# Patient Record
Sex: MALE | Race: BLACK OR AFRICAN AMERICAN | Employment: OTHER | URBAN - METROPOLITAN AREA
[De-identification: names, ages, dates, MRNs, and addresses within clinical notes are randomized per-mention and may not be internally consistent; named-entity substitution may affect disease eponyms.]

---

## 2018-02-28 ENCOUNTER — OFFICE VISIT (OUTPATIENT)
Dept: CARDIOLOGY CLINIC | Age: 68
End: 2018-02-28

## 2018-02-28 DIAGNOSIS — R94.31 ABNORMAL EKG: Primary | ICD-10-CM

## 2018-02-28 NOTE — PROGRESS NOTES
This is the first office visit here for Mr. Papo Trevizo since 2015. He is a hypertensive and diabetic being followed by Dr. Gagan Elizabeth.     The patient did not keep this appointment

## 2018-03-19 RX ORDER — PEN NEEDLE, DIABETIC 32GX 5/32"
NEEDLE, DISPOSABLE MISCELLANEOUS
Refills: 5 | Status: ON HOLD | COMMUNITY
Start: 2018-01-14 | End: 2019-11-05

## 2018-03-19 RX ORDER — DIPHENOXYLATE HYDROCHLORIDE AND ATROPINE SULFATE 2.5; .025 MG/1; MG/1
TABLET ORAL
Refills: 1 | COMMUNITY
Start: 2018-01-08 | End: 2019-09-05

## 2018-03-19 RX ORDER — INSULIN ASPART 100 [IU]/ML
INJECTION, SUSPENSION SUBCUTANEOUS
Refills: 6 | COMMUNITY
Start: 2018-01-13 | End: 2018-03-19 | Stop reason: ALTCHOICE

## 2018-03-19 RX ORDER — CLONIDINE HYDROCHLORIDE 0.1 MG/1
0.2 TABLET ORAL 3 TIMES DAILY
Refills: 6 | COMMUNITY
Start: 2018-02-10 | End: 2019-09-08 | Stop reason: DRUGHIGH

## 2018-03-19 RX ORDER — AMLODIPINE BESYLATE 5 MG/1
TABLET ORAL
Refills: 6 | COMMUNITY
Start: 2018-02-10 | End: 2018-03-19 | Stop reason: ALTCHOICE

## 2019-01-23 ENCOUNTER — HOSPITAL ENCOUNTER (OUTPATIENT)
Age: 69
Setting detail: OBSERVATION
Discharge: HOME | End: 2019-01-24
Attending: EMERGENCY MEDICINE | Admitting: GENERAL ACUTE CARE HOSPITAL
Payer: MEDICARE

## 2019-01-23 DIAGNOSIS — E16.2 HYPOGLYCEMIA: Primary | ICD-10-CM

## 2019-01-23 DIAGNOSIS — N17.9 AKI (ACUTE KIDNEY INJURY) (HCC): ICD-10-CM

## 2019-01-23 LAB
ALBUMIN SERPL-MCNC: 3 G/DL (ref 3.5–5)
ALBUMIN/GLOB SERPL: 0.6 {RATIO} (ref 1.1–2.2)
ALP SERPL-CCNC: 71 U/L (ref 45–117)
ALT SERPL-CCNC: 49 U/L (ref 12–78)
ANION GAP SERPL CALC-SCNC: 7 MMOL/L (ref 5–15)
AST SERPL-CCNC: 47 U/L (ref 15–37)
BASOPHILS # BLD: 0 K/UL (ref 0–0.1)
BASOPHILS NFR BLD: 0 % (ref 0–1)
BILIRUB SERPL-MCNC: 0.6 MG/DL (ref 0.2–1)
BUN SERPL-MCNC: 33 MG/DL (ref 6–20)
BUN/CREAT SERPL: 14 (ref 12–20)
CALCIUM SERPL-MCNC: 8.9 MG/DL (ref 8.5–10.1)
CHLORIDE SERPL-SCNC: 109 MMOL/L (ref 97–108)
CO2 SERPL-SCNC: 25 MMOL/L (ref 21–32)
CREAT SERPL-MCNC: 2.29 MG/DL (ref 0.7–1.3)
DIFFERENTIAL METHOD BLD: ABNORMAL
EOSINOPHIL # BLD: 0 K/UL (ref 0–0.4)
EOSINOPHIL NFR BLD: 0 % (ref 0–7)
ERYTHROCYTE [DISTWIDTH] IN BLOOD BY AUTOMATED COUNT: 13.5 % (ref 11.5–14.5)
GLOBULIN SER CALC-MCNC: 5 G/DL (ref 2–4)
GLUCOSE BLD STRIP.AUTO-MCNC: 101 MG/DL (ref 65–100)
GLUCOSE BLD STRIP.AUTO-MCNC: 147 MG/DL (ref 65–100)
GLUCOSE BLD STRIP.AUTO-MCNC: 151 MG/DL (ref 65–100)
GLUCOSE BLD STRIP.AUTO-MCNC: 53 MG/DL (ref 65–100)
GLUCOSE BLD STRIP.AUTO-MCNC: 78 MG/DL (ref 65–100)
GLUCOSE BLD STRIP.AUTO-MCNC: 88 MG/DL (ref 65–100)
GLUCOSE SERPL-MCNC: 90 MG/DL (ref 65–100)
HCT VFR BLD AUTO: 32.8 % (ref 36.6–50.3)
HGB BLD-MCNC: 10.5 G/DL (ref 12.1–17)
IMM GRANULOCYTES # BLD AUTO: 0 K/UL (ref 0–0.04)
IMM GRANULOCYTES NFR BLD AUTO: 0 % (ref 0–0.5)
LYMPHOCYTES # BLD: 0.6 K/UL (ref 0.8–3.5)
LYMPHOCYTES NFR BLD: 11 % (ref 12–49)
MCH RBC QN AUTO: 30 PG (ref 26–34)
MCHC RBC AUTO-ENTMCNC: 32 G/DL (ref 30–36.5)
MCV RBC AUTO: 93.7 FL (ref 80–99)
MONOCYTES # BLD: 0.3 K/UL (ref 0–1)
MONOCYTES NFR BLD: 5 % (ref 5–13)
NEUTS SEG # BLD: 4.6 K/UL (ref 1.8–8)
NEUTS SEG NFR BLD: 84 % (ref 32–75)
NRBC # BLD: 0 K/UL (ref 0–0.01)
NRBC BLD-RTO: 0 PER 100 WBC
PLATELET # BLD AUTO: 235 K/UL (ref 150–400)
PMV BLD AUTO: 10.6 FL (ref 8.9–12.9)
POTASSIUM SERPL-SCNC: 3.4 MMOL/L (ref 3.5–5.1)
PROT SERPL-MCNC: 8 G/DL (ref 6.4–8.2)
RBC # BLD AUTO: 3.5 M/UL (ref 4.1–5.7)
RBC MORPH BLD: ABNORMAL
SERVICE CMNT-IMP: ABNORMAL
SERVICE CMNT-IMP: NORMAL
SERVICE CMNT-IMP: NORMAL
SODIUM SERPL-SCNC: 141 MMOL/L (ref 136–145)
TROPONIN I SERPL-MCNC: <0.05 NG/ML
WBC # BLD AUTO: 5.5 K/UL (ref 4.1–11.1)

## 2019-01-23 PROCEDURE — 82962 GLUCOSE BLOOD TEST: CPT

## 2019-01-23 PROCEDURE — 99218 HC RM OBSERVATION: CPT

## 2019-01-23 PROCEDURE — 93005 ELECTROCARDIOGRAM TRACING: CPT

## 2019-01-23 PROCEDURE — 96365 THER/PROPH/DIAG IV INF INIT: CPT

## 2019-01-23 PROCEDURE — 80053 COMPREHEN METABOLIC PANEL: CPT

## 2019-01-23 PROCEDURE — 74011250636 HC RX REV CODE- 250/636: Performed by: NURSE PRACTITIONER

## 2019-01-23 PROCEDURE — 99285 EMERGENCY DEPT VISIT HI MDM: CPT

## 2019-01-23 PROCEDURE — 85025 COMPLETE CBC W/AUTO DIFF WBC: CPT

## 2019-01-23 PROCEDURE — 96366 THER/PROPH/DIAG IV INF ADDON: CPT

## 2019-01-23 PROCEDURE — 96374 THER/PROPH/DIAG INJ IV PUSH: CPT

## 2019-01-23 PROCEDURE — 96361 HYDRATE IV INFUSION ADD-ON: CPT

## 2019-01-23 PROCEDURE — 74011000258 HC RX REV CODE- 258: Performed by: NURSE PRACTITIONER

## 2019-01-23 PROCEDURE — 74011000250 HC RX REV CODE- 250

## 2019-01-23 PROCEDURE — 96375 TX/PRO/DX INJ NEW DRUG ADDON: CPT

## 2019-01-23 PROCEDURE — 36415 COLL VENOUS BLD VENIPUNCTURE: CPT

## 2019-01-23 PROCEDURE — 84484 ASSAY OF TROPONIN QUANT: CPT

## 2019-01-23 RX ORDER — DEXTROSE 50 % IN WATER (D50W) INTRAVENOUS SYRINGE
50
Status: COMPLETED | OUTPATIENT
Start: 2019-01-23 | End: 2019-01-23

## 2019-01-23 RX ORDER — MAGNESIUM SULFATE 100 %
4 CRYSTALS MISCELLANEOUS AS NEEDED
Status: DISCONTINUED | OUTPATIENT
Start: 2019-01-23 | End: 2019-01-24 | Stop reason: HOSPADM

## 2019-01-23 RX ORDER — AMLODIPINE BESYLATE 5 MG/1
10 TABLET ORAL DAILY
Status: DISCONTINUED | OUTPATIENT
Start: 2019-01-24 | End: 2019-01-24 | Stop reason: HOSPADM

## 2019-01-23 RX ORDER — DEXTROSE 50 % IN WATER (D50W) INTRAVENOUS SYRINGE
12.5-25 AS NEEDED
Status: DISCONTINUED | OUTPATIENT
Start: 2019-01-23 | End: 2019-01-24 | Stop reason: HOSPADM

## 2019-01-23 RX ORDER — ASPIRIN 81 MG/1
81 TABLET ORAL DAILY
Status: DISCONTINUED | OUTPATIENT
Start: 2019-01-24 | End: 2019-01-24 | Stop reason: HOSPADM

## 2019-01-23 RX ORDER — SODIUM CHLORIDE 0.9 % (FLUSH) 0.9 %
5-40 SYRINGE (ML) INJECTION AS NEEDED
Status: DISCONTINUED | OUTPATIENT
Start: 2019-01-23 | End: 2019-01-24 | Stop reason: HOSPADM

## 2019-01-23 RX ORDER — INSULIN LISPRO 100 [IU]/ML
INJECTION, SOLUTION INTRAVENOUS; SUBCUTANEOUS
Status: DISCONTINUED | OUTPATIENT
Start: 2019-01-24 | End: 2019-01-24 | Stop reason: HOSPADM

## 2019-01-23 RX ORDER — HYDRALAZINE HYDROCHLORIDE 20 MG/ML
20 INJECTION INTRAMUSCULAR; INTRAVENOUS
Status: DISCONTINUED | OUTPATIENT
Start: 2019-01-23 | End: 2019-01-24 | Stop reason: HOSPADM

## 2019-01-23 RX ORDER — SODIUM CHLORIDE 0.9 % (FLUSH) 0.9 %
5-40 SYRINGE (ML) INJECTION EVERY 8 HOURS
Status: DISCONTINUED | OUTPATIENT
Start: 2019-01-24 | End: 2019-01-24 | Stop reason: HOSPADM

## 2019-01-23 RX ORDER — HEPARIN SODIUM 5000 [USP'U]/ML
5000 INJECTION, SOLUTION INTRAVENOUS; SUBCUTANEOUS EVERY 8 HOURS
Status: DISCONTINUED | OUTPATIENT
Start: 2019-01-24 | End: 2019-01-24 | Stop reason: HOSPADM

## 2019-01-23 RX ORDER — DEXTROSE 50 % IN WATER (D50W) INTRAVENOUS SYRINGE
Status: COMPLETED
Start: 2019-01-23 | End: 2019-01-23

## 2019-01-23 RX ADMIN — DEXTROSE MONOHYDRATE: 10 INJECTION, SOLUTION INTRAVENOUS at 22:25

## 2019-01-23 RX ADMIN — DEXTROSE 50 % IN WATER (D50W) INTRAVENOUS SYRINGE 25 G: at 17:54

## 2019-01-23 RX ADMIN — DEXTROSE MONOHYDRATE 25 G: 500 INJECTION PARENTERAL at 17:54

## 2019-01-24 VITALS
TEMPERATURE: 98.1 F | HEIGHT: 70 IN | SYSTOLIC BLOOD PRESSURE: 168 MMHG | HEART RATE: 80 BPM | DIASTOLIC BLOOD PRESSURE: 96 MMHG | OXYGEN SATURATION: 98 % | RESPIRATION RATE: 18 BRPM | BODY MASS INDEX: 27.2 KG/M2 | WEIGHT: 190 LBS

## 2019-01-24 LAB
APPEARANCE UR: ABNORMAL
ATRIAL RATE: 57 BPM
BACTERIA URNS QL MICRO: NEGATIVE /HPF
BILIRUB UR QL: NEGATIVE
CALCULATED P AXIS, ECG09: 30 DEGREES
CALCULATED R AXIS, ECG10: 27 DEGREES
CALCULATED T AXIS, ECG11: -54 DEGREES
CHOLEST SERPL-MCNC: 144 MG/DL
COLOR UR: ABNORMAL
CREAT UR-MCNC: 94.8 MG/DL
DIAGNOSIS, 93000: NORMAL
EPITH CASTS URNS QL MICRO: ABNORMAL /LPF
EST. AVERAGE GLUCOSE BLD GHB EST-MCNC: 134 MG/DL
GLUCOSE BLD STRIP.AUTO-MCNC: 108 MG/DL (ref 65–100)
GLUCOSE BLD STRIP.AUTO-MCNC: 125 MG/DL (ref 65–100)
GLUCOSE BLD STRIP.AUTO-MCNC: 139 MG/DL (ref 65–100)
GLUCOSE BLD STRIP.AUTO-MCNC: 160 MG/DL (ref 65–100)
GLUCOSE BLD STRIP.AUTO-MCNC: 167 MG/DL (ref 65–100)
GLUCOSE BLD STRIP.AUTO-MCNC: 178 MG/DL (ref 65–100)
GLUCOSE BLD STRIP.AUTO-MCNC: 95 MG/DL (ref 65–100)
GLUCOSE UR STRIP.AUTO-MCNC: 100 MG/DL
HBA1C MFR BLD: 6.3 % (ref 4.2–6.3)
HDLC SERPL-MCNC: 38 MG/DL
HDLC SERPL: 3.8 {RATIO} (ref 0–5)
HGB UR QL STRIP: ABNORMAL
HYALINE CASTS URNS QL MICRO: ABNORMAL /LPF (ref 0–5)
KETONES UR QL STRIP.AUTO: NEGATIVE MG/DL
LDLC SERPL CALC-MCNC: 85.4 MG/DL (ref 0–100)
LEUKOCYTE ESTERASE UR QL STRIP.AUTO: NEGATIVE
LIPID PROFILE,FLP: NORMAL
NITRITE UR QL STRIP.AUTO: NEGATIVE
OSMOLALITY UR: 430 MOSM/KG H2O
P-R INTERVAL, ECG05: 166 MS
PH UR STRIP: 6 [PH] (ref 5–8)
POTASSIUM UR-SCNC: 42 MMOL/L
PROT UR STRIP-MCNC: >300 MG/DL
PROT UR-MCNC: 633 MG/DL (ref 0–11.9)
Q-T INTERVAL, ECG07: 536 MS
QRS DURATION, ECG06: 156 MS
QTC CALCULATION (BEZET), ECG08: 521 MS
RBC #/AREA URNS HPF: ABNORMAL /HPF (ref 0–5)
SERVICE CMNT-IMP: ABNORMAL
SERVICE CMNT-IMP: NORMAL
SODIUM UR-SCNC: 65 MMOL/L
SP GR UR REFRACTOMETRY: 1.02 (ref 1–1.03)
T4 FREE SERPL-MCNC: 1 NG/DL (ref 0.8–1.5)
TRIGL SERPL-MCNC: 103 MG/DL (ref ?–150)
TSH SERPL DL<=0.05 MIU/L-ACNC: 1.09 UIU/ML (ref 0.36–3.74)
UROBILINOGEN UR QL STRIP.AUTO: 1 EU/DL (ref 0.2–1)
VENTRICULAR RATE, ECG03: 57 BPM
VLDLC SERPL CALC-MCNC: 20.6 MG/DL
WBC URNS QL MICRO: ABNORMAL /HPF (ref 0–4)

## 2019-01-24 PROCEDURE — 80061 LIPID PANEL: CPT

## 2019-01-24 PROCEDURE — 84300 ASSAY OF URINE SODIUM: CPT

## 2019-01-24 PROCEDURE — 84133 ASSAY OF URINE POTASSIUM: CPT

## 2019-01-24 PROCEDURE — 84443 ASSAY THYROID STIM HORMONE: CPT

## 2019-01-24 PROCEDURE — 96366 THER/PROPH/DIAG IV INF ADDON: CPT

## 2019-01-24 PROCEDURE — 84439 ASSAY OF FREE THYROXINE: CPT

## 2019-01-24 PROCEDURE — 74011250636 HC RX REV CODE- 250/636: Performed by: GENERAL ACUTE CARE HOSPITAL

## 2019-01-24 PROCEDURE — 82570 ASSAY OF URINE CREATININE: CPT

## 2019-01-24 PROCEDURE — 96372 THER/PROPH/DIAG INJ SC/IM: CPT

## 2019-01-24 PROCEDURE — 94760 N-INVAS EAR/PLS OXIMETRY 1: CPT

## 2019-01-24 PROCEDURE — 83935 ASSAY OF URINE OSMOLALITY: CPT

## 2019-01-24 PROCEDURE — 81001 URINALYSIS AUTO W/SCOPE: CPT

## 2019-01-24 PROCEDURE — 36415 COLL VENOUS BLD VENIPUNCTURE: CPT

## 2019-01-24 PROCEDURE — 99218 HC RM OBSERVATION: CPT

## 2019-01-24 PROCEDURE — 83036 HEMOGLOBIN GLYCOSYLATED A1C: CPT

## 2019-01-24 PROCEDURE — 74011000258 HC RX REV CODE- 258: Performed by: NURSE PRACTITIONER

## 2019-01-24 PROCEDURE — 84156 ASSAY OF PROTEIN URINE: CPT

## 2019-01-24 PROCEDURE — 74011250636 HC RX REV CODE- 250/636: Performed by: NURSE PRACTITIONER

## 2019-01-24 PROCEDURE — 82962 GLUCOSE BLOOD TEST: CPT

## 2019-01-24 PROCEDURE — 96411 CHEMO IV PUSH ADDL DRUG: CPT

## 2019-01-24 PROCEDURE — 74011250637 HC RX REV CODE- 250/637: Performed by: GENERAL ACUTE CARE HOSPITAL

## 2019-01-24 RX ADMIN — ASPIRIN 81 MG: 81 TABLET, COATED ORAL at 08:22

## 2019-01-24 RX ADMIN — HEPARIN SODIUM 5000 UNITS: 5000 INJECTION INTRAVENOUS; SUBCUTANEOUS at 08:22

## 2019-01-24 RX ADMIN — HEPARIN SODIUM 5000 UNITS: 5000 INJECTION INTRAVENOUS; SUBCUTANEOUS at 00:13

## 2019-01-24 RX ADMIN — Medication 10 ML: at 13:35

## 2019-01-24 RX ADMIN — Medication 10 ML: at 06:17

## 2019-01-24 RX ADMIN — HYDRALAZINE HYDROCHLORIDE 20 MG: 20 INJECTION INTRAMUSCULAR; INTRAVENOUS at 00:13

## 2019-01-24 RX ADMIN — Medication 10 ML: at 00:18

## 2019-01-24 RX ADMIN — AMLODIPINE BESYLATE 10 MG: 5 TABLET ORAL at 08:22

## 2019-01-24 NOTE — ED PROVIDER NOTES
EMERGENCY DEPARTMENT HISTORY AND PHYSICAL EXAM 
 
 
Date: 1/23/2019 Patient Name: Mine Mckeon History of Presenting Illness Chief Complaint Patient presents with  Low Blood Sugar Pt reports he took his normal dose of insulin this morning and did not eat breakfast or lunch. EMS reports BS of 43 when they got there. He was given 25g of D50 and BS went to 140s. Pt A&Ox4 at present. History Provided By: Patient HPI: Mine Mckeon, 77 y.o. male with PMHx significant for DM and HTN, presents via EMS to the ED with cc of altered sensorium. He was driving when he suddenly felt as though he would pass out. He pulled over and activated EMS. BG level was 43 upon arrival. He states that the only insulin he took was 35 units of NPH this am. Pt denies fevers, chills, night sweats, chest pain, pressure, SOB, POWELL, PND, orthopnea, abdominal pain, n/v/d, melena, hematuria, dysuria, constipation, HA,and syncope.d No prior history of kidney disease. There are no other complaints, changes, or physical findings at this time. PCP: Katie Boggs MD 
 
No current facility-administered medications on file prior to encounter. Current Outpatient Medications on File Prior to Encounter Medication Sig Dispense Refill  cloNIDine HCl (CATAPRES) 0.1 mg tablet TK 1 T PO  BID  6  
 MASSIMO PEN NEEDLE 32 gauge x 5/32\" ndle USE AS DIRECTED WITH INSULIN  5  
 diphenoxylate-atropine (LOMOTIL) 2.5-0.025 mg per tablet TK 2 TS PO QID PRN  1  
 amLODIPine-benazepril (LOTREL) 10-40 mg per capsule Take 1 Cap by mouth daily.  insulin aspart protamine/insulin aspart (NOVOLOG MIX 70-30) 100 unit/mL (70-30) injection by SubCUTAneous route daily. 35 UNITS  aspirin delayed-release 81 mg tablet Take  by mouth as needed for Pain. Past History Past Medical History: 
Past Medical History:  
Diagnosis Date  Diabetes ([address]  Hypertension Past Surgical History: 
Past Surgical History: Procedure Laterality Date  HX GI    
 colonoscopy 6-7 yrs ago  [place] COLON CA SCRN NOT HI RSK IND  10/21/2015 Family History: No family history on file. Social History: 
Social History Tobacco Use  Smoking status: Never Smoker  Smokeless tobacco: Never Used Substance Use Topics  Alcohol use: No  
 Drug use: No  
 
 
Allergies: 
No Known Allergies Review of Systems Review of Systems Constitutional: Negative for activity change, appetite change, chills, diaphoresis, fatigue, fever and unexpected weight change. HENT: Negative for congestion, ear pain, rhinorrhea, sinus pressure, sore throat, tinnitus, trouble swallowing and voice change. Eyes: Negative for pain, discharge, redness and visual disturbance. Respiratory: Negative for apnea, cough, choking, chest tightness, shortness of breath, wheezing and stridor. Cardiovascular: Negative for chest pain, palpitations and leg swelling. Gastrointestinal: Negative for abdominal pain, constipation, nausea and vomiting. Endocrine: Negative for cold intolerance and heat intolerance. Genitourinary: Negative for difficulty urinating, dysuria, flank pain, hematuria, testicular pain and urgency. Musculoskeletal: Negative for arthralgias, back pain, gait problem, joint swelling, myalgias, neck pain and neck stiffness. Skin: Negative for color change, pallor, rash and wound. Allergic/Immunologic: Negative for immunocompromised state. Neurological: Negative for tremors, syncope, weakness, light-headedness, numbness and headaches. Hematological: Does not bruise/bleed easily. Psychiatric/Behavioral: Negative for agitation, confusion and suicidal ideas. Physical Exam  
Physical Exam  
Constitutional: He is oriented to person, place, and time. He appears well-developed and well-nourished. No distress. HENT:  
Head: Atraumatic. Nose: Nose normal.  
Mouth/Throat: No oropharyngeal exudate. Eyes: Conjunctivae and EOM are normal. Right eye exhibits no discharge. Left eye exhibits no discharge. No scleral icterus. Neck: Normal range of motion. Neck supple. No JVD present. No tracheal deviation present. No thyromegaly present. Cardiovascular: Normal rate and regular rhythm. Exam reveals no gallop and no friction rub. No murmur heard. Pulmonary/Chest: Breath sounds normal. No stridor. No respiratory distress. He has no wheezes. He has no rales. He exhibits no tenderness. Abdominal: Soft. Bowel sounds are normal. He exhibits no distension and no mass. There is no tenderness. There is no rebound and no guarding. Musculoskeletal: Normal range of motion. He exhibits no edema or tenderness. Lymphadenopathy:  
  He has no cervical adenopathy. Neurological: He is alert and oriented to person, place, and time. Coordination normal.  
Skin: Skin is warm and dry. He is not diaphoretic. Psychiatric: He has a normal mood and affect. His behavior is normal.  
Nursing note and vitals reviewed. Diagnostic Study Results Labs - Recent Results (from the past 12 hour(s)) GLUCOSE, POC Collection Time: 01/23/19  5:28 PM  
Result Value Ref Range Glucose (POC) 88 65 - 100 mg/dL Performed by Crystal Brooks GLUCOSE, POC Collection Time: 01/23/19  5:50 PM  
Result Value Ref Range Glucose (POC) 53 (L) 65 - 100 mg/dL Performed by Crystal Brooks GLUCOSE, POC Collection Time: 01/23/19  6:15 PM  
Result Value Ref Range Glucose (POC) 147 (H) 65 - 100 mg/dL Performed by Faisal Coburn EKG, 12 LEAD, INITIAL Collection Time: 01/23/19  6:27 PM  
Result Value Ref Range Ventricular Rate 57 BPM  
 Atrial Rate 57 BPM  
 P-R Interval 166 ms  
 QRS Duration 156 ms  
 Q-T Interval 536 ms  
 QTC Calculation (Bezet) 521 ms Calculated P Axis 30 degrees Calculated R Axis 27 degrees Calculated T Axis -54 degrees Diagnosis Sinus bradycardia with occasional premature ventricular complexes Right bundle branch block T wave abnormality, consider inferolateral ischemia No previous ECGs available METABOLIC PANEL, COMPREHENSIVE Collection Time: 01/23/19  7:06 PM  
Result Value Ref Range Sodium 141 136 - 145 mmol/L Potassium 3.4 (L) 3.5 - 5.1 mmol/L Chloride 109 (H) 97 - 108 mmol/L  
 CO2 25 21 - 32 mmol/L Anion gap 7 5 - 15 mmol/L Glucose 90 65 - 100 mg/dL BUN 33 (H) 6 - 20 MG/DL Creatinine 2.29 (H) 0.70 - 1.30 MG/DL  
 BUN/Creatinine ratio 14 12 - 20 GFR est AA 34 (L) >60 ml/min/1.73m2 GFR est non-AA 28 (L) >60 ml/min/1.73m2 Calcium 8.9 8.5 - 10.1 MG/DL Bilirubin, total 0.6 0.2 - 1.0 MG/DL  
 ALT (SGPT) 49 12 - 78 U/L  
 AST (SGOT) 47 (H) 15 - 37 U/L Alk. phosphatase 71 45 - 117 U/L Protein, total 8.0 6.4 - 8.2 g/dL Albumin 3.0 (L) 3.5 - 5.0 g/dL Globulin 5.0 (H) 2.0 - 4.0 g/dL A-G Ratio 0.6 (L) 1.1 - 2.2    
CBC WITH AUTOMATED DIFF Collection Time: 01/23/19  7:06 PM  
Result Value Ref Range WBC 5.5 4.1 - 11.1 K/uL  
 RBC 3.50 (L) 4.10 - 5.70 M/uL  
 HGB 10.5 (L) 12.1 - 17.0 g/dL HCT 32.8 (L) 36.6 - 50.3 % MCV 93.7 80.0 - 99.0 FL  
 MCH 30.0 26.0 - 34.0 PG  
 MCHC 32.0 30.0 - 36.5 g/dL  
 RDW 13.5 11.5 - 14.5 % PLATELET 914 416 - 331 K/uL MPV 10.6 8.9 - 12.9 FL  
 NRBC 0.0 0  WBC ABSOLUTE NRBC 0.00 0.00 - 0.01 K/uL NEUTROPHILS 84 (H) 32 - 75 % LYMPHOCYTES 11 (L) 12 - 49 % MONOCYTES 5 5 - 13 % EOSINOPHILS 0 0 - 7 % BASOPHILS 0 0 - 1 % IMMATURE GRANULOCYTES 0 0.0 - 0.5 % ABS. NEUTROPHILS 4.6 1.8 - 8.0 K/UL  
 ABS. LYMPHOCYTES 0.6 (L) 0.8 - 3.5 K/UL  
 ABS. MONOCYTES 0.3 0.0 - 1.0 K/UL  
 ABS. EOSINOPHILS 0.0 0.0 - 0.4 K/UL  
 ABS. BASOPHILS 0.0 0.0 - 0.1 K/UL  
 ABS. IMM. GRANS. 0.0 0.00 - 0.04 K/UL  
 DF AUTOMATED    
 RBC COMMENTS NORMOCYTIC, NORMOCHROMIC    
TROPONIN I  Collection Time: 01/23/19  7:06 PM  
 Result Value Ref Range Troponin-I, Qt. <0.05 <0.05 ng/mL GLUCOSE, POC Collection Time: 01/23/19  7:47 PM  
Result Value Ref Range Glucose (POC) 78 65 - 100 mg/dL Performed by Martina Kd GLUCOSE, POC Collection Time: 01/23/19  8:55 PM  
Result Value Ref Range Glucose (POC) 101 (H) 65 - 100 mg/dL Performed by [employer] Kd Radiologic Studies - No orders to display CT Results  (Last 48 hours) None CXR Results  (Last 48 hours) None Medical Decision Making I am the first provider for this patient. I reviewed the vital signs, available nursing notes, past medical history, past surgical history, family history and social history. Vital Signs-Reviewed the patient's vital signs. Patient Vitals for the past 12 hrs: 
 Temp Pulse Resp BP SpO2  
01/23/19 2101  68 17 (!) 165/96 99 % 01/23/19 1728  (!) 58 16 (!) 167/96 100 % Pulse Oximetry Analysis - 99% on RA Cardiac Monitor:  
Rate: 68 bpm 
Rhythm: Normal Sinus Rhythm Records Reviewed: Nursing Notes, Old Medical Records, Previous electrocardiograms, Previous Radiology Studies and Previous Laboratory Studies Provider Notes (Medical Decision Making):  
Hypoglycemia ASHVIN Routine laboratory data and UA Urine studies D10 Diet ordered Consult to Hospitalist 
 
CONSULT NOTE:  
9:32 PM 
Martita Ashton NP spoke with Dr. Beverli Bamberger, MD, Specialty: Hospitalist 
Discussed pt's hx, disposition, and available diagnostic and imaging results. Reviewed care plans. Consultant agrees with plans as outlined. Admit to inpt. Martita Ashton NP 
 
 
ED Course:  
Initial assessment performed. The patients presenting problems have been discussed, and they are in agreement with the care plan formulated and outlined with them. I have encouraged them to ask questions as they arise throughout their visit. Stable, ambulatory pt in NAD Critical Care Time:  
61 Disposition: Admit to inpt 
 
9:32 PM 
Patient is being admitted to the hospital.  The results of their tests and reasons for their admission have been discussed with them and/or available family. They convey agreement and understanding for the need to be admitted and for their admission diagnosis. Consultation has been made with the inpatient physician specialist for hospitalization. LABORATORY TESTS: 
Recent Results (from the past 12 hour(s)) GLUCOSE, POC Collection Time: 01/23/19  5:28 PM  
Result Value Ref Range Glucose (POC) 88 65 - 100 mg/dL Performed by [employer] GLUCOSE, POC Collection Time: 01/23/19  5:50 PM  
Result Value Ref Range Glucose (POC) 53 (L) 65 - 100 mg/dL Performed by [employer] GLUCOSE, POC Collection Time: 01/23/19  6:15 PM  
Result Value Ref Range Glucose (POC) 147 (H) 65 - 100 mg/dL Performed by Marcella Leung EKG, 12 LEAD, INITIAL Collection Time: 01/23/19  6:27 PM  
Result Value Ref Range Ventricular Rate 57 BPM  
 Atrial Rate 57 BPM  
 P-R Interval 166 ms  
 QRS Duration 156 ms  
 Q-T Interval 536 ms  
 QTC Calculation (Bezet) 521 ms Calculated P Axis 30 degrees Calculated R Axis 27 degrees Calculated T Axis -54 degrees Diagnosis Sinus bradycardia with occasional premature ventricular complexes Right bundle branch block T wave abnormality, consider inferolateral ischemia No previous ECGs available METABOLIC PANEL, COMPREHENSIVE Collection Time: 01/23/19  7:06 PM  
Result Value Ref Range Sodium 141 136 - 145 mmol/L Potassium 3.4 (L) 3.5 - 5.1 mmol/L Chloride 109 (H) 97 - 108 mmol/L  
 CO2 25 21 - 32 mmol/L Anion gap 7 5 - 15 mmol/L Glucose 90 65 - 100 mg/dL BUN 33 (H) 6 - 20 MG/DL Creatinine 2.29 (H) 0.70 - 1.30 MG/DL  
 BUN/Creatinine ratio 14 12 - 20 GFR est AA 34 (L) >60 ml/min/1.73m2 GFR est non-AA 28 (L) >60 ml/min/1.73m2 Calcium 8.9 8.5 - 10.1 MG/DL  Bilirubin, total 0.6 0.2 - 1.0 MG/DL  
 ALT (SGPT) 49 12 - 78 U/L  
 AST (SGOT) 47 (H) 15 - 37 U/L Alk. phosphatase 71 45 - 117 U/L Protein, total 8.0 6.4 - 8.2 g/dL Albumin 3.0 (L) 3.5 - 5.0 g/dL Globulin 5.0 (H) 2.0 - 4.0 g/dL A-G Ratio 0.6 (L) 1.1 - 2.2    
CBC WITH AUTOMATED DIFF Collection Time: 01/23/19  7:06 PM  
Result Value Ref Range WBC 5.5 4.1 - 11.1 K/uL  
 RBC 3.50 (L) 4.10 - 5.70 M/uL  
 HGB 10.5 (L) 12.1 - 17.0 g/dL HCT 32.8 (L) 36.6 - 50.3 % MCV 93.7 80.0 - 99.0 FL  
 MCH 30.0 26.0 - 34.0 PG  
 MCHC 32.0 30.0 - 36.5 g/dL  
 RDW 13.5 11.5 - 14.5 % PLATELET 414 618 - 144 K/uL MPV 10.6 8.9 - 12.9 FL  
 NRBC 0.0 0  WBC ABSOLUTE NRBC 0.00 0.00 - 0.01 K/uL NEUTROPHILS 84 (H) 32 - 75 % LYMPHOCYTES 11 (L) 12 - 49 % MONOCYTES 5 5 - 13 % EOSINOPHILS 0 0 - 7 % BASOPHILS 0 0 - 1 % IMMATURE GRANULOCYTES 0 0.0 - 0.5 % ABS. NEUTROPHILS 4.6 1.8 - 8.0 K/UL  
 ABS. LYMPHOCYTES 0.6 (L) 0.8 - 3.5 K/UL  
 ABS. MONOCYTES 0.3 0.0 - 1.0 K/UL  
 ABS. EOSINOPHILS 0.0 0.0 - 0.4 K/UL  
 ABS. BASOPHILS 0.0 0.0 - 0.1 K/UL  
 ABS. IMM. GRANS. 0.0 0.00 - 0.04 K/UL  
 DF AUTOMATED    
 RBC COMMENTS NORMOCYTIC, NORMOCHROMIC    
TROPONIN I Collection Time: 01/23/19  7:06 PM  
Result Value Ref Range Troponin-I, Qt. <0.05 <0.05 ng/mL GLUCOSE, POC Collection Time: 01/23/19  7:47 PM  
Result Value Ref Range Glucose (POC) 78 65 - 100 mg/dL Performed by [employer] Puls GLUCOSE, POC Collection Time: 01/23/19  8:55 PM  
Result Value Ref Range Glucose (POC) 101 (H) 65 - 100 mg/dL Performed by Rosy Puls IMAGING RESULTS: 
No orders to display No results found. MEDICATIONS GIVEN: 
Medications  
sodium chloride 0.9 % in dextrose 10% 1,040 mL infusion (not administered) dextrose (D50W) injection syrg 25 g (25 g IntraVENous Given 1/23/19 4455) IMPRESSION: 
1. Hypoglycemia 2. ASHVIN (acute kidney injury) ([facility] [address] PLAN: 
1.  Admit to Hospitalist  
 
 
 
 
 
 
 Diagnosis Clinical Impression: 1. Hypoglycemia 2. [facility]) [facility] Attestations: 
 
Yohana Jacobo NP 
9:34 PM

## 2019-01-24 NOTE — PROGRESS NOTES
Patient resting comfortably, sitting on the side of the bed, call bell w/in reach, no further needs expressed at this time, aware of POC.

## 2019-01-24 NOTE — PROGRESS NOTES
Oncology End of Shift Note Bedside shift change report given to Grady Flores RN (incoming nurse) by Magnus Street (outgoing nurse) on Hellen Webster. Report included the following information SBAR, Kardex and MAR. Shift Summary: pt arrived from ED,  Received PRN dose of hydralazine. BS obtained every two hours. Labs drawn Issues for Physician to Address:   
 
Patient on Cardiac Monitoring? [] Yes 
[x] No 
 
Rhythm:   
 
 
 
Shift Events Magn[address] Street

## 2019-01-24 NOTE — PROGRESS NOTES
Patient discharged and given discharge instructions. Patient had an opportunity to ask questions. Patient verbalized understanding of discharge instructions. Patient d/c from ED ambulatory, discharge instructions and prescriptions in hand. Patient accompanied by family. Pt refused a wheelchair. Walked with pt to his daughter car. 
 
1708 Discussed pt's BP of 168/96 and pt's refusal of insulin for BG of 160 with Addie Raphael MD. Pt stated he did not want to wait for a meal tray so he did not want insulin. No orders received.

## 2019-01-24 NOTE — PROGRESS NOTES
Problem: Diabetes Self-Management Goal: *Monitoring blood glucose, interpreting and using results Identify recommended blood glucose targets  and personal targets. AC & HS, PRN Goal: *Insulin pump training Outcome: Resolved/Met Date Met: 01/24/19 
n/a

## 2019-01-24 NOTE — DISCHARGE INSTRUCTIONS
HOSPITALIST DISCHARGE INSTRUCTIONS    NAME: Nilsa Irizarry   :  1950   MRN:  [id number]     Date/Time:  2019 3:17 PM    ADMIT DATE: 2019   DISCHARGE DATE: 2019         · It is important that you take the medication exactly as they are prescribed. · Keep your medication in the bottles provided by the pharmacist and keep a list of the medication names, dosages, and times to be taken in your wallet. · Do not take other medications without consulting your doctor. What to do at Home    Recommended diet:  Diabetic Diet    Recommended activity: Activity as tolerated      If you have questions regarding the hospital related prescriptions or hospital related issues please call [facility] at [phone]. You can always direct your questions to your primary care doctor if you are unable to reach your hospital physician; your PCP works as an extension of your hospital doctor just like your hospital doctor is an extension of your PCP for your time at the hospital [facility], [facility])    If you experience any of the following symptoms then please call your primary care physician or return to the emergency room if you cannot get hold of your doctor:    Fever, chills, nausea, vomiting, or persistent diarrhea  Worsening weakness or new problems with your speech or balance  Dark stools or visible blood in your stools  New Leg swelling or shortness of breath as these could be signs of a clot    Additional Instructions:      Bring these papers with you to your follow up appointments. The papers will help your doctors be sure to continue the care plan from the hospital.              Information obtained by :  I understand that if any problems occur once I am at home I am to contact my physician. I understand and acknowledge receipt of the instructions indicated above. Physician's or R.N.'s Signature                                                                  Date/Time                                                                                                                                              Patient or Representative Signature

## 2019-01-24 NOTE — PROGRESS NOTES
Reason for Admission:   Pt was admitted under OBS on 1/25/19 d/t dx of Hypoglycemia in setting of NPH.  AMS. ASHVIN vs CKD and HTN 
               
RRAT Score:     12 Do you (patient/family) have any concerns for transition/discharge? No concerns. Pt stated that he has received DM teaching in the past and knows what he should do. Plan for utilizing home health:    No ordered at this time. Likelihood of readmission? MOD Transition of Care Plan:      Pt lives alone in two story home with no steps to enter. 15 steps up to second floor. Pt has no DME. Pt has [employer] RN that calls to check on him. Pt has used HH in the past but can't remember the name of the agency. Pt has no SNF experience. Pt was I W ADLs and drives. Pharmacy is [facility]. DTR is going to transport Pt home in car. Pt is ready to DC home today. Appt is on AVS. No further CM needs. Care Management Interventions PCP Verified by CM: Yes 
Palliative Care Criteria Met (RRAT>21 & CHF Dx)?: No 
Mode of Transport at Discharge: Other (see comment) Transition of Care Consult (CM Consult): Discharge Planning MyChart Signup: No 
Discharge Durable Medical Equipment: No 
Physical Therapy Consult: No 
Occupational Therapy Consult: No 
Speech Therapy Consult: No 
Current Support Network: Lives with Spouse, Own Home Confirm Follow Up Transport: Self Plan discussed with Pt/Family/Caregiver: Yes Freedom of Choice Offered: Yes Discharge Location Discharge Placement: Home with family assistance Chris Archer CM Ext [phone]

## 2019-01-24 NOTE — ED NOTES
TRANSFER - OUT REPORT: 
 
Verbal report given to [facility], RN(name) on Rafaela Bustillo  being transferred to 1135(unit) for routine progression of care Report consisted of patients Situation, Background, Assessment and  
Recommendations(SBAR). Information from the following report(s) ED Summary was reviewed with the receiving nurse. Opportunity for questions and clarification was provided. Patient transported with: 
 [employer]

## 2019-01-24 NOTE — DISCHARGE SUMMARY
Hospitalist Discharge Summary     Patient ID:  Anupam Hatch  [id number]  77 y.o.  1950    PCP on record: Steff Vasquez MD    Admit date: 1/23/2019  Discharge date and time: 1/24/2019      DISCHARGE DIAGNOSIS:    Acute metabolic encephalopathy due to hypoglycemia  CKD? HTN    CONSULTATIONS:  None    Excerpted HPI from H&P of Feliberto Ibarra MD:  CHIEF COMPLAINT: confusion, AMS     HISTORY OF PRESENT ILLNESS:     Anupam Hatch is a 77 y.o.  male who presents with CC listed above. Pt states that he was feeling well this morning and doing his normal routine which involves taking 35 units of NPH in the morning. He states that he did not eat or drink anything after taking his insulin. He was driving when he suddenly felt as though he would pass out. He pulled over and activated EMS. BG level was 43 upon arrival.   In the ER pt has received multiple rounds of juice and crackers in an attempt to increase his BS. Eventually placed on D10 drip.    ______________________________________________________________________  DISCHARGE SUMMARY/HOSPITAL COURSE:  for full details see H&P, daily progress notes, labs, consult notes. Hypogylcemia, in the setting of NPH use   AMS, resolved  -Pt reports giving himself his usual 70/30 dose of insulin but ate only a salad, resulting in him passing out from hypoglycemia. -weaned off D10 drip and his BG has remained stable. He can restart his usual insulin regimen tomorrow and he knows to hold his insulin if he plans on skipping or delaying his breakfast.    ASHVIN vs CKD? HTN  -Cr 2.  He does not know his baseline but recalls being told in the past that his renal function was a bit off. He request to go home today and I think this is reasonable. I gave him a copy of his BMP to show his PCP next week. He plans to call his PCP tomorrow. If this is not his baseline, then his PCP can hold his ARB or lower the dose. _______________________________________________________________________  Patient seen and examined by me on discharge day. Pertinent Findings:  Gen:    Not in distress  Chest: Clear lungs  CVS:   Regular rhythm. No edema  Abd:  Soft, not distended, not tender  Neuro:  Alert, Oriented x 4, grossly non focal exam  _______________________________________________________________________  DISCHARGE MEDICATIONS:   Current Discharge Medication List      CONTINUE these medications which have NOT CHANGED    Details   cloNIDine HCl (CATAPRES) 0.1 mg tablet TK 1 T PO  BID  Refills: 6      MASSIMO PEN NEEDLE 32 gauge x 5/32\" ndle USE AS DIRECTED WITH INSULIN  Refills: 5      diphenoxylate-atropine (LOMOTIL) 2.5-0.025 mg per tablet TK 2 TS PO QID PRN  Refills: 1      amLODIPine-benazepril (LOTREL) 10-40 mg per capsule Take 1 Cap by mouth daily. insulin aspart protamine/insulin aspart (NOVOLOG MIX 70-30) 100 unit/mL (70-30) injection by SubCUTAneous route daily. 35 UNITS      aspirin delayed-release 81 mg tablet Take  by mouth as needed for Pain. My Recommended Diet, Activity, Wound Care, and follow-up labs are listed in the patient's Discharge Insturctions which I have personally completed and reviewed.   Risk of deterioration: Low    Condition at Discharge:  Stable  _____________________________________________________________________    Disposition  Home with family, no needs  ____________________________________________________________________    Care Plan discussed with:   Patient, Family, RN, Care Manager    ____________________________________________________________________    Code Status: Full Code  ____________________________________________________________________      Condition at Discharge:  Stable  _____________________________________________________________________  Follow up with:   PCP : Cynthia Huff MD  Follow-up Information     Follow up With Specialties Details Why Contact Info Vane Alfredo MD Family Practice In 3 days  [address]  [phone]                Total time in minutes spent coordinating this discharge (includes going over instructions, follow-up, prescriptions, and preparing report for sign off to her PCP) :  35 minutes    Signed:  Bernice Rivera MD
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
## 2019-01-28 LAB
ALBUMIN MFR UR ELPH: 61.4 %
ALPHA1 GLOB MFR UR ELPH: 4.7 %
ALPHA2 GLOB 24H MFR UR ELPH: 5 %
B-GLOBULIN 24H MFR UR ELPH: 9.9 %
GAMMA GLOB 24H MFR UR ELPH: 19 %
M PROTEIN MFR UR ELPH: NORMAL %
PLEASE NOTE:, 133800: NORMAL
PROT UR-MCNC: 761 MG/DL

## 2019-03-07 ENCOUNTER — HOSPITAL ENCOUNTER (OUTPATIENT)
Dept: ULTRASOUND IMAGING | Age: 69
Discharge: HOME | End: 2019-03-07
Attending: INTERNAL MEDICINE
Payer: MEDICARE

## 2019-03-07 DIAGNOSIS — N18.30 CHRONIC KIDNEY DISEASE, STAGE III (MODERATE) (HCC): ICD-10-CM

## 2019-03-07 PROCEDURE — 76770 US EXAM ABDO BACK WALL COMP: CPT

## 2019-04-10 NOTE — PROGRESS NOTES
Bedside shift change report given to Guillaume Ortiz rn (oncoming nurse) by Kathy James (offgoing nurse). Report included the following information SBAR, Kardex, Procedure Summary, Intake/Output, MAR and Recent Results. English

## 2019-04-17 ENCOUNTER — HOSPITAL ENCOUNTER (OUTPATIENT)
Dept: VASCULAR SURGERY | Age: 69
Discharge: HOME | End: 2019-04-17
Attending: PODIATRIST
Payer: MEDICARE

## 2019-04-17 DIAGNOSIS — R60.9 EDEMA: ICD-10-CM

## 2019-04-17 PROCEDURE — 93970 EXTREMITY STUDY: CPT

## 2019-06-03 ENCOUNTER — HOSPITAL ENCOUNTER (OUTPATIENT)
Dept: PHYSICAL THERAPY | Age: 69
Discharge: HOME | End: 2019-06-03
Payer: MEDICARE

## 2019-06-03 PROCEDURE — 97161 PT EVAL LOW COMPLEX 20 MIN: CPT

## 2019-06-03 PROCEDURE — 97110 THERAPEUTIC EXERCISES: CPT
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
## 2019-06-05 ENCOUNTER — HOSPITAL ENCOUNTER (OUTPATIENT)
Dept: PHYSICAL THERAPY | Age: 69
Discharge: HOME | End: 2019-06-05
Payer: MEDICARE

## 2019-06-05 PROCEDURE — 97110 THERAPEUTIC EXERCISES: CPT

## 2019-06-05 NOTE — PROGRESS NOTES
[facility]  [address], [address] [facility]    OUTPATIENT physical Therapy DAILY Treatment NOTe  Visit: 2    NAME: Donna Blood AGE: 77 y.o. GENDER: male  DATE: 6/5/2019  REFERRING PHYSICIAN: Silverio Burris MD        GOALS  Short term goals  Time frame: 4  1. Patient will be compliant and independent with the initial HEP as evidenced by being able to perform without cuing. 2. Patient will report a 40% improvement in symptoms. 3. Patient report a 40% improvement in sleeping. 4. Patient will have an increased tolerance for standing to allow >15 minutes of the activity before symptoms start. 5. Patient will tolerate 30 minutes of clinic activities before onset of symptoms.      Long term goals  Time frame: 8  1. Patient will report pain level decrease to 3/10 with activity to allow increased ease of movement. 2. Patient will have an improved score on the [facility] Angel outcome measure by 5 points to demonstrate an increase in functional activity tolerance. 3. Patient will be independent in final individualized HEP. 4. Patient will have an increase in hip flexion, adduction, abduction strength to 4+/5 to allow performance of dynamic gait tasks. 5. Patient will demonstrate improved postural awareness and positioning strategies to allow for >30 minutes sitting without being limited by symptoms. 6. Patient will sleep 6-8 hours without being interrupted by pain. SUBJECTIVE:   \"It feels tight, like I need to stretch it out. \"  \"Sometimes I have this horrible pain in my tail bone. \" (indicating his coccyx)   Pain In: 6/10    OBJECTIVE DATA SUMMARY:       EXAMINATION/PRESENTATION/DECISION MAKING:   Pain:  Location: Lumbar L4-5  Quality: aching and pain  Now: 7/10  Best: 0/10  Worst: 8/10  Factors that improve pain: rest, heat     Posture:   Rounded shoulders, generally forward head     Strength:   Hip add 3+/5     Range of Motion:   Lumbar Spine (AROM)  (*Measured 3rd finger from the floor)  Flexion*                Extension           ~50% available  R side bend*      58  L side bend*      55  R rotation           ~75% available  L rotation           ~75% available        Spinal Assessment:   Diminished lumbar lordosis        Joint Mobility:   Generally hypomobile     Palpation:   TTP and increased turgor right periscapula  TTP lumbar paraspinals     Neurologic Assessment:               Tone: normal               Sensation: intact               Reflexes: NT     Special Tests:   - SLR, increased tightness/buttock pain L > R     Edema: 3+ pitting edema bilateral ankles        Mobility:               Transitional Movements:                 Gait: SPC use with noted path deviations as he left the clinic     Balance:       Functional Measure:   Joseph Octave: 16/28        TREATMENT/INTERVENTION:  Modalities (Rationale): MHP to cervical and lumbar spine x10 min in hooklying post session to decrease guarding and pain        Therapeutic Exercises:  Initial HEP:  Scapular retraction  Shoulder rolls  Cervical retraction  Lower cervical/upper thoracic stretch  SKTC  LTR    Activities in bold completed this date: Additional clinical activities:    Seated: Forward flex over blue physio x10  Thoracic flexion/extesion over towel  Hamstring stretch on stool 3x20 sec  Rows with red TB x10  UT stretch  Lower cervical/upper thoracic stretch       Manual Therapy:  STM UT and right rhomboids  Hamstring, piriformis stretches 2x30 each     Activity tolerance and post treatment pain report:   Pain Out: 4/10    Education:  Education was provided to the patient on the following topics: Reinforced stretching while avoiding pain. [x]    No changes were made to the home exercise program.  []    The following changes were made to the home exercise program:   Patient verbalized understanding of the topics presented.       ASSESSMENT:   Patient returns for first visit following IE with reported improvement in pain and good compliance with home exercises. Challenged with form on rows and UT stretches but improved with demonstration and manual cues. Patients progression toward goals is as follows:  [x]     Improving appropriately and progressing toward goals  []     Improving slowly and progressing toward goals  []     Not making progress toward goals and plan of care will be adjusted    PLAN OF CARE:   Patient continues to benefit from skilled intervention to address the above impairments. [x]    Continue treatment per established plan of care.   []     Recommend the following changes to the plan of care:     Recommendations/Intent for next treatment: advance clinical activity, add UBE and standing rows/pulldowns    Kelli Guaman, PT, DPT   Time Calculation: 47 mins  Patient Time in clinic:   Start Time: 1430   Stop Time: [address]

## 2019-06-11 ENCOUNTER — HOSPITAL ENCOUNTER (OUTPATIENT)
Dept: PHYSICAL THERAPY | Age: 69
Discharge: HOME | End: 2019-06-11
Payer: MEDICARE

## 2019-06-11 PROCEDURE — 97014 ELECTRIC STIMULATION THERAPY: CPT

## 2019-06-11 PROCEDURE — 97110 THERAPEUTIC EXERCISES: CPT

## 2019-06-11 NOTE — PROGRESS NOTES
[facility]  [address], [address] [facility]    OUTPATIENT physical Therapy DAILY Treatment NOTe  Visit: 3    NAME: Kraig Cuba AGE: 77 y.o. GENDER: male  DATE: 6/11/2019  REFERRING PHYSICIAN: July Hammer MD        GOALS  Short term goals  Time frame: 4  1. Patient will be compliant and independent with the initial HEP as evidenced by being able to perform without cuing. 2. Patient will report a 40% improvement in symptoms. 3. Patient report a 40% improvement in sleeping. 4. Patient will have an increased tolerance for standing to allow >15 minutes of the activity before symptoms start. 5. Patient will tolerate 30 minutes of clinic activities before onset of symptoms.      Long term goals  Time frame: 8  1. Patient will report pain level decrease to 3/10 with activity to allow increased ease of movement. 2. Patient will have an improved score on the TXU Ernie outcome measure by 5 points to demonstrate an increase in functional activity tolerance. 3. Patient will be independent in final individualized HEP. 4. Patient will have an increase in hip flexion, adduction, abduction strength to 4+/5 to allow performance of dynamic gait tasks. 5. Patient will demonstrate improved postural awareness and positioning strategies to allow for >30 minutes sitting without being limited by symptoms. 6. Patient will sleep 6-8 hours without being interrupted by pain.            SUBJECTIVE:   Pain in low back and shoulders (points to UT)    Pain In: 7/10    OBJECTIVE DATA SUMMARY:       EXAMINATION/PRESENTATION/DECISION MAKING:   Pain:  Location: Lumbar L4-5  Quality: aching and pain  Now: 7/10  Best: 0/10  Worst: 8/10  Factors that improve pain: rest, heat     Posture:   Rounded shoulders, generally forward head     Strength:   Hip add 3+/5     Range of Motion:   Lumbar Spine (AROM)  (*Measured 3rd finger from the floor)  Flexion*                Extension           ~50% available  R side bend*      58  L side bend*      55  R rotation           ~75% available  L rotation           ~75% available        Spinal Assessment:   Diminished lumbar lordosis        Joint Mobility:   Generally hypomobile     Palpation:   TTP and increased turgor right periscapula  TTP lumbar paraspinals     Neurologic Assessment:               Tone: normal               Sensation: intact               Reflexes: NT     Special Tests:   - SLR, increased tightness/buttock pain L > R     Edema: 3+ pitting edema bilateral ankles        Mobility:               Transitional Movements:                 Gait: SPC use with noted path deviations as he left the clinic     Balance:       Functional Measure:   Erle Miracle: 16/28        TREATMENT/INTERVENTION:  Modalities (Rationale): MHP and ES to cervical and lumbar spine x15 min post session to decrease guarding and pain        Therapeutic Exercises:  Initial HEP:  Scapular retraction  Shoulder rolls  Cervical retraction  Lower cervical/upper thoracic stretch  SKTC  LTR    Activities in bold completed this date: Additional clinical activities:    Supine:  SKTC x 10 reps  LTR x 10 reps  Figure 4 piriformis stretch 2 reps B 30 hold  Single leg lumbar rotation stretch    Standing:  Rows with green TB x 10 reps  Pull downs with green TB x 10 reps  Hip flex and abduction B 10 reps, no resistance    Seated: Forward flex over blue physio x10  Thoracic flexion/extesion over towel  Trunk SB x 10 reps  Hamstring stretch on stool 3x20 sec  UT stretch   2 reps with 30 sec hold  Lower cervical/upper thoracic stretch    UBE/LBE x 5 min no resistance       Manual Therapy:  IASTM to B UT, levator and rhomboids. Procedure explained to pt, he expressed understanding and visualized the response. Left UT increased turgor.       Activity tolerance and post treatment pain report:   Pain Out: 4/10    Education:  Education was provided to the patient on the following topics: Reinforced stretching while avoiding pain. [x]    No changes were made to the home exercise program.  []    The following changes were made to the home exercise program:   Patient verbalized understanding of the topics presented. ASSESSMENT:   Pt reports decreased pain in low back and UT, continues to require verbal cues for form. Tolerated  Advanced clinic activities well. Assess response to ES and IASTM on next visit. Patients progression toward goals is as follows:  [x]     Improving appropriately and progressing toward goals  []     Improving slowly and progressing toward goals  []     Not making progress toward goals and plan of care will be adjusted    PLAN OF CARE:   Patient continues to benefit from skilled intervention to address the above impairments. [x]    Continue treatment per established plan of care.   []     Recommend the following changes to the plan of care:     Recommendations/Intent for next treatment: advance clinical activity, assess response to ES and IASTM     Elliot Andrea, PT   Time Calculation: 45 mins  Patient Time in clinic:   Start Time: 1330   Stop Time: [id number] [phone]

## 2019-06-13 ENCOUNTER — HOSPITAL ENCOUNTER (OUTPATIENT)
Dept: PHYSICAL THERAPY | Age: 69
Discharge: HOME | End: 2019-06-13
Payer: MEDICARE

## 2019-06-13 PROCEDURE — 97014 ELECTRIC STIMULATION THERAPY: CPT | Performed by: PHYSICAL THERAPIST

## 2019-06-13 PROCEDURE — 97110 THERAPEUTIC EXERCISES: CPT | Performed by: PHYSICAL THERAPIST

## 2019-06-13 NOTE — PROGRESS NOTES
[facility]  [address], [address] [facility]    OUTPATIENT physical Therapy DAILY Treatment NOTe  Visit: 4    NAME: Rachel Castellanos AGE: 77 y.o. GENDER: male  DATE: 6/13/2019  REFERRING PHYSICIAN: Cordelia Cummins MD       GOALS  Short term goals  Time frame: 4  1. Patient will be compliant and independent with the initial HEP as evidenced by being able to perform without cuing. 2. Patient will report a 40% improvement in symptoms. 3. Patient report a 40% improvement in sleeping. 4. Patient will have an increased tolerance for standing to allow >15 minutes of the activity before symptoms start. 5. Patient will tolerate 30 minutes of clinic activities before onset of symptoms.      Long term goals  Time frame: 8  1. Patient will report pain level decrease to 3/10 with activity to allow increased ease of movement. 2. Patient will have an improved score on the TXU Ernie outcome measure by 5 points to demonstrate an increase in functional activity tolerance. 3. Patient will be independent in final individualized HEP. 4. Patient will have an increase in hip flexion, adduction, abduction strength to 4+/5 to allow performance of dynamic gait tasks. 5. Patient will demonstrate improved postural awareness and positioning strategies to allow for >30 minutes sitting without being limited by symptoms. 6. Patient will sleep 6-8 hours without being interrupted by pain. SUBJECTIVE:   \"It's sore. \"    Pain In: 7/10 low back    OBJECTIVE DATA SUMMARY:   Objective data from initial evaluation  EXAMINATION/PRESENTATION/DECISION MAKING:   Pain:  Location: Lumbar L4-5  Quality: aching and pain  Now: 7/10  Best: 0/10  Worst: 8/10  Factors that improve pain: rest, heat     Posture:   Rounded shoulders, generally forward head     Strength:   Hip add 3+/5     Range of Motion:   Lumbar Spine (AROM)  (*Measured 3rd finger from the floor)  Flexion              Extension ~50% available  R side bend     58  L side bend      55  R rotation           ~75% available  L rotation           ~75% available        Spinal Assessment:   Diminished lumbar lordosis     Joint Mobility:   Generally hypomobile     Palpation:   TTP and increased turgor right periscapula  TTP lumbar paraspinals     Neurologic Assessment:               Tone: normal               Sensation: intact               Reflexes: NT     Special Tests:   - SLR, increased tightness/buttock pain L > R     Edema: 3+ pitting edema bilateral ankles        Mobility:               Transitional Movements: Increased time to perform               Gait: SPC use with noted path deviations as he left the clinic     Balance: Impaired; TU seconds     Functional Measure:   TXU Ernie:        TREATMENT/INTERVENTION:  Modalities (Rationale): MHP to cervical and lumbar spine x15 min post session to decrease guarding and pain; no skin irritation noted  E stim to bilateral UT and lumbar paraspinals with MHP        Therapeutic Exercises:  Initial HEP: Scapular retraction, Shoulder rolls, Cervical retraction, Lower cervical/upper thoracic stretch, SKTC, LTR    Additional clinical activities in BOLD performed this date:    UBE/LBE x 5 minutes, level 2    Supine:  SKTC x 5 reps B  LTR x 10 reps  Figure 4 piriformis stretch: 3 reps with 30 sec hold B  Single leg lumbar rotation stretch    Standing:  Rows with green TB x 10 reps  Pull downs with green TB x 10 reps  Hip extension and abduction B x 10 reps, no resistance    Seated: Forward flex over blue physio x10 reps; 5 reps side to side  Thoracic flexion/extension over towel: 10 reps   Trunk SB stretch x 10 reps  Trunk rotation stretch x 5 reps  Hamstring stretch on stool 3x20 sec  UT stretch   2 reps with 30 sec hold  Lower cervical/upper thoracic stretch     Manual Therapy:  IASTM to B UT, levator and rhomboids.   Procedure explained to pt, he expressed understanding and visualized the response. Left UT increased turgor. Activity tolerance and post treatment pain report:   Good; Pain Out: 4/10    Education:  Education was provided to the patient on the following topics: Reinforced stretching while avoiding pain. [x]    No changes were made to the home exercise program.  []    The following changes were made to the home exercise program:   Patient verbalized understanding of the topics presented. ASSESSMENT:   Patient presents with 7/10 pain in bilateral low back. He also reports pain in bilateral UT. Patient experiences the most pain when trying to fall asleep at night. He reported pain relief from IASTM and e stim trial last session. Patient tolerated clinic exercises well with cues for form as needed. Patients progression toward goals is as follows:  [x]     Improving appropriately and progressing toward goals  []     Improving slowly and progressing toward goals  []     Not making progress toward goals and plan of care will be adjusted    PLAN OF CARE:   Patient continues to benefit from skilled intervention to address the above impairments. [x]    Continue treatment per established plan of care.   []     Recommend the following changes to the plan of care:     Recommendations/Intent for next treatment: advance clinical activity, assess response to ES and IASTM     Monica Bates, PT   Time Calculation: 48 mins  Patient Time in clinic:   Start Time: 3484   Stop Time: [phone]

## 2019-06-18 ENCOUNTER — HOSPITAL ENCOUNTER (OUTPATIENT)
Dept: PHYSICAL THERAPY | Age: 69
Discharge: HOME | End: 2019-06-18
Payer: MEDICARE

## 2019-06-18 PROCEDURE — 97110 THERAPEUTIC EXERCISES: CPT

## 2019-06-18 PROCEDURE — 97014 ELECTRIC STIMULATION THERAPY: CPT

## 2019-06-18 NOTE — PROGRESS NOTES
[facility]  [address], [address] [facility]    OUTPATIENT physical Therapy DAILY Treatment NOTe  Visit: 5    NAME: Bernerd Koyanagi AGE: 77 y.o. GENDER: male  DATE: 6/18/2019  REFERRING PHYSICIAN: Deandre Leslie MD       GOALS  Short term goals  Time frame: 4  1. Patient will be compliant and independent with the initial HEP as evidenced by being able to perform without cuing. 2. Patient will report a 40% improvement in symptoms. 3. Patient report a 40% improvement in sleeping. 4. Patient will have an increased tolerance for standing to allow >15 minutes of the activity before symptoms start. 5. Patient will tolerate 30 minutes of clinic activities before onset of symptoms.      Long term goals  Time frame: 8  1. Patient will report pain level decrease to 3/10 with activity to allow increased ease of movement. 2. Patient will have an improved score on the TXU Ernie outcome measure by 5 points to demonstrate an increase in functional activity tolerance. 3. Patient will be independent in final individualized HEP. 4. Patient will have an increase in hip flexion, adduction, abduction strength to 4+/5 to allow performance of dynamic gait tasks. 5. Patient will demonstrate improved postural awareness and positioning strategies to allow for >30 minutes sitting without being limited by symptoms. 6. Patient will sleep 6-8 hours without being interrupted by pain. SUBJECTIVE:   \"It  still hurts. \"    Pain In: 7/10 low back, 6/10 for neck    OBJECTIVE DATA SUMMARY:   Objective data from initial evaluation  EXAMINATION/PRESENTATION/DECISION MAKING:   Pain:  Location: Lumbar L4-5  Quality: aching and pain  Now: 7/10  Best: 0/10  Worst: 8/10  Factors that improve pain: rest, heat     Posture:   Rounded shoulders, generally forward head     Strength:   Hip add 3+/5     Range of Motion:   Lumbar Spine (AROM)  (*Measured 3rd finger from the floor)  Flexion Extension           ~50% available  R side bend     58  L side bend      55  R rotation           ~75% available  L rotation           ~75% available        Spinal Assessment:   Diminished lumbar lordosis     Joint Mobility:   Generally hypomobile     Palpation:   TTP and increased turgor right periscapula  TTP lumbar paraspinals     Neurologic Assessment:               Tone: normal               Sensation: intact               Reflexes: NT     Special Tests:   - SLR, increased tightness/buttock pain L > R     Edema: 3+ pitting edema bilateral ankles        Mobility:               Transitional Movements: Increased time to perform               Gait: SPC use with noted path deviations as he left the clinic     Balance: Impaired; TU seconds     Functional Measure:   [facility]:        TREATMENT/INTERVENTION:  Modalities (Rationale): MHP to cervical and lumbar spine x15 min post session to decrease guarding and pain; no skin irritation noted  E stim to bilateral UT and lumbar paraspinals with MHP        Therapeutic Exercises:  Initial HEP: Scapular retraction, Shoulder rolls, Cervical retraction, Lower cervical/upper thoracic stretch, SKTC, LTR    Additional clinical activities in BOLD performed this date:    UBE/LBE x 5 minutes, level 3    Supine:  SKTC x 5 reps B  LTR x 10 reps  Figure 4 piriformis stretch: 3 reps with 30 sec hold B  Bridges x 10 reps    Standing:  Rows at Quantum 25# x 10 reps  Pull downs at Quantum 25# x 10 reps  Hip extension and abduction B x 10 reps, red TB for ext, no resistance for abduction  Mini squats x 10 reps    Seated: Forward flex over blue physio x10 reps; 5 reps side to side  Thoracic flexion/extension over towel: 10 reps   Trunk SB stretch x 10 reps  Trunk rotation stretch x 5 reps  Hamstring stretch  3x20 sec  UT stretch   2 reps with 30 sec hold  Lower cervical/upper thoracic stretch      Manual Therapy:  IASTM to B UT, levator and rhomboids.   Procedure explained to pt, he expressed understanding and visualized the response. Left UT increased turgor. Activity tolerance and post treatment pain report:   Good; Pain Out: 4/10    Education:  Education was provided to the patient on the following topics: Reinforced stretching while avoiding pain. [x]    No changes were made to the home exercise program.  []    The following changes were made to the home exercise program:   Patient verbalized understanding of the topics presented. ASSESSMENT:   Patient presents with 7/10 pain in bilateral low back, 6/10 in his neck and UT area. Patient experiences the most pain when trying to fall asleep at night. Patient tolerated clinic exercises well with cues for form as needed. , advanced exercises as above. Pt reports good response to IASTM and ES. Patients progression toward goals is as follows:  [x]     Improving appropriately and progressing toward goals  []     Improving slowly and progressing toward goals  []     Not making progress toward goals and plan of care will be adjusted    PLAN OF CARE:   Patient continues to benefit from skilled intervention to address the above impairments. [x]    Continue treatment per established plan of care.   []     Recommend the following changes to the plan of care:     Recommendations/Intent for next treatment: advance clinical activity, assess response to ES and IASTM     Elliot Andrea, PT   Time Calculation: 50 mins  Patient Time in clinic:   Start Time: 1400   Stop Time: 9034

## 2019-06-20 ENCOUNTER — HOSPITAL ENCOUNTER (OUTPATIENT)
Dept: PHYSICAL THERAPY | Age: 69
Discharge: HOME | End: 2019-06-20
Payer: MEDICARE

## 2019-06-20 PROCEDURE — 97110 THERAPEUTIC EXERCISES: CPT | Performed by: PHYSICAL THERAPIST

## 2019-06-20 PROCEDURE — 97014 ELECTRIC STIMULATION THERAPY: CPT | Performed by: PHYSICAL THERAPIST

## 2019-06-20 NOTE — PROGRESS NOTES
[facility]  [address], [address] [facility]    OUTPATIENT physical Therapy DAILY Treatment NOTe  Visit: 6    NAME: Edna Trinh AGE: 77 y.o. GENDER: male  DATE: 6/20/2019  REFERRING PHYSICIAN: Anaya Malhotra MD       GOALS  Short term goals  Time frame: 4  1. Patient will be compliant and independent with the initial HEP as evidenced by being able to perform without cuing. 2. Patient will report a 40% improvement in symptoms. 3. Patient report a 40% improvement in sleeping. 4. Patient will have an increased tolerance for standing to allow >15 minutes of the activity before symptoms start. 5. Patient will tolerate 30 minutes of clinic activities before onset of symptoms.      Long term goals  Time frame: 8  1. Patient will report pain level decrease to 3/10 with activity to allow increased ease of movement. 2. Patient will have an improved score on the TXU Ernie outcome measure by 5 points to demonstrate an increase in functional activity tolerance. 3. Patient will be independent in final individualized HEP. 4. Patient will have an increase in hip flexion, adduction, abduction strength to 4+/5 to allow performance of dynamic gait tasks. 5. Patient will demonstrate improved postural awareness and positioning strategies to allow for >30 minutes sitting without being limited by symptoms. 6. Patient will sleep 6-8 hours without being interrupted by pain. SUBJECTIVE:   \"It was sore last night. \"    Pain In: 7/10 low back and 6/10 neck    OBJECTIVE DATA SUMMARY:   Objective data from initial evaluation  EXAMINATION/PRESENTATION/DECISION MAKING:   Pain:  Location: Lumbar L4-5  Quality: aching and pain  Now: 7/10  Best: 0/10  Worst: 8/10  Factors that improve pain: rest, heat     Posture:   Rounded shoulders, generally forward head     Strength:   Hip add 3+/5     Range of Motion:   Lumbar Spine (AROM)  (*Measured 3rd finger from the floor)  Flexion Extension           ~50% available  R side bend     58  L side bend      55  R rotation           ~75% available  L rotation           ~75% available        Spinal Assessment:   Diminished lumbar lordosis     Joint Mobility:   Generally hypomobile     Palpation:   TTP and increased turgor right periscapula  TTP lumbar paraspinals     Neurologic Assessment:               Tone: normal               Sensation: intact               Reflexes: NT     Special Tests:   - SLR, increased tightness/buttock pain L > R     Edema: 3+ pitting edema bilateral ankles        Mobility:               Transitional Movements: Increased time to perform               Gait: SPC use with noted path deviations as he left the clinic     Balance: Impaired; TU seconds     Functional Measure:   [facility]:        TREATMENT/INTERVENTION:  Modalities (Rationale): MHP to cervical and lumbar spine x15 min post session to decrease guarding and pain; no skin irritation noted  E stim to bilateral UT and lumbar paraspinals with MHP       Therapeutic Exercises:  Initial HEP: Scapular retraction, Shoulder rolls, Cervical retraction, Lower cervical/upper thoracic stretch, SKTC, LTR    Additional clinical activities in BOLD performed this date:    UBE/LBE x 5 minutes, level 3    Supine:  SKTC x 5 reps B  LTR x 10 reps  Figure 4 piriformis stretch: 3 reps with 30 sec hold B  Bridges x 10 reps    Standing:  Rows at Quantum 25#: x 10 reps  Pull downs at Quantum 35#: x 10 reps  Hip extension and abduction with red TB: x 10 reps  Mini squats x 10 reps    Seated: Forward flex over blue physio x10 reps; 5 reps side to side  Thoracic flexion/extension over towel: 10 reps   Trunk SB stretch x 5 reps B  Trunk rotation stretch x 5 reps B  Hamstring stretch: 2 reps with 30 sec holds B  UT stretch   2 reps with 30 sec hold  Lower cervical/upper thoracic stretch: 3 reps with 10 second holds      Manual Therapy:  IASTM to B UT, levator and rhomboids. Procedure explained to pt, he expressed understanding and visualized the response. Left UT increased turgor. Activity tolerance and post treatment pain report:   Good; Pain Out: 5/10    Education:  Education was provided to the patient on the following topics: Reinforced stretching while avoiding pain. [x]    No changes were made to the home exercise program.  []    The following changes were made to the home exercise program:   Patient verbalized understanding of the topics presented. ASSESSMENT:   Patient presents with continued 7/10 pain in bilateral low back and 6/10 in his neck around UT. Patient experiences the most pain when trying to fall asleep at night. Patient tolerated clinic exercises well with cues for form as needed. He was able to tolerate increase in resistance for exercises. Patient with good response to IASTM, and reports continued pain relief with electrical stimulation. Patients progression toward goals is as follows:  [x]     Improving appropriately and progressing toward goals  []     Improving slowly and progressing toward goals  []     Not making progress toward goals and plan of care will be adjusted    PLAN OF CARE:   Patient continues to benefit from skilled intervention to address the above impairments. [x]    Continue treatment per established plan of care.   []     Recommend the following changes to the plan of care:     Recommendations/Intent for next treatment: advance clinical activity,  ES and IASTM     Essie Alpers, PT   Time Calculation: 45 mins  Patient Time in clinic:   Start Time: 1400   Stop Time: [phone]

## 2019-06-25 ENCOUNTER — HOSPITAL ENCOUNTER (OUTPATIENT)
Dept: PHYSICAL THERAPY | Age: 69
Discharge: HOME | End: 2019-06-25
Payer: MEDICARE

## 2019-06-25 PROCEDURE — 97014 ELECTRIC STIMULATION THERAPY: CPT

## 2019-06-25 PROCEDURE — 97110 THERAPEUTIC EXERCISES: CPT

## 2019-06-25 NOTE — PROGRESS NOTES
[facility]  [address], [address] [facility]    OUTPATIENT physical Therapy DAILY Treatment NOTe  Visit: 7    NAME: Husam Espinoza AGE: 77 y.o. GENDER: male  DATE: 6/25/2019  REFERRING PHYSICIAN: Barron Brown MD       GOALS  Short term goals  Time frame: 4  1. Patient will be compliant and independent with the initial HEP as evidenced by being able to perform without cuing. 2. Patient will report a 40% improvement in symptoms. 3. Patient report a 40% improvement in sleeping. 4. Patient will have an increased tolerance for standing to allow >15 minutes of the activity before symptoms start. 5. Patient will tolerate 30 minutes of clinic activities before onset of symptoms.      Long term goals  Time frame: 8  1. Patient will report pain level decrease to 3/10 with activity to allow increased ease of movement. 2. Patient will have an improved score on the TXU Ernie outcome measure by 5 points to demonstrate an increase in functional activity tolerance. 3. Patient will be independent in final individualized HEP. 4. Patient will have an increase in hip flexion, adduction, abduction strength to 4+/5 to allow performance of dynamic gait tasks. 5. Patient will demonstrate improved postural awareness and positioning strategies to allow for >30 minutes sitting without being limited by symptoms. 6. Patient will sleep 6-8 hours without being interrupted by pain. SUBJECTIVE:   \"It was sore last night. \"    Pain In: 6/10 low back and 5/10 neck    OBJECTIVE DATA SUMMARY:   Objective data from initial evaluation  EXAMINATION/PRESENTATION/DECISION MAKING:   Pain:  Location: Lumbar L4-5  Quality: aching and pain  Now: 7/10  Best: 0/10  Worst: 8/10  Factors that improve pain: rest, heat     Posture:   Rounded shoulders, generally forward head     Strength:   Hip add 3+/5     Range of Motion:   Lumbar Spine (AROM)  (*Measured 3rd finger from the floor)  Flexion Extension           ~50% available  R side bend     58  L side bend      55  R rotation           ~75% available  L rotation           ~75% available        Spinal Assessment:   Diminished lumbar lordosis     Joint Mobility:   Generally hypomobile     Palpation:   TTP and increased turgor right periscapula  TTP lumbar paraspinals     Neurologic Assessment:               Tone: normal               Sensation: intact               Reflexes: NT     Special Tests:   - SLR, increased tightness/buttock pain L > R     Edema: 3+ pitting edema bilateral ankles        Mobility:               Transitional Movements: Increased time to perform               Gait: SPC use with noted path deviations as he left the clinic     Balance: Impaired; TU seconds     Functional Measure:   [facility]:        TREATMENT/INTERVENTION:  Modalities (Rationale): MHP to cervical and lumbar spine x15 min post session to decrease guarding and pain; no skin irritation noted  E stim to bilateral UT and lumbar paraspinals with MHP       Therapeutic Exercises:  Initial HEP: Scapular retraction, Shoulder rolls, Cervical retraction, Lower cervical/upper thoracic stretch, SKTC, LTR    Additional clinical activities in BOLD performed this date:    UBE/LBE x 5 minutes, level 3    Supine:  SKTC x 5 reps B  LTR x 10 reps  Figure 4 piriformis stretch: 3 reps with 30 sec hold B  Bridges x 10 reps    Standing:  Rows at Quantum 30#: x 15 reps  Pull downs at Quantum 35#: x 15 reps  Hip extension and abduction with green TB: x 10 reps  Mini squats x 10 reps    Seated: Forward flex over blue physio x10 reps; 5 reps side to side  Thoracic flexion/extension over towel: 10 reps   Trunk SB stretch x 5 reps B  Trunk rotation stretch x 5 reps B  Hamstring stretch: 2 reps with 30 sec holds B  UT stretch   2 reps with 30 sec hold  Lower cervical/upper thoracic stretch: 3 reps with 10 second holds      Manual Therapy:  IASTM to B UT, levator and rhomboids. Procedure explained to pt, he expressed understanding and visualized the response. Left UT increased turgor. Activity tolerance and post treatment pain report:   Good; Pain Out: 5/10    Education:  Education was provided to the patient on the following topics: Reinforced stretching while avoiding pain. [x]    No changes were made to the home exercise program.  []    The following changes were made to the home exercise program:   Patient verbalized understanding of the topics presented. ASSESSMENT:   Patient presents with continued 6/10 pain in bilateral low back and 5/10 in his neck around UT\"I think the exercises are helping\"   Patient experiences the most pain when trying to fall asleep at night. Patient tolerated clinic exercises well with cues for form as needed. Increased repetitions and resistance this date. Patient with good response to IASTM, and reports continued pain relief with electrical stimulation. Patients progression toward goals is as follows:  [x]     Improving appropriately and progressing toward goals  []     Improving slowly and progressing toward goals  []     Not making progress toward goals and plan of care will be adjusted    PLAN OF CARE:   Patient continues to benefit from skilled intervention to address the above impairments. [x]    Continue treatment per established plan of care.   []     Recommend the following changes to the plan of care:     Recommendations/Intent for next treatment: advance clinical activity,  ES and IASTM, UBE/LBE     Zafar Andrew, PT   Time Calculation: 40 mins  Patient Time in clinic:   Start Time: 1410   Stop Time: 1450

## 2019-06-27 ENCOUNTER — HOSPITAL ENCOUNTER (OUTPATIENT)
Dept: PHYSICAL THERAPY | Age: 69
Discharge: HOME | End: 2019-06-27
Payer: MEDICARE

## 2019-06-27 PROCEDURE — 97110 THERAPEUTIC EXERCISES: CPT | Performed by: PHYSICAL THERAPIST

## 2019-06-27 PROCEDURE — 97014 ELECTRIC STIMULATION THERAPY: CPT | Performed by: PHYSICAL THERAPIST

## 2019-06-27 NOTE — PROGRESS NOTES
[facility]  [address], [address] [facility]    OUTPATIENT physical Therapy DAILY Treatment NOTe  Visit: 8    NAME: Mendel Thompson AGE: 77 y.o. GENDER: male  DATE: 6/27/2019  REFERRING PHYSICIAN: Murphy Hicks MD       GOALS  Short term goals  Time frame: 4  1. Patient will be compliant and independent with the initial HEP as evidenced by being able to perform without cuing. 2. Patient will report a 40% improvement in symptoms. 3. Patient report a 40% improvement in sleeping. 4. Patient will have an increased tolerance for standing to allow >15 minutes of the activity before symptoms start. 5. Patient will tolerate 30 minutes of clinic activities before onset of symptoms.      Long term goals  Time frame: 8  1. Patient will report pain level decrease to 3/10 with activity to allow increased ease of movement. 2. Patient will have an improved score on the TXU Ernie outcome measure by 5 points to demonstrate an increase in functional activity tolerance. 3. Patient will be independent in final individualized HEP. 4. Patient will have an increase in hip flexion, adduction, abduction strength to 4+/5 to allow performance of dynamic gait tasks. 5. Patient will demonstrate improved postural awareness and positioning strategies to allow for >30 minutes sitting without being limited by symptoms. 6. Patient will sleep 6-8 hours without being interrupted by pain. SUBJECTIVE:   \"It's sore. \"    Pain In: 6/10 low back and 5-6/10 neck    OBJECTIVE DATA SUMMARY:   Objective data from initial evaluation  EXAMINATION/PRESENTATION/DECISION MAKING:   Pain:  Location: Lumbar L4-5  Quality: aching and pain  Now: 7/10  Best: 0/10  Worst: 8/10  Factors that improve pain: rest, heat     Posture:   Rounded shoulders, generally forward head     Strength:   Hip add 3+/5     Range of Motion:   Lumbar Spine (AROM)  (*Measured 3rd finger from the floor)  Flexion Extension           ~50% available  R side bend     58  L side bend      55  R rotation           ~75% available  L rotation           ~75% available        Spinal Assessment:   Diminished lumbar lordosis     Joint Mobility:   Generally hypomobile     Palpation:   TTP and increased turgor right periscapula  TTP lumbar paraspinals     Neurologic Assessment:               Tone: normal               Sensation: intact               Reflexes: NT     Special Tests:   - SLR, increased tightness/buttock pain L > R     Edema: 3+ pitting edema bilateral ankles        Mobility:               Transitional Movements: Increased time to perform               Gait: SPC use with noted path deviations as he left the clinic     Balance: Impaired; TU seconds     Functional Measure:   [facility]:        TREATMENT/INTERVENTION:  Modalities (Rationale): MHP to cervical and lumbar spine x15 min post session to decrease guarding and pain; no skin irritation noted  E stim to bilateral UT and lumbar paraspinals with MHP       Therapeutic Exercises:  Initial HEP: Scapular retraction, Shoulder rolls, Cervical retraction, Lower cervical/upper thoracic stretch, SKTC, LTR    Additional clinical activities in BOLD performed this date:    UBE/LBE x 6 minutes, level 3    Supine:  SKTC x 5 reps B  LTR x 10 reps  Figure 4 piriformis stretch: 3 reps with 30 sec hold B  Bridges x 10 reps    Standing:  Rows at Quantum 30#: 15 reps  Pull downs at Quantum 35#: 15 reps  Hip extension and abduction with green TB: 10 reps B  Mini squats x 10 reps    Seated: Forward flex over blue physio x10 reps; 5 reps side to side  Thoracic flexion/extension over towel: 10 reps   Trunk SB stretch x 5 reps B  Trunk rotation stretch x 5 reps B  Hamstring stretch: 2 reps with 30 sec holds B  UT stretch: 2 reps with 30 sec hold  Lower cervical/upper thoracic stretch: 3 reps with 10 second holds      Manual Therapy:  IASTM to B UT, levator and rhomboids. Procedure explained to pt, he expressed understanding and visualized the response. Left UT increased turgor. Activity tolerance and post treatment pain report:   Good; Pain Out: 5/10    Education:  Education was provided to the patient on the following topics: Reinforced stretching while avoiding pain. [x]    No changes were made to the home exercise program.  []    The following changes were made to the home exercise program:   Patient verbalized understanding of the topics presented. ASSESSMENT:   Patient presents with continued pain in bilateral low back and neck around UT. Slow progress noted but patient reports that the pain is not as constant as it was. Patient experiences the most pain when trying to fall asleep at night. Patient tolerated clinic exercises well. He continues to require cues for form. Patient with continued pain relief with electrical stimulation. Patients progression toward goals is as follows:  [x]     Improving appropriately and progressing toward goals  []     Improving slowly and progressing toward goals  []     Not making progress toward goals and plan of care will be adjusted    PLAN OF CARE:   Patient continues to benefit from skilled intervention to address the above impairments. [x]    Continue treatment per established plan of care.   []     Recommend the following changes to the plan of care:     Recommendations/Intent for next treatment: advance clinical activity,  ES and IASVALENCIA Patel PT   Time Calculation: 40 mins  Patient Time in clinic:   Start Time: 1340   Stop Time: [id number]

## 2019-07-02 ENCOUNTER — HOSPITAL ENCOUNTER (OUTPATIENT)
Dept: PHYSICAL THERAPY | Age: 69
Discharge: HOME | End: 2019-07-02
Payer: MEDICARE

## 2019-07-02 PROCEDURE — 97140 MANUAL THERAPY 1/> REGIONS: CPT

## 2019-07-02 PROCEDURE — 97110 THERAPEUTIC EXERCISES: CPT

## 2019-07-02 NOTE — PROGRESS NOTES
[facility]  [address], [address] [facility]    OUTPATIENT physical Therapy DAILY Treatment NOTe  Visit: 9    NAME: Tj Corcoran AGE: 77 y.o. GENDER: male  DATE: 7/2/2019  REFERRING PHYSICIAN: Rbob Mckenzie MD       GOALS  Short term goals  Time frame: 4  1. Patient will be compliant and independent with the initial HEP as evidenced by being able to perform without cuing. 2. Patient will report a 40% improvement in symptoms. 3. Patient report a 40% improvement in sleeping. 4. Patient will have an increased tolerance for standing to allow >15 minutes of the activity before symptoms start. 5. Patient will tolerate 30 minutes of clinic activities before onset of symptoms.      Long term goals  Time frame: 8  1. Patient will report pain level decrease to 3/10 with activity to allow increased ease of movement. 2. Patient will have an improved score on the TXU Ernie outcome measure by 5 points to demonstrate an increase in functional activity tolerance. 3. Patient will be independent in final individualized HEP. 4. Patient will have an increase in hip flexion, adduction, abduction strength to 4+/5 to allow performance of dynamic gait tasks. 5. Patient will demonstrate improved postural awareness and positioning strategies to allow for >30 minutes sitting without being limited by symptoms. 6. Patient will sleep 6-8 hours without being interrupted by pain. SUBJECTIVE:   \"It's sore. \"    Pain In: 7/10 low back neck 5/10  It feels stiff    OBJECTIVE DATA SUMMARY:   Objective data from initial evaluation  EXAMINATION/PRESENTATION/DECISION MAKING:   Pain:  Location: Lumbar L4-5  Quality: aching and pain  Now: 7/10  Best: 0/10  Worst: 8/10  Factors that improve pain: rest, heat     Posture:   Rounded shoulders, generally forward head     Strength:   Hip add 3+/5     Range of Motion:   Lumbar Spine (AROM)  (*Measured 3rd finger from the floor)  Flexion Extension           ~50% available  R side bend     58  L side bend      55  R rotation           ~75% available  L rotation           ~75% available        Spinal Assessment:   Diminished lumbar lordosis     Joint Mobility:   Generally hypomobile     Palpation:   TTP and increased turgor right periscapula  TTP lumbar paraspinals     Neurologic Assessment:               Tone: normal               Sensation: intact               Reflexes: NT     Special Tests:   - SLR, increased tightness/buttock pain L > R     Edema: 3+ pitting edema bilateral ankles        Mobility:               Transitional Movements: Increased time to perform               Gait: SPC use with noted path deviations as he left the clinic     Balance: Impaired; TU seconds     Functional Measure:   [facility]:        TREATMENT/INTERVENTION:  Modalities (Rationale): MHP to cervical and lumbar spine x15 min post session to decrease guarding and pain; no skin irritation noted      Therapeutic Exercises:  Initial HEP: Scapular retraction, Shoulder rolls, Cervical retraction, Lower cervical/upper thoracic stretch, SKTC, LTR    Additional clinical activities in BOLD performed this date:    UBE/LBE x 6 minutes, level 3.5    Supine:  SKTC x 10 reps B  LTR x 10 reps B, therapist cues for form. Figure 4 piriformis stretch: 3 reps with 30 sec hold B  Bridges x 10 reps 2 sets  Single leg lumbar stretch with 20 sec hold 2 reps B      Standing:  Rows at Quantum 30#: 15 reps  Pull downs at Quantum 35#: 15 reps  Hip extension and abduction with green TB: 10 reps B  Mini squats with yellow ballx 10 reps  Band walks with red  TB 20 x 2 reps    Seated:   Forward flex over blue physio x10 reps; 5 reps side to side  Thoracic flexion/extension over towel: 10 reps   Trunk SB stretch x 5 reps B  Trunk rotation stretch x 5 reps B  Hamstring stretch: 2 reps with 30 sec holds B  UT stretch: 2 reps with 30 sec hold  Lower cervical/upper thoracic stretch: 3 reps with 10 second holds      Manual Therapy:  IASTM to B lumbar paraspinals  Procedure explained to pt, he expressed understanding and visualized the response. Activity tolerance and post treatment pain report:   Good; Pain Out: 5/10    Education:  Education was provided to the patient on the following topics: Reinforced stretching while avoiding pain. [x]    No changes were made to the home exercise program.  []    The following changes were made to the home exercise program:   Patient verbalized understanding of the topics presented. ASSESSMENT:   Patient presents with continued pain in bilateral low back and neck around UT. Slow progress noted but patient reports that the pain is not as constant as it was. Patient experiences the most pain when trying to fall asleep at night. Advanced clinic exercises this date with good tolerance. Keri Santos He continues to require cues for form. Assess response to IASTM lumbar paraspinals. Patients progression toward goals is as follows:  [x]     Improving appropriately and progressing toward goals  []     Improving slowly and progressing toward goals  []     Not making progress toward goals and plan of care will be adjusted    PLAN OF CARE:   Patient continues to benefit from skilled intervention to address the above impairments. [x]    Continue treatment per established plan of care.   []     Recommend the following changes to the plan of care:     Recommendations/Intent for next treatment: advance clinical activity,Assess response to IASTM lumbar paraspinals    Jamia Turner PT   Time Calculation: 60 mins  Patient Time in clinic:   Start Time: 1330   Stop Time: [id number]

## 2019-07-05 ENCOUNTER — HOSPITAL ENCOUNTER (OUTPATIENT)
Dept: PHYSICAL THERAPY | Age: 69
Discharge: HOME | End: 2019-07-05
Payer: MEDICARE

## 2019-07-05 PROCEDURE — 97110 THERAPEUTIC EXERCISES: CPT | Performed by: PHYSICAL THERAPIST

## 2019-07-05 NOTE — PROGRESS NOTES
[facility]  [address], [address] [facility]    OUTPATIENT physical Therapy DAILY Treatment NOTe  Visit: 10    NAME: Lindsey Carlisle AGE: 77 y.o. GENDER: male  DATE: 7/5/2019  REFERRING PHYSICIAN: Mariah Leo MD       GOALS  Short term goals  Time frame: 4  1. Patient will be compliant and independent with the initial HEP as evidenced by being able to perform without cuing. MET  2. Patient will report a 40% improvement in symptoms. MET  3. Patient report a 40% improvement in sleeping. MET  4. Patient will have an increased tolerance for standing to allow >15 minutes of the activity before symptoms start. MET  5. Patient will tolerate 30 minutes of clinic activities before onset of symptoms. MET     Long term goals  Time frame: 8  1. Patient will report pain level decrease to 3/10 with activity to allow increased ease of movement. 2. Patient will have an improved score on the TXU Ernie outcome measure by 5 points to demonstrate an increase in functional activity tolerance. 3. Patient will be independent in final individualized HEP. 4. Patient will have an increase in hip flexion, adduction, abduction strength to 4+/5 to allow performance of dynamic gait tasks. 5. Patient will demonstrate improved postural awareness and positioning strategies to allow for >30 minutes sitting without being limited by symptoms. 6. Patient will sleep 6-8 hours without being interrupted by pain. SUBJECTIVE:   \"I might have done a little too much so it's hurting. \"  \"It's off and on pain. The therapy is helping. \"    Patient 10 minutes late to session    Pain In: 7/10 low back and neck 6-7/10     OBJECTIVE DATA SUMMARY:   Objective data from initial evaluation  EXAMINATION/PRESENTATION/DECISION MAKING:   Pain:  Location: Lumbar L4-5  Quality: aching and pain  Now: 7/10  Best: 0/10  Worst: 8/10  Factors that improve pain: rest, heat     Posture:   Rounded shoulders, generally forward head     Strength:   Hip add 3+/5     Range of Motion:   Lumbar Spine (AROM)  (*Measured 3rd finger from the floor)  Flexion              Extension           ~50% available  R side bend     58  L side bend      55  R rotation           ~75% available  L rotation           ~75% available        Spinal Assessment:   Diminished lumbar lordosis     Joint Mobility:   Generally hypomobile     Palpation:   TTP and increased turgor right periscapular area  TTP lumbar paraspinals     Neurologic Assessment:               Tone: normal               Sensation: intact               Reflexes: NT     Special Tests:   - SLR, increased tightness/buttock pain L > R     Edema: 3+ pitting edema bilateral ankles        Mobility:               Transitional Movements: Increased time to perform               Gait: SPC use with noted path deviations as he left the clinic     Balance: Impaired; TU seconds     Functional Measure:   [facility]:  on evaluation  [facility]:  on 10th visit     TREATMENT/INTERVENTION:  Modalities (Rationale): MHP to cervical and lumbar spine x10 min post session to decrease guarding and pain; no skin irritation noted      Therapeutic Exercises:  Initial HEP: Scapular retraction, Shoulder rolls, Cervical retraction, Lower cervical/upper thoracic stretch, SKTC, LTR    Additional clinical activities in BOLD performed this date:    UBE/LBE x 6 minutes, level 3.5    Supine:  SKTC x 10 reps B  LTR x 5  reps B  Figure 4 piriformis stretch: 3 reps with 30 sec hold B  Bridges x 10 reps 2 sets  Single leg lumbar stretch with 20 sec hold 2 reps B    Standing:  Rows at Quantum 30#: 15 reps  Pull downs at Quantum 35#: 15 reps  Hip extension and abduction with green TB: 10 reps B  Mini squats with yellow ballx 10 reps  Band walks with red  TB 20 x 2 reps    Seated:   Forward flex over blue physio x 10 reps; 5 reps side to side  Thoracic flexion/extension over towel: 10 reps   Trunk SB stretch x 5 reps B  Trunk rotation stretch x 5 reps B  Hamstring stretch: 2 reps with 30 sec holds B  UT stretch: 2 reps with 30 sec holds B; maximal cues for form  Levator scapulae stretch: 2 reps with 30 second holds B; maximal cues for form  Lower cervical/upper thoracic stretch: 3 reps with 10 second holds      Manual Therapy:  IASTM to B lumbar paraspinals  Procedure explained to pt, he expressed understanding and visualized the response. Activity tolerance and post treatment pain report:   Good; Pain Out: 5/10    Education:  Education was provided to the patient on the following topics: Reinforced stretching while avoiding pain. [x]    No changes were made to the home exercise program.  []    The following changes were made to the home exercise program:   Patient verbalized understanding of the topics presented. ASSESSMENT:   Patient presents with continued pain in bilateral low back and neck. Pain is worse on right than left. Slow progress noted but patient reports that the pain is not as constant as it was. He reports that therapy is helping. Patient reports that he continues to rely on straight cane for balance. He continues to require moderate to maximal cues for form with majority of exercises despite this being his 10th visit. He does report that he is only performing LTR, SKTC, and trunk flexion stretch at home. Patient instructed to perform additional stretches for his neck to decrease pain levels. Patients progression toward goals is as follows:  []     Improving appropriately and progressing toward goals  [x]     Improving slowly and progressing toward goals  []     Not making progress toward goals and plan of care will be adjusted    PLAN OF CARE:   Patient continues to benefit from skilled intervention to address the above impairments. [x]    Continue treatment per established plan of care.   []     Recommend the following changes to the plan of care:     Recommendations/Intent for next treatment: advance clinical activity    [facility], PT   Time Calculation: 35 mins  Patient Time in clinic:   Start Time: 5824   Stop Time: 517

## 2019-07-08 ENCOUNTER — HOSPITAL ENCOUNTER (OUTPATIENT)
Dept: PHYSICAL THERAPY | Age: 69
Discharge: HOME | End: 2019-07-08
Payer: MEDICARE

## 2019-07-08 PROCEDURE — 97110 THERAPEUTIC EXERCISES: CPT

## 2019-07-08 NOTE — PROGRESS NOTES
[facility]  [address], [address] [facility]    OUTPATIENT physical Therapy DAILY Treatment NOTe  Visit: 11    NAME: Radha Urbano AGE: 77 y.o. GENDER: male  DATE: 7/8/2019  REFERRING PHYSICIAN: Teofilo Marshall MD       GOALS  Short term goals  Time frame: 4  1. Patient will be compliant and independent with the initial HEP as evidenced by being able to perform without cuing. MET  2. Patient will report a 40% improvement in symptoms. MET  3. Patient report a 40% improvement in sleeping. MET  4. Patient will have an increased tolerance for standing to allow >15 minutes of the activity before symptoms start. MET  5. Patient will tolerate 30 minutes of clinic activities before onset of symptoms. MET     Long term goals  Time frame: 8  1. Patient will report pain level decrease to 3/10 with activity to allow increased ease of movement. 2. Patient will have an improved score on the TXU Ernie outcome measure by 5 points to demonstrate an increase in functional activity tolerance. 3. Patient will be independent in final individualized HEP. 4. Patient will have an increase in hip flexion, adduction, abduction strength to 4+/5 to allow performance of dynamic gait tasks. 5. Patient will demonstrate improved postural awareness and positioning strategies to allow for >30 minutes sitting without being limited by symptoms. 6. Patient will sleep 6-8 hours without being interrupted by pain.          SUBJECTIVE:   It's not too bad today\"  Patient 15 minutes late to session    Pain In: 7/10 low back and neck 6/10     OBJECTIVE DATA SUMMARY:   Objective data from initial evaluation  EXAMINATION/PRESENTATION/DECISION MAKING:   Pain:  Location: Lumbar L4-5  Quality: aching and pain  Now: 7/10  Best: 0/10  Worst: 8/10  Factors that improve pain: rest, heat     Posture:   Rounded shoulders, generally forward head     Strength:   Hip add 3+/5     Range of Motion:   Lumbar Spine (AROM)  (*Measured 3rd finger from the floor)  Flexion              Extension           ~50% available  R side bend     58  L side bend      55  R rotation           ~75% available  L rotation           ~75% available        Spinal Assessment:   Diminished lumbar lordosis     Joint Mobility:   Generally hypomobile     Palpation:   TTP and increased turgor right periscapular area  TTP lumbar paraspinals     Neurologic Assessment:               Tone: normal               Sensation: intact               Reflexes: NT     Special Tests:   - SLR, increased tightness/buttock pain L > R     Edema: 3+ pitting edema bilateral ankles        Mobility:               Transitional Movements: Increased time to perform               Gait: SPC use with noted path deviations as he left the clinic     Balance: Impaired; TU seconds     Functional Measure:   Perico Mann:  on evaluation  Perico Mann:  on 10th visit     TREATMENT/INTERVENTION:  Modalities (Rationale): MHP to cervical and lumbar spine x10 min post session to decrease guarding and pain; no skin irritation noted      Therapeutic Exercises:  Initial HEP: Scapular retraction, Shoulder rolls, Cervical retraction, Lower cervical/upper thoracic stretch, SKTC, LTR    Additional clinical activities in BOLD performed this date:    UBE/LBE x 6 minutes, level 3.5    Supine:  SKTC x 10 reps B  LTR x 5  reps B  Figure 4 piriformis stretch: 3 reps with 30 sec hold B  Bridges x 10 reps 2 sets  Single leg lumbar stretch with 20 sec hold 2 reps B    Standing:  Rows at Quantum 30#: 15 reps  Pull downs at Quantum 35#: 15 reps  Hip extension and abduction with green TB: 10 reps B  Mini squats with yellow ballx 10 reps  Band walks with red  TB 20 x 2 reps    Seated:   Forward flex over blue physio x 10 reps; 5 reps side to side  Thoracic flexion/extension over towel: 10 reps   Trunk SB stretch x 5 reps B  Trunk rotation stretch x 5 reps B  Hamstring stretch: 2 reps with 30 sec holds B  UT stretch: 2 reps with 30 sec holds B; maximal cues for form  Levator scapulae stretch: 2 reps with 30 second holds B; maximal cues for form  Lower cervical/upper thoracic stretch: 3 reps with 10 second holds      Manual Therapy:  IASTM to B lumbar paraspinals  Procedure explained to pt, he expressed understanding and visualized the response. Activity tolerance and post treatment pain report:   Good; Pain Out: 5/10    Education:  Education was provided to the patient on the following topics: Reinforced stretching while avoiding pain. [x]    No changes were made to the home exercise program.  []    The following changes were made to the home exercise program:   Patient verbalized understanding of the topics presented. ASSESSMENT:   Patient presents with continued pain in bilateral low back, neck pain is better. Pt arrives in clinic with st cane,. He continues to require moderate to maximal cues for form with majority of exercises . Pt encouraged to continue complete HEP each day. .     Patients progression toward goals is as follows:  []     Improving appropriately and progressing toward goals  [x]     Improving slowly and progressing toward goals  []     Not making progress toward goals and plan of care will be adjusted    PLAN OF CARE:   Patient continues to benefit from skilled intervention to address the above impairments. [x]    Continue treatment per established plan of care.   []     Recommend the following changes to the plan of care:     Recommendations/Intent for next treatment: advance clinical activity, review HEP and emphasize daily exercises    Lee Amador PT   Time Calculation: 35 mins  Patient Time in clinic:   Start Time: 1315   Stop Time: 1350

## 2019-07-10 ENCOUNTER — HOSPITAL ENCOUNTER (OUTPATIENT)
Dept: PHYSICAL THERAPY | Age: 69
Discharge: HOME | End: 2019-07-10
Payer: MEDICARE

## 2019-07-10 PROCEDURE — 97110 THERAPEUTIC EXERCISES: CPT

## 2019-07-10 NOTE — PROGRESS NOTES
[facility]  [address], [address] [facility]    OUTPATIENT physical Therapy DAILY Treatment NOTe  Visit: 12     NAME: Chika Swanson AGE: 77 y.o. GENDER: male  DATE: 7/10/2019  REFERRING PHYSICIAN: Mohan Valladares MD       GOALS  Short term goals  Time frame: 4  1. Patient will be compliant and independent with the initial HEP as evidenced by being able to perform without cuing. MET  2. Patient will report a 40% improvement in symptoms. MET  3. Patient report a 40% improvement in sleeping. MET  4. Patient will have an increased tolerance for standing to allow >15 minutes of the activity before symptoms start. MET  5. Patient will tolerate 30 minutes of clinic activities before onset of symptoms. MET     Long term goals  Time frame: 8  1. Patient will report pain level decrease to 3/10 with activity to allow increased ease of movement. 2. Patient will have an improved score on the TXU Ernie outcome measure by 5 points to demonstrate an increase in functional activity tolerance. 3. Patient will be independent in final individualized HEP. 4. Patient will have an increase in hip flexion, adduction, abduction strength to 4+/5 to allow performance of dynamic gait tasks. 5. Patient will demonstrate improved postural awareness and positioning strategies to allow for >30 minutes sitting without being limited by symptoms. 6. Patient will sleep 6-8 hours without being interrupted by pain.          SUBJECTIVE:   It's not too bad today\"    Pain In: 7/10 low back and neck 6/10     OBJECTIVE DATA SUMMARY:   Objective data from initial evaluation  EXAMINATION/PRESENTATION/DECISION MAKING:   Pain:  Location: Lumbar L4-5  Quality: aching and pain  Now: 7/10  Best: 0/10  Worst: 8/10  Factors that improve pain: rest, heat     Posture:   Rounded shoulders, generally forward head     Strength:   Hip add 3+/5     Range of Motion:   Lumbar Spine (AROM)  (*Measured 3rd finger from the floor)  Flexion              Extension           ~50% available  R side bend     58  L side bend      55  R rotation           ~75% available  L rotation           ~75% available        Spinal Assessment:   Diminished lumbar lordosis     Joint Mobility:   Generally hypomobile     Palpation:   TTP and increased turgor right periscapular area  TTP lumbar paraspinals     Neurologic Assessment:               Tone: normal               Sensation: intact               Reflexes: NT     Special Tests:   - SLR, increased tightness/buttock pain L > R     Edema: 3+ pitting edema bilateral ankles        Mobility:               Transitional Movements: Increased time to perform               Gait: SPC use with noted path deviations as he left the clinic     Balance: Impaired; TU seconds     Functional Measure:   Perico Mann:  on evaluation  Perico Mann:  on 10th visit     TREATMENT/INTERVENTION:  Modalities (Rationale): MHP to cervical and lumbar spine x10 min post session to decrease guarding and pain; no skin irritation noted      Therapeutic Exercises:  Initial HEP: Scapular retraction, Shoulder rolls, Cervical retraction, Lower cervical/upper thoracic stretch, SKTC, LTR    Additional clinical activities in BOLD performed this date:    UBE/LBE x 6 minutes, level 3.5    Supine:  SKTC x 10 reps B  LTR x 10 reps B  Figure 4 piriformis stretch: 3 reps with 30 sec hold B  Bridges x 10 reps 2 sets  Single leg lumbar stretch with 20 sec hold 2 reps B      Standing:  Rows at Quantum 30#: 15 reps  Pull downs at Quantum 35#: 15 reps  Hip extension and abduction with green TB: 10 reps B  Mini squats with   Green TBx 10 reps  Band walks with green  TB 20 x 2 reps  HS stretch x 2 reps B 30 sec hold    Seated:   Forward flex over blue physio x 10 reps; 5 reps side to side  Thoracic flexion/extension over towel: 10 reps   Trunk SB stretch x 5 reps B  Trunk rotation stretch x 5 reps B  UT stretch: 2 reps with 30 sec holds B; maximal cues for form  Levator scapulae stretch: 2 reps with 30 second holds B; maximal cues for form  Lower cervical/upper thoracic stretch: 3 reps with 10 second holds      Manual Therapy:  IASTM to B lumbar paraspinals  Procedure explained to pt, he expressed understanding and visualized the response. Activity tolerance and post treatment pain report:   Good; Pain Out: 5/10    Education:  Education was provided to the patient on the following topics: Reinforced stretching while avoiding pain. [x]    No changes were made to the home exercise program.  []    The following changes were made to the home exercise program:   Patient verbalized understanding of the topics presented. ASSESSMENT:   Patient presents with continued pain in bilateral low back, neck pain is better. Pt arrives in clinic with st cane,. He continues to require moderate to maximal cues for form with majority of exercises Discussed with pt that today is his 12th Therapy visit and he does not report a significant decrease in pain. His pain level has been 7/10 since evaluation. Pt admits to not performing exercises everyday. Pt reports he will try to do the exercises x 2 per day with walking program over the next 2 weeks  and will return to see Dr Percy Aguilar at that time to assess progress. .     Patients progression toward goals is as follows:  []     Improving appropriately and progressing toward goals  [x]     Improving slowly and progressing toward goals  []     Not making progress toward goals and plan of care will be adjusted    PLAN OF CARE:   Patient continues to benefit from skilled intervention to address the above impairments. [x]    Continue treatment per established plan of care.   []     Recommend the following changes to the plan of care:     Recommendations/Intent for next treatment: advance clinical activity, review HEP and emphasize daily exercises    Vin Wharton PT   Time Calculation: 55 mins  Patient Time in clinic:   Start Time: 1035   Stop Time: 1133

## 2019-07-15 ENCOUNTER — HOSPITAL ENCOUNTER (OUTPATIENT)
Dept: PHYSICAL THERAPY | Age: 69
Discharge: HOME | End: 2019-07-15
Payer: MEDICARE

## 2019-07-15 PROCEDURE — 97110 THERAPEUTIC EXERCISES: CPT

## 2019-07-15 NOTE — PROGRESS NOTES
[facility]  [address], [address] [facility]    OUTPATIENT physical Therapy DAILY Treatment NOTe  Visit: 13  NAME: Tj Corcoran AGE: 77 y.o. GENDER: male  DATE: 7/15/2019  REFERRING PHYSICIAN: Robb Mckenzie MD       GOALS  Short term goals  Time frame: 4  1. Patient will be compliant and independent with the initial HEP as evidenced by being able to perform without cuing. MET  2. Patient will report a 40% improvement in symptoms. MET  3. Patient report a 40% improvement in sleeping. MET  4. Patient will have an increased tolerance for standing to allow >15 minutes of the activity before symptoms start. MET  5. Patient will tolerate 30 minutes of clinic activities before onset of symptoms. MET     Long term goals  Time frame: 8  1. Patient will report pain level decrease to 3/10 with activity to allow increased ease of movement. 2. Patient will have an improved score on the TXU Ernie outcome measure by 5 points to demonstrate an increase in functional activity tolerance. 3. Patient will be independent in final individualized HEP. 4. Patient will have an increase in hip flexion, adduction, abduction strength to 4+/5 to allow performance of dynamic gait tasks. 5. Patient will demonstrate improved postural awareness and positioning strategies to allow for >30 minutes sitting without being limited by symptoms. 6. Patient will sleep 6-8 hours without being interrupted by pain.          SUBJECTIVE:   It's not too bad today\"    Pain In: 5/10 low back and neck 5/10     OBJECTIVE DATA SUMMARY:   Objective data from initial evaluation  EXAMINATION/PRESENTATION/DECISION MAKING:   Pain:  Location: Lumbar L4-5  Quality: aching and pain  Now: 7/10  Best: 0/10  Worst: 8/10  Factors that improve pain: rest, heat     Posture:   Rounded shoulders, generally forward head     Strength:   Hip add 3+/5     Range of Motion:   Lumbar Spine (AROM)  (*Measured 3rd finger from the floor)  Flexion              Extension           ~50% available  R side bend     58  L side bend      55  R rotation           ~75% available  L rotation           ~75% available        Spinal Assessment:   Diminished lumbar lordosis     Joint Mobility:   Generally hypomobile     Palpation:   TTP and increased turgor right periscapular area  TTP lumbar paraspinals     Neurologic Assessment:               Tone: normal               Sensation: intact               Reflexes: NT     Special Tests:   - SLR, increased tightness/buttock pain L > R     Edema: 3+ pitting edema bilateral ankles        Mobility:               Transitional Movements: Increased time to perform               Gait: SPC use with noted path deviations as he left the clinic     Balance: Impaired; TU seconds     Functional Measure:   Perico Mann:  on evaluation  Perico Mann:  on 10th visit     TREATMENT/INTERVENTION:  Modalities (Rationale): MHP to cervical and lumbar spine x10 min post session to decrease guarding and pain; no skin irritation noted      Therapeutic Exercises:  Initial HEP: Scapular retraction, Shoulder rolls, Cervical retraction, Lower cervical/upper thoracic stretch, SKTC, LTR    Additional clinical activities in BOLD performed this date:    UBE/LBE x 6 minutes, level 4.0    Supine:  SKTC x 10 reps B  LTR x 10 reps B  Figure 4 piriformis stretch: 3 reps with 30 sec hold B  Bridges with 5# wt at hips x 10 reps 2 sets  Single leg lumbar stretch with 20 sec hold 2 reps B  SLR x 10 reps B      Standing:  Rows at Quantum 30#: 15 reps  Pull downs at Quantum 35#: 15 reps  Hip extension and abduction with green TB: 10 reps B  Mini squats with   Green TBx 10 reps  Band walks with green  TB 20 x 2 reps  HS stretch x 2 reps B 30 sec hold      Seated:   Forward flex over blue physio x 10 reps; 5 reps side to side  Thoracic flexion/extension over towel: 10 reps   Trunk SB stretch x 5 reps B  Trunk rotation stretch x 5 reps B  UT stretch: 2 reps with 30 sec holds B; maximal cues for form  Levator scapulae stretch: 2 reps with 30 second holds B; maximal cues for form  Lower cervical/upper thoracic stretch: 3 reps with 10 second holds      Manual Therapy:  IASTM to B right UT, levator and rhomboids, procedure explained to pt and he expressed understanding, visualized response. Pt instructed in use of tennis ball for self STM, he demonstrated understanding, encouraged him to use this at home. Activity tolerance and post treatment pain report:   Good; Pain Out: 5/10    Education:  Education was provided to the patient on the following topics: Reinforced stretching while avoiding pain. [x]    No changes were made to the home exercise program.  []    The following changes were made to the home exercise program:   Patient verbalized understanding of the topics presented. ASSESSMENT:   Patient presents with continued pain in bilateral low back, neck pain is better, 5/10 pain this date. Pt reports improved compliance with HEP, exercises daily  and  walking program.  Pt to see Dr Ramonita Ruano in the next couple of weeks  to assess progress. .     Patients progression toward goals is as follows:  []     Improving appropriately and progressing toward goals  [x]     Improving slowly and progressing toward goals  []     Not making progress toward goals and plan of care will be adjusted    PLAN OF CARE:   Patient continues to benefit from skilled intervention to address the above impairments. [x]    Continue treatment per established plan of care.   []     Recommend the following changes to the plan of care:     Recommendations/Intent for next treatment: advance clinical activity, review HEP and emphasize daily exercises    Sandra Mason PT   Time Calculation: 55 mins  Patient Time in clinic:   Start Time: 1212   Stop Time: 1130

## 2019-07-18 ENCOUNTER — HOSPITAL ENCOUNTER (OUTPATIENT)
Dept: PHYSICAL THERAPY | Age: 69
Discharge: HOME | End: 2019-07-18
Payer: MEDICARE

## 2019-07-18 PROCEDURE — 97110 THERAPEUTIC EXERCISES: CPT | Performed by: PHYSICAL THERAPIST

## 2019-07-18 NOTE — PROGRESS NOTES
[facility]  [address], [address] [facility]    OUTPATIENT physical Therapy DAILY Treatment NOTe  Visit: 14  NAME: Leonie Butler AGE: 77 y.o. GENDER: male  DATE: 7/18/2019  REFERRING PHYSICIAN: Jearldine Osler, MD       GOALS  Short term goals  Time frame: 4  1. Patient will be compliant and independent with the initial HEP as evidenced by being able to perform without cuing. MET  2. Patient will report a 40% improvement in symptoms. MET  3. Patient report a 40% improvement in sleeping. MET  4. Patient will have an increased tolerance for standing to allow >15 minutes of the activity before symptoms start. MET  5. Patient will tolerate 30 minutes of clinic activities before onset of symptoms. MET     Long term goals  Time frame: 8  1. Patient will report pain level decrease to 3/10 with activity to allow increased ease of movement. 2. Patient will have an improved score on the TXU Ernie outcome measure by 5 points to demonstrate an increase in functional activity tolerance. 3. Patient will be independent in final individualized HEP. 4. Patient will have an increase in hip flexion, adduction, abduction strength to 4+/5 to allow performance of dynamic gait tasks. 5. Patient will demonstrate improved postural awareness and positioning strategies to allow for >30 minutes sitting without being limited by symptoms. 6. Patient will sleep 6-8 hours without being interrupted by pain.          SUBJECTIVE:   \"The low back is feeling better\"    Pain In: 3-4/10 low back and neck 5/10     OBJECTIVE DATA SUMMARY:   Objective data from initial evaluation  EXAMINATION/PRESENTATION/DECISION MAKING:   Pain:  Location: Lumbar L4-5  Quality: aching and pain  Now: 7/10  Best: 0/10  Worst: 8/10  Factors that improve pain: rest, heat     Posture:   Rounded shoulders, generally forward head     Strength:   Hip add 3+/5     Range of Motion:   Lumbar Spine (AROM)  (*Measured 3rd finger from the floor)  Flexion              Extension           ~50% available  R side bend     58  L side bend      55  R rotation           ~75% available  L rotation           ~75% available        Spinal Assessment:   Diminished lumbar lordosis     Joint Mobility:   Generally hypomobile     Palpation:   TTP and increased turgor right periscapular area  TTP lumbar paraspinals     Neurologic Assessment:               Tone: normal               Sensation: intact               Reflexes: NT     Special Tests:   - SLR, increased tightness/buttock pain L > R     Edema: 3+ pitting edema bilateral ankles        Mobility:               Transitional Movements: Increased time to perform               Gait: SPC use with noted path deviations as he left the clinic     Balance: Impaired; TU seconds     Functional Measure:   Perico Mann:  on evaluation  Perico Mann:  on 10th visit     TREATMENT/INTERVENTION:  Modalities (Rationale): MHP to cervical and lumbar spine x10 min post session to decrease guarding and pain; no skin irritation noted      Therapeutic Exercises:  Initial HEP: Scapular retraction, Shoulder rolls, Cervical retraction, Lower cervical/upper thoracic stretch, SKTC, LTR    Additional clinical activities in BOLD performed this date:    UBE/LBE x 6 minutes, level 4.0    Supine:  SKTC x 10 reps B  LTR x 10 reps B  Figure 4 piriformis stretch: 3 reps with 30 sec hold B  Bridges with 5# wt at hips x 10 reps 2 sets  Single leg lumbar stretch with 20 sec hold 2 reps B  SLR x 10 reps B    Standing:  Rows at Quantum 30#: 15 reps  Pull downs at Quantum 35#: 15 reps  Hip extension and abduction with green TB: 10 reps B  Mini squats with   Green TBx 10 reps  Band walks with green  TB 20 x 2 reps  HS stretch x 2 reps B 30 sec hold      Seated:   Forward flex over blue physio x 10 reps; 5 reps side to side  Thoracic flexion/extension over towel: 10 reps   Trunk SB stretch x 5 reps B  Trunk rotation stretch x 5 reps B  UT stretch: 2 reps with 30 sec holds B; maximal cues for form  Levator scapulae stretch: 2 reps with 30 second holds B; maximal cues for form  Lower cervical/upper thoracic stretch: 3 reps with 10 second holds      Manual Therapy:  IASTM to B right UT, levator and rhomboids, procedure explained to pt and he expressed understanding, visualized response. Pt instructed in use of tennis ball for self STM, he demonstrated understanding, encouraged him to use this at home. Activity tolerance and post treatment pain report:   Good; Pain Out: 3/10    Education:  Education was provided to the patient on the following topics: Reinforced stretching while avoiding pain. [x]    No changes were made to the home exercise program.  []    The following changes were made to the home exercise program:   Patient verbalized understanding of the topics presented. ASSESSMENT:   Patient presents with decreased pain in low back and neck pain. Pain worse on left than right. Patient reports improved compliance with HEP; exercises daily  and walking program. Patient tolerated exercises well this date; continues to require verbal cues for proper form but not as regularly as he did in previous sessions. Patient believes he sees his doctor on Tuesday. Patients progression toward goals is as follows:  []     Improving appropriately and progressing toward goals  [x]     Improving slowly and progressing toward goals  []     Not making progress toward goals and plan of care will be adjusted    PLAN OF CARE:   Patient continues to benefit from skilled intervention to address the above impairments. [x]    Continue treatment per established plan of care.   []     Recommend the following changes to the plan of care:     Recommendations/Intent for next treatment: advance clinical activity, review HEP and emphasize daily exercises    Terri Danielle PT   Time Calculation: 33 mins  Patient Time in clinic:   Start Time: 6784   Stop Time: [address]

## 2019-07-22 ENCOUNTER — HOSPITAL ENCOUNTER (OUTPATIENT)
Dept: PHYSICAL THERAPY | Age: 69
Discharge: HOME | End: 2019-07-22
Payer: MEDICARE

## 2019-07-22 PROCEDURE — 97110 THERAPEUTIC EXERCISES: CPT

## 2019-07-22 NOTE — PROGRESS NOTES
[facility]  [address], [address] [facility]    OUTPATIENT physical Therapy DAILY Treatment NOTe with Discharge  Visit: 15  NAME: Sabrina Patrick AGE: 77 y.o. GENDER: male  DATE: 7/22/2019  REFERRING PHYSICIAN: Elizabeth Granados MD       GOALS  Short term goals  Time frame: 4  1. Patient will be compliant and independent with the initial HEP as evidenced by being able to perform without cuing. MET  2. Patient will report a 40% improvement in symptoms. MET  3. Patient report a 40% improvement in sleeping. MET  4. Patient will have an increased tolerance for standing to allow >15 minutes of the activity before symptoms start. MET  5. Patient will tolerate 30 minutes of clinic activities before onset of symptoms. MET     Long term goals  Time frame: 8  1. Patient will report pain level decrease to 3/10 with activity to allow increased ease of movement. 2. Patient will have an improved score on the TXU Ernie outcome measure by 5 points to demonstrate an increase in functional activity tolerance. 3. Patient will be independent in final individualized HEP. 4. Patient will have an increase in hip flexion, adduction, abduction strength to 4+/5 to allow performance of dynamic gait tasks. 5. Patient will demonstrate improved postural awareness and positioning strategies to allow for >30 minutes sitting without being limited by symptoms. 6. Patient will sleep 6-8 hours without being interrupted by pain.          SUBJECTIVE:   It feels ok    Pain In: 4/10 low back and neck 4/10     OBJECTIVE DATA SUMMARY:   Objective data from initial evaluation  EXAMINATION/PRESENTATION/DECISION MAKING:   Pain:  Location: Lumbar L4-5  Quality: aching and pain  Now: 7/10  Best: 0/10  Worst: 8/10  Factors that improve pain: rest, heat     Posture:   Rounded shoulders, generally forward head     Strength:   Hip add 3+/5     Range of Motion:   Lumbar Spine (AROM)  (*Measured 3rd finger from the floor)  Flexion              Extension           ~50% available  R side bend     58  L side bend      55  R rotation           ~75% available  L rotation           ~75% available        Spinal Assessment:   Diminished lumbar lordosis     Joint Mobility:   Generally hypomobile     Palpation:   TTP and increased turgor right periscapular area  TTP lumbar paraspinals     Neurologic Assessment:               Tone: normal               Sensation: intact               Reflexes: NT     Special Tests:   - SLR, increased tightness/buttock pain L > R     Edema: 3+ pitting edema bilateral ankles        Mobility:               Transitional Movements: Increased time to perform               Gait: SPC use with noted path deviations as he left the clinic     Balance: Impaired; TU seconds     Functional Measure:   Perico Mann:  on evaluation  Perico Mann:  on 10th visit     TREATMENT/INTERVENTION:  Modalities (Rationale): MHP to cervical and lumbar spine x10 min post session to decrease guarding and pain; no skin irritation noted      Therapeutic Exercises:  Initial HEP: Scapular retraction, Shoulder rolls, Cervical retraction, Lower cervical/upper thoracic stretch, SKTC, LTR    Additional clinical activities in BOLD performed this date:    UBE/LBE x 6 minutes, level 4.0    Supine:  SKTC x 10 reps B  LTR x 10 reps B  Figure 4 piriformis stretch: 3 reps with 30 sec hold B  Bridges with 5# wt at hips x 10 reps 2 sets  Single leg lumbar stretch with 20 sec hold 2 reps B  SLR x 10 reps B    Standing:  Rows at Quantum 30#: 15 reps  Pull downs at Quantum 35#: 15 reps  Hip extension and abduction with green TB: 10 reps B  Mini squats with   Green TBx 10 reps  Band walks with green  TB 20 x 2 reps  HS stretch x 2 reps B 30 sec hold      Seated:   Forward flex over blue physio x 10 reps; 5 reps side to side  Thoracic flexion/extension over towel: 10 reps   Trunk SB stretch x 5 reps B  Trunk rotation stretch x 5 reps B  UT stretch: 2 reps with 30 sec holds B; maximal cues for form  Levator scapulae stretch: 2 reps with 30 second holds B; maximal cues for form  Lower cervical/upper thoracic stretch: 3 reps with 10 second holds      Manual Therapy:  IASTM to B right UT, levator and rhomboids, procedure explained to pt and he expressed understanding, visualized response. Pt instructed in use of tennis ball for self STM, he demonstrated understanding, encouraged him to use this at home. Activity tolerance and post treatment pain report:   Good; Pain Out: 3/10    Education:  Education was provided to the patient on the following topics: Reinforced stretching while avoiding pain. [x]    No changes were made to the home exercise program.  []    The following changes were made to the home exercise program:   Patient verbalized understanding of the topics presented. ASSESSMENT:   Patient presents with decreased pain in low back and neck pain. . Patient reports improved compliance with HEP; exercises daily  and walking program. Patient tolerated exercises well this date; improved form with all exercises. Reviewed use of tennis ball for self trigger point release. He is ready for discharge from our service. PLAN OF CARE:      []    Continue treatment per established plan of care.   [x]     Recommend the following changes to the plan of care: Discharge      Vin Wharton PT   Time Calculation: 45 mins  Patient Time in clinic:   Start Time: 5467   Stop Time: 97 70 84

## 2019-07-22 NOTE — PROGRESS NOTES
Dr Sarthak Hung,    Mr Elza Ramachandran has been to Physical Therapy for 15 visits. Overall he reports decreased pain. In addition to his home exercise program, he has been doing strengthening  and stretching exercises. He is able to ride the stationary bike and perform some upper extremity wt lifting exercises. I have encouraged him to continue his exercises a long with walking or some form of cardiovascular exercise. He is ready for discharge from our Service.     Thank you for this referral,    Shelton Runner [facility]

## 2019-09-02 ENCOUNTER — APPOINTMENT (OUTPATIENT)
Dept: GENERAL RADIOLOGY | Age: 69
End: 2019-09-02
Attending: EMERGENCY MEDICINE
Payer: MEDICARE

## 2019-09-02 ENCOUNTER — HOSPITAL ENCOUNTER (INPATIENT)
Age: 69
LOS: 3 days | Discharge: HOME HEALTH | DRG: 683 | End: 2019-09-05
Attending: INTERNAL MEDICINE | Admitting: INTERNAL MEDICINE
Payer: MEDICARE

## 2019-09-02 ENCOUNTER — APPOINTMENT (OUTPATIENT)
Dept: CT IMAGING | Age: 69
End: 2019-09-02
Attending: EMERGENCY MEDICINE
Payer: MEDICARE

## 2019-09-02 ENCOUNTER — APPOINTMENT (OUTPATIENT)
Dept: ULTRASOUND IMAGING | Age: 69
DRG: 683 | End: 2019-09-02
Attending: INTERNAL MEDICINE
Payer: MEDICARE

## 2019-09-02 ENCOUNTER — HOSPITAL ENCOUNTER (EMERGENCY)
Age: 69
Discharge: ANOTHER HOSPITAL | End: 2019-09-02
Attending: EMERGENCY MEDICINE
Payer: MEDICARE

## 2019-09-02 VITALS
BODY MASS INDEX: 27.06 KG/M2 | HEIGHT: 70 IN | TEMPERATURE: 97.7 F | OXYGEN SATURATION: 100 % | RESPIRATION RATE: 16 BRPM | SYSTOLIC BLOOD PRESSURE: 191 MMHG | DIASTOLIC BLOOD PRESSURE: 115 MMHG | HEART RATE: 73 BPM | WEIGHT: 189 LBS

## 2019-09-02 DIAGNOSIS — I10 HYPERTENSION, UNSPECIFIED TYPE: ICD-10-CM

## 2019-09-02 DIAGNOSIS — S42.201A CLOSED FRACTURE OF PROXIMAL END OF RIGHT HUMERUS, UNSPECIFIED FRACTURE MORPHOLOGY, INITIAL ENCOUNTER: Primary | ICD-10-CM

## 2019-09-02 DIAGNOSIS — N17.9 ACUTE RENAL FAILURE, UNSPECIFIED ACUTE RENAL FAILURE TYPE (HCC): Primary | ICD-10-CM

## 2019-09-02 DIAGNOSIS — S42.214A CLOSED NONDISPLACED FRACTURE OF SURGICAL NECK OF RIGHT HUMERUS, UNSPECIFIED FRACTURE MORPHOLOGY, INITIAL ENCOUNTER: ICD-10-CM

## 2019-09-02 PROBLEM — E11.9 DM (DIABETES MELLITUS) (HCC): Status: ACTIVE | Noted: 2019-09-02

## 2019-09-02 PROBLEM — I16.1 HYPERTENSIVE EMERGENCY: Status: ACTIVE | Noted: 2019-09-02

## 2019-09-02 PROBLEM — S42.309A HUMERUS FRACTURE: Status: ACTIVE | Noted: 2019-09-02

## 2019-09-02 LAB
ALBUMIN SERPL-MCNC: 2.7 G/DL (ref 3.5–5)
ALBUMIN/GLOB SERPL: 0.6 {RATIO} (ref 1.1–2.2)
ALP SERPL-CCNC: 70 U/L (ref 45–117)
ALT SERPL-CCNC: 36 U/L (ref 12–78)
ANION GAP SERPL CALC-SCNC: 11 MMOL/L (ref 5–15)
AST SERPL-CCNC: 38 U/L (ref 15–37)
BASOPHILS # BLD: 0 K/UL (ref 0–0.1)
BASOPHILS NFR BLD: 0 % (ref 0–1)
BILIRUB SERPL-MCNC: 0.5 MG/DL (ref 0.2–1)
BUN SERPL-MCNC: 41 MG/DL (ref 6–20)
BUN/CREAT SERPL: 8 (ref 12–20)
CALCIUM SERPL-MCNC: 8.5 MG/DL (ref 8.5–10.1)
CHLORIDE SERPL-SCNC: 106 MMOL/L (ref 97–108)
CK SERPL-CCNC: 332 U/L (ref 39–308)
CO2 SERPL-SCNC: 27 MMOL/L (ref 21–32)
CREAT SERPL-MCNC: 5.26 MG/DL (ref 0.7–1.3)
DIFFERENTIAL METHOD BLD: ABNORMAL
EOSINOPHIL # BLD: 0.1 K/UL (ref 0–0.4)
EOSINOPHIL NFR BLD: 2 % (ref 0–7)
ERYTHROCYTE [DISTWIDTH] IN BLOOD BY AUTOMATED COUNT: 13.2 % (ref 11.5–14.5)
GLOBULIN SER CALC-MCNC: 4.5 G/DL (ref 2–4)
GLUCOSE BLD STRIP.AUTO-MCNC: 79 MG/DL (ref 65–100)
GLUCOSE SERPL-MCNC: 142 MG/DL (ref 65–100)
HCT VFR BLD AUTO: 29.7 % (ref 36.6–50.3)
HGB BLD-MCNC: 9.7 G/DL (ref 12.1–17)
IMM GRANULOCYTES # BLD AUTO: 0 K/UL (ref 0–0.04)
IMM GRANULOCYTES NFR BLD AUTO: 0 % (ref 0–0.5)
LYMPHOCYTES # BLD: 0.7 K/UL (ref 0.8–3.5)
LYMPHOCYTES NFR BLD: 16 % (ref 12–49)
MCH RBC QN AUTO: 30.6 PG (ref 26–34)
MCHC RBC AUTO-ENTMCNC: 32.7 G/DL (ref 30–36.5)
MCV RBC AUTO: 93.7 FL (ref 80–99)
MONOCYTES # BLD: 0.4 K/UL (ref 0–1)
MONOCYTES NFR BLD: 9 % (ref 5–13)
NEUTS SEG # BLD: 3.2 K/UL (ref 1.8–8)
NEUTS SEG NFR BLD: 73 % (ref 32–75)
NRBC # BLD: 0 K/UL (ref 0–0.01)
NRBC BLD-RTO: 0 PER 100 WBC
PLATELET # BLD AUTO: 272 K/UL (ref 150–400)
PMV BLD AUTO: 10.2 FL (ref 8.9–12.9)
POTASSIUM SERPL-SCNC: 3.4 MMOL/L (ref 3.5–5.1)
PROT SERPL-MCNC: 7.2 G/DL (ref 6.4–8.2)
RBC # BLD AUTO: 3.17 M/UL (ref 4.1–5.7)
RBC MORPH BLD: ABNORMAL
SERVICE CMNT-IMP: NORMAL
SODIUM SERPL-SCNC: 144 MMOL/L (ref 136–145)
TROPONIN I SERPL-MCNC: <0.05 NG/ML
WBC # BLD AUTO: 4.4 K/UL (ref 4.1–11.1)

## 2019-09-02 PROCEDURE — 99285 EMERGENCY DEPT VISIT HI MDM: CPT

## 2019-09-02 PROCEDURE — 36415 COLL VENOUS BLD VENIPUNCTURE: CPT

## 2019-09-02 PROCEDURE — 74011250637 HC RX REV CODE- 250/637: Performed by: EMERGENCY MEDICINE

## 2019-09-02 PROCEDURE — 76770 US EXAM ABDO BACK WALL COMP: CPT

## 2019-09-02 PROCEDURE — 93005 ELECTROCARDIOGRAM TRACING: CPT

## 2019-09-02 PROCEDURE — 74011250636 HC RX REV CODE- 250/636: Performed by: EMERGENCY MEDICINE

## 2019-09-02 PROCEDURE — 65660000000 HC RM CCU STEPDOWN

## 2019-09-02 PROCEDURE — 70450 CT HEAD/BRAIN W/O DYE: CPT

## 2019-09-02 PROCEDURE — 85025 COMPLETE CBC W/AUTO DIFF WBC: CPT

## 2019-09-02 PROCEDURE — 84484 ASSAY OF TROPONIN QUANT: CPT

## 2019-09-02 PROCEDURE — 74011250636 HC RX REV CODE- 250/636: Performed by: INTERNAL MEDICINE

## 2019-09-02 PROCEDURE — A4565 SLINGS: HCPCS

## 2019-09-02 PROCEDURE — 80053 COMPREHEN METABOLIC PANEL: CPT

## 2019-09-02 PROCEDURE — 96375 TX/PRO/DX INJ NEW DRUG ADDON: CPT

## 2019-09-02 PROCEDURE — 74011250637 HC RX REV CODE- 250/637: Performed by: INTERNAL MEDICINE

## 2019-09-02 PROCEDURE — 74011636637 HC RX REV CODE- 636/637: Performed by: INTERNAL MEDICINE

## 2019-09-02 PROCEDURE — 74176 CT ABD & PELVIS W/O CONTRAST: CPT

## 2019-09-02 PROCEDURE — 82962 GLUCOSE BLOOD TEST: CPT

## 2019-09-02 PROCEDURE — 74011000250 HC RX REV CODE- 250: Performed by: INTERNAL MEDICINE

## 2019-09-02 PROCEDURE — 82550 ASSAY OF CK (CPK): CPT

## 2019-09-02 PROCEDURE — 73060 X-RAY EXAM OF HUMERUS: CPT

## 2019-09-02 PROCEDURE — 71101 X-RAY EXAM UNILAT RIBS/CHEST: CPT

## 2019-09-02 PROCEDURE — 96374 THER/PROPH/DIAG INJ IV PUSH: CPT

## 2019-09-02 PROCEDURE — 74011000258 HC RX REV CODE- 258: Performed by: INTERNAL MEDICINE

## 2019-09-02 PROCEDURE — 74011000250 HC RX REV CODE- 250: Performed by: EMERGENCY MEDICINE

## 2019-09-02 RX ORDER — ONDANSETRON 2 MG/ML
4 INJECTION INTRAMUSCULAR; INTRAVENOUS
Status: DISCONTINUED | OUTPATIENT
Start: 2019-09-02 | End: 2019-09-05 | Stop reason: HOSPADM

## 2019-09-02 RX ORDER — LANOLIN ALCOHOL/MO/W.PET/CERES
325 CREAM (GRAM) TOPICAL 2 TIMES DAILY
Status: DISCONTINUED | OUTPATIENT
Start: 2019-09-02 | End: 2019-09-05 | Stop reason: HOSPADM

## 2019-09-02 RX ORDER — MAGNESIUM SULFATE 100 %
4 CRYSTALS MISCELLANEOUS AS NEEDED
Status: DISCONTINUED | OUTPATIENT
Start: 2019-09-02 | End: 2019-09-05 | Stop reason: HOSPADM

## 2019-09-02 RX ORDER — METHOCARBAMOL 500 MG/1
500 TABLET, FILM COATED ORAL 2 TIMES DAILY
Status: ON HOLD | COMMUNITY
End: 2019-11-05 | Stop reason: SDDI

## 2019-09-02 RX ORDER — FENTANYL CITRATE 50 UG/ML
50 INJECTION, SOLUTION INTRAMUSCULAR; INTRAVENOUS
Status: COMPLETED | OUTPATIENT
Start: 2019-09-02 | End: 2019-09-02

## 2019-09-02 RX ORDER — LABETALOL 100 MG/1
100 TABLET, FILM COATED ORAL ONCE
Status: COMPLETED | OUTPATIENT
Start: 2019-09-02 | End: 2019-09-02

## 2019-09-02 RX ORDER — LABETALOL 100 MG/1
100 TABLET, FILM COATED ORAL 2 TIMES DAILY
Status: DISCONTINUED | OUTPATIENT
Start: 2019-09-02 | End: 2019-09-02

## 2019-09-02 RX ORDER — LABETALOL 100 MG/1
100 TABLET, FILM COATED ORAL 2 TIMES DAILY
Status: ON HOLD | COMMUNITY
End: 2019-09-05 | Stop reason: SDUPTHER

## 2019-09-02 RX ORDER — DEXTROSE MONOHYDRATE 100 MG/ML
125-250 INJECTION, SOLUTION INTRAVENOUS AS NEEDED
Status: DISCONTINUED | OUTPATIENT
Start: 2019-09-02 | End: 2019-09-05 | Stop reason: HOSPADM

## 2019-09-02 RX ORDER — INSULIN LISPRO 100 [IU]/ML
INJECTION, SOLUTION INTRAVENOUS; SUBCUTANEOUS
Status: DISCONTINUED | OUTPATIENT
Start: 2019-09-02 | End: 2019-09-05 | Stop reason: HOSPADM

## 2019-09-02 RX ORDER — SPIRONOLACTONE 25 MG/1
25 TABLET ORAL DAILY
COMMUNITY
End: 2020-02-13

## 2019-09-02 RX ORDER — CLONIDINE HYDROCHLORIDE 0.1 MG/1
0.1 TABLET ORAL
Status: COMPLETED | OUTPATIENT
Start: 2019-09-02 | End: 2019-09-02

## 2019-09-02 RX ORDER — LABETALOL HYDROCHLORIDE 5 MG/ML
10 INJECTION, SOLUTION INTRAVENOUS
Status: COMPLETED | OUTPATIENT
Start: 2019-09-02 | End: 2019-09-02

## 2019-09-02 RX ORDER — ACETAMINOPHEN 325 MG/1
650 TABLET ORAL
Status: DISCONTINUED | OUTPATIENT
Start: 2019-09-02 | End: 2019-09-05 | Stop reason: HOSPADM

## 2019-09-02 RX ORDER — HEPARIN SODIUM 5000 [USP'U]/ML
5000 INJECTION, SOLUTION INTRAVENOUS; SUBCUTANEOUS EVERY 8 HOURS
Status: DISCONTINUED | OUTPATIENT
Start: 2019-09-02 | End: 2019-09-05 | Stop reason: HOSPADM

## 2019-09-02 RX ORDER — ASPIRIN 81 MG/1
81 TABLET ORAL AS NEEDED
Status: DISCONTINUED | OUTPATIENT
Start: 2019-09-02 | End: 2019-09-02

## 2019-09-02 RX ORDER — ASPIRIN 81 MG/1
81 TABLET ORAL DAILY
Status: DISCONTINUED | OUTPATIENT
Start: 2019-09-03 | End: 2019-09-05 | Stop reason: HOSPADM

## 2019-09-02 RX ORDER — FERROUS FUMARATE 324(106)MG
TABLET ORAL 2 TIMES DAILY
COMMUNITY
End: 2019-09-08 | Stop reason: DRUGHIGH

## 2019-09-02 RX ORDER — FUROSEMIDE 20 MG/1
20 TABLET ORAL 2 TIMES DAILY
COMMUNITY
End: 2019-09-05

## 2019-09-02 RX ORDER — POTASSIUM CHLORIDE 750 MG/1
10 TABLET, FILM COATED, EXTENDED RELEASE ORAL
Status: COMPLETED | OUTPATIENT
Start: 2019-09-02 | End: 2019-09-02

## 2019-09-02 RX ORDER — CLONIDINE HYDROCHLORIDE 0.1 MG/1
0.2 TABLET ORAL 3 TIMES DAILY
Status: DISCONTINUED | OUTPATIENT
Start: 2019-09-02 | End: 2019-09-05 | Stop reason: HOSPADM

## 2019-09-02 RX ORDER — SODIUM CHLORIDE 450 MG/100ML
75 INJECTION, SOLUTION INTRAVENOUS CONTINUOUS
Status: DISCONTINUED | OUTPATIENT
Start: 2019-09-02 | End: 2019-09-03

## 2019-09-02 RX ORDER — AMLODIPINE BESYLATE 5 MG/1
10 TABLET ORAL DAILY
Status: DISCONTINUED | OUTPATIENT
Start: 2019-09-03 | End: 2019-09-05 | Stop reason: HOSPADM

## 2019-09-02 RX ORDER — OXYCODONE HYDROCHLORIDE 5 MG/1
5 TABLET ORAL
Status: DISCONTINUED | OUTPATIENT
Start: 2019-09-02 | End: 2019-09-05 | Stop reason: HOSPADM

## 2019-09-02 RX ORDER — LABETALOL 200 MG/1
200 TABLET, FILM COATED ORAL 2 TIMES DAILY
Status: DISCONTINUED | OUTPATIENT
Start: 2019-09-03 | End: 2019-09-03

## 2019-09-02 RX ORDER — MORPHINE SULFATE 2 MG/ML
2 INJECTION, SOLUTION INTRAMUSCULAR; INTRAVENOUS
Status: DISCONTINUED | OUTPATIENT
Start: 2019-09-02 | End: 2019-09-05 | Stop reason: HOSPADM

## 2019-09-02 RX ADMIN — LABETALOL HYDROCHLORIDE 100 MG: 100 TABLET, FILM COATED ORAL at 21:16

## 2019-09-02 RX ADMIN — LABETALOL HYDROCHLORIDE 100 MG: 100 TABLET, FILM COATED ORAL at 15:30

## 2019-09-02 RX ADMIN — CLONIDINE HYDROCHLORIDE 0.2 MG: 0.1 TABLET ORAL at 21:16

## 2019-09-02 RX ADMIN — FENTANYL CITRATE 50 MCG: 50 INJECTION, SOLUTION INTRAMUSCULAR; INTRAVENOUS at 16:53

## 2019-09-02 RX ADMIN — LABETALOL HYDROCHLORIDE 10 MG: 5 INJECTION INTRAVENOUS at 16:51

## 2019-09-02 RX ADMIN — HEPARIN SODIUM 5000 UNITS: 5000 INJECTION INTRAVENOUS; SUBCUTANEOUS at 21:25

## 2019-09-02 RX ADMIN — CLONIDINE HYDROCHLORIDE 0.1 MG: 0.1 TABLET ORAL at 15:30

## 2019-09-02 RX ADMIN — FERROUS SULFATE TAB 325 MG (65 MG ELEMENTAL FE) 325 MG: 325 (65 FE) TAB at 21:16

## 2019-09-02 RX ADMIN — SODIUM CHLORIDE 1000 ML: 900 INJECTION, SOLUTION INTRAVENOUS at 15:20

## 2019-09-02 RX ADMIN — POTASSIUM CHLORIDE 10 MEQ: 750 TABLET, FILM COATED, EXTENDED RELEASE ORAL at 21:17

## 2019-09-02 RX ADMIN — SODIUM CHLORIDE 75 ML/HR: 450 INJECTION, SOLUTION INTRAVENOUS at 20:58

## 2019-09-02 RX ADMIN — SODIUM CHLORIDE 5 MG/HR: 0.9 INJECTION, SOLUTION INTRAVENOUS at 21:05

## 2019-09-02 NOTE — ED NOTES
Pt didn't understand that MD wants him to be admitted r/t renal failure-dr edgar md, at bedside again, discussing plan of care. No si/s of acute distress. Pt reported 10/10 RUE pain, tolerable at present per pt. No si/s of acute distress. Call bell within reach.

## 2019-09-02 NOTE — ED NOTES
Report given to EMS staff and care passed on of pt. No si/s of acute distress. Pt left via EMS stretcher with pt's belongings (keys, luggage, cane, home medications, and other personal belongings) to [facility] PCU, 2nd floor.

## 2019-09-02 NOTE — ED NOTES

## 2019-09-02 NOTE — ED NOTES
.. TRANSFER - OUT REPORT:    Verbal report given to buck sauer rn(name) on Chika Swanson  being transferred to [facility] inpt 2nd floor, PCU(unit) for routine progression of care       Report consisted of patients Situation, Background, Assessment and   Recommendations(SBAR). Information from the following report(s) SBAR, Kardex, ED Summary, Intake/Output, MAR, Accordion, Recent Results, Med Rec Status, Cardiac Rhythm sinus rhythm HR 80s, Alarm Parameters  and Quality Measures was reviewed with the receiving nurse. Lines:   Peripheral IV 09/02/19 Right Arm (Active)   Site Assessment Clean, dry, & intact 9/2/2019  2:26 PM   Phlebitis Assessment 0 9/2/2019  2:26 PM   Infiltration Assessment 0 9/2/2019  2:26 PM   Dressing Status Clean, dry, & intact 9/2/2019  2:26 PM   Dressing Type Transparent 9/2/2019  2:26 PM   Hub Color/Line Status Pink;Patent; Flushed 9/2/2019  2:26 PM   Action Taken Blood drawn 9/2/2019  2:26 PM   Alcohol Cap Used Yes 9/2/2019  2:26 PM        Opportunity for questions and clarification was provided.       Patient transported with:   Monitor, EMS staff, pt's belongings

## 2019-09-02 NOTE — H&P
Hospitalist Admission Note    NAME: Rehan Stacy   :  1950   MRN:  [id number]     Date/Time:  2019 6:13 PM    Patient PCP: Thea Parikh MD  ______________________________________________________________________  Given the patient's current clinical presentation, I have a high level of concern for decompensation if discharged from the emergency department. Complex decision making was performed, which includes reviewing the patient's available past medical records, laboratory results, and x-ray films. My assessment of this patient's clinical condition and my plan of care is as follows. Assessment / Plan:  Hypertensive Emergency: will start Cardene drip, c/w Norvasc, labetalol and clonidine, monitor in stepdown unit. Hold lasix and spironolactone  ASHVIN on CKD: will start IVF and monitor, hold ACE and diuretics for now. Get Nephrology input, check urine protein/creatinine, PTH. Chronic Diastolic Heart Failure: not in acute failure at this time, hold lasix, monitor I/o and weight. Hypokalemia: replace and monitor  Right humeral Fracture: c/w pain meds, get Ortho evaluation. DM type 2 with hyperglycemia: c/w NPH, place SSI, check HbA1C. Code Status: Full Code  Surrogate Decision Maker: DTR Avelina Pichardo [phone]  DVT Prophylaxis: heparin  GI Prophylaxis: not indicated  Baseline: fairly independent full aDL    Patient is critically ill, high risk for de-compensation      Subjective:   CHIEF COMPLAINT: \"I feel weak, my right shoulder hurts\"    HISTORY OF PRESENT ILLNESS:     Rehan Stacy is a 77 y.o.  male  with PMHx significant for diabetes, hypertension, who presents complaining of right upper extremity and right rib pain following a fall 4 days ago. Patient states that he was getting up to go to the bathroom and woke up on the ground.   Has had pain in the right upper extremity as well as some right rib pain since that time, however was in the midst of moving so did not present to the ER initially. He denies any chest pain, palpitations, or shortness of breath preceding his syncopal episode. Did not feel lightheaded prior to the event  At this time patient is lying in be dc/o right shoulder pain, s/p fall, denies chest pain, no SOB, no fever, no N/V no diarrhea, has low extremity edema, no cough, no urinary symptoms, no other associated symptoms. ,  We were asked to admit for work up and evaluation of the above problems. Past Medical History:   Diagnosis Date    Diabetes ([address]     Hypertension         Past Surgical History:   Procedure Laterality Date    HX GI      colonoscopy 6-7 yrs ago    FL COLON CA SCRN NOT HI RSK IND  10/21/2015            Social History     Tobacco Use    Smoking status: Never Smoker    Smokeless tobacco: Never Used   Substance Use Topics    Alcohol use: No        Family History   Problem Relation Age of Onset    Hypertension Mother     Hypertension Father      No Known Allergies     Prior to Admission medications    Medication Sig Start Date End Date Taking? Authorizing Provider   methocarbamol (ROBAXIN) 500 mg tablet Take 500 mg by mouth two (2) times a day. Bita Hudson MD   spironolactone (ALDACTONE) 25 mg tablet Take 25 mg by mouth daily. Bita Hudson MD   ferrous fumarate 324 mg (106 mg iron) tab Take  by mouth two (2) times a day. Bita Hudson MD   furosemide (LASIX) 20 mg tablet Take 20 mg by mouth two (2) times a day. Bita Hudson MD   labetalol (NORMODYNE) 100 mg tablet Take 100 mg by mouth two (2) times a day. Bita Hudson MD   cloNIDine HCl (CATAPRES) 0.1 mg tablet 0.2 mg three (3) times daily.  2/10/18   Provider, Historical   MASSIMO PEN NEEDLE 32 gauge x 5/32\" ndle USE AS DIRECTED WITH INSULIN 1/14/18   Provider, Historical   diphenoxylate-atropine (LOMOTIL) 2.5-0.025 mg per tablet TK 2 TS PO QID PRN 1/8/18   Provider, Historical   amLODIPine-benazepril (LOTREL) 10-40 mg per capsule Take 1 Cap by mouth daily.    Provider, Historical   insulin aspart protamine/insulin aspart (NOVOLOG MIX 70-30) 100 unit/mL (70-30) injection by SubCUTAneous route daily. 35 UNITS    Provider, Historical   aspirin delayed-release 81 mg tablet Take  by mouth as needed for Pain. Provider, Historical       REVIEW OF SYSTEMS:     I am not able to complete the review of systems because: The patient is intubated and sedated    The patient has altered mental status due to his acute medical problems    The patient has baseline aphasia from prior stroke(s)    The patient has baseline dementia and is not reliable historian    The patient is in acute medical distress and unable to provide information           Total of 12 systems reviewed as follows:       POSITIVE= underlined text  Negative = text not underlined  General:  fever, chills, sweats, generalized weakness, weight loss/gain,      loss of appetite   Eyes:    blurred vision, eye pain, loss of vision, double vision  ENT:    rhinorrhea, pharyngitis   Respiratory:   cough, sputum production, SOB, POWELL, wheezing, pleuritic pain   Cardiology:   chest pain, palpitations, orthopnea, PND, edema, syncope   Gastrointestinal:  abdominal pain , N/V, diarrhea, dysphagia, constipation, bleeding   Genitourinary:  frequency, urgency, dysuria, hematuria, incontinence   Muskuloskeletal :  arthralgia, myalgia, back pain  Hematology:  easy bruising, nose or gum bleeding, lymphadenopathy   Dermatological: rash, ulceration, pruritis, color change / jaundice  Endocrine:   hot flashes or polydipsia   Neurological:  headache, dizziness, confusion, focal weakness, paresthesia,     Speech difficulties, memory loss, gait difficulty  Psychological: Feelings of anxiety, depression, agitation    Objective:   VITALS:    Visit Vitals  BP (!) 213/123   Pulse 76   Temp 97.4 °F (36.3 °C)   Resp 18       PHYSICAL EXAM:    General:    Alert, cooperative, no distress, appears stated age.      HEENT: Atraumatic, anicteric sclerae, pink conjunctivae     No oral ulcers, mucosa moist, throat clear, dentition poor   Neck:  Supple, symmetrical,  thyroid: non tender  Lungs:   Coarse BS  Chest wall:  No tenderness  No Accessory muscle use. Heart:   Regular  rhythm,  No  murmur   trace edema  Abdomen:   Soft, non-tender. Not distended. Bowel sounds normal  Extremities: No cyanosis. No clubbing,      Skin turgor normal, Capillary refill normal, Radial pulse 2+  Skin:     Not pale. Not Jaundiced  No rashes   Psych:  Good insight. Not depressed. Not anxious or agitated. Neurologic: EOMs intact. No facial asymmetry. No aphasia or slurred speech. Symmetrical strength, Sensation grossly intact. Alert and oriented X 4.     _______________________________________________________________________  Care Plan discussed with:    Comments   Patient y    Family  y    RN y    Care Manager                    Consultant:      _______________________________________________________________________  Expected  Disposition:   Home with Family    HH/PT/OT/RN y   SNF/LTC    HALINA    ________________________________________________________________________  TOTAL TIME:   Minutes    Critical Care Provided 61    Minutes non procedure based      Comments    y Reviewed previous records   >50% of visit spent in counseling and coordination of care y Discussion with patient and/or family and questions answered       ________________________________________________________________________  Signed: Nahed Faith MD    Procedures: see electronic medical records for all procedures/Xrays and details which were not copied into this note but were reviewed prior to creation of Plan.     LAB DATA REVIEWED:    Recent Results (from the past 24 hour(s))   EKG, 12 LEAD, INITIAL    Collection Time: 09/02/19  2:13 PM   Result Value Ref Range    Ventricular Rate 91 BPM    Atrial Rate 91 BPM    P-R Interval 170 ms    QRS Duration 142 ms    Q-T Interval 382 ms    QTC Calculation (Bezet) 469 ms    Calculated P Axis 43 degrees    Calculated R Axis 62 degrees    Calculated T Axis -24 degrees    Diagnosis       Normal sinus rhythm  Right bundle branch block  Inferior infarct , age undetermined  T wave abnormality, consider lateral ischemia  Abnormal ECG  When compared with ECG of 23-JAN-2019 18:27,  Significant changes have occurred     CBC WITH AUTOMATED DIFF    Collection Time: 09/02/19  2:23 PM   Result Value Ref Range    WBC 4.4 4.1 - 11.1 K/uL    RBC 3.17 (L) 4.10 - 5.70 M/uL    HGB 9.7 (L) 12.1 - 17.0 g/dL    HCT 29.7 (L) 36.6 - 50.3 %    MCV 93.7 80.0 - 99.0 FL    MCH 30.6 26.0 - 34.0 PG    MCHC 32.7 30.0 - 36.5 g/dL    RDW 13.2 11.5 - 14.5 %    PLATELET 181 747 - 083 K/uL    MPV 10.2 8.9 - 12.9 FL    NRBC 0.0 0  WBC    ABSOLUTE NRBC 0.00 0.00 - 0.01 K/uL    NEUTROPHILS 73 32 - 75 %    LYMPHOCYTES 16 12 - 49 %    MONOCYTES 9 5 - 13 %    EOSINOPHILS 2 0 - 7 %    BASOPHILS 0 0 - 1 %    IMMATURE GRANULOCYTES 0 0.0 - 0.5 %    ABS. NEUTROPHILS 3.2 1.8 - 8.0 K/UL    ABS. LYMPHOCYTES 0.7 (L) 0.8 - 3.5 K/UL    ABS. MONOCYTES 0.4 0.0 - 1.0 K/UL    ABS. EOSINOPHILS 0.1 0.0 - 0.4 K/UL    ABS. BASOPHILS 0.0 0.0 - 0.1 K/UL    ABS. IMM. GRANS. 0.0 0.00 - 0.04 K/UL    DF SMEAR SCANNED      RBC COMMENTS NORMOCYTIC, NORMOCHROMIC     METABOLIC PANEL, COMPREHENSIVE    Collection Time: 09/02/19  2:23 PM   Result Value Ref Range    Sodium 144 136 - 145 mmol/L    Potassium 3.4 (L) 3.5 - 5.1 mmol/L    Chloride 106 97 - 108 mmol/L    CO2 27 21 - 32 mmol/L    Anion gap 11 5 - 15 mmol/L    Glucose 142 (H) 65 - 100 mg/dL    BUN 41 (H) 6 - 20 MG/DL    Creatinine 5.26 (H) 0.70 - 1.30 MG/DL    BUN/Creatinine ratio 8 (L) 12 - 20      GFR est AA 13 (L) >60 ml/min/1.73m2    GFR est non-AA 11 (L) >60 ml/min/1.73m2    Calcium 8.5 8.5 - 10.1 MG/DL    Bilirubin, total 0.5 0.2 - 1.0 MG/DL    ALT (SGPT) 36 12 - 78 U/L    AST (SGOT) 38 (H) 15 - 37 U/L    Alk.  phosphatase 70 45 - 117 U/L    Protein, total 7.2 6.4 - 8.2 g/dL    Albumin 2.7 (L) 3.5 - 5.0 g/dL    Globulin 4.5 (H) 2.0 - 4.0 g/dL    A-G Ratio 0.6 (L) 1.1 - 2.2     TROPONIN I    Collection Time: 09/02/19  2:23 PM   Result Value Ref Range    Troponin-I, Qt. <0.05 <0.05 ng/mL   CK    Collection Time: 09/02/19  2:23 PM   Result Value Ref Range     (H) 39 - 308 U/L

## 2019-09-02 NOTE — PROGRESS NOTES
1805: Patient arrived via stretcher from [facility].     1805: Dr. Jamaal Brock notified that patient has arrived    18: Patient in bed resting quietly. Call bell in reach. Will monitor. 1848: Called for an IV pump.     1900: Report given to [employer] [facility] (oncoming nurses) by Willem Bruno and Plizy (offgoing nurses). Report included Situation, background, Assessment and Recommendations.

## 2019-09-02 NOTE — ED PROVIDER NOTES
EMERGENCY DEPARTMENT HISTORY AND PHYSICAL EXAM      Date: 9/2/2019  Patient Name: Leonie Butler    History of Presenting Illness     Chief Complaint   Patient presents with    Fall       History Provided By: Patient    HPI: Leonie Butler, 77 y.o. male with PMHx significant for diabetes, hypertension, who presents complaining of right upper extremity and right rib pain following a fall 4 days ago. Patient states that he was getting up to go to the bathroom and woke up on the ground. Has had pain in the right upper extremity as well as some right rib pain since that time, however was in the midst of moving so did not present to the ER initially. He denies any chest pain, palpitations, or shortness of breath preceding his syncopal episode. Did not feel lightheaded prior to the event      PCP: Jearldine Osler, MD    There are no other complaints, changes, or physical findings at this time. Current Facility-Administered Medications   Medication Dose Route Frequency Provider Last Rate Last Dose    labetalol (NORMODYNE;TRANDATE) injection 10 mg  10 mg IntraVENous NOW Edwin Clark MD        fentaNYL citrate (PF) injection 50 mcg  50 mcg IntraVENous NOW Edwin Clark MD         Current Outpatient Medications   Medication Sig Dispense Refill    methocarbamol (ROBAXIN) 500 mg tablet Take 500 mg by mouth two (2) times a day.  spironolactone (ALDACTONE) 25 mg tablet Take 25 mg by mouth daily.  ferrous fumarate 324 mg (106 mg iron) tab Take  by mouth two (2) times a day.  furosemide (LASIX) 20 mg tablet Take 20 mg by mouth two (2) times a day.  labetalol (NORMODYNE) 100 mg tablet Take 100 mg by mouth two (2) times a day.  cloNIDine HCl (CATAPRES) 0.1 mg tablet 0.2 mg three (3) times daily.   6    MASSIMO PEN NEEDLE 32 gauge x 5/32\" ndle USE AS DIRECTED WITH INSULIN  5    insulin aspart protamine/insulin aspart (NOVOLOG MIX 70-30) 100 unit/mL (70-30) injection by SubCUTAneous route daily. 35 UNITS      aspirin delayed-release 81 mg tablet Take  by mouth as needed for Pain.  diphenoxylate-atropine (LOMOTIL) 2.5-0.025 mg per tablet TK 2 TS PO QID PRN  1    amLODIPine-benazepril (LOTREL) 10-40 mg per capsule Take 1 Cap by mouth daily. Past History     Past Medical History:  Past Medical History:   Diagnosis Date    Diabetes ([address]     Hypertension      Past Surgical History:  Past Surgical History:   Procedure Laterality Date    HX GI      colonoscopy 6-7 yrs ago    KS COLON CA SCRN NOT HI RSK IND  10/21/2015          Family History:  History reviewed. No pertinent family history. Social History:  Social History     Tobacco Use    Smoking status: Never Smoker    Smokeless tobacco: Never Used   Substance Use Topics    Alcohol use: No    Drug use: No     Allergies:  No Known Allergies  Review of Systems   Review of Systems   Constitutional: Negative for chills and fever. HENT: Negative for congestion, rhinorrhea and sore throat. Respiratory: Negative for cough and shortness of breath. Cardiovascular: Positive for chest pain (R rib pain). Gastrointestinal: Negative for abdominal pain, nausea and vomiting. Genitourinary: Negative for dysuria and urgency. Musculoskeletal: Positive for arthralgias (R shoulder). Skin: Negative for rash. Neurological: Negative for dizziness, light-headedness and headaches. All other systems reviewed and are negative. Physical Exam   Physical Exam   Constitutional: He is oriented to person, place, and time. He appears well-developed and well-nourished. No distress. HENT:   Head: Normocephalic and atraumatic. Eyes: Pupils are equal, round, and reactive to light. Conjunctivae and EOM are normal.   Neck: Normal range of motion. Cardiovascular: Normal rate, regular rhythm and intact distal pulses. Pulmonary/Chest: Effort normal and breath sounds normal. No stridor. No respiratory distress.  He exhibits tenderness (right lateral ribs). Abdominal: Soft. He exhibits no distension. There is no tenderness. Musculoskeletal:   Decreased ROM of the right shoulder with ttp, diffuse ecchymosis over the right upper arm   Neurological: He is alert and oriented to person, place, and time. Skin: Skin is warm and dry. Psychiatric: He has a normal mood and affect. Nursing note and vitals reviewed. Diagnostic Study Results   Labs -     Recent Results (from the past 12 hour(s))   EKG, 12 LEAD, INITIAL    Collection Time: 09/02/19  2:13 PM   Result Value Ref Range    Ventricular Rate 91 BPM    Atrial Rate 91 BPM    P-R Interval 170 ms    QRS Duration 142 ms    Q-T Interval 382 ms    QTC Calculation (Bezet) 469 ms    Calculated P Axis 43 degrees    Calculated R Axis 62 degrees    Calculated T Axis -24 degrees    Diagnosis       Normal sinus rhythm  Right bundle branch block  Inferior infarct , age undetermined  T wave abnormality, consider lateral ischemia  Abnormal ECG  When compared with ECG of 23-JAN-2019 18:27,  Significant changes have occurred     CBC WITH AUTOMATED DIFF    Collection Time: 09/02/19  2:23 PM   Result Value Ref Range    WBC 4.4 4.1 - 11.1 K/uL    RBC 3.17 (L) 4.10 - 5.70 M/uL    HGB 9.7 (L) 12.1 - 17.0 g/dL    HCT 29.7 (L) 36.6 - 50.3 %    MCV 93.7 80.0 - 99.0 FL    MCH 30.6 26.0 - 34.0 PG    MCHC 32.7 30.0 - 36.5 g/dL    RDW 13.2 11.5 - 14.5 %    PLATELET 674 215 - 049 K/uL    MPV 10.2 8.9 - 12.9 FL    NRBC 0.0 0  WBC    ABSOLUTE NRBC 0.00 0.00 - 0.01 K/uL    NEUTROPHILS 73 32 - 75 %    LYMPHOCYTES 16 12 - 49 %    MONOCYTES 9 5 - 13 %    EOSINOPHILS 2 0 - 7 %    BASOPHILS 0 0 - 1 %    IMMATURE GRANULOCYTES 0 0.0 - 0.5 %    ABS. NEUTROPHILS 3.2 1.8 - 8.0 K/UL    ABS. LYMPHOCYTES 0.7 (L) 0.8 - 3.5 K/UL    ABS. MONOCYTES 0.4 0.0 - 1.0 K/UL    ABS. EOSINOPHILS 0.1 0.0 - 0.4 K/UL    ABS. BASOPHILS 0.0 0.0 - 0.1 K/UL    ABS. IMM.  GRANS. 0.0 0.00 - 0.04 K/UL    DF SMEAR SCANNED      RBC COMMENTS NORMOCYTIC, NORMOCHROMIC     METABOLIC PANEL, COMPREHENSIVE    Collection Time: 09/02/19  2:23 PM   Result Value Ref Range    Sodium 144 136 - 145 mmol/L    Potassium 3.4 (L) 3.5 - 5.1 mmol/L    Chloride 106 97 - 108 mmol/L    CO2 27 21 - 32 mmol/L    Anion gap 11 5 - 15 mmol/L    Glucose 142 (H) 65 - 100 mg/dL    BUN 41 (H) 6 - 20 MG/DL    Creatinine 5.26 (H) 0.70 - 1.30 MG/DL    BUN/Creatinine ratio 8 (L) 12 - 20      GFR est AA 13 (L) >60 ml/min/1.73m2    GFR est non-AA 11 (L) >60 ml/min/1.73m2    Calcium 8.5 8.5 - 10.1 MG/DL    Bilirubin, total 0.5 0.2 - 1.0 MG/DL    ALT (SGPT) 36 12 - 78 U/L    AST (SGOT) 38 (H) 15 - 37 U/L    Alk. phosphatase 70 45 - 117 U/L    Protein, total 7.2 6.4 - 8.2 g/dL    Albumin 2.7 (L) 3.5 - 5.0 g/dL    Globulin 4.5 (H) 2.0 - 4.0 g/dL    A-G Ratio 0.6 (L) 1.1 - 2.2     TROPONIN I    Collection Time: 09/02/19  2:23 PM   Result Value Ref Range    Troponin-I, Qt. <0.05 <0.05 ng/mL       Radiologic Studies -   XR RIBS RT W PA CXR MIN 3 V   Final Result   IMPRESSION:     1. No visible rib fracture. 2. Right humeral fracture               XR HUMERUS RT   Final Result   IMPRESSION:    1. Fracture through the surgical neck of the right humerus. CT ABD PELV WO CONT    (Results Pending)   CT HEAD WO CONT    (Results Pending)     Xr Humerus Rt    Result Date: 9/2/2019  IMPRESSION: 1. Fracture through the surgical neck of the right humerus. Xr Ribs Rt W Pa Cxr Min 3 V    Result Date: 9/2/2019  IMPRESSION:  1. No visible rib fracture. 2. Right humeral fracture     Medical Decision Making   I am the first provider for this patient. I reviewed the vital signs, available nursing notes, past medical history, past surgical history, family history and social history. Vital Signs-Reviewed the patient's vital signs.   Patient Vitals for the past 12 hrs:   Temp Pulse Resp BP SpO2   09/02/19 1536     99 %   09/02/19 1535    (!) 217/128    09/02/19 1502  85 16 (!) 198/127    09/02/19 1351 98.4 °F (36.9 °C) 96 16 (!) 178/98 98 %       Pulse Oximetry Analysis - 99% on RA    Cardiac Monitor:   Rate: 85 bpm  Rhythm: Normal Sinus Rhythm      ED EKG interpretation:  Rhythm: normal sinus rhythm, RBBB; and regular . Rate (approx.): 91; Axis: normal; P wave: normal; QRS interval: prolonged; ST/T wave: normal; Other findings: unchanged from previous ekg. This EKG was interpreted by K. Sherren Givens, MD,ED Provider. Records Reviewed: Nursing Notes and Old Medical Records    Provider Notes (Medical Decision Making):   Patient presents following a syncopal episode 4 days ago with pain in his right shoulder with significant ecchymosis as well as pain in his right ribs. Concern for possible shoulder fracture. Will check x-rays. Also check x-rays of the ribs to rule out fracture. In terms of his syncope, will check basic lab work, troponin, EKG    ED Course:   Initial assessment performed. The patients presenting problems have been discussed, and they are in agreement with the care plan formulated and outlined with them. I have encouraged them to ask questions as they arise throughout their visit. Labs returned with a creatinine of 5, up from a baseline of 2. Patient has not taken his blood pressure medications today and is significantly hypertensive. We will give him his home blood pressure medications now. X-ray showed a fracture of the right humeral neck. Patient was placed in a sling. Given the worsening renal failure, CT scan of the abdomen was ordered to rule out obstruction. Will discuss with hospitalist at [facility] [place] for transfer.     ED Course as of Sep 02 1555   Mon Sep 02, 2019   1550 Patient accepted to ED [facility] by Dr. Brandon Agarwal, hospitalist.     Melody Signs      ED Course User Index  Fanny Sullivan MD       Critical Care:  CRITICAL CARE NOTE :  3:58 PM  IMPENDING DETERIORATION -Renal  ASSOCIATED RISK FACTORS - Metabolic changes  MANAGEMENT- Bedside Assessment and Transfer  INTERPRETATION -  Xrays, ECG and Blood Pressure  INTERVENTIONS - Metobolic interventions  CASE REVIEW - Hospitalist  TREATMENT RESPONSE -Stable  PERFORMED BY - Self    NOTES   :    I have spent 35 minutes of critical care time involved in lab review, consultations with specialist, family decision- making, bedside attention and documentation. During this entire length of time I was immediately available to the patient . Nelly Juarez MD      Disposition:  Transfer Note:   Patient is being transferred to [facility]. Transfer was accepted by Dr. Agustin Grimes. The reasons for their transfer have been discussed with them and available family. They convey agreement and understanding for the need to be transferred as explained to them by me. Diagnosis     Clinical Impression:   1. Acute renal failure, unspecified acute renal failure type ([address]    2. Closed nondisplaced fracture of surgical neck of right humerus, unspecified fracture morphology, initial encounter    3. Hypertension, unspecified type        This note will not be viewable in [employer]. Please note that this dictation was completed with [employer], the computer voice recognition software. Quite often unanticipated grammatical, syntax, homophones, and other interpretive errors are inadvertently transcribed by the computer software. Please disregard these errors.   Please excuse any errors that have escaped final proofreading

## 2019-09-02 NOTE — ED TRIAGE NOTES
Reports ground level fall in bathroom three days ago. States took him awhile to get up but did get up same day as fall. Has large amount of bruising to right upper arm and also reports right rib pain. Does not know what caused him to fall, reports he went to bathroom and next thing he knew he was on the floor.

## 2019-09-03 ENCOUNTER — APPOINTMENT (OUTPATIENT)
Dept: NON INVASIVE DIAGNOSTICS | Age: 69
DRG: 683 | End: 2019-09-03
Attending: INTERNAL MEDICINE
Payer: MEDICARE

## 2019-09-03 LAB
ALBUMIN SERPL-MCNC: 2.3 G/DL (ref 3.5–5)
ALBUMIN/GLOB SERPL: 0.6 {RATIO} (ref 1.1–2.2)
ALP SERPL-CCNC: 56 U/L (ref 45–117)
ALT SERPL-CCNC: 28 U/L (ref 12–78)
ANION GAP SERPL CALC-SCNC: 7 MMOL/L (ref 5–15)
AST SERPL-CCNC: 29 U/L (ref 15–37)
ATRIAL RATE: 91 BPM
BASOPHILS # BLD: 0 K/UL (ref 0–0.1)
BASOPHILS NFR BLD: 0 % (ref 0–1)
BILIRUB SERPL-MCNC: 0.7 MG/DL (ref 0.2–1)
BUN SERPL-MCNC: 40 MG/DL (ref 6–20)
BUN/CREAT SERPL: 9 (ref 12–20)
CALCIUM SERPL-MCNC: 7.7 MG/DL (ref 8.5–10.1)
CALCULATED P AXIS, ECG09: 43 DEGREES
CALCULATED R AXIS, ECG10: 62 DEGREES
CALCULATED T AXIS, ECG11: -24 DEGREES
CHLORIDE SERPL-SCNC: 110 MMOL/L (ref 97–108)
CO2 SERPL-SCNC: 27 MMOL/L (ref 21–32)
CREAT SERPL-MCNC: 4.63 MG/DL (ref 0.7–1.3)
DIAGNOSIS, 93000: NORMAL
DIFFERENTIAL METHOD BLD: ABNORMAL
ECHO AV AREA PEAK VELOCITY: 3.1 CM2
ECHO AV AREA/BSA PEAK VELOCITY: 1.5 CM2/M2
ECHO AV PEAK GRADIENT: 7.2 MMHG
ECHO AV PEAK VELOCITY: 134.19 CM/S
ECHO AV REGURGITANT PHT: 1367 CM
ECHO EST RA PRESSURE: 10 MMHG
ECHO LA AREA 4C: 16.7 CM2
ECHO LA MAJOR AXIS: 2.71 CM
ECHO LA VOL 4C: 40.53 ML (ref 18–58)
ECHO LA VOLUME INDEX A4C: 19.89 ML/M2 (ref 16–28)
ECHO LV E' LATERAL VELOCITY: 7.8 CM/S
ECHO LV E' SEPTAL VELOCITY: 5.33 CM/S
ECHO LV EDV A4C: 98.1 ML
ECHO LV EDV INDEX A4C: 48.1 ML/M2
ECHO LV EJECTION FRACTION A4C: 60 %
ECHO LV ESV A4C: 39.2 ML
ECHO LV ESV INDEX A4C: 19.2 ML/M2
ECHO LV INTERNAL DIMENSION DIASTOLIC: 4.02 CM (ref 4.2–5.9)
ECHO LV INTERNAL DIMENSION SYSTOLIC: 2.29 CM
ECHO LV IVSD: 2.07 CM (ref 0.6–1)
ECHO LV MASS 2D: 463.2 G (ref 88–224)
ECHO LV MASS INDEX 2D: 227.3 G/M2 (ref 49–115)
ECHO LV POSTERIOR WALL DIASTOLIC: 1.97 CM (ref 0.6–1)
ECHO LVOT DIAM: 2.11 CM
ECHO LVOT PEAK GRADIENT: 5.7 MMHG
ECHO LVOT PEAK VELOCITY: 118.92 CM/S
ECHO MV A VELOCITY: 83.22 CM/S
ECHO MV E DECELERATION TIME (DT): 251.6 MS
ECHO MV E VELOCITY: 57.46 CM/S
ECHO MV E/A RATIO: 0.69
ECHO MV E/E' LATERAL: 7.37
ECHO MV E/E' RATIO (AVERAGED): 9.07
ECHO MV E/E' SEPTAL: 10.78
ECHO MV REGURGITANT PEAK GRADIENT: 33 MMHG
ECHO MV REGURGITANT PEAK VELOCITY: 287.38 CM/S
ECHO PULMONARY ARTERY SYSTOLIC PRESSURE (PASP): 18.2 MMHG
ECHO PV MAX VELOCITY: 62.14 CM/S
ECHO PV PEAK GRADIENT: 1.5 MMHG
ECHO RA AREA 4C: 9.92 CM2
ECHO RIGHT VENTRICULAR SYSTOLIC PRESSURE (RVSP): 18.2 MMHG
ECHO TV REGURGITANT MAX VELOCITY: 143.12 CM/S
ECHO TV REGURGITANT PEAK GRADIENT: 8.2 MMHG
EOSINOPHIL # BLD: 0.1 K/UL (ref 0–0.4)
EOSINOPHIL NFR BLD: 3 % (ref 0–7)
ERYTHROCYTE [DISTWIDTH] IN BLOOD BY AUTOMATED COUNT: 13.2 % (ref 11.5–14.5)
EST. AVERAGE GLUCOSE BLD GHB EST-MCNC: NORMAL MG/DL
GLOBULIN SER CALC-MCNC: 4 G/DL (ref 2–4)
GLUCOSE BLD STRIP.AUTO-MCNC: 108 MG/DL (ref 65–100)
GLUCOSE BLD STRIP.AUTO-MCNC: 121 MG/DL (ref 65–100)
GLUCOSE BLD STRIP.AUTO-MCNC: 145 MG/DL (ref 65–100)
GLUCOSE BLD STRIP.AUTO-MCNC: 150 MG/DL (ref 65–100)
GLUCOSE BLD STRIP.AUTO-MCNC: 161 MG/DL (ref 65–100)
GLUCOSE BLD STRIP.AUTO-MCNC: 46 MG/DL (ref 65–100)
GLUCOSE BLD STRIP.AUTO-MCNC: 47 MG/DL (ref 65–100)
GLUCOSE BLD STRIP.AUTO-MCNC: 54 MG/DL (ref 65–100)
GLUCOSE BLD STRIP.AUTO-MCNC: 61 MG/DL (ref 65–100)
GLUCOSE BLD STRIP.AUTO-MCNC: 70 MG/DL (ref 65–100)
GLUCOSE SERPL-MCNC: 133 MG/DL (ref 65–100)
HAV IGM SER QL: NONREACTIVE
HBA1C MFR BLD: 4.8 % (ref 4.2–6.3)
HBV CORE IGM SER QL: NONREACTIVE
HBV SURFACE AG SER QL: >1000 INDEX
HBV SURFACE AG SER QL: POSITIVE
HCT VFR BLD AUTO: 25.6 % (ref 36.6–50.3)
HCV AB SER IA-ACNC: >11 INDEX
HCV AB SERPL QL IA: REACTIVE
HCV COMMENT,HCGAC: ABNORMAL
HGB BLD-MCNC: 8.5 G/DL (ref 12.1–17)
HIV1 P24 AG SERPL QL IA: NONREACTIVE
HIV1+2 AB SERPL QL IA: NONREACTIVE
IMM GRANULOCYTES # BLD AUTO: 0 K/UL (ref 0–0.04)
IMM GRANULOCYTES NFR BLD AUTO: 0 % (ref 0–0.5)
IRON SATN MFR SERPL: 29 % (ref 20–50)
IRON SERPL-MCNC: 58 UG/DL (ref 35–150)
LYMPHOCYTES # BLD: 1 K/UL (ref 0.8–3.5)
LYMPHOCYTES NFR BLD: 25 % (ref 12–49)
MAGNESIUM SERPL-MCNC: 2.1 MG/DL (ref 1.6–2.4)
MCH RBC QN AUTO: 30.4 PG (ref 26–34)
MCHC RBC AUTO-ENTMCNC: 33.2 G/DL (ref 30–36.5)
MCV RBC AUTO: 91.4 FL (ref 80–99)
MONOCYTES # BLD: 0.5 K/UL (ref 0–1)
MONOCYTES NFR BLD: 12 % (ref 5–13)
NEUTS SEG # BLD: 2.3 K/UL (ref 1.8–8)
NEUTS SEG NFR BLD: 60 % (ref 32–75)
NRBC # BLD: 0 K/UL (ref 0–0.01)
NRBC BLD-RTO: 0 PER 100 WBC
P-R INTERVAL, ECG05: 170 MS
PHOSPHATE SERPL-MCNC: 4.7 MG/DL (ref 2.6–4.7)
PISA AR MAX VEL: 191.08 CM/S
PLATELET # BLD AUTO: 226 K/UL (ref 150–400)
PMV BLD AUTO: 10 FL (ref 8.9–12.9)
POTASSIUM SERPL-SCNC: 3.3 MMOL/L (ref 3.5–5.1)
PROT SERPL-MCNC: 6.3 G/DL (ref 6.4–8.2)
Q-T INTERVAL, ECG07: 382 MS
QRS DURATION, ECG06: 142 MS
QTC CALCULATION (BEZET), ECG08: 469 MS
RBC # BLD AUTO: 2.8 M/UL (ref 4.1–5.7)
SERVICE CMNT-IMP: ABNORMAL
SERVICE CMNT-IMP: NORMAL
SODIUM SERPL-SCNC: 144 MMOL/L (ref 136–145)
SP1: ABNORMAL
SP2: ABNORMAL
SP3: ABNORMAL
TIBC SERPL-MCNC: 200 UG/DL (ref 250–450)
TSH SERPL DL<=0.05 MIU/L-ACNC: 2.1 UIU/ML (ref 0.36–3.74)
VENTRICULAR RATE, ECG03: 91 BPM
WBC # BLD AUTO: 3.8 K/UL (ref 4.1–11.1)

## 2019-09-03 PROCEDURE — A4565 SLINGS: HCPCS

## 2019-09-03 PROCEDURE — 97535 SELF CARE MNGMENT TRAINING: CPT

## 2019-09-03 PROCEDURE — 82784 ASSAY IGA/IGD/IGG/IGM EACH: CPT

## 2019-09-03 PROCEDURE — 86160 COMPLEMENT ANTIGEN: CPT

## 2019-09-03 PROCEDURE — 87389 HIV-1 AG W/HIV-1&-2 AB AG IA: CPT

## 2019-09-03 PROCEDURE — 97165 OT EVAL LOW COMPLEX 30 MIN: CPT

## 2019-09-03 PROCEDURE — 74011250637 HC RX REV CODE- 250/637: Performed by: INTERNAL MEDICINE

## 2019-09-03 PROCEDURE — 83520 IMMUNOASSAY QUANT NOS NONAB: CPT

## 2019-09-03 PROCEDURE — 83036 HEMOGLOBIN GLYCOSYLATED A1C: CPT

## 2019-09-03 PROCEDURE — 97161 PT EVAL LOW COMPLEX 20 MIN: CPT

## 2019-09-03 PROCEDURE — 82962 GLUCOSE BLOOD TEST: CPT

## 2019-09-03 PROCEDURE — 36415 COLL VENOUS BLD VENIPUNCTURE: CPT

## 2019-09-03 PROCEDURE — 86225 DNA ANTIBODY NATIVE: CPT

## 2019-09-03 PROCEDURE — 83540 ASSAY OF IRON: CPT

## 2019-09-03 PROCEDURE — 85025 COMPLETE CBC W/AUTO DIFF WBC: CPT

## 2019-09-03 PROCEDURE — 84100 ASSAY OF PHOSPHORUS: CPT

## 2019-09-03 PROCEDURE — 65660000000 HC RM CCU STEPDOWN

## 2019-09-03 PROCEDURE — 74011000258 HC RX REV CODE- 258: Performed by: INTERNAL MEDICINE

## 2019-09-03 PROCEDURE — 80053 COMPREHEN METABOLIC PANEL: CPT

## 2019-09-03 PROCEDURE — 74011250636 HC RX REV CODE- 250/636: Performed by: INTERNAL MEDICINE

## 2019-09-03 PROCEDURE — 97116 GAIT TRAINING THERAPY: CPT

## 2019-09-03 PROCEDURE — 83735 ASSAY OF MAGNESIUM: CPT

## 2019-09-03 PROCEDURE — 93306 TTE W/DOPPLER COMPLETE: CPT

## 2019-09-03 PROCEDURE — 86038 ANTINUCLEAR ANTIBODIES: CPT

## 2019-09-03 PROCEDURE — 80074 ACUTE HEPATITIS PANEL: CPT

## 2019-09-03 PROCEDURE — 74011636637 HC RX REV CODE- 636/637: Performed by: INTERNAL MEDICINE

## 2019-09-03 PROCEDURE — 74011000250 HC RX REV CODE- 250: Performed by: INTERNAL MEDICINE

## 2019-09-03 PROCEDURE — 84443 ASSAY THYROID STIM HORMONE: CPT

## 2019-09-03 RX ORDER — POTASSIUM CHLORIDE 750 MG/1
40 TABLET, FILM COATED, EXTENDED RELEASE ORAL ONCE
Status: COMPLETED | OUTPATIENT
Start: 2019-09-03 | End: 2019-09-03

## 2019-09-03 RX ADMIN — AMLODIPINE BESYLATE 10 MG: 5 TABLET ORAL at 08:05

## 2019-09-03 RX ADMIN — LABETALOL HYDROCHLORIDE 200 MG: 200 TABLET, FILM COATED ORAL at 08:04

## 2019-09-03 RX ADMIN — HEPARIN SODIUM 5000 UNITS: 5000 INJECTION INTRAVENOUS; SUBCUTANEOUS at 21:50

## 2019-09-03 RX ADMIN — FERROUS SULFATE TAB 325 MG (65 MG ELEMENTAL FE) 325 MG: 325 (65 FE) TAB at 17:23

## 2019-09-03 RX ADMIN — HEPARIN SODIUM 5000 UNITS: 5000 INJECTION INTRAVENOUS; SUBCUTANEOUS at 06:03

## 2019-09-03 RX ADMIN — CLONIDINE HYDROCHLORIDE 0.2 MG: 0.1 TABLET ORAL at 08:04

## 2019-09-03 RX ADMIN — HUMAN INSULIN 35 UNITS: 100 INJECTION, SUSPENSION SUBCUTANEOUS at 08:05

## 2019-09-03 RX ADMIN — CLONIDINE HYDROCHLORIDE 0.2 MG: 0.1 TABLET ORAL at 17:23

## 2019-09-03 RX ADMIN — Medication 16 G: at 17:22

## 2019-09-03 RX ADMIN — MORPHINE SULFATE 2 MG: 2 INJECTION, SOLUTION INTRAMUSCULAR; INTRAVENOUS at 08:05

## 2019-09-03 RX ADMIN — INSULIN LISPRO 2 UNITS: 100 INJECTION, SOLUTION INTRAVENOUS; SUBCUTANEOUS at 08:05

## 2019-09-03 RX ADMIN — ASPIRIN 81 MG: 81 TABLET, COATED ORAL at 08:06

## 2019-09-03 RX ADMIN — CLONIDINE HYDROCHLORIDE 0.2 MG: 0.1 TABLET ORAL at 21:50

## 2019-09-03 RX ADMIN — POTASSIUM CHLORIDE 40 MEQ: 750 TABLET, EXTENDED RELEASE ORAL at 18:29

## 2019-09-03 RX ADMIN — HEPARIN SODIUM 5000 UNITS: 5000 INJECTION INTRAVENOUS; SUBCUTANEOUS at 17:23

## 2019-09-03 RX ADMIN — LABETALOL HYDROCHLORIDE 300 MG: 200 TABLET, FILM COATED ORAL at 21:50

## 2019-09-03 RX ADMIN — FERROUS SULFATE TAB 325 MG (65 MG ELEMENTAL FE) 325 MG: 325 (65 FE) TAB at 08:05

## 2019-09-03 NOTE — PROGRESS NOTES
Hospitalist Progress Note    NAME: Masha Napier   :  1950   MRN:  [id number]       Assessment / Plan:  Hypertensive Emergency:  Uncontrolled BP   S/p Cardene drip,off this am , BP  Better controlled    c/w Norvasc, labetalol and clonidine, monitor in stepdown unit. 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 Nephrology input appreciated , check urine protein/creatinine, PTH. Positive hep B and C , new dg   GI consulted     Chronic Diastolic Heart Failure: not in acute failure at this time, hold lasix, monitor I/o and weight. Hypokalemia: replace and monitor    Right humeral Fracture: c/w pain meds,  Ortho evaluation: c/w sling , no surgery needed , FU with dr hightower in 2weeks     DM type 2   Now hypoglycemias  Blood sugars as low as 46   Will hold home dose NPH   C/w SSI   Hba1c 4.8    Code Status: Full Code  Surrogate Decision Maker: DTR [facility] [id number]  DVT Prophylaxis: heparin  GI Prophylaxis: not indicated  Baseline: fairly independent full aDL       25.0 - 29.9 Overweight / Body mass index is 27.12 kg/m². Subjective:     Chief Complaint / Reason for Physician Visit  FU hypertensive emergency . Pt has no acute complaints . Discussed with RN events overnight. Review of Systems:  Symptom Y/N Comments  Symptom Y/N Comments   Fever/Chills    Chest Pain n    Poor Appetite    Edema     Cough    Abdominal Pain n    Sputum    Joint Pain     SOB/POWELL n   Pruritis/Rash     Nausea/vomit n   Tolerating PT/OT     Diarrhea n   Tolerating Diet y    Constipation    Other       Could NOT obtain due to:      Objective:     VITALS:   Last 24hrs VS reviewed since prior progress note.  Most recent are:  Patient Vitals for the past 24 hrs:   Temp Pulse Resp BP SpO2   19 1108 97.2 °F (36.2 °C) 66 18 114/77    19 1019    124/80    09/03/19 1006    (!) 155/91    09/03/19 1000  72  127/81    09/03/19 0930    138/88    09/03/19 0900    133/85    09/03/19 0830  82  154/81    09/03/19 0730 97.3 °F (36.3 °C) 82 18 (!) 155/91 96 %   09/03/19 0320 97.4 °F (36.3 °C) 76 14 (!) 156/104 99 %   09/02/19 2251 98 °F (36.7 °C) 82 16 137/85 99 %   09/02/19 2200    (!) 170/106    09/02/19 2100    (!) 221/132    09/02/19 1926 97.6 °F (36.4 °C) 73 16 (!) 191/121 98 %   09/02/19 1809 97.4 °F (36.3 °C) 76 18 (!) 213/123        Intake/Output Summary (Last 24 hours) at 9/3/2019 1412  Last data filed at 9/3/2019 1046  Gross per 24 hour   Intake 536 ml   Output 725 ml   Net -189 ml        PHYSICAL EXAM:  General: WD, WN. Alert, cooperative, no acute distress    EENT:  EOMI. Anicteric sclerae. MMM  Resp:  CTA bilaterally, no wheezing or rales. No accessory muscle use  CV:  Regular  rhythm,  No edema  GI:  Soft, Non distended, Non tender.  +Bowel sounds  Neurologic:  Alert and oriented X 3, normal speech,   Psych:   Good insight. Not anxious nor agitated  Skin:  No rashes. No jaundice    Reviewed most current lab test results and cultures  YES  Reviewed most current radiology test results   YES  Review and summation of old records today    NO  Reviewed patient's current orders and MAR    YES  PMH/SH reviewed - no change compared to H&P  ________________________________________________________________________  Care Plan discussed with:    Comments   Patient x    Family      RN x    Care Manager     Consultant                        Multidiciplinary team rounds were held today with , nursing, pharmacist and clinical coordinator. Patient's plan of care was discussed; medications were reviewed and discharge planning was addressed.      ________________________________________________________________________  Total NON critical care TIME:  30Minutes    Total CRITICAL CARE TIME Spent:   Minutes non procedure based Comments   >50% of visit spent in counseling and coordination of care     ________________________________________________________________________  Vaibhav Pemberton MD     Procedures: see electronic medical records for all procedures/Xrays and details which were not copied into this note but were reviewed prior to creation of Plan. LABS:  I reviewed today's most current labs and imaging studies.   Pertinent labs include:  Recent Labs     09/03/19  0323 09/02/19  1423   WBC 3.8* 4.4   HGB 8.5* 9.7*   HCT 25.6* 29.7*    272     Recent Labs     09/03/19  0323 09/02/19  1423    144   K 3.3* 3.4*   * 106   CO2 27 27   * 142*   BUN 40* 41*   CREA 4.63* 5.26*   CA 7.7* 8.5   MG 2.1  --    PHOS 4.7  --    ALB 2.3* 2.7*   TBILI 0.7 0.5   SGOT 29 38*   ALT 28 36       Signed: Vaibhav Pemberton MD

## 2019-09-03 NOTE — CONSULTS
[facility]   [address] [facility], [facility], [facility]  Phone: [phone] NOTE     Patient: Alicia Dos Santos MRN: [id number]  PCP: Lukas Elliott MD   :     1950  Age:   77 y.o. Sex:  male      Referring physician: Robin Cleveland MD  Reason for consultation: 77 y.o. male with Hypertensive emergency [J62.5] complicated by ASHVIN   Admission Date: 2019  6:01 PM  LOS: 0 days      ASSESSMENT and PLAN :   ASHVIN on CKD :  - etiology unknown at this time   - Possible pre renal azotemia from poor po intake, diuretics vs acute hypertensive nephropathy   - there is no UA done on admission-- ordered UA and urine lytes   - check renal US : last one in March showed echogenic Kidneys   - continue IVF as ordered     CKD Stage III :  - baseline Cr ~ 2 mg/dl this year   - presumed to be 2/2 diabetic nephropathy   - sees [facility] nephrology   - ordered Gammopathy screen     Fall and R arm / chest injury     HTN urgency   - agree w/ cardene  Gtt   - aim for -170/80-90     Type II DM     Care Plan discussed with: pt         Thank you for consulting [facility] in the care of your patient. Subjective:   HPI: Alicia Dos Santos is a 77 y.o.  male who has been admitted to the hospital for RUE pain and swelling from a fall 4 days ago. On admission he was noted to have Creatinine > 5 and we were asked to see him. He is known to have diabetic nephropathy. Following his hospitalization in  his PCP has referred him to Nephrology. He reports seeing someone in [facility] in March and had Kidney US. He was asked to take Lisinopril,.denies having a Kidney bx. Denies NSAID use.  Reports falling 4 days ago but did not seek help   Denies any urinary sx  Reports difficult to control BP lately      Past Medical Hx:   Past Medical History:   Diagnosis Date    Diabetes ([address]     Hypertension         Past Surgical Hx:     Past Surgical History:   Procedure Laterality Date    HX GI      colonoscopy 6-7 yrs ago    ME COLON CA SCRN NOT HI RSK IND  10/21/2015              No Known Allergies    Social Hx:  reports that he has never smoked. He has never used smokeless tobacco. He reports that he does not drink alcohol or use drugs. Family History   Problem Relation Age of Onset    Hypertension Mother     Hypertension Father        Review of Systems:  A thorough twelve point review of system was performed today. Pertinent positives and negatives are mentioned in the HPI. The reminder of the ROS is negative and noncontributory. Objective:    Vitals:    Vitals:    09/02/19 1809 09/02/19 1926   BP: (!) 213/123 (!) 191/121   Pulse: 76 73   Resp: 18 16   Temp: 97.4 °F (36.3 °C) 97.6 °F (36.4 °C)   SpO2:  98%     I&O's:  No intake/output data recorded. Visit Vitals  BP (!) 191/121 (BP 1 Location: Left arm, BP Patient Position: At rest)   Pulse 73   Temp 97.6 °F (36.4 °C)   Resp 16   SpO2 98%       Physical Exam:  General:  No apparent Distress  HEENT: PERRL,  No Pallor , No Icterus  Neck: Supple,no mass palpable  Lungs : CTA  CVS: RRR, S1 S2 normal, No murmur   Abdomen: Soft, NT, BS +  Extremities: no Edema  Skin: No rash or lesions.   MS: bruising RUE   Neurologic: non focal, AAO x 3  Psych: normal affect/  Laboratory Results:    Recent Labs     09/02/19  1423      K 3.4*      CO2 27   *   BUN 41*   CREA 5.26*   CA 8.5   ALB 2.7*   SGOT 38*   ALT 36     Recent Labs     09/02/19  1423   WBC 4.4   HGB 9.7*   HCT 29.7*        No results found for: SDES  No results found for: CULT  Recent Results (from the past 24 hour(s))   EKG, 12 LEAD, INITIAL    Collection Time: 09/02/19  2:13 PM   Result Value Ref Range    Ventricular Rate 91 BPM    Atrial Rate 91 BPM    P-R Interval 170 ms    QRS Duration 142 ms    Q-T Interval 382 ms    QTC Calculation (Bezet) 469 ms    Calculated P Axis 43 degrees    Calculated R Axis 62 degrees    Calculated T Axis -24 degrees    Diagnosis       Normal sinus rhythm  Right bundle branch block  Inferior infarct , age undetermined  T wave abnormality, consider lateral ischemia  Abnormal ECG  When compared with ECG of 23-JAN-2019 18:27,  Significant changes have occurred     CBC WITH AUTOMATED DIFF    Collection Time: 09/02/19  2:23 PM   Result Value Ref Range    WBC 4.4 4.1 - 11.1 K/uL    RBC 3.17 (L) 4.10 - 5.70 M/uL    HGB 9.7 (L) 12.1 - 17.0 g/dL    HCT 29.7 (L) 36.6 - 50.3 %    MCV 93.7 80.0 - 99.0 FL    MCH 30.6 26.0 - 34.0 PG    MCHC 32.7 30.0 - 36.5 g/dL    RDW 13.2 11.5 - 14.5 %    PLATELET 619 318 - 775 K/uL    MPV 10.2 8.9 - 12.9 FL    NRBC 0.0 0  WBC    ABSOLUTE NRBC 0.00 0.00 - 0.01 K/uL    NEUTROPHILS 73 32 - 75 %    LYMPHOCYTES 16 12 - 49 %    MONOCYTES 9 5 - 13 %    EOSINOPHILS 2 0 - 7 %    BASOPHILS 0 0 - 1 %    IMMATURE GRANULOCYTES 0 0.0 - 0.5 %    ABS. NEUTROPHILS 3.2 1.8 - 8.0 K/UL    ABS. LYMPHOCYTES 0.7 (L) 0.8 - 3.5 K/UL    ABS. MONOCYTES 0.4 0.0 - 1.0 K/UL    ABS. EOSINOPHILS 0.1 0.0 - 0.4 K/UL    ABS. BASOPHILS 0.0 0.0 - 0.1 K/UL    ABS. IMM. GRANS. 0.0 0.00 - 0.04 K/UL    DF SMEAR SCANNED      RBC COMMENTS NORMOCYTIC, NORMOCHROMIC     METABOLIC PANEL, COMPREHENSIVE    Collection Time: 09/02/19  2:23 PM   Result Value Ref Range    Sodium 144 136 - 145 mmol/L    Potassium 3.4 (L) 3.5 - 5.1 mmol/L    Chloride 106 97 - 108 mmol/L    CO2 27 21 - 32 mmol/L    Anion gap 11 5 - 15 mmol/L    Glucose 142 (H) 65 - 100 mg/dL    BUN 41 (H) 6 - 20 MG/DL    Creatinine 5.26 (H) 0.70 - 1.30 MG/DL    BUN/Creatinine ratio 8 (L) 12 - 20      GFR est AA 13 (L) >60 ml/min/1.73m2    GFR est non-AA 11 (L) >60 ml/min/1.73m2    Calcium 8.5 8.5 - 10.1 MG/DL    Bilirubin, total 0.5 0.2 - 1.0 MG/DL    ALT (SGPT) 36 12 - 78 U/L    AST (SGOT) 38 (H) 15 - 37 U/L    Alk.  phosphatase 70 45 - 117 U/L    Protein, total 7.2 6.4 - 8.2 g/dL    Albumin 2.7 (L) 3.5 - 5.0 g/dL    Globulin 4.5 (H) 2.0 - 4.0 g/dL    A-G Ratio 0.6 (L) 1.1 - 2.2     TROPONIN I    Collection Time: 09/02/19  2:23 PM   Result Value Ref Range    Troponin-I, Qt. <0.05 <0.05 ng/mL   CK    Collection Time: 09/02/19  2:23 PM   Result Value Ref Range     (H) 39 - 308 U/L   GLUCOSE, POC    Collection Time: 09/02/19  9:23 PM   Result Value Ref Range    Glucose (POC) 79 65 - 100 mg/dL    Performed by Aditya Jensen          Urine dipstick:   Lab Results   Component Value Date/Time    Color YELLOW/STRAW 01/24/2019 12:52 AM    Appearance TURBID (A) 01/24/2019 12:52 AM    Specific gravity 1.016 01/24/2019 12:52 AM    pH (UA) 6.0 01/24/2019 12:52 AM    Protein >300 (A) 01/24/2019 12:52 AM    Glucose 100 (A) 01/24/2019 12:52 AM    Ketone NEGATIVE  01/24/2019 12:52 AM    Bilirubin NEGATIVE  01/24/2019 12:52 AM    Urobilinogen 1.0 01/24/2019 12:52 AM    Nitrites NEGATIVE  01/24/2019 12:52 AM    Leukocyte Esterase NEGATIVE  01/24/2019 12:52 AM    Epithelial cells FEW 01/24/2019 12:52 AM    Bacteria NEGATIVE  01/24/2019 12:52 AM    WBC 0-4 01/24/2019 12:52 AM    RBC 5-10 01/24/2019 12:52 AM       I have reviewed the following: All pertinent labs, microbiology data, radiology imaging for my assessment     Medications list Personally Reviewed   [x]      Yes     []               No       Medications:  Prior to Admission medications    Medication Sig Start Date End Date Taking? Authorizing Provider   methocarbamol (ROBAXIN) 500 mg tablet Take 500 mg by mouth two (2) times a day. Bita Hudson MD   spironolactone (ALDACTONE) 25 mg tablet Take 25 mg by mouth daily. Bita Hudson MD   ferrous fumarate 324 mg (106 mg iron) tab Take  by mouth two (2) times a day. Bita Hudson MD   furosemide (LASIX) 20 mg tablet Take 20 mg by mouth two (2) times a day. Bita Hudson MD   labetalol (NORMODYNE) 100 mg tablet Take 100 mg by mouth two (2) times a day. Bita Hudson MD   cloNIDine HCl (CATAPRES) 0.1 mg tablet 0.2 mg three (3) times daily.  2/10/18 Provider, Historical   MASSIMO PEN NEEDLE 32 gauge x 5/32\" ndle USE AS DIRECTED WITH INSULIN 1/14/18   Provider, Historical   diphenoxylate-atropine (LOMOTIL) 2.5-0.025 mg per tablet TK 2 TS PO QID PRN 1/8/18   Provider, Historical   amLODIPine-benazepril (LOTREL) 10-40 mg per capsule Take 1 Cap by mouth daily. Provider, Historical   insulin aspart protamine/insulin aspart (NOVOLOG MIX 70-30) 100 unit/mL (70-30) injection by SubCUTAneous route daily. 35 UNITS    Provider, Historical   aspirin delayed-release 81 mg tablet Take  by mouth as needed for Pain. Provider, Historical        Thank you for allowing us to participate in the care of this patient. We will follow patient. Please dont hesitate to call with any questions    Anca Michael MD  [facility]   [address] [address]  Phone - [phone]   Fax - [phone]  [email or web address]

## 2019-09-03 NOTE — PROGRESS NOTES
Nephrology Progress Note     Jarvis Carrera     www. [email or web address]                  Phone - [phone]   Patient: Krissy Shelley   Date- 9/3/2019        Admit Date: 9/2/2019  YOB: 1950             CC: Follow up for ashvin          Subjective: Interval History:   -  Ashvin/cr improving  bp better on cardene gtt  No c/o sob,   No c/o chest pain,   No c/o nausea or vomiting  No c/o  fever. No vomiting or diarrhea prior to admission  He took advil   ROS:- as above   Assessment  & Plan:   ASHVIN on ckd  - etiology multiple - acute hypertensive nephropathy or GN- hepatitis related  +ve hep b and hep c or NSAIDS use  - stop ivf  - no hydro on renal usg  - follow bmp in am  - stop ivf  - hold lasix, aldactone and benazepril  - consider GI consult for hepatitis. CKD 3 - f/b   - ckd likely due to dm + HTN    HTN Urgencey  - resume home meds  - wean cardene gtt    TYPE 2 DM  PROTEINURIA , Microscopic hematuria  - +ve hep b and hep c -   - follow gammopathy screen              Physical Exam:   GEN: NAD  NECK- Supple, no mass  RESP: Clear b/l, no wheezing, No accessory muscle use  CVS: RRR,S1,S2    ABDO: soft , non tender, No mass  EXT: No Edema   NEURO: normal speech, non focal    Care Plan discussed with: patient , nurse  Objective:   Visit Vitals  BP (!) 155/91 (BP 1 Location: Left arm, BP Patient Position: At rest)   Pulse 82   Temp 97.3 °F (36.3 °C)   Resp 18   Wt 81 kg (178 lb 9.2 oz)   SpO2 96%   BMI 25.62 kg/m²     Last 3 Recorded Weights in this Encounter    09/03/19 0459   Weight: 81 kg (178 lb 9.2 oz)     09/01 1901 - 09/03 0700  In: -   Out: 725 [Urine:725]    Intake/Output Summary (Last 24 hours) at 9/3/2019 0940  Last data filed at 9/3/2019 0730  Gross per 24 hour   Intake 120 ml   Output 725 ml   Net -605 ml      Chart reviewed. Pertinent Notes reviewed.        Medication list  reviewed   Current Facility-Administered Medications   Medication    cloNIDine HCl (CATAPRES) tablet 0.2 mg    ferrous sulfate tablet 325 mg    0.45% sodium chloride infusion    acetaminophen (TYLENOL) tablet 650 mg    ondansetron (ZOFRAN) injection 4 mg    niCARdipine (CARDENE) 25 mg in 0.9% sodium chloride 250 mL infusion    insulin lispro (HUMALOG) injection    glucose chewable tablet 16 g    glucagon (GLUCAGEN) injection 1 mg    insulin NPH (NOVOLIN N, HUMULIN N) injection 35 Units    heparin (porcine) injection 5,000 Units    dextrose 10% infusion 125-250 mL    amLODIPine (NORVASC) tablet 10 mg    oxyCODONE IR (ROXICODONE) tablet 5 mg    morphine injection 2 mg    aspirin delayed-release tablet 81 mg    labetalol (NORMODYNE) tablet 200 mg           Data Review :  Recent Labs     09/03/19  0323 09/02/19  1423    144   K 3.3* 3.4*   * 106   CO2 27 27   BUN 40* 41*   CREA 4.63* 5.26*   * 142*   CA 7.7* 8.5   MG 2.1  --    PHOS 4.7  --      Recent Labs     09/03/19  0323 09/02/19  1423   WBC 3.8* 4.4   HGB 8.5* 9.7*   HCT 25.6* 29.7*    272     No results for input(s): FE, TIBC, PSAT, FERR in the last 72 hours.    Recent Labs     09/02/19  1423   *   TROIQ <0.05     Lab Results   Component Value Date/Time    Color YELLOW/STRAW 01/24/2019 12:52 AM    Appearance TURBID (A) 01/24/2019 12:52 AM    Specific gravity 1.016 01/24/2019 12:52 AM    pH (UA) 6.0 01/24/2019 12:52 AM    Protein >300 (A) 01/24/2019 12:52 AM    Glucose 100 (A) 01/24/2019 12:52 AM    Ketone NEGATIVE  01/24/2019 12:52 AM    Bilirubin NEGATIVE  01/24/2019 12:52 AM    Urobilinogen 1.0 01/24/2019 12:52 AM    Nitrites NEGATIVE  01/24/2019 12:52 AM    Leukocyte Esterase NEGATIVE  01/24/2019 12:52 AM    Epithelial cells FEW 01/24/2019 12:52 AM    Bacteria NEGATIVE  01/24/2019 12:52 AM    WBC 0-4 01/24/2019 12:52 AM    RBC 5-10 01/24/2019 12:52 AM     No results found for: CULT  No results found for: SDES  Lab Results   Component Value Date/Time    Sodium,urine random 65 01/24/2019 12:52 AM    Creatinine, urine 94.80 01/24/2019 12:52 AM       Results from Hospital Encounter encounter on 09/02/19   XR RIBS RT W PA CXR MIN 3 V    Narrative EXAM:  XR RIBS RT W PA CXR MIN 3 V    INDICATION:   Pain/Trauma  Additional history: Ground-level fall in bathroom 3 days ago. COMPARISON: None. FINDINGS: A frontal radiograph of the chest and 3 oblique views of the right  ribs demonstrate no fracture. There is no pneumothorax or pleural effusion. Still noted is made of a fracture through the surgical neck of the right humerus      Impression IMPRESSION:    1. No visible rib fracture. 2. Right humeral fracture               Manjula Moreno MD  [address] [facility]   [email or web address]  [facility]  [address], [address]  [place], [address]  Phone - [phone]         Fax - [phone]

## 2019-09-03 NOTE — PROGRESS NOTES
Bedside and Verbal shift change report given to [address] (oncoming nurse) by Daniel Paredes (offgoing nurse). Report included the following information SBAR, Kardex, Intake/Output and Cardiac Rhythm NSR.    1900 Pt resting in bed quietly, no family present, offered hospital gown pt denied, states he is hungry and daughter is bringing food. Hypertensive on monitor, called 2nd time for IV pump, awaiting to start cardene infusion     2300 PT sitting up eating dinner, cardene gtt stopp due to drop in /85    0400 Assisted pt to bathroom to bath, pt wants to bathe self, educated on pull cord when done    Bedside and Verbal shift change report given to Jake Shelley RN (oncoming nurse) by Didi Hameed RN (offgoing nurse).  Report included the following information SBAR, Kardex, Intake/Output and Cardiac Rhythm NSR.     0645 Restarted cardene for /116

## 2019-09-03 NOTE — CONSULTS
ORTHOPAEDIC CONSULT NOTE    Subjective:     Date of Consultation:  September 3, 2019      Lawrence Abrams is a 77 y.o. male who is being seen for R shoulder/arm pain secondary to fall last week, work up in ED at [facility] reveled a proximal humerus fracture. Pt reports the pain is well managed at this time and has had a sling in place since admission. Patient Active Problem List    Diagnosis Date Noted    Hypertensive emergency 09/02/2019    [facility] 09/02/2019    DM (diabetes mellitus) ([facility] 09/02/2019    Humerus fracture 09/02/2019    Hypoglycemia 01/23/2019     Family History   Problem Relation Age of Onset    Hypertension Mother     Hypertension Father       Social History     Tobacco Use    Smoking status: Never Smoker    Smokeless tobacco: Never Used   Substance Use Topics    Alcohol use: No     Past Medical History:   Diagnosis Date    Diabetes ([facility]     Hypertension       Past Surgical History:   Procedure Laterality Date    HX GI      colonoscopy 6-7 yrs ago    MI COLON CA SCRN NOT HI [facility] IND  10/21/2015           Prior to Admission medications    Medication Sig Start Date End Date Taking? Authorizing Provider   methocarbamol (ROBAXIN) 500 mg tablet Take 500 mg by mouth two (2) times a day. Bita Hudson MD   spironolactone (ALDACTONE) 25 mg tablet Take 25 mg by mouth daily. Bita Hudson MD   ferrous fumarate 324 mg (106 mg iron) tab Take  by mouth two (2) times a day. Bita Hudson MD   furosemide (LASIX) 20 mg tablet Take 20 mg by mouth two (2) times a day. Bita Hudson MD   labetalol (NORMODYNE) 100 mg tablet Take 100 mg by mouth two (2) times a day. Bita Hudson MD   cloNIDine HCl (CATAPRES) 0.1 mg tablet 0.2 mg three (3) times daily.  2/10/18   Provider, Historical   MASSIMO PEN NEEDLE 32 gauge x 5/32\" ndle USE AS DIRECTED WITH INSULIN 1/14/18   Provider, Historical   diphenoxylate-atropine (LOMOTIL) 2.5-0.025 mg per tablet TK 2 TS PO QID PRN 1/8/18   Provider, Historical   amLODIPine-benazepril (LOTREL) 10-40 mg per capsule Take 1 Cap by mouth daily. Provider, Historical   insulin aspart protamine/insulin aspart (NOVOLOG MIX 70-30) 100 unit/mL (70-30) injection by SubCUTAneous route daily. 35 UNITS    Provider, Historical   aspirin delayed-release 81 mg tablet Take  by mouth as needed for Pain. Provider, Historical     Current Facility-Administered Medications   Medication Dose Route Frequency    cloNIDine HCl (CATAPRES) tablet 0.2 mg  0.2 mg Oral TID    ferrous sulfate tablet 325 mg  325 mg Oral BID    0.45% sodium chloride infusion  75 mL/hr IntraVENous CONTINUOUS    acetaminophen (TYLENOL) tablet 650 mg  650 mg Oral Q4H PRN    ondansetron (ZOFRAN) injection 4 mg  4 mg IntraVENous Q6H PRN    niCARdipine (CARDENE) 25 mg in 0.9% sodium chloride 250 mL infusion  0-15 mg/hr IntraVENous TITRATE    insulin lispro (HUMALOG) injection   SubCUTAneous AC&HS    glucose chewable tablet 16 g  4 Tab Oral PRN    glucagon (GLUCAGEN) injection 1 mg  1 mg IntraMUSCular PRN    insulin NPH (NOVOLIN N, HUMULIN N) injection 35 Units  35 Units SubCUTAneous BID WITH MEALS    heparin (porcine) injection 5,000 Units  5,000 Units SubCUTAneous Q8H    dextrose 10% infusion 125-250 mL  125-250 mL IntraVENous PRN    amLODIPine (NORVASC) tablet 10 mg  10 mg Oral DAILY    oxyCODONE IR (ROXICODONE) tablet 5 mg  5 mg Oral Q4H PRN    morphine injection 2 mg  2 mg IntraVENous Q4H PRN    aspirin delayed-release tablet 81 mg  81 mg Oral DAILY    labetalol (NORMODYNE) tablet 200 mg  200 mg Oral BID      No Known Allergies     Review of Systems:  A comprehensive review of systems was negative except for that written in the HPI.     Mental Status: no dementia    Objective:     Patient Vitals for the past 8 hrs:   BP Temp Pulse Resp SpO2 Weight   09/03/19 0730 (!) 155/91 97.3 °F (36.3 °C) 82 18 96 %    09/03/19 0459      81 kg (178 lb 9.2 oz)   09/03/19 0320 (!) 156/104 97.4 °F (36.3 °C) 76 14 99 %      Temp (24hrs), Av.7 °F (36.5 °C), Min:97.3 °F (36.3 °C), Max:98.4 °F (36.9 °C)      Gen: Well-developed,  in no acute distress   Musc: RUE - mild shoulder/ UE edema, good ROM of elbow, wrist, and fingers, + TTP of proximal humerus, NVI  FROM of LUE and bilat LE  Skin: No skin breakdown noted. Skin warm, pink, dry  Neuro: Cranial nerves are grossly intact, no focal motor weakness, follows commands appropriately     Imaging Review:  Final [99] 2019 14:15 2019 14:37   Result Information     Status: Final result (Exam End: 2019 14:37) Provider Status: Open   Study Result     EXAM: XR HUMERUS RT     INDICATION: Trauma.     COMPARISON: None.     FINDINGS: Two views of the right humerus demonstrate fracture through the  surgical neck of the right humerus. .     IMPRESSION  IMPRESSION:   1. Fracture through the surgical neck of the right humerus.          Labs:   Recent Results (from the past 24 hour(s))   EKG, 12 LEAD, INITIAL    Collection Time: 19  2:13 PM   Result Value Ref Range    Ventricular Rate 91 BPM    Atrial Rate 91 BPM    P-R Interval 170 ms    QRS Duration 142 ms    Q-T Interval 382 ms    QTC Calculation (Bezet) 469 ms    Calculated P Axis 43 degrees    Calculated R Axis 62 degrees    Calculated T Axis -24 degrees    Diagnosis       Normal sinus rhythm  Right bundle branch block  Inferior infarct , age undetermined  T wave abnormality, consider lateral ischemia  Abnormal ECG  When compared with ECG of 2019 18:27,  Significant changes have occurred     CBC WITH AUTOMATED DIFF    Collection Time: 19  2:23 PM   Result Value Ref Range    WBC 4.4 4.1 - 11.1 K/uL    RBC 3.17 (L) 4.10 - 5.70 M/uL    HGB 9.7 (L) 12.1 - 17.0 g/dL    HCT 29.7 (L) 36.6 - 50.3 %    MCV 93.7 80.0 - 99.0 FL    MCH 30.6 26.0 - 34.0 PG    MCHC 32.7 30.0 - 36.5 g/dL    RDW 13.2 11.5 - 14.5 %    PLATELET 865 661 - 210 K/uL    MPV 10.2 8.9 - 12.9 FL    NRBC 0.0 0  WBC    ABSOLUTE NRBC 0. 00 0.00 - 0.01 K/uL    NEUTROPHILS 73 32 - 75 %    LYMPHOCYTES 16 12 - 49 %    MONOCYTES 9 5 - 13 %    EOSINOPHILS 2 0 - 7 %    BASOPHILS 0 0 - 1 %    IMMATURE GRANULOCYTES 0 0.0 - 0.5 %    ABS. NEUTROPHILS 3.2 1.8 - 8.0 K/UL    ABS. LYMPHOCYTES 0.7 (L) 0.8 - 3.5 K/UL    ABS. MONOCYTES 0.4 0.0 - 1.0 K/UL    ABS. EOSINOPHILS 0.1 0.0 - 0.4 K/UL    ABS. BASOPHILS 0.0 0.0 - 0.1 K/UL    ABS. IMM. GRANS. 0.0 0.00 - 0.04 K/UL    DF SMEAR SCANNED      RBC COMMENTS NORMOCYTIC, NORMOCHROMIC     METABOLIC PANEL, COMPREHENSIVE    Collection Time: 09/02/19  2:23 PM   Result Value Ref Range    Sodium 144 136 - 145 mmol/L    Potassium 3.4 (L) 3.5 - 5.1 mmol/L    Chloride 106 97 - 108 mmol/L    CO2 27 21 - 32 mmol/L    Anion gap 11 5 - 15 mmol/L    Glucose 142 (H) 65 - 100 mg/dL    BUN 41 (H) 6 - 20 MG/DL    Creatinine 5.26 (H) 0.70 - 1.30 MG/DL    BUN/Creatinine ratio 8 (L) 12 - 20      GFR est AA 13 (L) >60 ml/min/1.73m2    GFR est non-AA 11 (L) >60 ml/min/1.73m2    Calcium 8.5 8.5 - 10.1 MG/DL    Bilirubin, total 0.5 0.2 - 1.0 MG/DL    ALT (SGPT) 36 12 - 78 U/L    AST (SGOT) 38 (H) 15 - 37 U/L    Alk. phosphatase 70 45 - 117 U/L    Protein, total 7.2 6.4 - 8.2 g/dL    Albumin 2.7 (L) 3.5 - 5.0 g/dL    Globulin 4.5 (H) 2.0 - 4.0 g/dL    A-G Ratio 0.6 (L) 1.1 - 2.2     TROPONIN I    Collection Time: 09/02/19  2:23 PM   Result Value Ref Range    Troponin-I, Qt. <0.05 <0.05 ng/mL   CK    Collection Time: 09/02/19  2:23 PM   Result Value Ref Range     (H) 39 - 308 U/L   GLUCOSE, POC    Collection Time: 09/02/19  9:23 PM   Result Value Ref Range    Glucose (POC) 79 65 - 100 mg/dL    Performed by She Monge, ACUTE    Collection Time: 09/03/19  3:23 AM   Result Value Ref Range    Hepatitis A, IgM NONREACTIVE NR      __          Hepatitis B surface Ag >1,000.00 Index    Hep B surface Ag Interp.  POSITIVE (A) NEG      __          Hepatitis B core, IgM NONREACTIVE NR      __          Hepatitis C virus Ab >11.00 Index    Hep C  virus Ab Interp. REACTIVE (A) NR      Hep C  virus Ab comment Method used is Siemens Advia Centaur     CBC WITH AUTOMATED DIFF    Collection Time: 09/03/19  3:23 AM   Result Value Ref Range    WBC 3.8 (L) 4.1 - 11.1 K/uL    RBC 2.80 (L) 4.10 - 5.70 M/uL    HGB 8.5 (L) 12.1 - 17.0 g/dL    HCT 25.6 (L) 36.6 - 50.3 %    MCV 91.4 80.0 - 99.0 FL    MCH 30.4 26.0 - 34.0 PG    MCHC 33.2 30.0 - 36.5 g/dL    RDW 13.2 11.5 - 14.5 %    PLATELET 234 017 - 937 K/uL    MPV 10.0 8.9 - 12.9 FL    NRBC 0.0 0  WBC    ABSOLUTE NRBC 0.00 0.00 - 0.01 K/uL    NEUTROPHILS 60 32 - 75 %    LYMPHOCYTES 25 12 - 49 %    MONOCYTES 12 5 - 13 %    EOSINOPHILS 3 0 - 7 %    BASOPHILS 0 0 - 1 %    IMMATURE GRANULOCYTES 0 0.0 - 0.5 %    ABS. NEUTROPHILS 2.3 1.8 - 8.0 K/UL    ABS. LYMPHOCYTES 1.0 0.8 - 3.5 K/UL    ABS. MONOCYTES 0.5 0.0 - 1.0 K/UL    ABS. EOSINOPHILS 0.1 0.0 - 0.4 K/UL    ABS. BASOPHILS 0.0 0.0 - 0.1 K/UL    ABS. IMM. GRANS. 0.0 0.00 - 0.04 K/UL    DF AUTOMATED     MAGNESIUM    Collection Time: 09/03/19  3:23 AM   Result Value Ref Range    Magnesium 2.1 1.6 - 2.4 mg/dL   METABOLIC PANEL, COMPREHENSIVE    Collection Time: 09/03/19  3:23 AM   Result Value Ref Range    Sodium 144 136 - 145 mmol/L    Potassium 3.3 (L) 3.5 - 5.1 mmol/L    Chloride 110 (H) 97 - 108 mmol/L    CO2 27 21 - 32 mmol/L    Anion gap 7 5 - 15 mmol/L    Glucose 133 (H) 65 - 100 mg/dL    BUN 40 (H) 6 - 20 MG/DL    Creatinine 4.63 (H) 0.70 - 1.30 MG/DL    BUN/Creatinine ratio 9 (L) 12 - 20      GFR est AA 15 (L) >60 ml/min/1.73m2    GFR est non-AA 13 (L) >60 ml/min/1.73m2    Calcium 7.7 (L) 8.5 - 10.1 MG/DL    Bilirubin, total 0.7 0.2 - 1.0 MG/DL    ALT (SGPT) 28 12 - 78 U/L    AST (SGOT) 29 15 - 37 U/L    Alk.  phosphatase 56 45 - 117 U/L    Protein, total 6.3 (L) 6.4 - 8.2 g/dL    Albumin 2.3 (L) 3.5 - 5.0 g/dL    Globulin 4.0 2.0 - 4.0 g/dL    A-G Ratio 0.6 (L) 1.1 - 2.2     PHOSPHORUS    Collection Time: 09/03/19  3:23 AM   Result Value Ref Range    Phosphorus 4.7 2.6 - 4.7 MG/DL   GLUCOSE, POC    Collection Time: 09/03/19  6:41 AM   Result Value Ref Range    Glucose (POC) 161 (H) 65 - 100 mg/dL    Performed by Sarah Gomez    GLUCOSE, POC    Collection Time: 09/03/19  7:27 AM   Result Value Ref Range    Glucose (POC) 150 (H) 65 - 100 mg/dL    Performed by Declan Valencia          Impression:     Patient Active Problem List    Diagnosis Date Noted    Hypertensive emergency 09/02/2019    [facility] 09/02/2019    DM (diabetes mellitus) ([facility] 09/02/2019    Humerus fracture 09/02/2019    Hypoglycemia 01/23/2019     Active Problems:    Hypertensive emergency (9/2/2019)      ARF (acute renal failure) ([facility] (9/2/2019)      DM (diabetes mellitus) ([facility] (9/2/2019)      Humerus fracture (9/2/2019)        Plan:   -  Pt is stable orthopaedically, Non-Operative management at this time  -  R proximal humerus fracture - sling, may ROM elbow and wrist, NWB RUE, pain managment as per attending   -  Pt to follow up with Dr. Silvia Templeton in 2 weeks     Dr. Silvia Templeton aware and agrees with plan as above.         Shiloh Tejeda NP  Orthopedic Nurse Practitioner   [place]

## 2019-09-03 NOTE — PROGRESS NOTES
Problem: Self Care Deficits Care Plan (Adult)  Goal: *Acute Goals and Plan of Care (Insert Text)  Description    FUNCTIONAL STATUS PRIOR TO ADMISSION: Patient was modified independent using a Single point cane for functional mobility. HOME SUPPORT: The patient lived with family and did not need support. Occupational Therapy Goals  Initiated 9/3/2019  1. Patient will perform grooming with supervision/set-up within 7 day(s). 2.  Patient will perform bathing UB and janay area with minimal assistance/contact guard assist within 7 day(s). 3.  Patient will perform lower body dressing with supervision/set-up within 7 day(s). 4.  Patient will perform toilet transfers with supervision/set-up within 7 day(s). 5.  Patient will perform all aspects of toileting with independence within 7 day(s). Outcome: Progressing Towards Goal   OCCUPATIONAL THERAPY EVALUATION  Patient: Walker Bang (74 y.o. male)  Date: 9/3/2019  Primary Diagnosis: Hypertensive emergency [I16.1]        Precautions: fall       ASSESSMENT  Based on the objective data described below, the patient presents with decreased endurance, strength, functional mobility and fx right humerus,decreased ADLs, cognition mildly impaired and balance. Pt did repeat himself numerous times about being independent and he fell at home and waited 4 days to check out arm. Pt stated that he walks with [facility] and he had a fall at home and fx his humerus and did not check it out for 4 days. Pt did have LOB with [place] [facility] in room and bathroom. Pt was reminded to have staff with him at all times when he is moving. Pts sling was adjusted and pillow was placed behind his right arm in sling, and he is able to move his elbow, wrist and hand per doctors orders. Current Level of Function Impacting Discharge (ADLs/self-care): decreased endurance, strength, functional mobility, and ADLs and loss of balance, and falls at home. Functional Outcome Measure:   The patient scored 55/100 on the Barthel outcome measure which is indicative of decreased endurance, strength, functional mobility and balance. .      Other factors to consider for discharge: pt will need 24/7 assist at discharge, until his balance is improved, and home care OT and PT. Patient will benefit from skilled therapy intervention to address the above noted impairments. PLAN :  Recommendations and Planned Interventions: self care training, functional mobility training, therapeutic exercise, balance training, endurance activities, patient education, home safety training and family training/education    Frequency/Duration: Patient will be followed by occupational therapy 4 times a week to address goals. Recommendation for discharge: (in order for the patient to meet his/her long term goals)  Occupational therapy at least 2 days/week in the home AND ensure assist and/or supervision for safety with ADLs and ILS     This discharge recommendation:  Has been made in collaboration with the attending provider and/or case management    Equipment recommendations for successful discharge (if) home: none       SUBJECTIVE:   Patient stated I know not to get up with out someone.     OBJECTIVE DATA SUMMARY:   HISTORY:   Past Medical History:   Diagnosis Date    Diabetes ([facility]     Hypertension      Past Surgical History:   Procedure Laterality Date    HX GI      colonoscopy 6-7 yrs ago    AL COLON CA SCRN NOT  [address] IND  10/21/2015            Expanded or extensive additional review of patient history:     Home Situation  Home Environment: Private residence  # Steps to Enter: 2  Rails to Enter: Yes  Hand Rails : Bilateral  One/Two Story Residence: Two story, live on 1st floor  # of Interior Steps: 12  Height of Each Step (in): 12 inches  Interior Rails: None  Lift Chair Available: No  Living Alone: No  Support Systems: Child(oleksandr)  Patient Expects to be Discharged to[de-identified] Private residence  Current DME Used/Available at Home: Cane, straight    Hand dominance: Right    EXAMINATION OF PERFORMANCE DEFICITS:  Cognitive/Behavioral Status:  Neurologic State: Alert  Orientation Level: Appropriate for age  Cognition: Follows commands  Perception: Appears intact  Perseveration: No perseveration noted  Safety/Judgement: Awareness of environment    Skin: in fair health    Edema: none    Hearing: Auditory  Auditory Impairment: None    Vision/Perceptual:                 intact                    Range of Motion:   Impaired right UE due to fx of humerus, and left UE intact           Strength:  Impaired on right UE and left is intact                   Coordination:     Fine Motor Skills-Upper: Left Intact; Right Intact    Gross Motor Skills-Upper: Left Intact; Right Impaired(due to shoulder fx)    Tone & Sensation:  Intact in BUE           Balance:  Sitting: Intact  Standing: Impaired; Without support  Standing - Static: Fair;Constant support  Standing - Dynamic : Fair;Constant support    Functional Mobility and Transfers for ADLs:  Bed Mobility:  Rolling: Supervision  Supine to Sit: Supervision  Sit to Supine: Supervision  Scooting: Independent    Transfers:  Sit to Stand: Contact guard assistance  Stand to Sit: Contact guard assistance  Bed to Chair: Contact guard assistance  Bathroom Mobility: Contact guard assistance  Toilet Transfer : Contact guard assistance    ADL Assessment:  Feeding: Setup    Oral Facial Hygiene/Grooming: Setup    Bathing: Maximum assistance(with UB)    Upper Body Dressing: Maximum assistance;Minimum assistance    Lower Body Dressing: Contact guard assistance    Toileting: Contact guard assistance;Stand by assistance              Cognitive Retraining  Safety/Judgement: Awareness of environment      Functional Measure:  Barthel Index:    Bathin  Bladder: 10  Bowels: 10  Groomin  Dressin  Feedin  Mobility: 10  Stairs: 0  Toilet Use: 5  Transfer (Bed to Chair and Back): 10  Total: 55/100        The Barthel ADL Index: Guidelines  1. The index should be used as a record of what a patient does, not as a record of what a patient could do. 2. The main aim is to establish degree of independence from any help, physical or verbal, however minor and for whatever reason. 3. The need for supervision renders the patient not independent. 4. A patient's performance should be established using the best available evidence. Asking the patient, friends/relatives and nurses are the usual sources, but direct observation and common sense are also important. However direct testing is not needed. 5. Usually the patient's performance over the preceding 24-48 hours is important, but occasionally longer periods will be relevant. 6. Middle categories imply that the patient supplies over 50 per cent of the effort. 7. Use of aids to be independent is allowed. Reina King., Barthel, D.W. (3323). Functional evaluation: the Barthel Index. [address] [facility] (14)2. Carmen Meng nazanin [employer] [place], Jasbir Melgar., Koki Craven., Sarai Kelvin, [address] [place] [address] (1999). Measuring the change indisability after inpatient rehabilitation; comparison of the responsiveness of the Barthel Index and Functional [place] Measure. Journal of Neurology, Neurosurgery, and Psychiatry, 66(4), 820-042. Charlotte Perez, N.J.A, TATO Paredes, & Dima Real MGelacioA. (2004.) Assessment of post-stroke quality of life in cost-effectiveness studies: The usefulness of the Barthel Index and the EuroQoL-5D.  Quality of Life Research, 15, 190-97         Occupational Therapy Evaluation Charge Determination   History Examination Decision-Making   MEDIUM Complexity : Expanded review of history including physical, cognitive and psychosocial  history  LOW Complexity : 1-3 performance deficits relating to physical, cognitive , or psychosocial skils that result in activity limitations and / or participation restrictions  LOW Complexity : No comorbidities that affect functional and no verbal or physical assistance needed to complete eval tasks       Based on the above components, the patient evaluation is determined to be of the following complexity level: LOW         Activity Tolerance:   Fair  Please refer to the flowsheet for vital signs taken during this treatment. After treatment patient left in no apparent distress:    Sitting in chair, Call bell within reach and Bed / chair alarm activated    COMMUNICATION/EDUCATION:   The patients plan of care was discussed with: Physical Therapist, Speech Therapist and . Home safety education was provided and the patient/caregiver indicated understanding. and Patient/family have participated as able in goal setting and plan of care. This patients plan of care is appropriate for delegation to [facility].     Thank you for this referral.  Phill Barajas OT  Time Calculation: 41 mins

## 2019-09-03 NOTE — PROGRESS NOTES
Problem: Mobility Impaired (Adult and Pediatric)  Goal: *Acute Goals and Plan of Care (Insert Text)  Description  FUNCTIONAL STATUS PRIOR TO ADMISSION: Patient was modified independent using a single point cane for functional mobility. History of 1 fall. Still driving. HOME SUPPORT PRIOR TO ADMISSION: Pt recently moved in with daughter however is home alone during the day while daughter works and grandchildren are in school. Physical Therapy Goals  Initiated 9/3/2019  1. Patient will move from supine to sit and sit to supine , scoot up and down and roll side to side in bed with modified independence within 7 day(s). 2.  Patient will transfer from bed to chair and chair to bed with modified independence using the least restrictive device within 7 day(s). 3.  Patient will perform sit to stand with modified independence within 7 day(s). 4.  Patient will ambulate with modified independence for 200 feet with the least restrictive device within 7 day(s). 5.  Patient will ascend/descend 2 stairs with 1 handrail(s) with supervision/set-up within 7 day(s). Outcome: Progressing Towards Goal   PHYSICAL THERAPY EVALUATION  Patient: Manuel French (55 y.o. male)  Date: 9/3/2019  Primary Diagnosis: Hypertensive emergency [I16.1]        Precautions:   (sling R UE - humerus fx)      ASSESSMENT  Based on the objective data described below, the patient presents with R humerus fx, impaired balance, generalized weakness, decreased endurance/activity tolerance, intermittent confusion, and overall mild impairments in functional mobility. Pt with minor increased in trunk sway in addition to minor path deviations during ambulation trial, requiring CGAx1 for balance checks. Overall, pt tolerated therapy session well with VSS throughout however is a falls risk and should continue to have x1 person assist during all OOB mobility/ambulation. Current Level of Function Impacting Discharge (mobility/balance):  CGAx1 transfers and ambulation with use of cane    Functional Outcome Measure: The patient scored 55/100 on the Barthel Index outcome measure which is indicative of moderate impairment in functional mobility and ADLs. Other factors to consider for discharge: home alone during the day     Patient will benefit from skilled therapy intervention to address the above noted impairments. PLAN :  Recommendations and Planned Interventions: bed mobility training, transfer training, gait training, therapeutic exercises, patient and family training/education and therapeutic activities      Frequency/Duration: Patient will be followed by physical therapy:  4 times a week to address goals. Recommendation for discharge: (in order for the patient to meet his/her long term goals)  Physical therapy at least 2 days/week in the home AND ensure assist and/or supervision for safety with ADLs and functional mobility     This discharge recommendation:  Has been made in collaboration with the attending provider and/or case management    Equipment recommendations for successful discharge (if) home: patient owns DME required for discharge         SUBJECTIVE:   Patient stated This arm doesn't hurt!     OBJECTIVE DATA SUMMARY:   HISTORY:    Past Medical History:   Diagnosis Date    Diabetes ([facility] [address]     Hypertension      Past Surgical History:   Procedure Laterality Date    HX GI      colonoscopy 6-7 yrs ago    NM COLON CA SCRN NOT HI RSK IND  10/21/2015            Personal factors and/or comorbidities impacting plan of care:     Home Situation  Home Environment: Private residence  # Steps to Enter: 2  Rails to Enter: Yes  Hand Rails : Bilateral  One/Two Story Residence: Two story, live on 1st floor  # of Interior Steps: 12  Height of Each Step (in): 12 inches  Interior Rails: None  Lift Chair Available: No  Living Alone: No  Support Systems: Child(oleksandr)  Patient Expects to be Discharged to[de-identified] Private residence  Current DME Used/Available at Home: Cane, straight    EXAMINATION/PRESENTATION/DECISION MAKING:   Critical Behavior:  Neurologic State: Alert  Orientation Level: Appropriate for age  Cognition: Follows commands  Safety/Judgement: Awareness of environment  Hearing: Auditory  Auditory Impairment: None  Skin:  intact  Edema: none noted   Range Of Motion:  AROM: Within functional limits(except for R UE 2/2 sling/humerus fx)                       Strength:    Strength: Generally decreased, functional                    Tone & Sensation:   Tone: Normal              Sensation: Intact               Coordination:  Coordination: Within functional limits  Vision:      Functional Mobility:  Bed Mobility:  Rolling: Supervision  Supine to Sit: Supervision  Sit to Supine: Supervision  Scooting: Independent  Transfers:  Sit to Stand: Contact guard assistance  Stand to Sit: Contact guard assistance        Bed to Chair: Contact guard assistance              Balance:   Sitting: Intact  Standing: Impaired; Without support  Standing - Static: Fair;Constant support  Standing - Dynamic : Fair;Constant support  Ambulation/Gait Training:  Distance (ft): 100 Feet (ft)  Assistive Device: Gait belt;Cane, straight  Ambulation - Level of Assistance: Contact guard assistance        Gait Abnormalities: Trunk sway increased; Path deviations        Base of Support: Widened     Speed/Sarah: Pace decreased (<100 feet/min)  Step Length: Left shortened;Right shortened             Functional Measure:  Barthel Index:    Bathin  Bladder: 10  Bowels: 10  Groomin  Dressin  Feedin  Mobility: 10  Stairs: 0  Toilet Use: 5  Transfer (Bed to Chair and Back): 10  Total: 55/100       The Barthel ADL Index: Guidelines  1. The index should be used as a record of what a patient does, not as a record of what a patient could do. 2. The main aim is to establish degree of independence from any help, physical or verbal, however minor and for whatever reason.   3. The need for supervision renders the patient not independent. 4. A patient's performance should be established using the best available evidence. Asking the patient, friends/relatives and nurses are the usual sources, but direct observation and common sense are also important. However direct testing is not needed. 5. Usually the patient's performance over the preceding 24-48 hours is important, but occasionally longer periods will be relevant. 6. Middle categories imply that the patient supplies over 50 per cent of the effort. 7. Use of aids to be independent is allowed. Ronnie Cuevas., Barthel, DGelacioW. (2771). Functional evaluation: the Barthel Index. [address] [facility] (14)2. Ceci Zacarias nazanin NGOZI [place], Elif Winkler., Eula Muhammad., Charlie, [address] (1999). Measuring the change indisability after inpatient rehabilitation; comparison of the responsiveness of the Barthel Index and Functional [place] Measure. Journal of Neurology, Neurosurgery, and Psychiatry, 66(4), 953-179. LUNA Junior, TATO Paredes, & Sandra Martínez MDANNA. (2004.) Assessment of post-stroke quality of life in cost-effectiveness studies: The usefulness of the Barthel Index and the EuroQoL-5D.  Quality of Life Research, 15, 710-51           Physical Therapy Evaluation Charge Determination   History Examination Presentation Decision-Making   MEDIUM  Complexity : 1-2 comorbidities / personal factors will impact the outcome/ POC  MEDIUM Complexity : 3 Standardized tests and measures addressing body structure, function, activity limitation and / or participation in recreation  MEDIUM Complexity : Evolving with changing characteristics  MEDIUM Complexity : FOTO score of 26-74      Based on the above components, the patient evaluation is determined to be of the following complexity level: MEDIUM    Pain Rating:  Denied complaints of pain    Activity Tolerance:   WNL and Good  Please refer to the flowsheet for vital signs taken during this treatment. After treatment patient left in no apparent distress:   Sitting in chair and Call bell within reach    COMMUNICATION/EDUCATION:   The patients plan of care was discussed with: Occupational Therapist and Registered Nurse. Fall prevention education was provided and the patient/caregiver indicated understanding., Patient/family have participated as able in goal setting and plan of care. and Patient/family agree to work toward stated goals and plan of care.     Thank you for this referral.  Sree Magallanes, PT, DPT   Time Calculation: 19 mins

## 2019-09-03 NOTE — PROGRESS NOTES
Reason for Admission:  Hypertensive emergency, acute renal failure                    RRAT Score:  16 - moderate risk                 Do you (patient/family) have any concerns for transition/discharge? No concerns              Plan for utilizing home health:   yes    Current Advanced Directive/Advance Care Plan:  Full code/ not on file             Transition of Care Plan:    Home with f/u appts and pending needs at discharge    Pt is a 77 y.o  [place] male admitted with Hypertensive Emergency, Acute Renal Failure. Pt was alert, oriented resting in bed. Demographic information verified and all is correct. Pt lives with his daughter and her 3 children in a 2 story home with 2 steps to the entrance. Pt and his wife are  and he recently moved in with his daughter. Pt has a cane at home and no other DME. Denies having HH and rehab in the past. Preferred pharmacy is [facility]. Pt's family can transport pt home by car. CM discussed therapy's recommendation for home health and pt wants to talk with his daughter about it later today. CM will f/u with pt tomorrow. Care Management Interventions  PCP Verified by CM: Yes(Dr. Tony Del Rosario )  Mode of Transport at Discharge:  Other (see comment)(pt's daughter or family can transport by car)  Transition of Care Consult (CM Consult): Discharge Planning  Discharge Durable Medical Equipment: No(cane )  Physical Therapy Consult: Yes  Occupational Therapy Consult: Yes  Speech Therapy Consult: No  Current Support Network: Own Home, Other(lives with his daughter and her 3 children in a 2 story home with 2 steps to the entrance)  Confirm Follow Up Transport: Family  Discharge Location  Discharge Placement: [address] [place] [address]

## 2019-09-03 NOTE — PROGRESS NOTES
Problem: Falls - Risk of  Goal: *Absence of Falls  Description  Document Corine Lyn Fall Risk and appropriate interventions in the flowsheet.   Outcome: Progressing Towards Goal  Note:   Fall Risk Interventions:                      History of Falls Interventions: Bed/chair exit alarm, Door open when patient unattended, Room close to nurse's station

## 2019-09-04 ENCOUNTER — HOME HEALTH ADMISSION (OUTPATIENT)
Dept: HOME HEALTH SERVICES | Facility: HOME HEALTH | Age: 69
End: 2019-09-04
Payer: MEDICARE

## 2019-09-04 ENCOUNTER — APPOINTMENT (OUTPATIENT)
Dept: ULTRASOUND IMAGING | Age: 69
DRG: 683 | End: 2019-09-04
Attending: NURSE PRACTITIONER
Payer: MEDICARE

## 2019-09-04 LAB
ALBUMIN SERPL ELPH-MCNC: 2.7 G/DL (ref 2.9–4.4)
ALBUMIN SERPL-MCNC: 2.2 G/DL (ref 3.5–5)
ALBUMIN/GLOB SERPL: 0.9 {RATIO} (ref 0.7–1.7)
ALPHA1 GLOB SERPL ELPH-MCNC: 0.2 G/DL (ref 0–0.4)
ALPHA2 GLOB SERPL ELPH-MCNC: 0.8 G/DL (ref 0.4–1)
ANION GAP SERPL CALC-SCNC: 7 MMOL/L (ref 5–15)
B-GLOBULIN SERPL ELPH-MCNC: 0.8 G/DL (ref 0.7–1.3)
BASOPHILS # BLD: 0 K/UL (ref 0–0.1)
BASOPHILS NFR BLD: 0 % (ref 0–1)
BUN SERPL-MCNC: 37 MG/DL (ref 6–20)
BUN/CREAT SERPL: 9 (ref 12–20)
CALCIUM SERPL-MCNC: 7.8 MG/DL (ref 8.5–10.1)
CHLORIDE SERPL-SCNC: 109 MMOL/L (ref 97–108)
CO2 SERPL-SCNC: 26 MMOL/L (ref 21–32)
CREAT SERPL-MCNC: 4.28 MG/DL (ref 0.7–1.3)
DIFFERENTIAL METHOD BLD: ABNORMAL
EOSINOPHIL # BLD: 0.2 K/UL (ref 0–0.4)
EOSINOPHIL NFR BLD: 5 % (ref 0–7)
ERYTHROCYTE [DISTWIDTH] IN BLOOD BY AUTOMATED COUNT: 13.3 % (ref 11.5–14.5)
GAMMA GLOB SERPL ELPH-MCNC: 1.3 G/DL (ref 0.4–1.8)
GLOBULIN SER-MCNC: 3.1 G/DL (ref 2.2–3.9)
GLUCOSE BLD STRIP.AUTO-MCNC: 100 MG/DL (ref 65–100)
GLUCOSE BLD STRIP.AUTO-MCNC: 112 MG/DL (ref 65–100)
GLUCOSE BLD STRIP.AUTO-MCNC: 114 MG/DL (ref 65–100)
GLUCOSE BLD STRIP.AUTO-MCNC: 182 MG/DL (ref 65–100)
GLUCOSE BLD STRIP.AUTO-MCNC: 91 MG/DL (ref 65–100)
GLUCOSE SERPL-MCNC: 102 MG/DL (ref 65–100)
HCT VFR BLD AUTO: 27.9 % (ref 36.6–50.3)
HGB BLD-MCNC: 9.2 G/DL (ref 12.1–17)
IGA SERPL-MCNC: 193 MG/DL (ref 61–437)
IGG SERPL-MCNC: 1478 MG/DL (ref 700–1600)
IGM SERPL-MCNC: 112 MG/DL (ref 20–172)
IMM GRANULOCYTES # BLD AUTO: 0 K/UL (ref 0–0.04)
IMM GRANULOCYTES NFR BLD AUTO: 0 % (ref 0–0.5)
INTERPRETATION SERPL IEP-IMP: ABNORMAL
KAPPA LC FREE SER-MCNC: 169.9 MG/L (ref 3.3–19.4)
KAPPA LC FREE/LAMBDA FREE SER: 2.21 {RATIO} (ref 0.26–1.65)
LAMBDA LC FREE SERPL-MCNC: 76.8 MG/L (ref 5.7–26.3)
LYMPHOCYTES # BLD: 0.8 K/UL (ref 0.8–3.5)
LYMPHOCYTES NFR BLD: 21 % (ref 12–49)
M PROTEIN SERPL ELPH-MCNC: ABNORMAL G/DL
MCH RBC QN AUTO: 29.9 PG (ref 26–34)
MCHC RBC AUTO-ENTMCNC: 33 G/DL (ref 30–36.5)
MCV RBC AUTO: 90.6 FL (ref 80–99)
MONOCYTES # BLD: 0.3 K/UL (ref 0–1)
MONOCYTES NFR BLD: 9 % (ref 5–13)
NEUTS SEG # BLD: 2.4 K/UL (ref 1.8–8)
NEUTS SEG NFR BLD: 65 % (ref 32–75)
NRBC # BLD: 0 K/UL (ref 0–0.01)
NRBC BLD-RTO: 0 PER 100 WBC
PHOSPHATE SERPL-MCNC: 4.1 MG/DL (ref 2.6–4.7)
PLATELET # BLD AUTO: 253 K/UL (ref 150–400)
PMV BLD AUTO: 10.3 FL (ref 8.9–12.9)
POTASSIUM SERPL-SCNC: 3.7 MMOL/L (ref 3.5–5.1)
PROCALCITONIN SERPL-MCNC: <0.1 NG/ML
PROT SERPL-MCNC: 5.8 G/DL (ref 6–8.5)
RBC # BLD AUTO: 3.08 M/UL (ref 4.1–5.7)
RBC MORPH BLD: ABNORMAL
SERVICE CMNT-IMP: ABNORMAL
SERVICE CMNT-IMP: NORMAL
SERVICE CMNT-IMP: NORMAL
SODIUM SERPL-SCNC: 142 MMOL/L (ref 136–145)
WBC # BLD AUTO: 3.7 K/UL (ref 4.1–11.1)

## 2019-09-04 PROCEDURE — 74011000258 HC RX REV CODE- 258: Performed by: INTERNAL MEDICINE

## 2019-09-04 PROCEDURE — 76705 ECHO EXAM OF ABDOMEN: CPT

## 2019-09-04 PROCEDURE — 65660000000 HC RM CCU STEPDOWN

## 2019-09-04 PROCEDURE — 97535 SELF CARE MNGMENT TRAINING: CPT

## 2019-09-04 PROCEDURE — 84145 PROCALCITONIN (PCT): CPT

## 2019-09-04 PROCEDURE — 82962 GLUCOSE BLOOD TEST: CPT

## 2019-09-04 PROCEDURE — 97116 GAIT TRAINING THERAPY: CPT

## 2019-09-04 PROCEDURE — 74011250636 HC RX REV CODE- 250/636: Performed by: INTERNAL MEDICINE

## 2019-09-04 PROCEDURE — 80069 RENAL FUNCTION PANEL: CPT

## 2019-09-04 PROCEDURE — 36415 COLL VENOUS BLD VENIPUNCTURE: CPT

## 2019-09-04 PROCEDURE — 74011250637 HC RX REV CODE- 250/637: Performed by: INTERNAL MEDICINE

## 2019-09-04 PROCEDURE — 85025 COMPLETE CBC W/AUTO DIFF WBC: CPT

## 2019-09-04 PROCEDURE — 74011000250 HC RX REV CODE- 250: Performed by: INTERNAL MEDICINE

## 2019-09-04 PROCEDURE — 87040 BLOOD CULTURE FOR BACTERIA: CPT

## 2019-09-04 RX ORDER — PHENYLEPHRINE HCL IN 0.9% NACL 30MG/250ML
10-100 PLASTIC BAG, INJECTION (ML) INTRAVENOUS
Status: DISCONTINUED | OUTPATIENT
Start: 2019-09-04 | End: 2019-09-04

## 2019-09-04 RX ORDER — LABETALOL HYDROCHLORIDE 5 MG/ML
20 INJECTION, SOLUTION INTRAVENOUS
Status: DISCONTINUED | OUTPATIENT
Start: 2019-09-04 | End: 2019-09-05 | Stop reason: HOSPADM

## 2019-09-04 RX ORDER — HYDRALAZINE HYDROCHLORIDE 25 MG/1
25 TABLET, FILM COATED ORAL 3 TIMES DAILY
Status: DISCONTINUED | OUTPATIENT
Start: 2019-09-04 | End: 2019-09-05 | Stop reason: HOSPADM

## 2019-09-04 RX ORDER — SPIRONOLACTONE 25 MG/1
25 TABLET ORAL DAILY
Status: DISCONTINUED | OUTPATIENT
Start: 2019-09-04 | End: 2019-09-05 | Stop reason: HOSPADM

## 2019-09-04 RX ADMIN — AMLODIPINE BESYLATE 10 MG: 5 TABLET ORAL at 08:30

## 2019-09-04 RX ADMIN — SPIRONOLACTONE 25 MG: 25 TABLET ORAL at 10:07

## 2019-09-04 RX ADMIN — HEPARIN SODIUM 5000 UNITS: 5000 INJECTION INTRAVENOUS; SUBCUTANEOUS at 16:46

## 2019-09-04 RX ADMIN — HYDRALAZINE HYDROCHLORIDE 25 MG: 25 TABLET, FILM COATED ORAL at 21:10

## 2019-09-04 RX ADMIN — ASPIRIN 81 MG: 81 TABLET, COATED ORAL at 08:30

## 2019-09-04 RX ADMIN — HEPARIN SODIUM 5000 UNITS: 5000 INJECTION INTRAVENOUS; SUBCUTANEOUS at 21:10

## 2019-09-04 RX ADMIN — HYDRALAZINE HYDROCHLORIDE 25 MG: 25 TABLET, FILM COATED ORAL at 16:48

## 2019-09-04 RX ADMIN — HYDRALAZINE HYDROCHLORIDE 25 MG: 25 TABLET, FILM COATED ORAL at 10:07

## 2019-09-04 RX ADMIN — CLONIDINE HYDROCHLORIDE 0.2 MG: 0.1 TABLET ORAL at 16:48

## 2019-09-04 RX ADMIN — LABETALOL HYDROCHLORIDE 300 MG: 200 TABLET, FILM COATED ORAL at 08:30

## 2019-09-04 RX ADMIN — LABETALOL HYDROCHLORIDE 300 MG: 200 TABLET, FILM COATED ORAL at 21:09

## 2019-09-04 RX ADMIN — FERROUS SULFATE TAB 325 MG (65 MG ELEMENTAL FE) 325 MG: 325 (65 FE) TAB at 08:30

## 2019-09-04 RX ADMIN — HEPARIN SODIUM 5000 UNITS: 5000 INJECTION INTRAVENOUS; SUBCUTANEOUS at 06:36

## 2019-09-04 RX ADMIN — CLONIDINE HYDROCHLORIDE 0.2 MG: 0.1 TABLET ORAL at 08:30

## 2019-09-04 RX ADMIN — CLONIDINE HYDROCHLORIDE 0.2 MG: 0.1 TABLET ORAL at 21:10

## 2019-09-04 RX ADMIN — SODIUM CHLORIDE 5 MG/HR: 0.9 INJECTION, SOLUTION INTRAVENOUS at 00:08

## 2019-09-04 NOTE — PROGRESS NOTES
Problem: Mobility Impaired (Adult and Pediatric)  Goal: *Acute Goals and Plan of Care (Insert Text)  Description  FUNCTIONAL STATUS PRIOR TO ADMISSION: Patient was modified independent using a single point cane for functional mobility. History of 1 fall. Still driving. HOME SUPPORT PRIOR TO ADMISSION: Pt recently moved in with daughter however is home alone during the day while daughter works and grandchildren are in school. Physical Therapy Goals  Initiated 9/3/2019  1. Patient will move from supine to sit and sit to supine , scoot up and down and roll side to side in bed with modified independence within 7 day(s). 2.  Patient will transfer from bed to chair and chair to bed with modified independence using the least restrictive device within 7 day(s). 3.  Patient will perform sit to stand with modified independence within 7 day(s). 4.  Patient will ambulate with modified independence for 200 feet with the least restrictive device within 7 day(s). 5.  Patient will ascend/descend 2 stairs with 1 handrail(s) with supervision/set-up within 7 day(s). Outcome: Progressing Towards Goal   PHYSICAL THERAPY TREATMENT  Patient: Vic Marcano (56 y.o. male)  Date: 9/4/2019  Diagnosis: Hypertensive emergency [I16.1] <principal problem not specified>       Precautions: NWB  Chart, physical therapy assessment, plan of care and goals were reviewed. ASSESSMENT  Based on the objective data described below, demonstrates impaired balance and gait, decreased tolerance to activity, limited by symptomatic HTN this session. Pt received supine in bed and agreeable to therapy, but reported feeling lethargic and dizzy this morning. Pt with mild balance and gait impairments as well as reports of feeling unsteady today requiring CGA-min A for safety. RN made aware of elevated BP. Pt will continue to benefit from PT to progress mobility as tolerated and reach highest level of independence.  Continue to encourage pt to mobilize with RN staff (1 person assist and cane) throughout the day as tolerated. Vitals:    09/04/19 0747 09/04/19 0851 09/04/19 0853 09/04/19 0858   BP: (!) 141/97 (!) 179/101 (!) 143/94 (!) 146/98   BP 1 Location: Left arm Left arm Left arm Left arm   BP Patient Position: At rest Sitting Standing Standing  Comment: after walking around bed   Pulse: 66      Resp: 20      Temp: 97.5 °F (36.4 °C)      SpO2: 99%      Weight:       Height:           Current Level of Function Impacting Discharge (mobility/balance): CGA-min A for balance and gait    Other factors to consider for discharge: ? Alone during the day         PLAN :  Patient continues to benefit from skilled intervention to address the above impairments. Continue treatment per established plan of care. to address goals. Recommendation for discharge: (in order for the patient to meet his/her long term goals)  Physical therapy at least 2 days/week in the home AND ensure assist and/or supervision for safety with ADLs, balance (24 hour supervision)     This discharge recommendation:  Has been made in collaboration with the attending provider and/or case management    Equipment recommendations for successful discharge (if) home: none       SUBJECTIVE:   Patient stated I feel like I did when I fell. Like my blood pressure is high.   When asked patient to describe his symptoms when his BP is high, pt is unable to describe in details \"I just know\"    OBJECTIVE DATA SUMMARY:   Critical Behavior:  Neurologic State: Alert  Orientation Level: Oriented X4  Cognition: Follows commands  Safety/Judgement: Awareness of environment  Functional Mobility Training:  Bed Mobility:     Supine to Sit: Stand-by assistance              Transfers:  Sit to Stand: Contact guard assistance  Stand to Sit: Contact guard assistance                             Balance:  Sitting: Intact  Standing: Impaired; Without support  Standing - Static: Fair;Constant support  Standing - Dynamic : Fair;Constant support  Ambulation/Gait Training:  Distance (ft): 12 Feet (ft)  Assistive Device: Gait belt;Brace/Splint;Cane, straight  Ambulation - Level of Assistance: Contact guard assistance;Minimal assistance        Gait Abnormalities: Decreased step clearance;Trunk sway increased        Base of Support: Widened;Center of gravity altered     Speed/Sarah: Pace decreased (<100 feet/min)  Step Length: Right shortened;Left shortened           Activity Tolerance:   Fair and limited by HTN   Please refer to the flowsheet for vital signs taken during this treatment.     After treatment patient left in no apparent distress:   Supine in bed, Call bell within reach and Side rails x 3    COMMUNICATION/COLLABORATION:   The patients plan of care was discussed with: Occupational Therapist and Registered Nurse    Claire Lobato, PT, DPT   Time Calculation: 17 mins

## 2019-09-04 NOTE — PROGRESS NOTES
Problem: Falls - Risk of  Goal: *Absence of Falls  Description  Document Dilma Hale Fall Risk and appropriate interventions in the flowsheet.   Outcome: Progressing Towards Goal  Note:   Fall Risk Interventions:            Medication Interventions: Evaluate medications/consider consulting pharmacy, Patient to call before getting OOB, Teach patient to arise slowly         History of Falls Interventions: Bed/chair exit alarm, Room close to nurse's station, Door open when patient unattended

## 2019-09-04 NOTE — PROGRESS NOTES
Bedside and Verbal shift change report given to [address] (oncoming nurse) by Adri Molina (offgoing nurse). Report included the following information SBAR, Kardex, Intake/Output and Cardiac Rhythm NSR.     1900 Pt asleep in bed, states 3 nursing students \"queens\" took care of him today, cardene gtt off currently BP still borderline 837 systolic. Pt denies pain or other needs at this time      2000 Ambulated pt to bathroom, unsteady gait, cane used    0005 Cardene gtt restarted, pt slowly rising despite oral BP meds, current /112    0030 Cardene gtt stopped after 30 min, pt bp 120/72, pt asleep    Bedside and Verbal shift change report given to Dede Morin RN (oncoming nurse) by Linda Ngo RN (offgoing nurse). Report included the following information SBAR, Kardex, Intake/Output and Cardiac Rhythm NSR.

## 2019-09-04 NOTE — PROGRESS NOTES
Called report to [facility], patient currently down in Ultrasound, will transfer patient once he returns to floor.

## 2019-09-04 NOTE — PROGRESS NOTES
Problem: Falls - Risk of  Goal: *Absence of Falls  Description  Document Marlon Kaba Fall Risk and appropriate interventions in the flowsheet. Outcome: Progressing Towards Goal  Note:   Fall Risk Interventions:            Medication Interventions: Bed/chair exit alarm, Patient to call before getting OOB, Teach patient to arise slowly         History of Falls Interventions: Bed/chair exit alarm, Room close to nurse's station, Vital signs minimum Q4HRs X 24 hrs (comment for end date), Door open when patient unattended         Problem: Patient Education: Go to Patient Education Activity  Goal: Patient/Family Education  Outcome: Progressing Towards Goal     Problem: Patient Education: Go to Patient Education Activity  Goal: Patient/Family Education  Outcome: Progressing Towards Goal     Problem: Patient Education: Go to Patient Education Activity  Goal: Patient/Family Education  Outcome: Progressing Towards Goal     Problem: Deep Venous Thrombosis - Risk of  Goal: *Absence of deep venous thrombosis signs and symptoms(Stroke Metric)  Outcome: Progressing Towards Goal  Goal: *Absence of impaired coagulation signs and symptoms  Outcome: Progressing Towards Goal  Goal: *Knowledge of prescribed medications  Outcome: Progressing Towards Goal  Goal: *Absence of bleeding  Outcome: Progressing Towards Goal     Problem: Patient Education: Go to Patient Education Activity  Goal: Patient/Family Education  Outcome: Progressing Towards Goal     Problem: Pressure Injury - Risk of  Goal: *Prevention of pressure injury  Description  Document Zaki Scale and appropriate interventions in the flowsheet. Outcome: Progressing Towards Goal  Note:   Pressure Injury Interventions:             Activity Interventions: Increase time out of bed, Pressure redistribution bed/mattress(bed type)                               Problem: Patient Education: Go to Patient Education Activity  Goal: Patient/Family Education  Outcome: Progressing Towards Goal Problem: Hypertension  Goal: *Blood pressure within specified parameters  Outcome: Progressing Towards Goal  Goal: *Fluid volume balance  Outcome: Progressing Towards Goal  Goal: *Labs within defined limits  Outcome: Progressing Towards Goal     Problem: Patient Education: Go to Patient Education Activity  Goal: Patient/Family Education  Outcome: Progressing Towards Goal

## 2019-09-04 NOTE — PROGRESS NOTES
1500: Received report from RENZO Caballero. Pt at ultrasound. Will move to [facility] once ultrasound is complete. TRANSFER - IN REPORT:    Verbal report received from RENZO Caballero on Thania Patel  being received from PCU for routine progression of care      Report consisted of patients Situation, Background, Assessment and   Recommendations(SBAR). Information from the following report(s) SBAR, Kardex, MAR, Recent Results and Cardiac Rhythm NSR was reviewed with the receiving nurse. Opportunity for questions and clarification was provided. Assessment completed upon patients arrival to unit and care assumed. 1515: Patient arrived on floor. 1545: Unable to get oral or axillary temperature. Pt is refusing to let us get rectal temperature. 1629: Pt axillary temperature is 95.7. Paged Dr. Talia Pfeiffer. 1635: Spoke with Dr. Talia Pfeiffer about oral temperature being 95.7 axillary, she would like to order a Bearhugger and continue to reassess. 1750: Paged Dr. Talia Pfeiffer regarding pt's temperature. Pt still has a temperature of 96 with the Bearhugger. 1810: Dr. Talia Pfeiffer would like to draw CBC, Paired Blood Cultures, and Pro-calcitonin. In addition, a urinalysis. Bedside shift change report GIVEN TO Billie Krause RN.  Report included the following information SBAR, Kardex, MAR, Recent Results and Cardiac Rhythm NSR.         SIGNIFICANT CHANGES DURING SHIFT:  bearhugger applied, unable to get rectal temp per patient refusal, oral temperature not reading, only able to to get axillary temperature      CONCERNS TO ADDRESS WITH MD:  Temperature           [facility] NURSING NOTE   Admission Date 9/2/2019   Admission Diagnosis Hypertensive emergency [I16.1]   Consults IP CONSULT TO ORTHOPEDIC SURGERY  IP CONSULT TO NEPHROLOGY  IP CONSULT TO GASTROENTEROLOGY      Cardiac Monitoring [x] Yes [] No      Purposeful Hourly Rounding [x] Yes    David Score Total Score: 2   David score 3 or > [x] Bed Alarm [] Avasys [] 1:1 sitter [] Patient refused (Signed refusal form in chart)   Zaki Score Zaki Score: 22   Zaki score 14 or < [] PMT consult [] Wound Care consult    []  Specialty bed  [] Nutrition consult      Influenza Vaccine Received Flu Vaccine for Current Season (usually Sept-March): Yes           Oxygen needs? [x] Room air Oxygen @  []1L    []2L    []3L   []4L    []5L   []6L via  NC   Chronic home O2 use? [] Yes [] No  Perform O2 challenge test and document in progress note using smartphrase (.Homeoxygen)      Last bowel movement Last Bowel Movement Date: 09/02/19      Urinary Catheter             LDAs               Peripheral IV 09/02/19 Right;Left Arm (Active)   Site Assessment Clean, dry, & intact 9/4/2019  3:19 PM   Phlebitis Assessment 0 9/4/2019  3:19 PM   Infiltration Assessment 0 9/4/2019  3:19 PM   Dressing Status Clean, dry, & intact 9/4/2019  3:19 PM   Dressing Type Tape;Transparent 9/4/2019  3:19 PM   Hub Color/Line Status Pink 9/4/2019  3:19 PM   Action Taken Open ports on tubing capped 9/4/2019  3:52 AM   Alcohol Cap Used Yes 9/4/2019  3:52 AM       Peripheral IV 09/02/19 Left Hand (Active)   Site Assessment Clean, dry, & intact 9/4/2019  3:19 PM   Phlebitis Assessment 0 9/4/2019  3:19 PM   Infiltration Assessment 0 9/4/2019  3:19 PM   Dressing Status Clean, dry, & intact 9/4/2019  3:19 PM   Dressing Type Tape;Transparent 9/4/2019  3:19 PM   Hub Color/Line Status Blue 9/4/2019  3:19 PM   Action Taken Open ports on tubing capped 9/4/2019  3:52 AM   Alcohol Cap Used Yes 9/4/2019  3:52 AM                         Readmission Risk Assessment Tool Score Medium Risk            16       Total Score        3 Has Seen PCP in Last 6 Months (Yes=3, No=0)    4 IP Visits Last 12 Months (1-3=4, 4=9, >4=11)    5 Pt. Coverage (Medicare=5 , Medicaid, or Self-Pay=4)    4 Charlson Comorbidity Score (Age + Comorbid Conditions)        Criteria that do not apply:    . Living with Significant Other. Assisted Living. LTAC. SNF.  or   Rehab Patient Length of Stay (>5 days = 3)       Expected Length of Stay - - -   Actual Length of Stay 2

## 2019-09-04 NOTE — PROGRESS NOTES
Hospitalist Progress Note    NAME: Masha Napier   :  1950   MRN:  [id number]       Assessment / Plan:  Hypertensive Emergency:  Uncontrolled BP   S/p Cardene drip, , BP  Labile up and down ( was elevated this am)    c/w Norvasc, labetalol and clonidine, . 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 Nephrology input appreciated , check urine protein/creatinine, PTH and other labs  No hydro on renal US     Positive hep B and C , new dg   GI consulted : FU as OP with dr Cynthia Joseph   Abdominal US ordered    Chronic Diastolic Heart Failure: not in acute failure at this time, hold lasix, monitor I/o and weight. Hypokalemia: resolved     Right humeral Fracture: c/w pain meds,  Ortho evaluation: c/w sling , no surgery needed , FU with dr hightower in 2weeks     DM type 2   Now hypoglycemias  Blood sugars as low as 46 yesterday , BS within acceptable limits   Will hold home dose NPH   C/w SSI   Hba1c 4.8    Code Status: Full Code  Surrogate Decision Maker: DTREJI Leon [id number]  DVT Prophylaxis: heparin  GI Prophylaxis: not indicated  Baseline: fairly independent full aDL       25.0 - 29.9 Overweight / Body mass index is 26.07 kg/m². Subjective:     Chief Complaint / Reason for Physician Visit  FU hypertensive emergency . Pt has no acute complaints . Discussed with RN events overnight. Review of Systems:  Symptom Y/N Comments  Symptom Y/N Comments   Fever/Chills    Chest Pain n    Poor Appetite    Edema     Cough    Abdominal Pain n    Sputum    Joint Pain     SOB/POWELL n   Pruritis/Rash     Nausea/vomit n   Tolerating PT/OT     Diarrhea n   Tolerating Diet y    Constipation    Other       Could NOT obtain due to:      Objective:     VITALS:   Last 24hrs VS reviewed since prior progress note.  Most recent are:  Patient Vitals for the past 24 hrs:   Temp Pulse Resp BP SpO2   09/04/19 1138 98 °F (36.7 °C) 61 18 138/89 98 %   09/04/19 1100    138/89    09/04/19 1009    (!) 153/99    09/04/19 0858    (!) 146/98    09/04/19 0853    (!) 143/94    09/04/19 0851    (!) 179/101    09/04/19 0747 97.5 °F (36.4 °C) 66 20 (!) 141/97 99 %   09/04/19 0352  66      09/04/19 0351  66      09/04/19 0349  66      09/04/19 0348  66      09/04/19 0341  66      09/04/19 0340  66      09/04/19 0338  66      09/04/19 0336  66      09/04/19 0334  66      09/04/19 0333  66      09/04/19 0331  67      09/04/19 0330 98 °F (36.7 °C) 67 14 (!) 151/99 96 %   09/04/19 0328  66      09/04/19 0327  79      09/04/19 0325  79      09/04/19 0324  67      09/04/19 0321  77      09/04/19 0320  77      09/04/19 0318  77      09/04/19 0316  79      09/04/19 0314  79      09/04/19 0312  79      09/04/19 0311  79      09/04/19 0309  76      09/04/19 0308  76      09/04/19 0307  76      09/04/19 0305  71      09/04/19 0304  76      09/04/19 0301  76      09/04/19 0258  79      09/04/19 0256  66      09/04/19 0255  79      09/04/19 0252  66      09/04/19 0251  79      09/04/19 0249  79      09/04/19 0248  67      09/04/19 0246  79      09/04/19 0244  66      09/04/19 0241  66      09/04/19 0240  67      09/04/19 0238  67      09/04/19 0236  66      09/04/19 0234  79      09/04/19 0232  67      09/04/19 0230  67  (!) 143/94    09/04/19 0228  66      09/04/19 0227  66      09/04/19 0225  67      09/04/19 0223  67      09/04/19 0221  67      09/04/19 0219  67      09/04/19 0217  67      09/04/19 0215  79      09/04/19 0213  68      09/04/19 0210  68      09/04/19 0209  67      09/04/19 0207  79      09/04/19 0205  76      09/04/19 0203  79      09/04/19 [address]   09/04/19 0200    141/90    09/04/19 0159  79      09/04/19 [place]   09/04/19 [place]   09/04/19 0154  79      09/04/19 0151  66      09/04/19 0150  67      09/04/19 0148  79      09/04/19 [address]   09/04/19 0144  79      09/04/19 0142  79      09/04/19 0141  68      09/04/19 0138  68      09/04/19 [address]   09/04/19 0135  68      09/04/19 0133  68      09/04/19 0132  68      09/04/19 0130  68  142/88    09/04/19 0127  71      09/04/19 0126  71      09/04/19 0124  71      09/04/19 0123  71      09/04/19 0120  76      09/04/19 0119  76      09/04/19 0117  76      09/04/19 0115  76      09/04/19 0113  76      09/04/19 0111  76      09/04/19 0110  76      09/04/19 0108  68      09/04/19 0105  76      09/04/19 0104  76      09/04/19 0102  76      09/04/19 0100  69  126/76    09/04/19 0058  68      09/04/19 0056  68      09/04/19 0055  68      09/04/19 0053  68      09/04/19 0051  69      09/04/19 0050  69      09/04/19 0049  68      09/04/19 0047  68      09/04/19 0046  68      09/04/19 0044  68      09/04/19 0042  68      09/04/19 0040  68      09/04/19 0038  68      09/04/19 0036  68  120/72    09/04/19 0034  69      09/04/19 0032  70      09/04/19 0030    129/79    09/04/19 0029  72      09/04/19 0028  71      09/04/19 0026  72      09/04/19 0025  72      09/04/19 0023  72      09/04/19 0020  73      09/04/19 0019  74      09/04/19 0017  74      09/04/19 0016  76      09/04/19 0013  74      09/04/19 0012  74      09/04/19 0010  68      09/04/19 0009  68      09/04/19 0006  68      09/04/19 0005  73      09/04/19 0003  73      09/04/19 0002  73      09/04/19 0000    (!) 206/112    09/03/19 2359  74      09/03/19 [address] [place]    09/03/19 2356  74      09/03/19 2354  75      09/03/19 2352  75      09/03/19 2351  75      09/03/19 2349  76      09/03/19 2347  75      09/03/19 2344  74      09/03/19 2343  74      09/03/19 2341  76      09/03/19 2340  73      09/03/19 2338    (!) 186/106    09/03/19 2337  78      09/03/19 2336  79      09/03/19 2334  81      09/03/19 2332  78      09/03/19 2330  71      09/03/19 2329  71      09/03/19 2326  71      09/03/19 2325  71      09/03/19 2323  72      09/03/19 2321  72      09/03/19 2319  70      09/03/19 2317  71      09/03/19 2316  71      09/03/19 2313  71      09/03/19 2312  71      09/03/19 2310  71      09/03/19 2309  71      09/03/19 2307  72      09/03/19 2304  73      09/03/19 2303  72      09/03/19 2301  72      09/03/19 2300 97.3 °F (36.3 °C) 71 14 (!) 183/105 98 %   09/03/19 1927 97.1 °F (36.2 °C) 69 16 (!) 162/97 100 %   09/03/19 1730    (!) 165/97    09/03/19 1449    (!) 159/101        Intake/Output Summary (Last 24 hours) at 9/4/2019 1422  Last data filed at 9/4/2019 0747  Gross per 24 hour   Intake 240 ml   Output    Net 240 ml        PHYSICAL EXAM:  General: WD, WN. Alert, cooperative, no acute distress    EENT:  EOMI. Anicteric sclerae. MMM  Resp:  CTA bilaterally, no wheezing or rales. No accessory muscle use  CV:  Regular  rhythm,  No edema  GI:  Soft, Non distended, Non tender.  +Bowel sounds  Neurologic:  Alert and oriented X 3, normal speech,   Psych:   Good insight. Not anxious nor agitated  Skin:  No rashes.   No jaundice    Reviewed most current lab test results and cultures  YES  Reviewed most current radiology test results   YES  Review and summation of old records today    NO  Reviewed patient's current orders and MAR    YES  PMH/SH reviewed - no change compared to H&P  ________________________________________________________________________  Care Plan discussed with:    Comments   Patient x    Family      RN x    Care Manager x    Consultant  x bernard meléndez                     x Multidiciplinary team rounds were held today with , nursing, pharmacist and clinical coordinator. Patient's plan of care was discussed; medications were reviewed and discharge planning was addressed. ________________________________________________________________________  Total NON critical care TIME:  30Minutes    Total CRITICAL CARE TIME Spent:   Minutes non procedure based      Comments   >50% of visit spent in counseling and coordination of care     ________________________________________________________________________  Dana Cruz MD     Procedures: see electronic medical records for all procedures/Xrays and details which were not copied into this note but were reviewed prior to creation of Plan. LABS:  I reviewed today's most current labs and imaging studies.   Pertinent labs include:  Recent Labs     09/03/19  0323 09/02/19  1423   WBC 3.8* 4.4   HGB 8.5* 9.7*   HCT 25.6* 29.7*    272     Recent Labs     09/04/19  0429 09/03/19  0323 09/02/19  1423    144 144   K 3.7 3.3* 3.4*   * 110* 106   CO2 26 27 27   * 133* 142*   BUN 37* 40* 41*   CREA 4.28* 4.63* 5.26*   CA 7.8* 7.7* 8.5   MG  --  2.1  --    PHOS 4.1 4.7  --    ALB 2.2* 2.3* 2.7*   TBILI  --  0.7 0.5   SGOT  --  29 38*   ALT  --  28 36       Signed: Dana Cruz MD

## 2019-09-04 NOTE — PROGRESS NOTES
I was informed by RN that pt was hypothermic with temp around 95F   No symptoms   Check cbc with diff , blood cx , UA and procalcitonin  C/w bear hugger

## 2019-09-04 NOTE — PROGRESS NOTES
Nephrology Progress Note     Jarvis Carrera     [email or web address]                  Phone - [phone]   Patient: Felisa Morin   Date- 9/4/2019        Admit Date: 9/2/2019  YOB: 1950             CC: Follow up for ASHVIN          Subjective: Interval History:   -  ashvin IMPROVING  bp high  No c/o sob,   No c/o chest pain,   No c/o nausea or vomiting  No c/o  fever. ROS:- as above   Assessment  & Plan:   ASHVIN on ckd  - etiology multiple - acute hypertensive nephropathy or GN- hepatitis related  +ve hep b and hep c or NSAIDS use  - no hydro on renal usg  - follow bmp in am  - hold lasix and benazepril  - he may need renal bx - I have d/w him. He would like to follow with  as out pt    CKD 3 - f/b   - ckd likely due to dm + HTN    HTN Urgencey  - restart aldactone  - start hydralazine  - if bp stable okay to d/c in afternoon or tomorrow am    TYPE 2 DM  PROTEINURIA , Microscopic hematuria  - +ve hep b and hep c -   - follow gammopathy screen      Physical Exam:   GEN: NAD  NECK- Supple, no mass  RESP:clear b/l, no wheezing  CVS: S1,S2,RRR  ABDO: soft , non tender, No mass  EXT: no edema  NEURO: normal speech, non focal   Care Plan discussed with: patient , nurse, DR. BETANCOURT  Objective:   Visit Vitals  BP (!) 146/98 (BP 1 Location: Left arm, BP Patient Position: Standing)   Pulse 66   Temp 97.5 °F (36.4 °C)   Resp 20   Ht 5' 10\" (1.778 m)   Wt 82.4 kg (181 lb 10.5 oz)   SpO2 99%   BMI 26.07 kg/m²     Last 3 Recorded Weights in this Encounter    09/03/19 0459 09/03/19 1006 09/04/19 0616   Weight: 81 kg (178 lb 9.2 oz) 85.7 kg (189 lb) 82.4 kg (181 lb 10.5 oz)     09/02 1901 - 09/04 0700  In: 187 [P.O.:120; I.V.:416]  Out: 725 [Urine:725]    Intake/Output Summary (Last 24 hours) at 9/4/2019 0931  Last data filed at 9/3/2019 1046  Gross per 24 hour   Intake 416 ml   Output    Net 416 ml      Chart reviewed. Pertinent Notes reviewed.        Medication list reviewed   Current Facility-Administered Medications   Medication    labetalol (NORMODYNE) tablet 300 mg    [Held by provider] insulin NPH (NOVOLIN N, HUMULIN N) injection 17 Units    cloNIDine HCl (CATAPRES) tablet 0.2 mg    ferrous sulfate tablet 325 mg    acetaminophen (TYLENOL) tablet 650 mg    ondansetron (ZOFRAN) injection 4 mg    niCARdipine (CARDENE) 25 mg in 0.9% sodium chloride 250 mL infusion    insulin lispro (HUMALOG) injection    glucose chewable tablet 16 g    glucagon (GLUCAGEN) injection 1 mg    heparin (porcine) injection 5,000 Units    dextrose 10% infusion 125-250 mL    amLODIPine (NORVASC) tablet 10 mg    oxyCODONE IR (ROXICODONE) tablet 5 mg    morphine injection 2 mg    aspirin delayed-release tablet 81 mg           Data Review :  Recent Labs     09/04/19  0429 09/03/19  0323 09/02/19  1423    144 144   K 3.7 3.3* 3.4*   * 110* 106   CO2 26 27 27   BUN 37* 40* 41*   CREA 4.28* 4.63* 5.26*   * 133* 142*   CA 7.8* 7.7* 8.5   MG  --  2.1  --    PHOS 4.1 4.7  --      Recent Labs     09/03/19  0323 09/02/19  1423   WBC 3.8* 4.4   HGB 8.5* 9.7*   HCT 25.6* 29.7*    272     Recent Labs     09/03/19  1130   TIBC 200*   PSAT 29      Recent Labs     09/02/19  1423   *   TROIQ <0.05     Lab Results   Component Value Date/Time    Color YELLOW/STRAW 01/24/2019 12:52 AM    Appearance TURBID (A) 01/24/2019 12:52 AM    Specific gravity 1.016 01/24/2019 12:52 AM    pH (UA) 6.0 01/24/2019 12:52 AM    Protein >300 (A) 01/24/2019 12:52 AM    Glucose 100 (A) 01/24/2019 12:52 AM    Ketone NEGATIVE  01/24/2019 12:52 AM    Bilirubin NEGATIVE  01/24/2019 12:52 AM    Urobilinogen 1.0 01/24/2019 12:52 AM    Nitrites NEGATIVE  01/24/2019 12:52 AM    Leukocyte Esterase NEGATIVE  01/24/2019 12:52 AM    Epithelial cells FEW 01/24/2019 12:52 AM    Bacteria NEGATIVE  01/24/2019 12:52 AM    WBC 0-4 01/24/2019 12:52 AM    RBC 5-10 01/24/2019 12:52 AM     No results found for: CULT  No results found for: SDES  Lab Results   Component Value Date/Time    Sodium,urine random 65 01/24/2019 12:52 AM    Creatinine, urine 94.80 01/24/2019 12:52 AM       Results from Hospital Encounter encounter on 09/02/19   XR RIBS RT W PA CXR MIN 3 V    Narrative EXAM:  XR RIBS RT W PA CXR MIN 3 V    INDICATION:   Pain/Trauma  Additional history: Ground-level fall in bathroom 3 days ago. COMPARISON: None. FINDINGS: A frontal radiograph of the chest and 3 oblique views of the right  ribs demonstrate no fracture. There is no pneumothorax or pleural effusion. Still noted is made of a fracture through the surgical neck of the right humerus      Impression IMPRESSION:    1. No visible rib fracture. 2. Right humeral fracture               Phi Tony MD  [facility]   [email or web address]  [facility]  [address], [address], [address]  Phone - [phone]         Fax - [phone]

## 2019-09-04 NOTE — CONSULTS
GI CONSULTATION NOTE  Guilherme Begum, NP  [phone] NP in-hospital cell phone M-F until 4:30  After 5pm or on weekends, please call  for physician on call    NAME: Kevin Littlejohn   :  1950   MRN:  [id number]   Attending: Dr Sade Be  Primary GI: Dr Lei Oviedo  Date/Time:  2019 8:52 AM  Assessment:   New dx of Hepatitis B and C   - Hep B surface Ag +  - Hep C virus Ab +  - Neg HIV  - Normal LFTs  - 1 tattoo but denies drug use, blood transfusions, unprotected sex  - CT ab/pel without contrast - normal liver but not great study for evaluation of cirrhosis    Hypertensive Emergency:  - On and off drip overnight     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 Treatment of Hep C and supportive care for Hep B should be done outpatient when stabilized but will begin work up here. 2. Abdominal US  3. Ordered Viral Load   4. Avoid hepatotoxic drugs    Will need to follow up outpatient with Dr Krissy Cerrato for initial of therapy. Nothing further to add inpatient. Thank you for consultation. Plan discussed with Dr Lei Oviedo  Subjective:     HISTORY OF PRESENT ILLNESS:     Kevin Littlejohn is an 77 y.o.  male who we are asked to see for complaint of new diagnosis of Hep B/C. Medical history includes HTN and diabetes. Pt presented to the hospital with right arm pain s/p fall. Pt is bein treated for hypertensive emergency, ASHVIN on CKD, HF, right humerus fracture, and is also noted to have Hep B and C on nephrology work up. Pt endorses having 1 tattoo at a clean shop but denies any history of blood transfusions, IV or intranasal drug use, male-male intercourse. Pt denies any history of liver issues. LFTs are normal. CT without contrast showing normal liver but no recent US.        Past Medical History:   Diagnosis Date    Diabetes ([address]     Hypertension       Past Surgical History:   Procedure Laterality Date    HX GI      colonoscopy 6-7 yrs ago    SC COLON CA SCRN NOT HI RSK IND  10/21/2015          Social History     Tobacco Use    Smoking status: Never Smoker    Smokeless tobacco: Never Used   Substance Use Topics    Alcohol use: No      Family History   Problem Relation Age of Onset    Hypertension Mother     Hypertension Father       No Known Allergies   Prior to Admission medications    Medication Sig Start Date End Date Taking? Authorizing Provider   methocarbamol (ROBAXIN) 500 mg tablet Take 500 mg by mouth two (2) times a day. Bita Hudson MD   spironolactone (ALDACTONE) 25 mg tablet Take 25 mg by mouth daily. Bita Hudson MD   ferrous fumarate 324 mg (106 mg iron) tab Take  by mouth two (2) times a day. Bita Hudson MD   furosemide (LASIX) 20 mg tablet Take 20 mg by mouth two (2) times a day. Bita Hudson MD   labetalol (NORMODYNE) 100 mg tablet Take 100 mg by mouth two (2) times a day. Bita Hudson MD   cloNIDine HCl (CATAPRES) 0.1 mg tablet 0.2 mg three (3) times daily. 2/10/18   Provider, Historical   MASSIMO PEN NEEDLE 32 gauge x 5/32\" ndle USE AS DIRECTED WITH INSULIN 1/14/18   Provider, Historical   diphenoxylate-atropine (LOMOTIL) 2.5-0.025 mg per tablet TK 2 TS PO QID PRN 1/8/18   Provider, Historical   amLODIPine-benazepril (LOTREL) 10-40 mg per capsule Take 1 Cap by mouth daily. Provider, Historical   insulin aspart protamine/insulin aspart (NOVOLOG MIX 70-30) 100 unit/mL (70-30) injection by SubCUTAneous route daily. 35 UNITS    Provider, Historical   aspirin delayed-release 81 mg tablet Take  by mouth as needed for Pain.     Provider, Historical       Patient Active Problem List   Diagnosis Code    Hypoglycemia E16.2    Hypertensive emergency I16.1    ARF (acute renal failure) ([facility] N17.9    DM (diabetes mellitus) ([facility] [address] E11.9    Humerus fracture S42.309A       REVIEW OF SYSTEMS:    Constitutional: negative fever, negative chills, negative weight loss  Eyes: negative visual changes  ENT:   negative sore throat, tongue or lip swelling   Respiratory:  negative cough, negative dyspnea  Cards:  negative for chest pain, palpitations, lower extremity edema  GI:   See HPI  :  negative for frequency, dysuria  Integument:  negative for rash and pruritus  Heme:  negative for easy bruising and gum/nose bleeding  Musculoskel: negative for myalgias,  back pain and muscle weakness. Neuro: negative for headaches, dizziness, vertigo  Psych:  negative for feelings of anxiety      Pertinent Positives: Right arm pain, fatigue when Blood sugar is low. Objective:   VITALS:    Visit Vitals  BP (!) 141/97 (BP 1 Location: Left arm, BP Patient Position: At rest)   Pulse 66   Temp 97.5 °F (36.4 °C)   Resp 20   Ht 5' 10\" (1.778 m)   Wt 82.4 kg (181 lb 10.5 oz)   SpO2 99%   BMI 26.07 kg/m²       PHYSICAL EXAM:   General:          Alert, WD, WN, cooperative, no distress, appears stated age. Head:               Normocephalic, without obvious abnormality, atraumatic. Eyes:               Conjunctivae clear and pale, anicteric sclerae. Pupils are equal  Nose:               Nares normal. No drainage or sinus tenderness. Throat:             Lips, mucosa, and tongue normal.  No Thrush  Neck:               Supple, symmetrical,  no adenopathy, thyroid: non tender  Back:               Symmetric,  No CVA tenderness. Lungs:             CTA bilaterally. No wheezing/rhonchi/rales. Chest wall:      No tenderness or deformity. No Accessory muscle use. Heart:              Regular rate and rhythm,  no murmur, rub or gallop. Abdomen:        Soft, non-tender. Not distended. Bowel sounds normal. No masses  Extremities:     Atraumatic, No cyanosis. No edema. No clubbing. Right arm in sling. Skin:                Texture, turgor normal. No rashes/lesions/jaundice  Lymph:            Cervical, supraclavicular normal.  Psych:             Good insight. Not depressed. Neurologic:      EOMs intact. No facial asymmetry. No aphasia or slurred speech. Normal strength, A/O X 3.      LAB DATA REVIEWED:    Recent Results (from the past 24 hour(s))   GLUCOSE, POC    Collection Time: 09/03/19 11:04 AM   Result Value Ref Range    Glucose (POC) 108 (H) 65 - 100 mg/dL    Performed by Yael Martinez    HIV-1,2 P24 AG/AB SCREEN    Collection Time: 09/03/19 11:30 AM   Result Value Ref Range    p24 Antigen NONREACTIVE NR      HIV-1,2 Ab NONREACTIVE NR     IRON PROFILE    Collection Time: 09/03/19 11:30 AM   Result Value Ref Range    Iron 58 35 - 150 ug/dL    TIBC 200 (L) 250 - 450 ug/dL    Iron % saturation 29 20 - 50 %   ECHO ADULT COMPLETE    Collection Time: 09/03/19  1:16 PM   Result Value Ref Range    Right Atrial Area 4C 9.92 cm2    LV E' Lateral Velocity 7.80 cm/s    LV E' Septal Velocity 5.33 cm/s    Aortic Valve Systolic Peak Velocity 748.41 cm/s    Aortic Valve Area by Continuity of Peak Velocity 3.1 cm2    AoV PG 7.2 mmHg    LVIDd 4.02 (A) 4.2 - 5.9 cm    LVPWd 1.97 (A) 0.6 - 1.0 cm    LVIDs 2.29 cm    IVSd 2.07 (A) 0.6 - 1.0 cm    LV ES Vol A4C 39.2 mL    LVOT d 2.11 cm    LVOT Peak Velocity 118.92 cm/s    LVOT Peak Gradient 5.7 mmHg    MV A Bijan 83.22 cm/s    MV E Bijan 57.46 cm/s    MV E/A 0.69     LV Ejection Fraction MOD 4C 60 %    LA Vol 4C 40.53 18 - 58 mL    LA Area 4C 16.7 cm2    LV Mass .2 (A) 88 - 224 g    LV Mass AL Index 227.3 (A) 49 - 115 g/m2    E/E' lateral 7.37     E/E' septal 10.78     RVSP 18.2 mmHg    E/E' ratio (averaged) 9.07     LV ED Vol A4C 98.1 mL    Est. RA Pressure 10.0 mmHg    Mitral Regurgitant Peak Velocity 287.38 cm/s    Mitral Valve E Wave Deceleration Time 251.6 ms    Left Atrium Major Axis 2.71 cm    Triscuspid Valve Regurgitation Peak Gradient 8.2 mmHg    Aortic Regurgitant Pressure Half-time 1,367.0 cm    Pulmonic Valve Max Velocity 62.14 cm/s    TR Max Velocity 143.12 cm/s    PASP 18.2 mmHg    LA Vol Index 19.89 16 - 28 ml/m2    LVED Vol Index A4C 48.1 mL/m2    LVES Vol Index A4C 19.2 mL/m2    MR Peak Gradient 33.0 mmHg    KARLEY/BSA Pk Bijan 1.5 cm2/m2    AR Max Bijan 191.08 cm/s    PV peak gradient 1.5 mmHg   GLUCOSE, POC    Collection Time: 09/03/19  4:14 PM   Result Value Ref Range    Glucose (POC) 54 (L) 65 - 100 mg/dL    Performed by [employer]    GLUCOSE, POC    Collection Time: 09/03/19  4:47 PM   Result Value Ref Range    Glucose (POC) 61 (L) 65 - 100 mg/dL    Performed by Vita  TESSIE    GLUCOSE, POC    Collection Time: 09/03/19  5:16 PM   Result Value Ref Range    Glucose (POC) 47 (LL) 65 - 100 mg/dL    Performed by [employer]    GLUCOSE, POC    Collection Time: 09/03/19  5:18 PM   Result Value Ref Range    Glucose (POC) 46 (LL) 65 - 100 mg/dL    Performed by Vita  TESSIE    GLUCOSE, POC    Collection Time: 09/03/19  5:44 PM   Result Value Ref Range    Glucose (POC) 70 65 - 100 mg/dL    Performed by [employer]    GLUCOSE, POC    Collection Time: 09/03/19  6:08 PM   Result Value Ref Range    Glucose (POC) 145 (H) 65 - 100 mg/dL    Performed by AktinoY    GLUCOSE, POC    Collection Time: 09/03/19  8:26 PM   Result Value Ref Range    Glucose (POC) 121 (H) 65 - 100 mg/dL    Performed by Zuleyma Gomez (PCT)    RENAL FUNCTION PANEL    Collection Time: 09/04/19  4:29 AM   Result Value Ref Range    Sodium 142 136 - 145 mmol/L    Potassium 3.7 3.5 - 5.1 mmol/L    Chloride 109 (H) 97 - 108 mmol/L    CO2 26 21 - 32 mmol/L    Anion gap 7 5 - 15 mmol/L    Glucose 102 (H) 65 - 100 mg/dL    BUN 37 (H) 6 - 20 MG/DL    Creatinine 4.28 (H) 0.70 - 1.30 MG/DL    BUN/Creatinine ratio 9 (L) 12 - 20      GFR est AA 17 (L) >60 ml/min/1.73m2    GFR est non-AA 14 (L) >60 ml/min/1.73m2    Calcium 7.8 (L) 8.5 - 10.1 MG/DL    Phosphorus 4.1 2.6 - 4.7 MG/DL    Albumin 2.2 (L) 3.5 - 5.0 g/dL   GLUCOSE, POC    Collection Time: 09/04/19  6:23 AM   Result Value Ref Range    Glucose (POC) 91 65 - 100 mg/dL    Performed by Myron Hammond 930, POC    Collection Time: 09/04/19  7:47 AM   Result Value Ref Range    Glucose (POC) 100 65 - 100 mg/dL    Performed by Cloteal Grain (PCT)        IMAGING RESULTS:  I have personally reviewed the imaging reports    DATE: 9/2/19  EXAM: CT ABD PELV WO CONT     INDICATION: Hydronephrosis     COMPARISON: CT June 2006     CONTRAST:  None.     TECHNIQUE:   Thin axial images were obtained through the abdomen and pelvis. Coronal and  sagittal reconstructions were generated. Oral contrast was not administered. CT  dose reduction was achieved through use of a standardized protocol tailored for  this examination and automatic exposure control for dose modulation.      The absence of intravenous contrast material reduces the sensitivity for  evaluation of the solid parenchymal organs of the abdomen.      FINDINGS:   LUNG BASES: There is mild bibasilar atelectasis  INCIDENTALLY IMAGED HEART AND MEDIASTINUM: Coronary artery calcifications are  noted. The heart is enlarged. LIVER: No mass or biliary dilatation. GALLBLADDER: Not well visualized  SPLEEN: No mass. PANCREAS: No mass or ductal dilatation. ADRENALS: Unremarkable. KIDNEYS/URETERS: Bilateral low-density renal lesions are incompletely evaluated,  with the largest in the left upper pole measuring 17 mm. No evidence of renal  stone or hydronephrosis. STOMACH: Unremarkable. SMALL BOWEL: No evidence of bowel obstruction  COLON: There is diverticulosis of the colon, without evidence of acute  diverticulitis. APPENDIX: Not visualized  PERITONEUM: No ascites or pneumoperitoneum. RETROPERITONEUM: No lymphadenopathy or aortic aneurysm. REPRODUCTIVE ORGANS: Prostate is enlarged  URINARY BLADDER: No mass or calculus. BONES: No destructive bone lesion. ADDITIONAL COMMENTS: N/A     IMPRESSION  IMPRESSION:  No acute abdominal or pelvic pathology. No evidence of genitourinary stone or  hydronephrosis.     Total time spent with patient: 50 minutes ________________________________________________________________________  Care Plan discussed with:  Patient y   Family     RN y              Consultant:       CT  9/4/2019:  ________________________________________________________________________    ___________________________________________________  Consulting Provider:  Amirah Hare NP      9/4/2019  8:52 AM

## 2019-09-04 NOTE — PROGRESS NOTES
Problem: Self Care Deficits Care Plan (Adult)  Goal: *Acute Goals and Plan of Care (Insert Text)  Description    FUNCTIONAL STATUS PRIOR TO ADMISSION: Patient was modified independent using a Single point cane for functional mobility. HOME SUPPORT: The patient lived with family and did not need support. Occupational Therapy Goals  Initiated 9/3/2019  1. Patient will perform grooming with supervision/set-up within 7 day(s). 2.  Patient will perform bathing UB and janay area with minimal assistance/contact guard assist within 7 day(s). 3.  Patient will perform lower body dressing with supervision/set-up within 7 day(s). 4.  Patient will perform toilet transfers with supervision/set-up within 7 day(s). 5.  Patient will perform all aspects of toileting with independence within 7 day(s). Outcome: Progressing Towards Goal   OCCUPATIONAL THERAPY TREATMENT  Patient: Irene Kaiser (52 y.o. male)  Date: 9/4/2019  Diagnosis: Hypertensive emergency [I16.1] <principal problem not specified>       Precautions: NWB  Chart, occupational therapy assessment, plan of care, and goals were reviewed. ASSESSMENT  Based on the objective data described below, pt was more drowsy today and stated that he was feeling light headed today with sitting on EOB and standing. Pt was more unsteady today and was only able to walk around the bed and due to BP being increased and he was light headed. Nursing notified and pt was put back to bed. OT adjusted his sling and placed a pillow/blanket behind his right arm for support.     Patient Vitals for the past 4 hrs:   Temp Pulse Resp BP SpO2   09/04/19 1138 98 °F (36.7 °C) 61 18 138/89 98 %   09/04/19 1100 -- -- -- 138/89 --   09/04/19 1009 -- -- -- (!) 153/99 --   09/04/19 0858 -- -- -- (!) 146/98 --   09/04/19 0853 -- -- -- (!) 143/94 --       .    Current Level of Function Impacting Discharge (ADLs): decreased balance, not able to use right UE, impaired cognition noted and decreased strength. Other factors to consider for discharge: pt will need 24/7 assist at home at discharge and would benefit from home care         PLAN :  Patient continues to benefit from skilled intervention to address the above impairments. Continue treatment per established plan of care. to address goals. Recommend with staff: Liam Palmyra for meals if pts BP is stable     Recommend next OT session: work on standing at the sink for bathing of UB    Recommendation for discharge: (in order for the patient to meet his/her long term goals)  Occupational therapy at least 2 days/week in the home AND ensure assist and/or supervision for safety with ADLs     This discharge recommendation:  Has been made in collaboration with the attending provider and/or case management    Equipment recommendations for successful discharge (if) home: none       SUBJECTIVE:   Patient stated I feel light headed. I know when my blood pressure is high.     OBJECTIVE DATA SUMMARY:   Cognitive/Behavioral Status:  Neurologic State: Alert;Drowsy; Confused  Orientation Level: Oriented X4  Cognition: Follows commands; Impaired decision making  Perception: Appears intact  Perseveration: No perseveration noted  Safety/Judgement: Fall prevention    Functional Mobility and Transfers for ADLs:  Bed Mobility:  Supine to Sit: Stand-by assistance    Transfers:  Sit to Stand: Contact guard assistance          Balance:  Sitting: Intact  Standing: Impaired; Without support  Standing - Static: Fair;Constant support  Standing - Dynamic : Fair;Constant support    ADL Intervention:  Feeding  Feeding Assistance: Set-up     Pt is min to mod for UB ADLs and CGA for LB ADLs          Toileting  Toileting Assistance: Contact guard assistance;Supervision    Cognitive Retraining  Safety/Judgement: Fall prevention    Activity Tolerance:   Fair  Please refer to the flowsheet for vital signs taken during this treatment.     After treatment patient left in no apparent distress: Supine in bed, Call bell within reach and Bed / chair alarm activated    COMMUNICATION/COLLABORATION:   The patients plan of care was discussed with: Physical Therapist, Registered Nurse and     Amanda Terry OT  Time Calculation: 18 mins

## 2019-09-04 NOTE — PROGRESS NOTES
25 Jazlyn Alfaro and f/u appts    CM spoke with pt's daughter by phone and discussed home health and she was in agreement. Reviewed [facility] companies and she chose [facility]. Referral sent to [facility] via [facility]. Pt's daughter can transport pt home by car. Pt lives with daughter. Medicare pt has received, reviewed, and signed 2nd IM letter informing them of their right to appeal the discharge. Signed copy has been placed on pt bedside chart. ENIO informed pt's daughter that pt was moved to another unit and is in room 2218.   3:45pm - Patient transferred to room 2218. ENIO gave handoff report to Aden Mcmullen CM.      Jayesh Benitez, [address]

## 2019-09-05 VITALS
DIASTOLIC BLOOD PRESSURE: 93 MMHG | HEIGHT: 70 IN | BODY MASS INDEX: 26.05 KG/M2 | SYSTOLIC BLOOD PRESSURE: 154 MMHG | OXYGEN SATURATION: 99 % | RESPIRATION RATE: 19 BRPM | TEMPERATURE: 97.3 F | WEIGHT: 182 LBS | HEART RATE: 66 BPM

## 2019-09-05 LAB
ALBUMIN SERPL-MCNC: 2.5 G/DL (ref 3.5–5)
ANA SER QL: NEGATIVE
ANION GAP SERPL CALC-SCNC: 7 MMOL/L (ref 5–15)
APPEARANCE UR: CLEAR
BACTERIA URNS QL MICRO: NEGATIVE /HPF
BILIRUB UR QL: NEGATIVE
BUN SERPL-MCNC: 35 MG/DL (ref 6–20)
BUN/CREAT SERPL: 8 (ref 12–20)
C-ANCA TITR SER IF: NORMAL TITER
C3 SERPL-MCNC: 119 MG/DL (ref 82–167)
C4 SERPL-MCNC: 37 MG/DL (ref 14–44)
CALCIUM SERPL-MCNC: 8.2 MG/DL (ref 8.5–10.1)
CALCIUM SERPL-MCNC: 8.2 MG/DL (ref 8.5–10.1)
CHLORIDE SERPL-SCNC: 109 MMOL/L (ref 97–108)
CO2 SERPL-SCNC: 25 MMOL/L (ref 21–32)
COLOR UR: ABNORMAL
CREAT SERPL-MCNC: 4.42 MG/DL (ref 0.7–1.3)
DSDNA AB SER-ACNC: 1 IU/ML (ref 0–9)
EPITH CASTS URNS QL MICRO: ABNORMAL /LPF
GLUCOSE BLD STRIP.AUTO-MCNC: 134 MG/DL (ref 65–100)
GLUCOSE BLD STRIP.AUTO-MCNC: 138 MG/DL (ref 65–100)
GLUCOSE SERPL-MCNC: 116 MG/DL (ref 65–100)
GLUCOSE UR STRIP.AUTO-MCNC: 100 MG/DL
HGB UR QL STRIP: NEGATIVE
KETONES UR QL STRIP.AUTO: NEGATIVE MG/DL
LEUKOCYTE ESTERASE UR QL STRIP.AUTO: NEGATIVE
MYELOPEROXIDASE AB SER IA-ACNC: <9 U/ML (ref 0–9)
NITRITE UR QL STRIP.AUTO: NEGATIVE
P-ANCA ATYPICAL TITR SER IF: NORMAL TITER
P-ANCA TITR SER IF: NORMAL TITER
PH UR STRIP: 6 [PH] (ref 5–8)
PHOSPHATE SERPL-MCNC: 4.4 MG/DL (ref 2.6–4.7)
POTASSIUM SERPL-SCNC: 4.3 MMOL/L (ref 3.5–5.1)
PROT UR STRIP-MCNC: 300 MG/DL
PROTEINASE3 AB SER IA-ACNC: <3.5 U/ML (ref 0–3.5)
PTH-INTACT SERPL-MCNC: 278.9 PG/ML (ref 18.4–88)
RBC #/AREA URNS HPF: ABNORMAL /HPF (ref 0–5)
SERVICE CMNT-IMP: ABNORMAL
SERVICE CMNT-IMP: ABNORMAL
SODIUM SERPL-SCNC: 141 MMOL/L (ref 136–145)
SP GR UR REFRACTOMETRY: 1.01 (ref 1–1.03)
UA: UC IF INDICATED,UAUC: ABNORMAL
UROBILINOGEN UR QL STRIP.AUTO: 1 EU/DL (ref 0.2–1)
WBC URNS QL MICRO: ABNORMAL /HPF (ref 0–4)

## 2019-09-05 PROCEDURE — 74011250636 HC RX REV CODE- 250/636: Performed by: INTERNAL MEDICINE

## 2019-09-05 PROCEDURE — 81001 URINALYSIS AUTO W/SCOPE: CPT

## 2019-09-05 PROCEDURE — 80069 RENAL FUNCTION PANEL: CPT

## 2019-09-05 PROCEDURE — 87902 NFCT AGT GNTYP ALYS HEP C: CPT

## 2019-09-05 PROCEDURE — 74011250637 HC RX REV CODE- 250/637: Performed by: INTERNAL MEDICINE

## 2019-09-05 PROCEDURE — 82962 GLUCOSE BLOOD TEST: CPT

## 2019-09-05 PROCEDURE — 82595 ASSAY OF CRYOGLOBULIN: CPT

## 2019-09-05 PROCEDURE — 87522 HEPATITIS C REVRS TRNSCRPJ: CPT

## 2019-09-05 PROCEDURE — 83970 ASSAY OF PARATHORMONE: CPT

## 2019-09-05 PROCEDURE — 36415 COLL VENOUS BLD VENIPUNCTURE: CPT

## 2019-09-05 RX ORDER — AMLODIPINE BESYLATE 10 MG/1
10 TABLET ORAL DAILY
Qty: 30 TAB | Refills: 1 | Status: SHIPPED | OUTPATIENT
Start: 2019-09-06 | End: 2019-11-18

## 2019-09-05 RX ORDER — OXYCODONE HYDROCHLORIDE 5 MG/1
5 TABLET ORAL
Qty: 18 TAB | Refills: 0 | Status: SHIPPED | OUTPATIENT
Start: 2019-09-05 | End: 2019-09-08

## 2019-09-05 RX ORDER — INSULIN ASPART 100 [IU]/ML
17 INJECTION, SUSPENSION SUBCUTANEOUS DAILY
Qty: 5.1 ML | Refills: 0 | Status: SHIPPED | OUTPATIENT
Start: 2019-09-05 | End: 2019-10-05

## 2019-09-05 RX ORDER — LABETALOL 100 MG/1
300 TABLET, FILM COATED ORAL 2 TIMES DAILY
Qty: 180 TAB | Refills: 1 | Status: SHIPPED | OUTPATIENT
Start: 2019-09-05 | End: 2019-11-18

## 2019-09-05 RX ADMIN — SPIRONOLACTONE 25 MG: 25 TABLET ORAL at 09:04

## 2019-09-05 RX ADMIN — HEPARIN SODIUM 5000 UNITS: 5000 INJECTION INTRAVENOUS; SUBCUTANEOUS at 05:53

## 2019-09-05 RX ADMIN — ASPIRIN 81 MG: 81 TABLET, COATED ORAL at 09:04

## 2019-09-05 RX ADMIN — AMLODIPINE BESYLATE 10 MG: 5 TABLET ORAL at 09:03

## 2019-09-05 RX ADMIN — FERROUS SULFATE TAB 325 MG (65 MG ELEMENTAL FE) 325 MG: 325 (65 FE) TAB at 09:04

## 2019-09-05 RX ADMIN — HYDRALAZINE HYDROCHLORIDE 25 MG: 25 TABLET, FILM COATED ORAL at 09:04

## 2019-09-05 RX ADMIN — CLONIDINE HYDROCHLORIDE 0.2 MG: 0.1 TABLET ORAL at 09:02

## 2019-09-05 RX ADMIN — LABETALOL HYDROCHLORIDE 300 MG: 200 TABLET, FILM COATED ORAL at 09:02

## 2019-09-05 NOTE — DISCHARGE SUMMARY
Hospitalist Discharge Summary     Patient ID:  Juan Crespo  [id number]  77 y.o.  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  male  with PMHx significant for diabetes, hypertension, who presents complaining of right upper extremity and right rib pain following a fall 4 days ago. Janay Whalen states that he was getting up to go to the bathroom and woke up on the ground.  Has had pain in the right upper extremity as well as some right rib pain since that time, however was in the midst of moving so did not present to the ER initially. [facility] denies any chest pain, palpitations, or shortness of breath preceding his syncopal episode.  Did not feel lightheaded prior to the event  At this time patient is lying in be dc/o right shoulder pain, s/p fall, denies chest pain, no SOB, no fever, no N/V no diarrhea, has low extremity edema, no cough, no urinary symptoms, no other associated symptoms. ,  We were asked to admit for work up and evaluation of the above problems. ______________________________________________________________________  DISCHARGE SUMMARY/HOSPITAL COURSE:  for full details see H&P, daily progress notes, labs, consult notes. Hypertensive Emergency: resolved   Uncontrolled BP   S/p Cardene drip, , BP now within normal limits    c/w Norvasc, labetalol and clonidine, . Hydralazine added , spironolactone restarted       ASHVIN on CKD 3:   Creat up to 5.26 on admission ( from baseline creat 2) etiology multiple - acute hypertensive nephropathy or GN- hepatitis related  +ve hep b and hep c or NSAIDS use  Creat trending down , creat on discharge is 4.42  S/p  IVF    hold ACE and diuretics for now. Nephrology input appreciated , UA showed proteinuria and microscopic hematuria . other labs such as cryoglobulin , HCV RT-PCR , HEP C genotype pending   No hydro on renal US   he may need renal bx - He would like to follow with  as out pt     Positive hep B and C , new dg   GI consulted : FU as OP with dr Michelle Orellana   Abdominal US wnl      Chronic Diastolic Heart Edgard [place] in acute failure at this time, hold lasix, monitor I/o and weight.     Hypokalemia: resolved      Right humeral Fracture: c/w pain meds,  Ortho evaluation: c/w sling , no surgery needed , FU with dr hightower in 2weeks      DM type 2   Now hypoglycemias  Blood sugars , BS within acceptable limits with some hypoglycemia  DC on 1/2 dose home NPH ( 17units instead of 35units) due to worsening CKD  . Patient was instructed to check his blood sugars levels before injecting his NPH   C/w SSI   Hba1c 4.8    _______________________________________________________________________  Patient seen and examined by me on discharge day. Pertinent Findings:  Gen:    Not in distress  Chest: Clear lungs  CVS:   Regular rhythm. No edema  Abd:  Soft, not distended, not tender  Neuro:  Alert, orientedx4  _______________________________________________________________________  DISCHARGE MEDICATIONS:   Current Discharge Medication List      START taking these medications    Details   amLODIPine (NORVASC) 10 mg tablet Take 1 Tab by mouth daily for 60 days. Qty: 30 Tab, Refills: 1      oxyCODONE IR (ROXICODONE) 5 mg immediate release tablet Take 1 Tab by mouth every four (4) hours as needed (severe pain) for up to 3 days.  Max Daily Amount: 30 mg.  Qty: 18 Tab, Refills: 0    Associated Diagnoses: Closed fracture of proximal end of right humerus, unspecified fracture morphology, initial encounter         CONTINUE these medications which have CHANGED    Details   labetalol (NORMODYNE) 100 mg tablet Take 3 Tabs by mouth two (2) times a day for 60 days. Qty: 180 Tab, Refills: 1      insulin aspart protamine/insulin aspart (NOVOLOG MIX 70-30 U-100 INSULN) 100 unit/mL (70-30) injection 17 Units by SubCUTAneous route daily for 30 days. Check blood sugars before injecting the insulin . If blood sugars low < 150, do not inject the insulin  Qty: 5.1 mL, Refills: 0         CONTINUE these medications which have NOT CHANGED    Details   methocarbamol (ROBAXIN) 500 mg tablet Take 500 mg by mouth two (2) times a day. spironolactone (ALDACTONE) 25 mg tablet Take 25 mg by mouth daily. ferrous fumarate 324 mg (106 mg iron) tab Take  by mouth two (2) times a day. cloNIDine HCl (CATAPRES) 0.1 mg tablet 0.2 mg three (3) times daily. Refills: 6      MASSIMO PEN NEEDLE 32 gauge x 5/32\" ndle USE AS DIRECTED WITH INSULIN  Refills: 5      aspirin delayed-release 81 mg tablet Take  by mouth as needed for Pain. STOP taking these medications       furosemide (LASIX) 20 mg tablet Comments:   Reason for Stopping:         diphenoxylate-atropine (LOMOTIL) 2.5-0.025 mg per tablet Comments:   Reason for Stopping:         amLODIPine-benazepril (LOTREL) 10-40 mg per capsule Comments:   Reason for Stopping:                 Patient Follow Up Instructions:    Activity: Activity as tolerated  Diet: Renal Diet  Wound Care: None needed    Follow-up with see below   Follow-up tests/labs   Follow-up Information     Follow up With Specialties Details Why Contact Info    Tera Gonzalez MD Family Practice Go on 9/6/2019 For scheduled appointment at 10:30AM   [employer]  [phone]      Fernando Dunn MD Gastroenterology  GI [address] [place] [address]  [address] [place] [address]      Beni White MD Nephrology nephrology [address] [facility] Bigg Mcclelland MD Orthopedic Surgery In 2 weeks ortho [address]  Carlos Huggins 984 [phone]      [facility]  PT, OT, Nursing services  [address] [place] [address] Chadd Valente MD Family Practice Schedule an appointment as soon as possible for a visit in 1 week  [address]  [phone]          ________________________________________________________________    Risk of deterioration: Moderate    Condition at Discharge:  Stable  __________________________________________________________________    Disposition  Home with family, no needs    ____________________________________________________________________    Code Status: Full Code  ___________________________________________________________________      Total time in minutes spent coordinating this discharge (includes going over instructions, follow-up, prescriptions, and preparing report for sign off to her PCP) :  40 minutes    Signed:  Ashlyn Jovel MD

## 2019-09-05 NOTE — PROGRESS NOTES
Report received from [facility]. Introduced myself as primary RN. Assumed care of patient. Instructed patient to call for assistance prior to getting up. Pt verbalized understanding. Call bell within reach. Patient assisted to recliner. Discharge instructions reviewed with the patient. Patient verbalized understanding. Opportunities for questions and concerns provided. Peripheral IV and telemetry box removed. Pt stable for discharge. Patient escorted to main entrance by PCT.

## 2019-09-05 NOTE — PROGRESS NOTES
Bedside shift change report GIVEN TO Eduar Bills RN. Report included the following information SBAR, Kardex and MAR. SIGNIFICANT CHANGES DURING SHIFT:        CONCERNS TO ADDRESS WITH MD:            [facility] NURSING NOTE   Admission Date 9/2/2019   Admission Diagnosis Hypertensive emergency [I16.1]   Consults IP CONSULT TO ORTHOPEDIC SURGERY  IP CONSULT TO NEPHROLOGY  IP CONSULT TO GASTROENTEROLOGY      Cardiac Monitoring [x] Yes [] No      Purposeful Hourly Rounding [x] Yes    David Score Total Score: 2   David score 3 or > [] Bed Alarm [] Avasys [] 1:1 sitter [] Patient refused (Signed refusal form in chart)   Zaki Score Zaki Score: 22   Zaki score 14 or < [] PMT consult [] Wound Care consult    []  Specialty bed  [] Nutrition consult      Influenza Vaccine Received Flu Vaccine for Current Season (usually Sept-March): Yes           Oxygen needs? [x] Room air Oxygen @  []1L    []2L    []3L   []4L    []5L   []6L via  NC   Chronic home O2 use? [] Yes [] No  Perform O2 challenge test and document in progress note using smartphrase (.Homeoxygen)      Last bowel movement Last Bowel Movement Date: 09/02/19      Urinary Catheter             LDAs               Peripheral IV 09/02/19 Right;Left Arm (Active)   Site Assessment Clean, dry, & intact 9/5/2019  3:09 AM   Phlebitis Assessment 0 9/5/2019  3:09 AM   Infiltration Assessment 0 9/5/2019  3:09 AM   Dressing Status Clean, dry, & intact 9/5/2019  3:09 AM   Dressing Type Transparent;Tape 9/5/2019  3:09 AM   Hub Color/Line Status Pink;Flushed 9/5/2019  3:09 AM   Action Taken Open ports on tubing capped 9/4/2019  3:52 AM   Alcohol Cap Used Yes 9/4/2019  3:52 AM                         Readmission Risk Assessment Tool Score Medium Risk            16       Total Score        3 Has Seen PCP in Last 6 Months (Yes=3, No=0)    4 IP Visits Last 12 Months (1-3=4, 4=9, >4=11)    5 Pt.  Coverage (Medicare=5 , Medicaid, or Self-Pay=4)    4 Charlson Comorbidity Score (Age + Comorbid Conditions)        Criteria that do not apply:    . Living with Significant Other. Assisted Living. LTAC. SNF.  or   Rehab    Patient Length of Stay (>5 days = 3)       Expected Length of Stay - - -   Actual Length of Stay 3

## 2019-09-05 NOTE — PROGRESS NOTES
JADA plan: Pt will discharge home today, transported by his daughter in a personal vehicle. Pt will receive  PT/OT/RN through [address] and is scheduled to follow-up with his PCP, Nephrologist, and Gastroenterologist. No further concerns indicated at this time. AVS updated. Patient is ready for discharge from Care Managment standpoint. Care Management Interventions  PCP Verified by CM: Yes  Mode of Transport at Discharge:  Other (see comment)(daughter)  Transition of Care Consult (CM Consult): Home Health([facility] PT/OT/RN)  [facility] - INPATIENT: Yes  MyChart Signup: No  Discharge Durable Medical Equipment: No  Physical Therapy Consult: Yes  Occupational Therapy Consult: Yes  Speech Therapy Consult: No  Current Support Network: Own Home, Other(lives with his daughter and her 3 children in a 2 story home with 2 steps to the entrance)  Confirm Follow Up Transport: Family  Plan discussed with Pt/Family/Caregiver: Yes  Freedom of Choice Offered: Yes  Discharge Location  Discharge Placement: Home with home health    KATHI Rosenthal  Care Manager  [phone]

## 2019-09-05 NOTE — PROGRESS NOTES
Problem: Falls - Risk of  Goal: *Absence of Falls  Description  Document Dilma Hale Fall Risk and appropriate interventions in the flowsheet. Outcome: Progressing Towards Goal  Note:   Fall Risk Interventions:            Medication Interventions: Patient to call before getting OOB, Teach patient to arise slowly         History of Falls Interventions:  Investigate reason for fall, Room close to nurse's station, Assess for delayed presentation/identification of injury for 48 hrs (comment for end date), Vital signs minimum Q4HRs X 24 hrs (comment for end date)

## 2019-09-05 NOTE — PROGRESS NOTES
Nephrology Progress Note     Jarvis Carrera     [email or web address]. [email or web address]                  Phone - [phone]   Patient: Alicia Dos Santos   Date- 9/5/2019        Admit Date: 9/2/2019  YOB: 1950             CC: Follow up for ashvin          Subjective: Interval History:   -  bp much better  Cr. Slightly increased  No sob  No nausea or vomiting    ROS:- as above   Assessment  & Plan:   ASHVIN on ckd  - etiology multiple - acute hypertensive nephropathy or GN- hepatitis related  +ve hep b and hep c or NSAIDS use  - mild increase in cr is due to normalisation of bp  - no hydro on renal usg  - hold lasix and benazepril  - he may need renal bx - I have d/w him. He would like to follow with  as out pt  - OKAY TO D/C - follow with  as out pt    CKD 3 - f/b   - ckd likely due to dm + HTN    htn Urgencey  - CONTINUE CURRENT MEDS  - HOLD LASIX AND BENAZEPRIL    TYPE 2 DM  PROTEINURIA , Microscopic hematuria  - +ve hep b and hep c -   - follow gammopathy screen      Physical Exam:   General: NAD  Resp:  Clear lungs, no wheezing or rales. CV:  Regular  rhythm,  S1,S2  GI:  Soft, Non distended, Non tender. Neurologic:  Alert and oriented X 3. Non Focal  Skin:  no rashes or ulcers. No jaundice       Care Plan discussed with: patient , nurse, DR. BETANCOURT  Objective:   Visit Vitals  /79 (BP 1 Location: Left arm, BP Patient Position: At rest)   Pulse 72   Temp 98 °F (36.7 °C)   Resp 19   Ht 5' 10\" (1.778 m)   Wt 82.6 kg (182 lb)   SpO2 99%   BMI 26.11 kg/m²     Last 3 Recorded Weights in this Encounter    09/03/19 1006 09/04/19 0616 09/05/19 0331   Weight: 85.7 kg (189 lb) 82.4 kg (181 lb 10.5 oz) 82.6 kg (182 lb)     09/03 1901 - 09/05 0700  In: 240 [P.O.:240]  Out: 700 [Urine:700]    Intake/Output Summary (Last 24 hours) at 9/5/2019 0949  Last data filed at 9/5/2019 0850  Gross per 24 hour   Intake 240 ml   Output 700 ml   Net -460 ml      Chart reviewed. Pertinent Notes reviewed.        Medication list  reviewed   Current Facility-Administered Medications   Medication    spironolactone (ALDACTONE) tablet 25 mg    labetalol (NORMODYNE;TRANDATE) injection 20 mg    hydrALAZINE (APRESOLINE) tablet 25 mg    labetalol (NORMODYNE) tablet 300 mg    cloNIDine HCl (CATAPRES) tablet 0.2 mg    ferrous sulfate tablet 325 mg    acetaminophen (TYLENOL) tablet 650 mg    ondansetron (ZOFRAN) injection 4 mg    insulin lispro (HUMALOG) injection    glucose chewable tablet 16 g    glucagon (GLUCAGEN) injection 1 mg    heparin (porcine) injection 5,000 Units    dextrose 10% infusion 125-250 mL    amLODIPine (NORVASC) tablet 10 mg    oxyCODONE IR (ROXICODONE) tablet 5 mg    morphine injection 2 mg    aspirin delayed-release tablet 81 mg           Data Review :  Recent Labs     09/05/19  0256 09/04/19  0429 09/03/19  0323    142 144   K 4.3 3.7 3.3*   * 109* 110*   CO2 25 26 27   BUN 35* 37* 40*   CREA 4.42* 4.28* 4.63*   * 102* 133*   CA 8.2*  8.2* 7.8* 7.7*   MG  --   --  2.1   PHOS 4.4 4.1 4.7     Recent Labs     09/04/19  1829 09/03/19  0323 09/02/19  1423   WBC 3.7* 3.8* 4.4   HGB 9.2* 8.5* 9.7*   HCT 27.9* 25.6* 29.7*    226 272     Recent Labs     09/03/19  1130   TIBC 200*   PSAT 29      Recent Labs     09/02/19  1423   *   TROIQ <0.05     Lab Results   Component Value Date/Time    Color YELLOW/STRAW 09/05/2019 03:11 AM    Appearance CLEAR 09/05/2019 03:11 AM    Specific gravity 1.009 09/05/2019 03:11 AM    pH (UA) 6.0 09/05/2019 03:11 AM    Protein 300 (A) 09/05/2019 03:11 AM    Glucose 100 (A) 09/05/2019 03:11 AM    Ketone NEGATIVE  09/05/2019 03:11 AM    Bilirubin NEGATIVE  09/05/2019 03:11 AM    Urobilinogen 1.0 09/05/2019 03:11 AM    Nitrites NEGATIVE  09/05/2019 03:11 AM    Leukocyte Esterase NEGATIVE  09/05/2019 03:11 AM    Epithelial cells FEW 09/05/2019 03:11 AM    Bacteria NEGATIVE  09/05/2019 03:11 AM    WBC 0-4 09/05/2019 03:11 AM    RBC 0-5 09/05/2019 03:11 AM     Lab Results   Component Value Date/Time    Culture result: NO GROWTH AFTER 12 HOURS 09/04/2019 06:29 PM     No results found for: COLT  Lab Results   Component Value Date/Time    Sodium,urine random 65 01/24/2019 12:52 AM    Creatinine, urine 94.80 01/24/2019 12:52 AM       Results from [facility] encounter on 09/02/19   XR RIBS RT W PA CXR MIN 3 V    Narrative EXAM:  XR RIBS RT W PA CXR MIN 3 V    INDICATION:   Pain/Trauma  Additional history: Ground-level fall in bathroom 3 days ago. COMPARISON: None. FINDINGS: A frontal radiograph of the chest and 3 oblique views of the right  ribs demonstrate no fracture. There is no pneumothorax or pleural effusion. Still noted is made of a fracture through the surgical neck of the right humerus      Impression IMPRESSION:    1. No visible rib fracture. 2. Right humeral fracture               Selma Barragan MD  [facility]   [email or web address]  [facility]  [address], [address] [place], [address]  Phone - [phone]         Fax - [phone]

## 2019-09-06 LAB
HCV RNA SERPL NAA+PROBE-ACNC: NORMAL IU/ML
HCV RNA SERPL NAA+PROBE-LOG IU: 6.41 LOG10 IU/ML
SERIAL MONITORING: NORMAL

## 2019-09-08 ENCOUNTER — HOME CARE VISIT (OUTPATIENT)
Dept: SCHEDULING | Facility: HOME HEALTH | Age: 69
End: 2019-09-08
Payer: MEDICARE

## 2019-09-08 VITALS
HEIGHT: 70 IN | HEART RATE: 83 BPM | RESPIRATION RATE: 20 BRPM | TEMPERATURE: 97.7 F | BODY MASS INDEX: 26.07 KG/M2 | SYSTOLIC BLOOD PRESSURE: 136 MMHG | WEIGHT: 182.1 LBS | DIASTOLIC BLOOD PRESSURE: 70 MMHG | OXYGEN SATURATION: 99 %

## 2019-09-08 LAB
HCV GENTYP SERPL NAA+PROBE: NORMAL
PLEASE NOTE, 550474: NORMAL

## 2019-09-08 PROCEDURE — 3331090001 HH PPS REVENUE CREDIT

## 2019-09-08 PROCEDURE — G0299 HHS/HOSPICE OF RN EA 15 MIN: HCPCS

## 2019-09-08 PROCEDURE — 3331090002 HH PPS REVENUE DEBIT

## 2019-09-08 PROCEDURE — 400013 HH SOC

## 2019-09-09 ENCOUNTER — HOME CARE VISIT (OUTPATIENT)
Dept: SCHEDULING | Facility: HOME HEALTH | Age: 69
End: 2019-09-09
Payer: MEDICARE

## 2019-09-09 VITALS
SYSTOLIC BLOOD PRESSURE: 130 MMHG | OXYGEN SATURATION: 98 % | HEART RATE: 68 BPM | TEMPERATURE: 97.5 F | RESPIRATION RATE: 16 BRPM | DIASTOLIC BLOOD PRESSURE: 80 MMHG

## 2019-09-09 VITALS
TEMPERATURE: 97.5 F | OXYGEN SATURATION: 98 % | DIASTOLIC BLOOD PRESSURE: 80 MMHG | HEART RATE: 68 BPM | SYSTOLIC BLOOD PRESSURE: 130 MMHG

## 2019-09-09 LAB
BACTERIA SPEC CULT: NORMAL
CRYOGLOB SER QL 1D COLD INC: NORMAL
SERVICE CMNT-IMP: NORMAL

## 2019-09-09 PROCEDURE — 3331090002 HH PPS REVENUE DEBIT

## 2019-09-09 PROCEDURE — 3331090001 HH PPS REVENUE CREDIT

## 2019-09-09 PROCEDURE — G0152 HHCP-SERV OF OT,EA 15 MIN: HCPCS

## 2019-09-09 PROCEDURE — G0151 HHCP-SERV OF PT,EA 15 MIN: HCPCS

## 2019-09-10 ENCOUNTER — HOME CARE VISIT (OUTPATIENT)
Dept: HOME HEALTH SERVICES | Facility: HOME HEALTH | Age: 69
End: 2019-09-10
Payer: MEDICARE

## 2019-09-10 PROCEDURE — 3331090001 HH PPS REVENUE CREDIT

## 2019-09-10 PROCEDURE — 3331090002 HH PPS REVENUE DEBIT

## 2019-09-11 ENCOUNTER — HOME CARE VISIT (OUTPATIENT)
Dept: SCHEDULING | Facility: HOME HEALTH | Age: 69
End: 2019-09-11
Payer: MEDICARE

## 2019-09-11 PROCEDURE — 3331090002 HH PPS REVENUE DEBIT

## 2019-09-11 PROCEDURE — 3331090001 HH PPS REVENUE CREDIT

## 2019-09-11 PROCEDURE — G0299 HHS/HOSPICE OF RN EA 15 MIN: HCPCS

## 2019-09-12 ENCOUNTER — HOME CARE VISIT (OUTPATIENT)
Dept: SCHEDULING | Facility: HOME HEALTH | Age: 69
End: 2019-09-12
Payer: MEDICARE

## 2019-09-12 VITALS
HEART RATE: 79 BPM | DIASTOLIC BLOOD PRESSURE: 65 MMHG | TEMPERATURE: 98.2 F | SYSTOLIC BLOOD PRESSURE: 142 MMHG | OXYGEN SATURATION: 99 %

## 2019-09-12 PROCEDURE — 3331090001 HH PPS REVENUE CREDIT

## 2019-09-12 PROCEDURE — G0157 HHC PT ASSISTANT EA 15: HCPCS

## 2019-09-12 PROCEDURE — 3331090002 HH PPS REVENUE DEBIT

## 2019-09-13 ENCOUNTER — HOME CARE VISIT (OUTPATIENT)
Dept: SCHEDULING | Facility: HOME HEALTH | Age: 69
End: 2019-09-13
Payer: MEDICARE

## 2019-09-13 ENCOUNTER — HOME CARE VISIT (OUTPATIENT)
Dept: HOME HEALTH SERVICES | Facility: HOME HEALTH | Age: 69
End: 2019-09-13
Payer: MEDICARE

## 2019-09-13 VITALS
HEART RATE: 75 BPM | DIASTOLIC BLOOD PRESSURE: 80 MMHG | SYSTOLIC BLOOD PRESSURE: 158 MMHG | OXYGEN SATURATION: 98 % | TEMPERATURE: 98.2 F

## 2019-09-13 PROCEDURE — 3331090002 HH PPS REVENUE DEBIT

## 2019-09-13 PROCEDURE — 3331090001 HH PPS REVENUE CREDIT

## 2019-09-13 PROCEDURE — G0157 HHC PT ASSISTANT EA 15: HCPCS

## 2019-09-14 PROCEDURE — 3331090002 HH PPS REVENUE DEBIT

## 2019-09-14 PROCEDURE — 3331090001 HH PPS REVENUE CREDIT

## 2019-09-15 VITALS
SYSTOLIC BLOOD PRESSURE: 130 MMHG | TEMPERATURE: 98.1 F | DIASTOLIC BLOOD PRESSURE: 72 MMHG | HEART RATE: 78 BPM | OXYGEN SATURATION: 97 % | RESPIRATION RATE: 18 BRPM

## 2019-09-15 PROCEDURE — 3331090002 HH PPS REVENUE DEBIT

## 2019-09-15 PROCEDURE — 3331090001 HH PPS REVENUE CREDIT

## 2019-09-16 ENCOUNTER — HOME CARE VISIT (OUTPATIENT)
Dept: SCHEDULING | Facility: HOME HEALTH | Age: 69
End: 2019-09-16
Payer: MEDICARE

## 2019-09-16 PROCEDURE — G0155 HHCP-SVS OF CSW,EA 15 MIN: HCPCS

## 2019-09-16 PROCEDURE — 3331090001 HH PPS REVENUE CREDIT

## 2019-09-16 PROCEDURE — 3331090002 HH PPS REVENUE DEBIT

## 2019-09-17 ENCOUNTER — HOME CARE VISIT (OUTPATIENT)
Dept: SCHEDULING | Facility: HOME HEALTH | Age: 69
End: 2019-09-17
Payer: MEDICARE

## 2019-09-17 PROCEDURE — 3331090001 HH PPS REVENUE CREDIT

## 2019-09-17 PROCEDURE — 3331090002 HH PPS REVENUE DEBIT

## 2019-09-18 ENCOUNTER — HOME CARE VISIT (OUTPATIENT)
Dept: SCHEDULING | Facility: HOME HEALTH | Age: 69
End: 2019-09-18
Payer: MEDICARE

## 2019-09-18 VITALS
TEMPERATURE: 97.9 F | SYSTOLIC BLOOD PRESSURE: 160 MMHG | OXYGEN SATURATION: 98 % | HEART RATE: 83 BPM | DIASTOLIC BLOOD PRESSURE: 80 MMHG

## 2019-09-18 PROCEDURE — 3331090001 HH PPS REVENUE CREDIT

## 2019-09-18 PROCEDURE — G0157 HHC PT ASSISTANT EA 15: HCPCS

## 2019-09-18 PROCEDURE — 3331090002 HH PPS REVENUE DEBIT

## 2019-09-19 ENCOUNTER — HOME CARE VISIT (OUTPATIENT)
Dept: HOME HEALTH SERVICES | Facility: HOME HEALTH | Age: 69
End: 2019-09-19
Payer: MEDICARE

## 2019-09-19 ENCOUNTER — HOME CARE VISIT (OUTPATIENT)
Dept: SCHEDULING | Facility: HOME HEALTH | Age: 69
End: 2019-09-19
Payer: MEDICARE

## 2019-09-19 VITALS
OXYGEN SATURATION: 98 % | TEMPERATURE: 98.3 F | DIASTOLIC BLOOD PRESSURE: 75 MMHG | HEART RATE: 83 BPM | SYSTOLIC BLOOD PRESSURE: 140 MMHG

## 2019-09-19 PROCEDURE — 3331090002 HH PPS REVENUE DEBIT

## 2019-09-19 PROCEDURE — G0157 HHC PT ASSISTANT EA 15: HCPCS

## 2019-09-19 PROCEDURE — 3331090001 HH PPS REVENUE CREDIT

## 2019-09-20 PROCEDURE — 3331090001 HH PPS REVENUE CREDIT

## 2019-09-20 PROCEDURE — 3331090002 HH PPS REVENUE DEBIT

## 2019-09-21 PROCEDURE — 3331090002 HH PPS REVENUE DEBIT

## 2019-09-21 PROCEDURE — 3331090001 HH PPS REVENUE CREDIT

## 2019-09-22 PROCEDURE — 3331090002 HH PPS REVENUE DEBIT

## 2019-09-22 PROCEDURE — 3331090001 HH PPS REVENUE CREDIT

## 2019-09-23 PROCEDURE — 3331090001 HH PPS REVENUE CREDIT

## 2019-09-23 PROCEDURE — 3331090002 HH PPS REVENUE DEBIT

## 2019-09-24 ENCOUNTER — HOME CARE VISIT (OUTPATIENT)
Dept: SCHEDULING | Facility: HOME HEALTH | Age: 69
End: 2019-09-24
Payer: MEDICARE

## 2019-09-24 PROCEDURE — 3331090002 HH PPS REVENUE DEBIT

## 2019-09-24 PROCEDURE — 3331090001 HH PPS REVENUE CREDIT

## 2019-09-24 PROCEDURE — G0157 HHC PT ASSISTANT EA 15: HCPCS

## 2019-09-25 ENCOUNTER — HOSPITAL ENCOUNTER (OUTPATIENT)
Dept: VASCULAR SURGERY | Age: 69
Discharge: HOME | End: 2019-09-25
Attending: INTERNAL MEDICINE
Payer: MEDICARE

## 2019-09-25 DIAGNOSIS — I82.403 DVT OF LOWER EXTREMITY, BILATERAL (HCC): ICD-10-CM

## 2019-09-25 PROCEDURE — 3331090001 HH PPS REVENUE CREDIT

## 2019-09-25 PROCEDURE — 3331090002 HH PPS REVENUE DEBIT

## 2019-09-25 PROCEDURE — 93970 EXTREMITY STUDY: CPT

## 2019-09-26 ENCOUNTER — HOME CARE VISIT (OUTPATIENT)
Dept: SCHEDULING | Facility: HOME HEALTH | Age: 69
End: 2019-09-26
Payer: MEDICARE

## 2019-09-26 VITALS
TEMPERATURE: 98.8 F | DIASTOLIC BLOOD PRESSURE: 90 MMHG | SYSTOLIC BLOOD PRESSURE: 150 MMHG | HEART RATE: 89 BPM | OXYGEN SATURATION: 98 %

## 2019-09-26 VITALS
OXYGEN SATURATION: 97 % | SYSTOLIC BLOOD PRESSURE: 148 MMHG | DIASTOLIC BLOOD PRESSURE: 72 MMHG | TEMPERATURE: 98.6 F | HEART RATE: 80 BPM

## 2019-09-26 VITALS
RESPIRATION RATE: 18 BRPM | SYSTOLIC BLOOD PRESSURE: 150 MMHG | HEART RATE: 89 BPM | TEMPERATURE: 98.8 F | DIASTOLIC BLOOD PRESSURE: 90 MMHG | OXYGEN SATURATION: 98 %

## 2019-09-26 PROCEDURE — G0151 HHCP-SERV OF PT,EA 15 MIN: HCPCS

## 2019-09-26 PROCEDURE — 3331090001 HH PPS REVENUE CREDIT

## 2019-09-26 PROCEDURE — 3331090002 HH PPS REVENUE DEBIT

## 2019-09-26 PROCEDURE — G0152 HHCP-SERV OF OT,EA 15 MIN: HCPCS

## 2019-09-27 ENCOUNTER — HOME CARE VISIT (OUTPATIENT)
Dept: SCHEDULING | Facility: HOME HEALTH | Age: 69
End: 2019-09-27
Payer: MEDICARE

## 2019-09-27 PROCEDURE — 3331090001 HH PPS REVENUE CREDIT

## 2019-09-27 PROCEDURE — 3331090002 HH PPS REVENUE DEBIT

## 2019-09-28 PROCEDURE — 3331090002 HH PPS REVENUE DEBIT

## 2019-09-28 PROCEDURE — 3331090001 HH PPS REVENUE CREDIT

## 2019-09-29 PROCEDURE — 3331090001 HH PPS REVENUE CREDIT

## 2019-09-29 PROCEDURE — 3331090002 HH PPS REVENUE DEBIT

## 2019-09-30 PROCEDURE — 3331090001 HH PPS REVENUE CREDIT

## 2019-09-30 PROCEDURE — 3331090002 HH PPS REVENUE DEBIT

## 2019-10-01 PROCEDURE — 3331090001 HH PPS REVENUE CREDIT

## 2019-10-01 PROCEDURE — 3331090002 HH PPS REVENUE DEBIT

## 2019-10-02 PROCEDURE — 3331090001 HH PPS REVENUE CREDIT

## 2019-10-02 PROCEDURE — 3331090002 HH PPS REVENUE DEBIT

## 2019-10-03 PROCEDURE — 3331090002 HH PPS REVENUE DEBIT

## 2019-10-03 PROCEDURE — 3331090001 HH PPS REVENUE CREDIT

## 2019-10-04 ENCOUNTER — HOME CARE VISIT (OUTPATIENT)
Dept: HOME HEALTH SERVICES | Facility: HOME HEALTH | Age: 69
End: 2019-10-04
Payer: MEDICARE

## 2019-10-04 PROCEDURE — 3331090001 HH PPS REVENUE CREDIT

## 2019-10-04 PROCEDURE — 3331090002 HH PPS REVENUE DEBIT

## 2019-10-05 PROCEDURE — 3331090002 HH PPS REVENUE DEBIT

## 2019-10-05 PROCEDURE — 3331090001 HH PPS REVENUE CREDIT

## 2019-10-06 PROCEDURE — 3331090001 HH PPS REVENUE CREDIT

## 2019-10-06 PROCEDURE — 3331090002 HH PPS REVENUE DEBIT

## 2019-10-07 PROCEDURE — 3331090002 HH PPS REVENUE DEBIT

## 2019-10-07 PROCEDURE — 3331090001 HH PPS REVENUE CREDIT

## 2019-10-08 PROCEDURE — 3331090002 HH PPS REVENUE DEBIT

## 2019-10-08 PROCEDURE — 3331090001 HH PPS REVENUE CREDIT

## 2019-10-09 PROCEDURE — 3331090001 HH PPS REVENUE CREDIT

## 2019-10-09 PROCEDURE — 3331090002 HH PPS REVENUE DEBIT

## 2019-10-10 ENCOUNTER — HOME CARE VISIT (OUTPATIENT)
Dept: SCHEDULING | Facility: HOME HEALTH | Age: 69
End: 2019-10-10
Payer: MEDICARE

## 2019-10-10 VITALS
OXYGEN SATURATION: 98 % | HEART RATE: 88 BPM | SYSTOLIC BLOOD PRESSURE: 128 MMHG | DIASTOLIC BLOOD PRESSURE: 88 MMHG | RESPIRATION RATE: 18 BRPM | TEMPERATURE: 97.5 F

## 2019-10-10 PROCEDURE — 3331090002 HH PPS REVENUE DEBIT

## 2019-10-10 PROCEDURE — G0299 HHS/HOSPICE OF RN EA 15 MIN: HCPCS

## 2019-10-10 PROCEDURE — 3331090001 HH PPS REVENUE CREDIT

## 2019-10-11 PROCEDURE — 3331090001 HH PPS REVENUE CREDIT

## 2019-10-11 PROCEDURE — 3331090002 HH PPS REVENUE DEBIT

## 2019-10-12 PROCEDURE — 3331090002 HH PPS REVENUE DEBIT

## 2019-10-12 PROCEDURE — 3331090001 HH PPS REVENUE CREDIT

## 2019-10-13 PROCEDURE — 3331090002 HH PPS REVENUE DEBIT

## 2019-10-13 PROCEDURE — 3331090001 HH PPS REVENUE CREDIT

## 2019-10-14 PROCEDURE — 3331090001 HH PPS REVENUE CREDIT

## 2019-10-14 PROCEDURE — 3331090002 HH PPS REVENUE DEBIT

## 2019-10-15 ENCOUNTER — HOME CARE VISIT (OUTPATIENT)
Dept: HOME HEALTH SERVICES | Facility: HOME HEALTH | Age: 69
End: 2019-10-15
Payer: MEDICARE

## 2019-10-15 PROCEDURE — 3331090001 HH PPS REVENUE CREDIT

## 2019-10-15 PROCEDURE — 3331090002 HH PPS REVENUE DEBIT

## 2019-10-16 PROCEDURE — 3331090001 HH PPS REVENUE CREDIT

## 2019-10-16 PROCEDURE — 3331090002 HH PPS REVENUE DEBIT

## 2019-10-17 PROCEDURE — 3331090001 HH PPS REVENUE CREDIT

## 2019-10-17 PROCEDURE — 3331090002 HH PPS REVENUE DEBIT

## 2019-10-18 PROCEDURE — 3331090001 HH PPS REVENUE CREDIT

## 2019-10-18 PROCEDURE — 3331090002 HH PPS REVENUE DEBIT

## 2019-10-19 PROCEDURE — 3331090002 HH PPS REVENUE DEBIT

## 2019-10-19 PROCEDURE — 3331090001 HH PPS REVENUE CREDIT

## 2019-10-20 PROCEDURE — 3331090001 HH PPS REVENUE CREDIT

## 2019-10-20 PROCEDURE — 3331090002 HH PPS REVENUE DEBIT

## 2019-10-21 PROCEDURE — 3331090002 HH PPS REVENUE DEBIT

## 2019-10-21 PROCEDURE — 3331090001 HH PPS REVENUE CREDIT

## 2019-10-22 PROCEDURE — 3331090002 HH PPS REVENUE DEBIT

## 2019-10-22 PROCEDURE — 3331090001 HH PPS REVENUE CREDIT

## 2019-10-23 ENCOUNTER — HOME CARE VISIT (OUTPATIENT)
Dept: SCHEDULING | Facility: HOME HEALTH | Age: 69
End: 2019-10-23
Payer: MEDICARE

## 2019-10-23 PROCEDURE — 3331090001 HH PPS REVENUE CREDIT

## 2019-10-23 PROCEDURE — 3331090002 HH PPS REVENUE DEBIT

## 2019-10-24 PROCEDURE — 3331090001 HH PPS REVENUE CREDIT

## 2019-10-24 PROCEDURE — 3331090002 HH PPS REVENUE DEBIT

## 2019-10-25 PROCEDURE — 3331090002 HH PPS REVENUE DEBIT

## 2019-10-25 PROCEDURE — 3331090001 HH PPS REVENUE CREDIT

## 2019-10-26 PROCEDURE — 3331090002 HH PPS REVENUE DEBIT

## 2019-10-26 PROCEDURE — 3331090001 HH PPS REVENUE CREDIT

## 2019-10-27 PROCEDURE — 3331090001 HH PPS REVENUE CREDIT

## 2019-10-27 PROCEDURE — 3331090002 HH PPS REVENUE DEBIT

## 2019-10-28 PROCEDURE — 3331090002 HH PPS REVENUE DEBIT

## 2019-10-28 PROCEDURE — 3331090001 HH PPS REVENUE CREDIT

## 2019-10-29 PROCEDURE — 3331090002 HH PPS REVENUE DEBIT

## 2019-10-29 PROCEDURE — 3331090001 HH PPS REVENUE CREDIT

## 2019-10-30 PROCEDURE — 3331090002 HH PPS REVENUE DEBIT

## 2019-10-30 PROCEDURE — 3331090001 HH PPS REVENUE CREDIT

## 2019-10-31 PROCEDURE — 3331090002 HH PPS REVENUE DEBIT

## 2019-10-31 PROCEDURE — 3331090001 HH PPS REVENUE CREDIT

## 2019-11-01 ENCOUNTER — HOME CARE VISIT (OUTPATIENT)
Dept: SCHEDULING | Facility: HOME HEALTH | Age: 69
End: 2019-11-01
Payer: MEDICARE

## 2019-11-01 ENCOUNTER — HOSPITAL ENCOUNTER (EMERGENCY)
Age: 69
Discharge: HOME | End: 2019-11-01
Attending: EMERGENCY MEDICINE
Payer: MEDICARE

## 2019-11-01 ENCOUNTER — APPOINTMENT (OUTPATIENT)
Dept: CT IMAGING | Age: 69
End: 2019-11-01
Attending: NURSE PRACTITIONER
Payer: MEDICARE

## 2019-11-01 ENCOUNTER — HOSPITAL ENCOUNTER (EMERGENCY)
Age: 69
Discharge: OTHER INSTITUTION | End: 2019-11-01
Attending: EMERGENCY MEDICINE
Payer: MEDICARE

## 2019-11-01 VITALS
SYSTOLIC BLOOD PRESSURE: 196 MMHG | BODY MASS INDEX: 25.84 KG/M2 | WEIGHT: 174.5 LBS | RESPIRATION RATE: 20 BRPM | HEIGHT: 69 IN | DIASTOLIC BLOOD PRESSURE: 122 MMHG | OXYGEN SATURATION: 99 % | TEMPERATURE: 97.5 F | HEART RATE: 82 BPM

## 2019-11-01 VITALS
RESPIRATION RATE: 18 BRPM | HEART RATE: 87 BPM | TEMPERATURE: 97.7 F | DIASTOLIC BLOOD PRESSURE: 131 MMHG | WEIGHT: 180 LBS | OXYGEN SATURATION: 99 % | BODY MASS INDEX: 25.83 KG/M2 | SYSTOLIC BLOOD PRESSURE: 194 MMHG

## 2019-11-01 DIAGNOSIS — G93.40 ENCEPHALOPATHY: ICD-10-CM

## 2019-11-01 DIAGNOSIS — D64.9 ANEMIA, UNSPECIFIED TYPE: ICD-10-CM

## 2019-11-01 DIAGNOSIS — I15.9 SECONDARY HYPERTENSION: Primary | ICD-10-CM

## 2019-11-01 DIAGNOSIS — N17.9 AKI (ACUTE KIDNEY INJURY) (HCC): Primary | ICD-10-CM
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
## 2019-11-01 LAB
ALBUMIN SERPL-MCNC: 3 G/DL (ref 3.5–5)
ALBUMIN SERPL-MCNC: 3.1 G/DL (ref 3.5–5)
ALBUMIN/GLOB SERPL: 0.6 {RATIO} (ref 1.1–2.2)
ALBUMIN/GLOB SERPL: 0.7 {RATIO} (ref 1.1–2.2)
ALP SERPL-CCNC: 83 U/L (ref 45–117)
ALP SERPL-CCNC: 93 U/L (ref 45–117)
ALT SERPL-CCNC: 42 U/L (ref 12–78)
ALT SERPL-CCNC: 43 U/L (ref 12–78)
AMPHET UR QL SCN: NEGATIVE
ANION GAP SERPL CALC-SCNC: 11 MMOL/L (ref 5–15)
ANION GAP SERPL CALC-SCNC: 9 MMOL/L (ref 5–15)
APPEARANCE UR: ABNORMAL
AST SERPL-CCNC: 32 U/L (ref 15–37)
AST SERPL-CCNC: 35 U/L (ref 15–37)
ATRIAL RATE: 86 BPM
BACTERIA URNS QL MICRO: NEGATIVE /HPF
BARBITURATES UR QL SCN: NEGATIVE
BASOPHILS # BLD: 0 K/UL (ref 0–0.1)
BASOPHILS # BLD: 0 K/UL (ref 0–0.1)
BASOPHILS NFR BLD: 0 % (ref 0–1)
BASOPHILS NFR BLD: 0 % (ref 0–1)
BENZODIAZ UR QL: NEGATIVE
BILIRUB SERPL-MCNC: 0.6 MG/DL (ref 0.2–1)
BILIRUB SERPL-MCNC: 0.6 MG/DL (ref 0.2–1)
BILIRUB UR QL: NEGATIVE
BUN SERPL-MCNC: 47 MG/DL (ref 6–20)
BUN SERPL-MCNC: 52 MG/DL (ref 6–20)
BUN/CREAT SERPL: 9 (ref 12–20)
BUN/CREAT SERPL: 9 (ref 12–20)
CALCIUM SERPL-MCNC: 8.7 MG/DL (ref 8.5–10.1)
CALCIUM SERPL-MCNC: 8.7 MG/DL (ref 8.5–10.1)
CALCULATED P AXIS, ECG09: 47 DEGREES
CALCULATED R AXIS, ECG10: 123 DEGREES
CALCULATED T AXIS, ECG11: -17 DEGREES
CANNABINOIDS UR QL SCN: NEGATIVE
CHLORIDE SERPL-SCNC: 106 MMOL/L (ref 97–108)
CHLORIDE SERPL-SCNC: 110 MMOL/L (ref 97–108)
CK MB CFR SERPL CALC: 1 % (ref 0–2.5)
CK MB SERPL-MCNC: 3 NG/ML (ref 5–25)
CK SERPL-CCNC: 290 U/L (ref 39–308)
CO2 SERPL-SCNC: 22 MMOL/L (ref 21–32)
CO2 SERPL-SCNC: 27 MMOL/L (ref 21–32)
COCAINE UR QL SCN: NEGATIVE
COLOR UR: ABNORMAL
COMMENT, HOLDF: NORMAL
CREAT SERPL-MCNC: 4.98 MG/DL (ref 0.7–1.3)
CREAT SERPL-MCNC: 5.49 MG/DL (ref 0.7–1.3)
DIAGNOSIS, 93000: NORMAL
DIFFERENTIAL METHOD BLD: ABNORMAL
DIFFERENTIAL METHOD BLD: ABNORMAL
DRUG SCRN COMMENT,DRGCM: NORMAL
EOSINOPHIL # BLD: 0 K/UL (ref 0–0.4)
EOSINOPHIL # BLD: 0.1 K/UL (ref 0–0.4)
EOSINOPHIL NFR BLD: 0 % (ref 0–7)
EOSINOPHIL NFR BLD: 1 % (ref 0–7)
EPITH CASTS URNS QL MICRO: ABNORMAL /LPF
ERYTHROCYTE [DISTWIDTH] IN BLOOD BY AUTOMATED COUNT: 14.5 % (ref 11.5–14.5)
ERYTHROCYTE [DISTWIDTH] IN BLOOD BY AUTOMATED COUNT: 14.6 % (ref 11.5–14.5)
GLOBULIN SER CALC-MCNC: 4.5 G/DL (ref 2–4)
GLOBULIN SER CALC-MCNC: 4.9 G/DL (ref 2–4)
GLUCOSE SERPL-MCNC: 122 MG/DL (ref 65–100)
GLUCOSE SERPL-MCNC: 89 MG/DL (ref 65–100)
GLUCOSE UR STRIP.AUTO-MCNC: NEGATIVE MG/DL
HCT VFR BLD AUTO: 25.3 % (ref 36.6–50.3)
HCT VFR BLD AUTO: 31.2 % (ref 36.6–50.3)
HEMOCCULT STL QL: NEGATIVE
HGB BLD-MCNC: 10 G/DL (ref 12.1–17)
HGB BLD-MCNC: 8.2 G/DL (ref 12.1–17)
HGB UR QL STRIP: ABNORMAL
IMM GRANULOCYTES # BLD AUTO: 0.1 K/UL (ref 0–0.04)
IMM GRANULOCYTES # BLD AUTO: 0.1 K/UL (ref 0–0.04)
IMM GRANULOCYTES NFR BLD AUTO: 1 % (ref 0–0.5)
IMM GRANULOCYTES NFR BLD AUTO: 1 % (ref 0–0.5)
KETONES UR QL STRIP.AUTO: NEGATIVE MG/DL
LEUKOCYTE ESTERASE UR QL STRIP.AUTO: ABNORMAL
LYMPHOCYTES # BLD: 0.6 K/UL (ref 0.8–3.5)
LYMPHOCYTES # BLD: 0.8 K/UL (ref 0.8–3.5)
LYMPHOCYTES NFR BLD: 10 % (ref 12–49)
LYMPHOCYTES NFR BLD: 12 % (ref 12–49)
MCH RBC QN AUTO: 30.1 PG (ref 26–34)
MCH RBC QN AUTO: 30.2 PG (ref 26–34)
MCHC RBC AUTO-ENTMCNC: 32.1 G/DL (ref 30–36.5)
MCHC RBC AUTO-ENTMCNC: 32.4 G/DL (ref 30–36.5)
MCV RBC AUTO: 93 FL (ref 80–99)
MCV RBC AUTO: 94.3 FL (ref 80–99)
METHADONE UR QL: NEGATIVE
MONOCYTES # BLD: 0.4 K/UL (ref 0–1)
MONOCYTES # BLD: 0.5 K/UL (ref 0–1)
MONOCYTES NFR BLD: 6 % (ref 5–13)
MONOCYTES NFR BLD: 7 % (ref 5–13)
NEUTS SEG # BLD: 5 K/UL (ref 1.8–8)
NEUTS SEG # BLD: 5.1 K/UL (ref 1.8–8)
NEUTS SEG NFR BLD: 79 % (ref 32–75)
NEUTS SEG NFR BLD: 83 % (ref 32–75)
NITRITE UR QL STRIP.AUTO: NEGATIVE
NRBC # BLD: 0 K/UL (ref 0–0.01)
NRBC # BLD: 0 K/UL (ref 0–0.01)
NRBC BLD-RTO: 0 PER 100 WBC
NRBC BLD-RTO: 0 PER 100 WBC
OPIATES UR QL: NEGATIVE
P-R INTERVAL, ECG05: 168 MS
PCP UR QL: NEGATIVE
PH UR STRIP: 5.5 [PH] (ref 5–8)
PLATELET # BLD AUTO: 239 K/UL (ref 150–400)
PLATELET # BLD AUTO: 248 K/UL (ref 150–400)
PMV BLD AUTO: 10 FL (ref 8.9–12.9)
PMV BLD AUTO: 10.4 FL (ref 8.9–12.9)
POTASSIUM SERPL-SCNC: 3.9 MMOL/L (ref 3.5–5.1)
POTASSIUM SERPL-SCNC: 3.9 MMOL/L (ref 3.5–5.1)
PROT SERPL-MCNC: 7.5 G/DL (ref 6.4–8.2)
PROT SERPL-MCNC: 8 G/DL (ref 6.4–8.2)
PROT UR STRIP-MCNC: >300 MG/DL
Q-T INTERVAL, ECG07: 398 MS
QRS DURATION, ECG06: 124 MS
QTC CALCULATION (BEZET), ECG08: 476 MS
RBC # BLD AUTO: 2.72 M/UL (ref 4.1–5.7)
RBC # BLD AUTO: 3.31 M/UL (ref 4.1–5.7)
RBC #/AREA URNS HPF: ABNORMAL /HPF (ref 0–5)
RBC MORPH BLD: ABNORMAL
RBC MORPH BLD: ABNORMAL
SAMPLES BEING HELD,HOLD: NORMAL
SODIUM SERPL-SCNC: 142 MMOL/L (ref 136–145)
SODIUM SERPL-SCNC: 143 MMOL/L (ref 136–145)
SP GR UR REFRACTOMETRY: 1.01 (ref 1–1.03)
TROPONIN I SERPL-MCNC: <0.05 NG/ML
TROPONIN I SERPL-MCNC: <0.05 NG/ML
UA: UC IF INDICATED,UAUC: ABNORMAL
UROBILINOGEN UR QL STRIP.AUTO: 0.2 EU/DL (ref 0.2–1)
VENTRICULAR RATE, ECG03: 86 BPM
WBC # BLD AUTO: 6.2 K/UL (ref 4.1–11.1)
WBC # BLD AUTO: 6.5 K/UL (ref 4.1–11.1)
WBC URNS QL MICRO: ABNORMAL /HPF (ref 0–4)

## 2019-11-01 PROCEDURE — 85025 COMPLETE CBC W/AUTO DIFF WBC: CPT

## 2019-11-01 PROCEDURE — 82550 ASSAY OF CK (CPK): CPT

## 2019-11-01 PROCEDURE — 71046 X-RAY EXAM CHEST 2 VIEWS: CPT

## 2019-11-01 PROCEDURE — 80307 DRUG TEST PRSMV CHEM ANLYZR: CPT

## 2019-11-01 PROCEDURE — 93005 ELECTROCARDIOGRAM TRACING: CPT

## 2019-11-01 PROCEDURE — G0300 HHS/HOSPICE OF LPN EA 15 MIN: HCPCS

## 2019-11-01 PROCEDURE — 82272 OCCULT BLD FECES 1-3 TESTS: CPT

## 2019-11-01 PROCEDURE — 70450 CT HEAD/BRAIN W/O DYE: CPT

## 2019-11-01 PROCEDURE — 3331090001 HH PPS REVENUE CREDIT

## 2019-11-01 PROCEDURE — 80053 COMPREHEN METABOLIC PANEL: CPT

## 2019-11-01 PROCEDURE — 36415 COLL VENOUS BLD VENIPUNCTURE: CPT

## 2019-11-01 PROCEDURE — 3331090002 HH PPS REVENUE DEBIT

## 2019-11-01 PROCEDURE — 84484 ASSAY OF TROPONIN QUANT: CPT

## 2019-11-01 PROCEDURE — 99284 EMERGENCY DEPT VISIT MOD MDM: CPT

## 2019-11-01 PROCEDURE — 81001 URINALYSIS AUTO W/SCOPE: CPT

## 2019-11-01 PROCEDURE — 74176 CT ABD & PELVIS W/O CONTRAST: CPT

## 2019-11-01 PROCEDURE — 74011250637 HC RX REV CODE- 250/637: Performed by: NURSE PRACTITIONER

## 2019-11-01 PROCEDURE — 99285 EMERGENCY DEPT VISIT HI MDM: CPT

## 2019-11-01 RX ORDER — LORAZEPAM 2 MG/ML
1 INJECTION INTRAMUSCULAR
Status: DISCONTINUED | OUTPATIENT
Start: 2019-11-01 | End: 2019-11-01

## 2019-11-01 RX ORDER — SODIUM CHLORIDE 0.9 % (FLUSH) 0.9 %
5-40 SYRINGE (ML) INJECTION EVERY 8 HOURS
Status: DISCONTINUED | OUTPATIENT
Start: 2019-11-01 | End: 2019-11-02 | Stop reason: HOSPADM

## 2019-11-01 RX ORDER — SODIUM CHLORIDE 0.9 % (FLUSH) 0.9 %
5-40 SYRINGE (ML) INJECTION AS NEEDED
Status: DISCONTINUED | OUTPATIENT
Start: 2019-11-01 | End: 2019-11-02 | Stop reason: HOSPADM

## 2019-11-01 RX ORDER — CLONIDINE HYDROCHLORIDE 0.1 MG/1
0.2 TABLET ORAL
Status: COMPLETED | OUTPATIENT
Start: 2019-11-01 | End: 2019-11-01

## 2019-11-01 RX ADMIN — CLONIDINE HYDROCHLORIDE 0.1 MG: 0.1 TABLET ORAL at 21:24

## 2019-11-01 NOTE — ED TRIAGE NOTES
Patient arrived by ems, home health nurse stated his bp was high, systolic 261. Patient states he usually takes bp medication in the morning, last time he took it was yesterday morning. Patient has no complaints. Dr. Marylee Rhody at bedside administered by patients home clonidine.

## 2019-11-01 NOTE — ED NOTES
Provider reviewed discharge instructions with the patient. The patient verbalized understanding. All questions and concerns were addressed. The patient via wheelchair in the care of family members with instructions and prescriptions in hand. Pt is alert and oriented x 4. Respirations are clear and unlabored.

## 2019-11-01 NOTE — DISCHARGE INSTRUCTIONS
Patient Education        Acute High Blood Pressure: Care Instructions  Your Care Instructions    Acute high blood pressure is very high blood pressure. It's a serious problem. Very high blood pressure can damage your brain, heart, eyes, and kidneys. You may have been given medicines to lower your blood pressure. You may have gotten them as pills or through a needle in one of your veins. This is called an IV. And maybe you were given other medicines too. These can be needed when high blood pressure causes other problems. To keep your blood pressure at a lower level, you may need to make healthy lifestyle changes. And you will probably need to take medicines. Be sure to follow up with your doctor about your blood pressure and what you can do about it. Follow-up care is a key part of your treatment and safety. Be sure to make and go to all appointments, and call your doctor if you are having problems. It's also a good idea to know your test results and keep a list of the medicines you take. How can you care for yourself at home? · See your doctor as often as he or she recommends. This is to make sure your blood pressure is under control. You will probably go at least 2 times a year. But it may be more often at first.  · Take your blood pressure medicine exactly as prescribed. You may take one or more types. They include diuretics, beta-blockers, ACE inhibitors, calcium channel blockers, and angiotensin II receptor blockers. Call your doctor if you think you are having a problem with your medicine. · If you take blood pressure medicine, talk to your doctor before you take decongestants or anti-inflammatory medicine, such as ibuprofen. These can raise blood pressure. · Learn how to check your blood pressure at home. Check it often. · Ask your doctor if it's okay to drink alcohol. · Talk to your doctor about lifestyle changes that can help blood pressure.  These include being active and managing your weight. · Don't smoke. Smoking increases your risk for heart attack and stroke. When should you call for help? Call  911 anytime you think you may need emergency care. This may mean having symptoms that suggest that your blood pressure is causing a serious heart or blood vessel problem. Your blood pressure may be over 180/120.   For example, call  911 if:    · You have symptoms of a heart attack. These may include:  ? Chest pain or pressure, or a strange feeling in the chest.  ? Sweating. ? Shortness of breath. ? Nausea or vomiting. ? Pain, pressure, or a strange feeling in the back, neck, jaw, or upper belly or in one or both shoulders or arms. ? Lightheadedness or sudden weakness. ? A fast or irregular heartbeat.     · You have symptoms of a stroke. These may include:  ? Sudden numbness, tingling, weakness, or loss of movement in your face, arm, or leg, especially on only one side of your body. ? Sudden vision changes. ? Sudden trouble speaking. ? Sudden confusion or trouble understanding simple statements. ? Sudden problems with walking or balance. ? A sudden, severe headache that is different from past headaches.     · You have severe back or belly pain.    Do not wait until your blood pressure comes down on its own. Get help right away.   Call your doctor now or seek immediate care if:    · Your blood pressure is much higher than normal (such as 180/120 or higher), but you don't have symptoms.     · You think high blood pressure is causing symptoms, such as:  ? Severe headache.  ? Blurry vision.    Watch closely for changes in your health, and be sure to contact your doctor if:    · Your blood pressure measures higher than your doctor recommends at least 2 times. That means the top number is higher or the bottom number is higher, or both.     · You think you may be having side effects from your blood pressure medicine. Where can you learn more?   Go to http://michael-andrea.info/. Enter A985 in the search box to learn more about \"Acute High Blood Pressure: Care Instructions. \"  Current as of: April 9, 2019  Content Version: 12.2  © 0677-4237 [employer]. Care instructions adapted under license by [employer] (which disclaims liability or warranty for this information). If you have questions about a medical condition or this instruction, always ask your healthcare professional. [address] any warranty or liability for your use of this information.

## 2019-11-01 NOTE — ED NOTES
Patient here with c/o fall s/p discharge from outside hospital.  Patient A&Ox4 with slight intermittent memory deficits, patient's ex-wife at bedside assisting with patient history. Patient reports that he had elevated BP of 250/150 earlier today which prompted him to go to [facility].  Patient states that he was not keen on being kept in the hospital so he states that he pushed to be discharged. Patient reports going home after discharge but having two falls at home. Patient returns to hospital for fall. Patient states that he had increased drowsiness and lethargy yesterday. Patient states that he also had an incontinent episode yesterday. Patient reports hx of HTN, diabetes, and kidney disease. Patient states that he walks slow and walks with a cane, reports ever since recent injury this summer when he had a fractured shoulder. Patient states that he had a different cane but was loaded a more sturdier cane today by another family member. Patient states concern for \"my words\", stating that he finds himself stating things that are not correct. Patient states that he has been stating that his son lives in [place], however patient states that he knows that his son lives in [facility] and doesn't know why he says [place]. Patient's ex-wife at bedside and patient states that he wants to keep calling her \"Xin\" which he states is the name of his daughter and not his ex-wife's name. Reviewed patient medications and patient reports taking his normal medications earlier today. Patient brought his home medications with him, which contained a pill bottle for a muscle relaxant that was prescribed back in May. Patient states that he does not know why he is on the muscle relaxant, and patient unsure as to the last recent dose.             Emergency Department Nursing Plan of Care       The Nursing Plan of Care is developed from the Nursing assessment and Emergency Department Attending provider initial evaluation. The plan of care may be reviewed in the ED Provider note.     The Plan of Care was developed with the following considerations:   Patient / Family readiness to learn indicated by:verbalized understanding  Persons(s) to be included in education: patient  Barriers to Learning/Limitations:No    Signed     Vishal Tubbs RN    11/1/2019   8:09 PM

## 2019-11-01 NOTE — ED PROVIDER NOTES
'my BP was up at home (sent by [facility]); didn't take my BP meds this am;     Pt BIBEMS/ no complaints;     pt denies HA, numbness/ tingling, vison changes, diff swallowing, CP, SOB, Abd pain, F/Ch, N/V, D/Cons or other current systemic complaints;                Past Medical History:   Diagnosis Date    Diabetes ([address]     Hypertension        Past Surgical History:   Procedure Laterality Date    HX GI      colonoscopy 6-7 yrs ago    CT COLON CA SCRN NOT HI RSK IND  10/21/2015              Family History:   Problem Relation Age of Onset    Hypertension Mother     Hypertension Father        Social History     Socioeconomic History    Marital status:      Spouse name: Not on file    Number of children: Not on file    Years of education: Not on file    Highest education level: Not on file   Occupational History    Not on file   Social Needs    Financial resource strain: Not on file    Food insecurity:     Worry: Not on file     Inability: Not on file    Transportation needs:     Medical: Not on file     Non-medical: Not on file   Tobacco Use    Smoking status: Never Smoker    Smokeless tobacco: Never Used   Substance and Sexual Activity    Alcohol use: No    Drug use: No    Sexual activity: Not on file   Lifestyle    Physical activity:     Days per week: Not on file     Minutes per session: Not on file    Stress: Not on file   Relationships    Social connections:     Talks on phone: Not on file     Gets together: Not on file     Attends [employer] service: Not on file     Active member of club or organization: Not on file     Attends meetings of clubs or organizations: Not on file     Relationship status: Not on file    Intimate partner violence:     Fear of current or ex partner: Not on file     Emotionally abused: Not on file     Physically abused: Not on file     Forced sexual activity: Not on file   Other Topics Concern    Not on file   Social History Narrative    Not on file ALLERGIES: Patient has no known allergies. Review of Systems    Vitals:    11/01/19 1130   BP: (!) 152/137   Pulse: 87   Resp: 18   Temp: 97.7 °F (36.5 °C)   SpO2: 100%            Physical Exam     MDM       Procedures    Chief Complaint   Patient presents with    Hypertension       11:41 AM  The patients presenting problems have been discussed, and they are in agreement with the care plan formulated and outlined with them. I have encouraged them to ask questions as they arise throughout their visit. MEDICATIONS GIVEN:  Medications - No data to display    LABS REVIEWED:  Labs Reviewed - No data to display    RADIOLOGY RESULTS:  The following have been ordered and reviewed:  _____________________________________________________________________  _____________________________________________________________________    EKG interpretation:   Rhythm: normal sinus rhythm; and regular . Rate (approx.): 84; Axis: normal; P wave: normal; QRS interval: normal ; ST/T wave: normal; Negative acute significant segmental elevations/ unchanged compared to study dated 09/02/2019    PROCEDURES:        CONSULTATIONS:       PROGRESS NOTES:      DIAGNOSIS:    1. Secondary hypertension              ED COURSE: The patients hospital course has been uncomplicated. 1:13 PM 'I need to go home now/ will take my BP meds when I get home';   Dacia Lechuga  results have been reviewed with him. He has been counseled regarding his diagnosis. He verbally conveys understanding and agreement of the signs, symptoms, diagnosis, treatment and prognosis and additionally agrees to Call/ Arrange follow up as recommended with Dr. Myah Shook MD in 24 - 48 hours. He also agrees with the care-plan and conveys that all of his questions have been answered.   I have also put together some discharge instructions for him that include: 1) educational information regarding their diagnosis, 2) how to care for their diagnosis at home, as well a 3) list of reasons why they would want to return to the ED prior to their follow-up appointment, should their condition change or for concerns.

## 2019-11-01 NOTE — ED PROVIDER NOTES
EMERGENCY DEPARTMENT HISTORY AND PHYSICAL EXAM    Date: 11/1/2019  Patient Name: Phylicia Kwan    History of Presenting Illness     Chief Complaint   Patient presents with   Verlee Laguna Fall    Hypertension         History Provided By: Patient    Chief Complaint: fall    HPI: Phylicia Kwan is a 77 y.o. male with a PMH of diabetes, hypertension and kidney disease who presents with falling this afternoon. Patient is ex-wife is with patient states patient fell at home twice. On chart review patient was evaluated at [facility] emergency department this morning for high blood pressure. Patient's wife states home health nurse had advised patient his pressure was elevated and he should go to the emergency department. Patient states he wanted to come home and take his own blood pressure medicines. Patient states that he did take his blood pressure medicines when he got home but his pressure was still elevated. He states that he was walking and fell. Patient is alert and oriented but vague historian. He states he is incontinent of urine he states he has problems with memory. He denies pain he denies dizziness lightheadedness numbness or tingling. He states his feet are swollen. He denies chest pain or shortness of breath. He has no other complaints at this time. Because there are no symptoms or complaints of pain, there is no reported quality, severity, modifying factors, or associated signs and symptoms reported.     PCP: Toby Purdy MD    Current Facility-Administered Medications   Medication Dose Route Frequency Provider Last Rate Last Dose    sodium chloride (NS) flush 5-40 mL  5-40 mL IntraVENous Q8H Guillermo Megan, NP        sodium chloride (NS) flush 5-40 mL  5-40 mL IntraVENous PRN Guillermo Megan, NP        sodium chloride (NS) flush 5-40 mL  5-40 mL IntraVENous Q8H Guillermo Megan, NP        sodium chloride (NS) flush 5-40 mL  5-40 mL IntraVENous PRN Guillermo Megan, NP         Current Outpatient Medications   Medication Sig Dispense Refill    oxyCODONE IR (ROXICODONE) 5 mg immediate release tablet Take 5 mg by mouth every four (4) hours as needed for Pain.  cloNIDine HCl (CATAPRES) 0.1 mg tablet Take 0.2 mg by mouth three (3) times daily. 6    ferrous sulfate 325 mg (65 mg iron) tablet Take 1 Tab by mouth two (2) times a day.  labetalol (NORMODYNE) 100 mg tablet Take 3 Tabs by mouth two (2) times a day for 60 days. 180 Tab 1    amLODIPine (NORVASC) 10 mg tablet Take 1 Tab by mouth daily for 60 days. 30 Tab 1    spironolactone (ALDACTONE) 25 mg tablet Take 25 mg by mouth daily.  aspirin delayed-release 81 mg tablet Take  by mouth as needed for Pain.  methocarbamol (ROBAXIN) 500 mg tablet Take 500 mg by mouth two (2) times a day.  MASSIMO PEN NEEDLE 32 gauge x 5/32\" ndle USE AS DIRECTED WITH INSULIN  5       Past History     Past Medical History:  Past Medical History:   Diagnosis Date    Diabetes ([facility] [address]     Hypertension     Kidney disease        Past Surgical History:  Past Surgical History:   Procedure Laterality Date    HX GI      colonoscopy 6-7 yrs ago    MD COLON CA SCRN NOT HI RSK IND  10/21/2015            Family History:  Family History   Problem Relation Age of Onset    Hypertension Mother     Hypertension Father        Social History:  Social History     Tobacco Use    Smoking status: Never Smoker    Smokeless tobacco: Never Used   Substance Use Topics    Alcohol use: No    Drug use: No       Allergies:  No Known Allergies      Review of Systems   Review of Systems   Constitutional: Negative for chills, fatigue and fever. HENT: Negative for congestion and sore throat. Respiratory: Negative for cough and wheezing. Cardiovascular: Positive for leg swelling. Negative for chest pain. Gastrointestinal: Negative for abdominal pain, nausea and vomiting. Genitourinary: Negative for dysuria.    Musculoskeletal: Negative for arthralgias, back pain and myalgias. Skin: Negative for rash. Neurological: Negative for dizziness, syncope, weakness, light-headedness, numbness and headaches. All other systems reviewed and are negative. Physical Exam     Vitals:    11/01/19 2008 11/01/19 2100 11/01/19 2123 11/01/19 2130   BP:  (!) 194/125 (!) 182/117 (!) 184/117   Pulse: 77 79 79 80   Resp: 11 19 17 16   Temp:       SpO2: 100% 100% 100% 99%   Weight:       Height:         Physical Exam   Constitutional: He is oriented to person, place, and time. He appears well-developed and well-nourished. Pleasant 74-year old male mildly confused   HENT:   Head: Normocephalic and atraumatic. Right Ear: External ear normal.   Left Ear: External ear normal.   Eyes: Conjunctivae are normal. Right eye exhibits no discharge. Left eye exhibits no discharge. Neck: Normal range of motion. Neck supple. Cardiovascular: Normal rate and regular rhythm. Pulmonary/Chest: Effort normal and breath sounds normal. No respiratory distress. He has no wheezes. Abdominal: Soft. Bowel sounds are normal. There is no tenderness. Musculoskeletal: Normal range of motion. He exhibits edema (bilateral ankle and pedal edema). Lymphadenopathy:     He has no cervical adenopathy. Neurological: He is alert and oriented to person, place, and time. No cranial nerve deficit. Skin: Skin is warm and dry. Psychiatric: He has a normal mood and affect. His behavior is normal. Judgment and thought content normal.   Nursing note and vitals reviewed.         Diagnostic Study Results     Labs -     Recent Results (from the past 12 hour(s))   EKG, 12 LEAD, INITIAL    Collection Time: 11/01/19 11:51 AM   Result Value Ref Range    Ventricular Rate 86 BPM    Atrial Rate 86 BPM    P-R Interval 168 ms    QRS Duration 124 ms    Q-T Interval 398 ms    QTC Calculation (Bezet) 476 ms    Calculated P Axis 47 degrees    Calculated R Axis 123 degrees    Calculated T Axis -17 degrees    Diagnosis       ** Poor data quality, interpretation may be adversely affected  Normal sinus rhythm  Possible Left atrial enlargement  Right bundle branch block  Nonspecific ST abnormality  Abnormal ECG  Confirmed by Andrea Shore MD. ([id number]) on 11/1/2019 5:13:44 PM     TROPONIN I    Collection Time: 11/01/19 12:09 PM   Result Value Ref Range    Troponin-I, Qt. <0.05 <1.35 ng/mL   METABOLIC PANEL, COMPREHENSIVE    Collection Time: 11/01/19 12:09 PM   Result Value Ref Range    Sodium 143 136 - 145 mmol/L    Potassium 3.9 3.5 - 5.1 mmol/L    Chloride 110 (H) 97 - 108 mmol/L    CO2 22 21 - 32 mmol/L    Anion gap 11 5 - 15 mmol/L    Glucose 89 65 - 100 mg/dL    BUN 47 (H) 6 - 20 MG/DL    Creatinine 4.98 (H) 0.70 - 1.30 MG/DL    BUN/Creatinine ratio 9 (L) 12 - 20      GFR est AA 14 (L) >60 ml/min/1.73m2    GFR est non-AA 12 (L) >60 ml/min/1.73m2    Calcium 8.7 8.5 - 10.1 MG/DL    Bilirubin, total 0.6 0.2 - 1.0 MG/DL    ALT (SGPT) 42 12 - 78 U/L    AST (SGOT) 32 15 - 37 U/L    Alk. phosphatase 93 45 - 117 U/L    Protein, total 8.0 6.4 - 8.2 g/dL    Albumin 3.1 (L) 3.5 - 5.0 g/dL    Globulin 4.9 (H) 2.0 - 4.0 g/dL    A-G Ratio 0.6 (L) 1.1 - 2.2     CBC WITH AUTOMATED DIFF    Collection Time: 11/01/19 12:09 PM   Result Value Ref Range    WBC 6.5 4.1 - 11.1 K/uL    RBC 3.31 (L) 4.10 - 5.70 M/uL    HGB 10.0 (L) 12.1 - 17.0 g/dL    HCT 31.2 (L) 36.6 - 50.3 %    MCV 94.3 80.0 - 99.0 FL    MCH 30.2 26.0 - 34.0 PG    MCHC 32.1 30.0 - 36.5 g/dL    RDW 14.6 (H) 11.5 - 14.5 %    PLATELET 252 673 - 856 K/uL    MPV 10.0 8.9 - 12.9 FL    NRBC 0.0 0  WBC    ABSOLUTE NRBC 0.00 0.00 - 0.01 K/uL    NEUTROPHILS 79 (H) 32 - 75 %    LYMPHOCYTES 12 12 - 49 %    MONOCYTES 7 5 - 13 %    EOSINOPHILS 1 0 - 7 %    BASOPHILS 0 0 - 1 %    IMMATURE GRANULOCYTES 1 (H) 0.0 - 0.5 %    ABS. NEUTROPHILS 5.0 1.8 - 8.0 K/UL    ABS. LYMPHOCYTES 0.8 0.8 - 3.5 K/UL    ABS. MONOCYTES 0.5 0.0 - 1.0 K/UL    ABS. EOSINOPHILS 0.1 0.0 - 0.4 K/UL    ABS.  BASOPHILS 0.0 0.0 - 0.1 K/UL ABS. IMM. GRANS. 0.1 (H) 0.00 - 0.04 K/UL    DF SMEAR SCANNED      RBC COMMENTS NORMOCYTIC, NORMOCHROMIC     SAMPLES BEING HELD    Collection Time: 11/01/19 12:09 PM   Result Value Ref Range    SAMPLES BEING HELD 1SST     COMMENT        Add-on orders for these samples will be processed based on acceptable specimen integrity and analyte stability, which may vary by analyte. CBC WITH AUTOMATED DIFF    Collection Time: 11/01/19  7:41 PM   Result Value Ref Range    WBC 6.2 4.1 - 11.1 K/uL    RBC 2.72 (L) 4.10 - 5.70 M/uL    HGB 8.2 (L) 12.1 - 17.0 g/dL    HCT 25.3 (L) 36.6 - 50.3 %    MCV 93.0 80.0 - 99.0 FL    MCH 30.1 26.0 - 34.0 PG    MCHC 32.4 30.0 - 36.5 g/dL    RDW 14.5 11.5 - 14.5 %    PLATELET 802 559 - 217 K/uL    MPV 10.4 8.9 - 12.9 FL    NRBC 0.0 0  WBC    ABSOLUTE NRBC 0.00 0.00 - 0.01 K/uL    NEUTROPHILS 83 (H) 32 - 75 %    LYMPHOCYTES 10 (L) 12 - 49 %    MONOCYTES 6 5 - 13 %    EOSINOPHILS 0 0 - 7 %    BASOPHILS 0 0 - 1 %    IMMATURE GRANULOCYTES 1 (H) 0.0 - 0.5 %    ABS. NEUTROPHILS 5.1 1.8 - 8.0 K/UL    ABS. LYMPHOCYTES 0.6 (L) 0.8 - 3.5 K/UL    ABS. MONOCYTES 0.4 0.0 - 1.0 K/UL    ABS. EOSINOPHILS 0.0 0.0 - 0.4 K/UL    ABS. BASOPHILS 0.0 0.0 - 0.1 K/UL    ABS. IMM. GRANS. 0.1 (H) 0.00 - 0.04 K/UL    DF SMEAR SCANNED      RBC COMMENTS ANISOCYTOSIS  1+       METABOLIC PANEL, COMPREHENSIVE    Collection Time: 11/01/19  7:41 PM   Result Value Ref Range    Sodium 142 136 - 145 mmol/L    Potassium 3.9 3.5 - 5.1 mmol/L    Chloride 106 97 - 108 mmol/L    CO2 27 21 - 32 mmol/L    Anion gap 9 5 - 15 mmol/L    Glucose 122 (H) 65 - 100 mg/dL    BUN 52 (H) 6 - 20 MG/DL    Creatinine 5.49 (H) 0.70 - 1.30 MG/DL    BUN/Creatinine ratio 9 (L) 12 - 20      GFR est AA 13 (L) >60 ml/min/1.73m2    GFR est non-AA 10 (L) >60 ml/min/1.73m2    Calcium 8.7 8.5 - 10.1 MG/DL    Bilirubin, total 0.6 0.2 - 1.0 MG/DL    ALT (SGPT) 43 12 - 78 U/L    AST (SGOT) 35 15 - 37 U/L    Alk.  phosphatase 83 45 - 117 U/L    Protein, total 7.5 6.4 - 8.2 g/dL    Albumin 3.0 (L) 3.5 - 5.0 g/dL    Globulin 4.5 (H) 2.0 - 4.0 g/dL    A-G Ratio 0.7 (L) 1.1 - 2.2     CK W/ CKMB & INDEX    Collection Time: 11/01/19  7:41 PM   Result Value Ref Range     39 - 308 U/L    CK - MB 3.0 <3.6 NG/ML    CK-MB Index 1.0 0.0 - 2.5     TROPONIN I    Collection Time: 11/01/19  7:41 PM   Result Value Ref Range    Troponin-I, Qt. <0.05 <0.05 ng/mL   EKG, 12 LEAD, INITIAL    Collection Time: 11/01/19  7:43 PM   Result Value Ref Range    Ventricular Rate 78 BPM    Atrial Rate 78 BPM    P-R Interval 146 ms    QRS Duration 108 ms    Q-T Interval 404 ms    QTC Calculation (Bezet) 460 ms    Calculated P Axis 19 degrees    Calculated R Axis -19 degrees    Calculated T Axis 89 degrees    Diagnosis       Normal sinus rhythm  Incomplete right bundle branch block  ST & T wave abnormality, consider lateral ischemia  Abnormal ECG  When compared with ECG of 01-NOV-2019 11:51,  Incomplete right bundle branch block has replaced Right bundle branch block  ST elevation now present in Inferior leads  T wave inversion no longer evident in Inferior leads  T wave inversion more evident in Lateral leads     URINALYSIS W/ REFLEX CULTURE    Collection Time: 11/01/19  9:05 PM   Result Value Ref Range    Color ABRAHAM      Appearance CLOUDY (A) CLEAR      Specific gravity 1.015 1.003 - 1.030      pH (UA) 5.5 5.0 - 8.0      Protein >300 (A) NEG mg/dL    Glucose NEGATIVE  NEG mg/dL    Ketone NEGATIVE  NEG mg/dL    Bilirubin NEGATIVE  NEG      Blood LARGE (A) NEG      Urobilinogen 0.2 0.2 - 1.0 EU/dL    Nitrites NEGATIVE  NEG      Leukocyte Esterase TRACE (A) NEG      WBC 0-4 0 - 4 /hpf    RBC  0 - 5 /hpf    Epithelial cells FEW FEW /lpf    Bacteria NEGATIVE  NEG /hpf    UA:UC IF INDICATED CULTURE NOT INDICATED BY UA RESULT CNI     DRUG SCREEN, URINE    Collection Time: 11/01/19  9:05 PM   Result Value Ref Range    AMPHETAMINES NEGATIVE  NEG      BARBITURATES NEGATIVE  NEG BENZODIAZEPINES NEGATIVE  NEG      COCAINE NEGATIVE  NEG      METHADONE NEGATIVE  NEG      OPIATES NEGATIVE  NEG      PCP(PHENCYCLIDINE) NEGATIVE  NEG      THC (TH-CANNABINOL) NEGATIVE  NEG      Drug screen comment (NOTE)    OCCULT BLOOD, STOOL    Collection Time: 11/01/19  9:06 PM   Result Value Ref Range    Occult blood, stool NEGATIVE  NEG         Radiologic Studies -   CT HEAD WO CONT   Final Result   IMPRESSION: No acute intracranial hemorrhage, mass or infarct. Stable pattern   chronic white matter change most compatible with small vessel ischemic and/or   senescent change. XR CHEST PA LAT   Final Result   IMPRESSION: No acute cardiopulmonary findings. COPD. Right humeral neck   fracture. CT ABD PELV WO CONT    (Results Pending)     CT Results  (Last 48 hours)               11/01/19 1909  CT HEAD WO CONT Final result    Impression:  IMPRESSION: No acute intracranial hemorrhage, mass or infarct. Stable pattern   chronic white matter change most compatible with small vessel ischemic and/or   senescent change. Narrative:  EXAM: CT HEAD WO CONT       INDICATION: Headache; frequent falls       COMPARISON: None. CONTRAST: None. TECHNIQUE: Unenhanced CT of the head was performed using 5 mm images. Brain and   bone windows were generated. CT dose reduction was achieved through use of a   standardized protocol tailored for this examination and automatic exposure   control for dose modulation. FINDINGS: There is no acute intracranial hemorrhage, mass, mass effect or   herniation. Ventricles and sulci show no significant change in proportionate and   symmetric pattern. There is no significant change in pattern of periventricular   white matter hypodensity. The gray-white matter differentiation is   well-preserved. The mastoid air cells are well pneumatized.  The visualized   paranasal sinuses are normal.               CXR Results  (Last 48 hours)               11/01/19 1909  XR CHEST PA LAT Final result    Impression:  IMPRESSION: No acute cardiopulmonary findings. COPD. Right humeral neck   fracture. Narrative:  EXAM: XR CHEST PA LAT       INDICATION: pneumonia       COMPARISON: Chest x-ray 9/2/2019. FINDINGS: PA and lateral radiographs of the chest demonstrate hyperinflated but   otherwise clear lungs. The cardiac and mediastinal contours and pulmonary   vascularity are normal. Atherosclerotic calcifications affect the aortic arch   and the thoracic aorta is tortuous. The chest wall structures and visualized   upper abdomen show no acute findings with incidental note of degenerative spine,   right humeral neck fracture, and diffuse osteopenia. Medical Decision Making   I am the first provider for this patient. I reviewed the vital signs, available nursing notes, past medical history, past surgical history, family history and social history. Vital Signs-Reviewed the patient's vital signs. Records Reviewed: Nursing Notes, Old Medical Records and Previous Laboratory Studies     O2 sat interpretation 99% RA  ED EKG interpretation:  Rhythm: normal sinus rhythm; and regular . Rate (approx.): 78; Axis: normal; P wave: normal; QRS interval: normal ; ST/T  Abnormality consider lateral ischema Lead: ; Other findings:incompleted right bundle branch block abnormal ekg. This EKG was interpreted by Ruy March NP,ED Provider. Disposition:  Patient is being transferred to ED [facility] telemetry unit , transfer accepted by Dr Kaitlin Cox. The reasons for their transfer have been discussed with them . They convey agreement and understanding for the need to be transferred as explained to them by this provider. Ruy March NP.     Current Discharge Medication List          Provider Notes (Medical Decision Making):   DDX ASHVIN Anemia frequent falls encephalopathy hematuria  Procedures:  Procedures    Please note that this dictation was completed with Dragon, computer voice recognition software. Quite often unanticipated grammatical, syntax, homophones, and other interpretive errors are inadvertently transcribed by the computer software. Please disregard these errors. Additionally, please excuse any errors that have escaped final proofreading. Diagnosis     Clinical Impression:   1. ASHVIN (acute kidney injury) ([facility] [address]    2. Anemia, unspecified type    3. Encephalopathy    4.  Fall, initial encounter

## 2019-11-02 ENCOUNTER — HOME CARE VISIT (OUTPATIENT)
Dept: HOME HEALTH SERVICES | Facility: HOME HEALTH | Age: 69
End: 2019-11-02
Payer: MEDICARE

## 2019-11-02 ENCOUNTER — HOSPITAL ENCOUNTER (INPATIENT)
Age: 69
LOS: 16 days | Discharge: HOME HEALTH | DRG: 674 | End: 2019-11-18
Attending: INTERNAL MEDICINE | Admitting: INTERNAL MEDICINE
Payer: MEDICARE

## 2019-11-02 VITALS
HEART RATE: 98 BPM | TEMPERATURE: 98.4 F | RESPIRATION RATE: 18 BRPM | OXYGEN SATURATION: 98 % | SYSTOLIC BLOOD PRESSURE: 200 MMHG | DIASTOLIC BLOOD PRESSURE: 150 MMHG

## 2019-11-02 DIAGNOSIS — Z71.89 GOALS OF CARE, COUNSELING/DISCUSSION: ICD-10-CM

## 2019-11-02 DIAGNOSIS — R53.81 DEBILITY: ICD-10-CM

## 2019-11-02 DIAGNOSIS — Z71.89 ADVANCED CARE PLANNING/COUNSELING DISCUSSION: ICD-10-CM

## 2019-11-02 DIAGNOSIS — Z71.89 DNR (DO NOT RESUSCITATE) DISCUSSION: ICD-10-CM

## 2019-11-02 PROBLEM — N18.9 ACUTE ON CHRONIC RENAL FAILURE (HCC): Status: ACTIVE | Noted: 2019-11-02

## 2019-11-02 PROBLEM — N17.9 ACUTE ON CHRONIC RENAL FAILURE (HCC): Status: ACTIVE | Noted: 2019-11-02

## 2019-11-02 PROBLEM — R31.9 HEMATURIA: Status: ACTIVE | Noted: 2019-11-02

## 2019-11-02 LAB
ANION GAP SERPL CALC-SCNC: 8 MMOL/L (ref 5–15)
ATRIAL RATE: 78 BPM
BUN SERPL-MCNC: 51 MG/DL (ref 6–20)
BUN/CREAT SERPL: 9 (ref 12–20)
CALCIUM SERPL-MCNC: 8.5 MG/DL (ref 8.5–10.1)
CALCULATED P AXIS, ECG09: 19 DEGREES
CALCULATED R AXIS, ECG10: -19 DEGREES
CALCULATED T AXIS, ECG11: 89 DEGREES
CHLORIDE SERPL-SCNC: 108 MMOL/L (ref 97–108)
CO2 SERPL-SCNC: 24 MMOL/L (ref 21–32)
COMMENT, HOLDF: NORMAL
CREAT SERPL-MCNC: 5.37 MG/DL (ref 0.7–1.3)
DIAGNOSIS, 93000: NORMAL
ERYTHROCYTE [DISTWIDTH] IN BLOOD BY AUTOMATED COUNT: 14.3 % (ref 11.5–14.5)
GLUCOSE BLD STRIP.AUTO-MCNC: 107 MG/DL (ref 65–100)
GLUCOSE BLD STRIP.AUTO-MCNC: 119 MG/DL (ref 65–100)
GLUCOSE BLD STRIP.AUTO-MCNC: 133 MG/DL (ref 65–100)
GLUCOSE BLD STRIP.AUTO-MCNC: 161 MG/DL (ref 65–100)
GLUCOSE BLD STRIP.AUTO-MCNC: 166 MG/DL (ref 65–100)
GLUCOSE SERPL-MCNC: 114 MG/DL (ref 65–100)
HCT VFR BLD AUTO: 26.5 % (ref 36.6–50.3)
HCT VFR BLD AUTO: 27 % (ref 36.6–50.3)
HGB BLD-MCNC: 8.6 G/DL (ref 12.1–17)
HGB BLD-MCNC: 8.6 G/DL (ref 12.1–17)
MCH RBC QN AUTO: 30.2 PG (ref 26–34)
MCHC RBC AUTO-ENTMCNC: 32.5 G/DL (ref 30–36.5)
MCV RBC AUTO: 93 FL (ref 80–99)
NRBC # BLD: 0 K/UL (ref 0–0.01)
NRBC BLD-RTO: 0 PER 100 WBC
P-R INTERVAL, ECG05: 146 MS
PLATELET # BLD AUTO: 242 K/UL (ref 150–400)
PMV BLD AUTO: 10.3 FL (ref 8.9–12.9)
POTASSIUM SERPL-SCNC: 3.8 MMOL/L (ref 3.5–5.1)
Q-T INTERVAL, ECG07: 404 MS
QRS DURATION, ECG06: 108 MS
QTC CALCULATION (BEZET), ECG08: 460 MS
RBC # BLD AUTO: 2.85 M/UL (ref 4.1–5.7)
SAMPLES BEING HELD,HOLD: NORMAL
SERVICE CMNT-IMP: ABNORMAL
SODIUM SERPL-SCNC: 140 MMOL/L (ref 136–145)
TROPONIN I SERPL-MCNC: <0.05 NG/ML
TROPONIN I SERPL-MCNC: <0.05 NG/ML
VENTRICULAR RATE, ECG03: 78 BPM
WBC # BLD AUTO: 6.1 K/UL (ref 4.1–11.1)

## 2019-11-02 PROCEDURE — 85027 COMPLETE CBC AUTOMATED: CPT

## 2019-11-02 PROCEDURE — 74011250636 HC RX REV CODE- 250/636: Performed by: INTERNAL MEDICINE

## 2019-11-02 PROCEDURE — 80048 BASIC METABOLIC PNL TOTAL CA: CPT

## 2019-11-02 PROCEDURE — 84484 ASSAY OF TROPONIN QUANT: CPT

## 2019-11-02 PROCEDURE — 65660000000 HC RM CCU STEPDOWN

## 2019-11-02 PROCEDURE — 85018 HEMOGLOBIN: CPT

## 2019-11-02 PROCEDURE — 3331090001 HH PPS REVENUE CREDIT

## 2019-11-02 PROCEDURE — 82962 GLUCOSE BLOOD TEST: CPT

## 2019-11-02 PROCEDURE — 74011250637 HC RX REV CODE- 250/637: Performed by: INTERNAL MEDICINE

## 2019-11-02 PROCEDURE — 36415 COLL VENOUS BLD VENIPUNCTURE: CPT

## 2019-11-02 PROCEDURE — 3331090002 HH PPS REVENUE DEBIT

## 2019-11-02 RX ORDER — CLONIDINE HYDROCHLORIDE 0.1 MG/1
0.2 TABLET ORAL 3 TIMES DAILY
Status: DISCONTINUED | OUTPATIENT
Start: 2019-11-02 | End: 2019-11-02

## 2019-11-02 RX ORDER — HEPARIN SODIUM 5000 [USP'U]/ML
5000 INJECTION, SOLUTION INTRAVENOUS; SUBCUTANEOUS EVERY 8 HOURS
Status: DISCONTINUED | OUTPATIENT
Start: 2019-11-02 | End: 2019-11-02

## 2019-11-02 RX ORDER — DEXTROSE MONOHYDRATE 25 G/50ML
25-50 INJECTION, SOLUTION INTRAVENOUS AS NEEDED
Status: DISCONTINUED | OUTPATIENT
Start: 2019-11-02 | End: 2019-11-18 | Stop reason: HOSPADM

## 2019-11-02 RX ORDER — ONDANSETRON 2 MG/ML
4 INJECTION INTRAMUSCULAR; INTRAVENOUS
Status: DISCONTINUED | OUTPATIENT
Start: 2019-11-02 | End: 2019-11-18 | Stop reason: HOSPADM

## 2019-11-02 RX ORDER — AMLODIPINE BESYLATE 5 MG/1
10 TABLET ORAL DAILY
Status: DISCONTINUED | OUTPATIENT
Start: 2019-11-02 | End: 2019-11-18 | Stop reason: HOSPADM

## 2019-11-02 RX ORDER — BISACODYL 5 MG
5 TABLET, DELAYED RELEASE (ENTERIC COATED) ORAL DAILY PRN
Status: DISCONTINUED | OUTPATIENT
Start: 2019-11-02 | End: 2019-11-18 | Stop reason: HOSPADM

## 2019-11-02 RX ORDER — LISINOPRIL 20 MG/1
40 TABLET ORAL DAILY
Status: DISCONTINUED | OUTPATIENT
Start: 2019-11-02 | End: 2019-11-05

## 2019-11-02 RX ORDER — HYDRALAZINE HYDROCHLORIDE 20 MG/ML
10 INJECTION INTRAMUSCULAR; INTRAVENOUS
Status: DISCONTINUED | OUTPATIENT
Start: 2019-11-02 | End: 2019-11-02 | Stop reason: SDUPTHER

## 2019-11-02 RX ORDER — HYDRALAZINE HYDROCHLORIDE 20 MG/ML
20 INJECTION INTRAMUSCULAR; INTRAVENOUS
Status: DISCONTINUED | OUTPATIENT
Start: 2019-11-02 | End: 2019-11-18 | Stop reason: HOSPADM

## 2019-11-02 RX ORDER — ACETAMINOPHEN 325 MG/1
650 TABLET ORAL
Status: DISCONTINUED | OUTPATIENT
Start: 2019-11-02 | End: 2019-11-18 | Stop reason: HOSPADM

## 2019-11-02 RX ORDER — INSULIN LISPRO 100 [IU]/ML
INJECTION, SOLUTION INTRAVENOUS; SUBCUTANEOUS
Status: DISCONTINUED | OUTPATIENT
Start: 2019-11-02 | End: 2019-11-18 | Stop reason: HOSPADM

## 2019-11-02 RX ORDER — DOXAZOSIN 2 MG/1
2 TABLET ORAL DAILY
Status: DISCONTINUED | OUTPATIENT
Start: 2019-11-02 | End: 2019-11-11

## 2019-11-02 RX ORDER — MAGNESIUM SULFATE 100 %
4 CRYSTALS MISCELLANEOUS AS NEEDED
Status: DISCONTINUED | OUTPATIENT
Start: 2019-11-02 | End: 2019-11-18 | Stop reason: HOSPADM

## 2019-11-02 RX ORDER — HYDRALAZINE HYDROCHLORIDE 20 MG/ML
INJECTION INTRAMUSCULAR; INTRAVENOUS
Status: DISPENSED
Start: 2019-11-02 | End: 2019-11-02

## 2019-11-02 RX ORDER — LABETALOL 200 MG/1
600 TABLET, FILM COATED ORAL 2 TIMES DAILY
Status: DISCONTINUED | OUTPATIENT
Start: 2019-11-02 | End: 2019-11-11

## 2019-11-02 RX ORDER — ASPIRIN 81 MG/1
81 TABLET ORAL AS NEEDED
Status: DISCONTINUED | OUTPATIENT
Start: 2019-11-02 | End: 2019-11-18 | Stop reason: HOSPADM

## 2019-11-02 RX ORDER — SODIUM CHLORIDE 0.9 % (FLUSH) 0.9 %
5-40 SYRINGE (ML) INJECTION EVERY 8 HOURS
Status: DISCONTINUED | OUTPATIENT
Start: 2019-11-02 | End: 2019-11-18 | Stop reason: HOSPADM

## 2019-11-02 RX ORDER — SODIUM CHLORIDE 0.9 % (FLUSH) 0.9 %
5-40 SYRINGE (ML) INJECTION AS NEEDED
Status: DISCONTINUED | OUTPATIENT
Start: 2019-11-02 | End: 2019-11-18 | Stop reason: HOSPADM

## 2019-11-02 RX ADMIN — DOXAZOSIN 2 MG: 2 TABLET ORAL at 22:13

## 2019-11-02 RX ADMIN — Medication 10 ML: at 14:58

## 2019-11-02 RX ADMIN — HEPARIN SODIUM 5000 UNITS: 5000 INJECTION INTRAVENOUS; SUBCUTANEOUS at 03:14

## 2019-11-02 RX ADMIN — HYDRALAZINE HYDROCHLORIDE 10 MG: 20 INJECTION INTRAMUSCULAR; INTRAVENOUS at 01:00

## 2019-11-02 RX ADMIN — HYDRALAZINE HYDROCHLORIDE 10 MG: 20 INJECTION INTRAMUSCULAR; INTRAVENOUS at 08:36

## 2019-11-02 RX ADMIN — Medication 10 ML: at 03:14

## 2019-11-02 RX ADMIN — LABETALOL HYDROCHLORIDE 600 MG: 200 TABLET, FILM COATED ORAL at 17:32

## 2019-11-02 RX ADMIN — LABETALOL HYDROCHLORIDE 300 MG: 200 TABLET, FILM COATED ORAL at 08:35

## 2019-11-02 RX ADMIN — AMLODIPINE BESYLATE 10 MG: 5 TABLET ORAL at 08:34

## 2019-11-02 RX ADMIN — Medication 10 ML: at 22:16

## 2019-11-02 RX ADMIN — LISINOPRIL 40 MG: 20 TABLET ORAL at 08:35

## 2019-11-02 RX ADMIN — CLONIDINE HYDROCHLORIDE 0.2 MG: 0.1 TABLET ORAL at 08:35

## 2019-11-02 NOTE — ED NOTES
TRANSFER - OUT REPORT:    Verbal report given to Newton Bunch RN(name) on Lupe Garcia  being transferred to [facility] telemetry unit bed 3234(unit) for routine progression of care       Report consisted of patients Situation, Background, Assessment and   Recommendations(SBAR). Information from the following report(s) SBAR was reviewed with the receiving nurse. Lines:   Peripheral IV 11/01/19 Left Antecubital (Active)   Site Assessment Clean, dry, & intact 11/1/2019  7:41 PM   Phlebitis Assessment 0 11/1/2019  7:41 PM   Infiltration Assessment 0 11/1/2019  7:41 PM   Dressing Status Clean, dry, & intact 11/1/2019  7:41 PM   Dressing Type Tape;Transparent 11/1/2019  7:41 PM   Hub Color/Line Status Blue 11/1/2019  7:41 PM   Action Taken Blood drawn 11/1/2019  7:41 PM   Alcohol Cap Used No 11/1/2019  7:41 PM        Opportunity for questions and clarification was provided.       Patient transported with:   Monitor   GABRIELLE

## 2019-11-02 NOTE — PROGRESS NOTES
Problem: Falls - Risk of  Goal: *Absence of Falls  Description  Document Waldemar Blane Fall Risk and appropriate interventions in the flowsheet.   Outcome: Progressing Towards Goal  Note:   Fall Risk Interventions:  Mobility Interventions: Patient to call before getting OOB         Medication Interventions: Patient to call before getting OOB

## 2019-11-02 NOTE — ED NOTES
Pt refused to take both Clonidine pills. Pt states, \"I don't need two. That's some strong medicine. That will kill me. \" Pt agreed to take 1 Clonidine pill for a dose of 0.1mg. NP Jammie and primary RN Ari Pike made aware.

## 2019-11-02 NOTE — CONSULTS
Urology Consult    Patient: Nolan Dixon MRN: [id number]  SSN: [id number]    YOB: 1950  Age: 77 y.o. Sex: male          Date of Consultation:  November 2, 2019  Requesting Physician: Lida Hill *  Reason for Consultation:  hematuria         History of Present Illness:  Nolan Dixon is a 77 y.o. male admitted 11/2/2019 to the hospital for Acute on chronic renal failure ([facility] [address] [N17.9, N18.9]. He was admitted for acute on chronic renal insufficiency and severe HTN. He is a poor historian. He reports falling yesterday. He was apparently evaluated at [facility] and d/c'd home. At some point yesterday, it seems that he had a straight catheterization. He reports gross hematuria since. He believes he had some type of prostate surgery in the past.  He denies voiding difficulty or pain. He had a CT that was personally reviewed. He has a very large prostate with bladder wall thickening, no hydro. Past Medical History:   Diagnosis Date    Diabetes ([facility] [address]     Hypertension     Kidney disease         Past Surgical History:   Procedure Laterality Date    HX GI      colonoscopy 6-7 yrs ago    DE COLON CA SCRN NOT HI RSK IND  10/21/2015            No Known Allergies     Prior to Admission medications    Medication Sig Start Date End Date Taking? Authorizing Provider   furosemide (LASIX) 40 mg tablet Take 40 mg by mouth daily. Provider, Historical   lisinopril (PRINIVIL, ZESTRIL) 40 mg tablet Take 40 mg by mouth daily. take one tablet by mouth every night at bedtime    Provider, Historical   oxyCODONE IR (ROXICODONE) 5 mg immediate release tablet Take 5 mg by mouth every four (4) hours as needed for Pain. Olya Holden MD   cloNIDine HCl (CATAPRES) 0.1 mg tablet Take 0.2 mg by mouth three (3) times daily. 9/8/19   Provider, Historical   ferrous sulfate 325 mg (65 mg iron) tablet Take 1 Tab by mouth two (2) times a day.     Provider, Historical   labetalol ([facility]) 100 mg tablet Take 3 Tabs by mouth two (2) times a day for 60 days. 19  Leona Martinez MD   amLODIPine (NORVASC) 10 mg tablet Take 1 Tab by mouth daily for 60 days. 19  Leona Martinez MD   methocarbamol (ROBAXIN) 500 mg tablet Take 500 mg by mouth two (2) times a day. Other, MD Bita   spironolactone (ALDACTONE) 25 mg tablet Take 25 mg by mouth daily. Other, MD Bita   MASSIMO PEN NEEDLE 32 gauge x \" ndle USE AS DIRECTED WITH INSULIN 18   Provider, Historical   aspirin delayed-release 81 mg tablet Take  by mouth as needed for Pain. Provider, Historical       Social History     Tobacco Use    Smoking status: Never Smoker    Smokeless tobacco: Never Used   Substance Use Topics    Alcohol use: No        Family History   Problem Relation Age of Onset    Hypertension Mother     Hypertension Father         ROS:  Admission ROS from 2019 was reviewed and changes (other than per HPI) include: none. Physical Exam:    Vital Signs:  Temp (24hrs), Av.9 °F (36.6 °C), Min:97.3 °F (36.3 °C), Max:98.3 °F (36.8 °C)   Blood pressure 124/81, pulse 68, temperature 97.3 °F (36.3 °C), resp. rate 16, SpO2 100 %.   I&O's:   0701 -  1900  In: -   Out: 750 [Urine:750]    Appearance: well-developed NAD   Conjunctiva/Lids: conjunctivae and lids normal   External Ears/Nose: normal no lesions or deformities   Neck: supple   Respiratory Effort: breathing easily   Lower Extremity: no edema   Abdomen/Flank: soft non-tender without masses; no CVA tenderness   Liver/Spleen: no organomegaly   Hernia: no ventral hernia   Gait/Station: normal   Skin Inspection: warm and dry   Mood/Affect: normal      Lab Review:     CBC   Recent Labs     19  1618 19  0255 19  1941 19  1209   WBC  --  6.1 6.2 6.5   HGB 8.6* 8.6* 8.2* 10.0*   HCT 27.0* 26.5* 25.3* 31.2*   PLT  --  242 239 248     CMP   Recent Labs     19  0255 19  1941 19  1209    142 143 K 3.8 3.9 3.9    106 110*   CO2 24 27 22   * 122* 89   BUN 51* 52* 47*   CREA 5.37* 5.49* 4.98*   CA 8.5 8.7 8.7   ALB  --  3.0* 3.1*   TBILI  --  0.6 0.6   SGOT  --  35 32   ALT  --  43 42       Other No results for input(s): INR, PSA, INREXT in the last 72 hours. No lab exists for component: PTT    UA:   Lab Results   Component Value Date/Time    Color ABRAHAM 11/01/2019 09:05 PM    Appearance CLOUDY (A) 11/01/2019 09:05 PM    Specific gravity 1.015 11/01/2019 09:05 PM    pH (UA) 5.5 11/01/2019 09:05 PM    Protein >300 (A) 11/01/2019 09:05 PM    Glucose NEGATIVE  11/01/2019 09:05 PM    Ketone NEGATIVE  11/01/2019 09:05 PM    Bilirubin NEGATIVE  11/01/2019 09:05 PM    Urobilinogen 0.2 11/01/2019 09:05 PM    Nitrites NEGATIVE  11/01/2019 09:05 PM    Leukocyte Esterase TRACE (A) 11/01/2019 09:05 PM    Epithelial cells FEW 11/01/2019 09:05 PM    Bacteria NEGATIVE  11/01/2019 09:05 PM    WBC 0-4 11/01/2019 09:05 PM    RBC  11/01/2019 09:05 PM       Cultures:      Imaging:      Assessment:     Active Problems:    Acute on chronic renal failure ([facility] [address] (11/2/2019)      Hematuria (11/2/2019)          Plan:     1. Hematuria is likely due to trauma from recent catheterization and his very large prostate. Check bladder scans to make sure he is emptying. He is already on doxazosin. Hematuria will likely resolve with conservative management. If he has difficulty voiding or very high residuals, a 22 Fr 3-way brice should be inserted and any blood clots removed initially with hand irrigation. Will follow. He should have outpt f/u to discuss treatment of his very enlarged prostate and consider cysto.     Signed By: Anabelle Olivier MD  - November 2, 2019

## 2019-11-02 NOTE — PROGRESS NOTES
1200 - troponin sent to lab. Troponin negative. 1400 - [facility]    1620 - H & H sent to lab. 1930 - Bedside shift change report given to [facility], RN (oncoming nurse) by Stormy Dickinson RN (offgoing nurse).  Report included the following information SBAR, Kardex, Procedure Summary, Intake/Output, MAR, Accordion, Recent Results, Med Rec Status and Cardiac Rhythm NSr.

## 2019-11-02 NOTE — CONSULTS
Nephrology Consult Note     Jarvis Vegaesa     [email or web address]. [email or web address]              Phone - [phone]   Patient: Leanna Cali   YOB: 1950    Date- 11/2/2019  MRN: [id number]             REASON FOR CONSULTATION: ASHVIN ON CKD  CONSULTING PHYSICIAN: DR. MOSCOSO    ADMIT DATE:11/2/2019 PATIENT PCP:Leland Albert MD     ASSESSMENT & Plan:     ASHVIN  on ckd  - etiology multiple - acute hypertensive nephropathy or GN- hepatitis related  +ve hep b and hep c   - follow bmp in am  - avoid diuretics  - hold lisinopril  - NO RRT indicated     Hypertensive emergency  - continue norvasc,   - increase labetalol  - start cardura  - stop clonidine due to side effect of dizziness. And if he is non compliant it will cause rebound hypertension.  - hold lisinopril    Anemia of ckd  - start epogen once bp better undercontrol    Edema  - he will need lasix restarted soon    CKD - f/b   - ckd likely due to dm + HTN          TYPE 2 DM  PROTEINURIA , Microscopic hematuria  - +ve hep b and hep c -      Right humeral neck fracture                  · Active Problems:  ·   Acute on chronic renal failure (HCC) (11/2/2019)  ·   ·   Hematuria (11/2/2019)  ·   ·     [x] High complexity decision making was performed  [x] Patient is at high-risk of decompensation with multiple organ involvement    Subjective:   HPI: Leanna Cali is a 77 y.o.  male. he is admitted with uncontrolled htn. He has multiple fall  At him. He was in [facility] recently. His cr 5.37 with bun 51 on admission  His cr. Was 4.4 on sept 5th 2019  He claims that he is taking all of his bp meds  His sbp was in 0' on admission  After receiving his home bp meds his most recent bp is 124/74 mm of hg  He has h/o hepatitis. He was seen by GI during previous admission  He has anemia  He has been followed by DR. Jose Alfredo Donaldson for his ckd  No c/o sob,   No c/o chest pain,   No c/o nausea or vomiting  No c/o  fever.   He reports that some of his bp meds makes him sleepy. Review of Systems:       A 11 point review of system was performed today. Pertinent positives and negatives are mentioned in the HPI. The reminder of the ROS is negative and noncontributory. Past Medical History:   Diagnosis Date    Diabetes ([address]     Hypertension     Kidney disease       Past Surgical History:   Procedure Laterality Date    HX GI      colonoscopy 6-7 yrs ago    OK COLON CA SCRN NOT HI RSK IND  10/21/2015           Prior to Admission medications    Medication Sig Start Date End Date Taking? Authorizing Provider   furosemide (LASIX) 40 mg tablet Take 40 mg by mouth daily. Provider, Historical   lisinopril (PRINIVIL, ZESTRIL) 40 mg tablet Take 40 mg by mouth daily. take one tablet by mouth every night at bedtime    Provider, Historical   oxyCODONE IR (ROXICODONE) 5 mg immediate release tablet Take 5 mg by mouth every four (4) hours as needed for Pain. Declan Pro MD   cloNIDine HCl (CATAPRES) 0.1 mg tablet Take 0.2 mg by mouth three (3) times daily. 9/8/19   Provider, Historical   ferrous sulfate 325 mg (65 mg iron) tablet Take 1 Tab by mouth two (2) times a day. Provider, Historical   labetalol (NORMODYNE) 100 mg tablet Take 3 Tabs by mouth two (2) times a day for 60 days. 9/5/19 11/4/19  Declan Pro MD   amLODIPine (NORVASC) 10 mg tablet Take 1 Tab by mouth daily for 60 days. 9/6/19 11/5/19  Declan Pro MD   methocarbamol (ROBAXIN) 500 mg tablet Take 500 mg by mouth two (2) times a day. Bita Hudson MD   spironolactone (ALDACTONE) 25 mg tablet Take 25 mg by mouth daily. Becca, MD Bita   MASSIMO PEN NEEDLE 32 gauge x 5/32\" ndle USE AS DIRECTED WITH INSULIN 1/14/18   Provider, Historical   aspirin delayed-release 81 mg tablet Take  by mouth as needed for Pain.     Provider, Historical     No Known Allergies   Social History     Tobacco Use    Smoking status: Never Smoker    Smokeless tobacco: Never Used Substance Use Topics    Alcohol use: No      Family History   Problem Relation Age of Onset    Hypertension Mother     Hypertension Father         Objective:      Patient Vitals for the past 24 hrs:   Temp Pulse Resp BP SpO2   11/02/19 1512 97.3 °F (36.3 °C) 68 16 124/81 100 %   11/02/19 1144 98 °F (36.7 °C) 80 16 154/87 100 %   11/02/19 0948    129/81    11/02/19 0828 98 °F (36.7 °C) 84 16 (!) 213/119 100 %   11/02/19 0312 98.3 °F (36.8 °C) 82 17 145/84 100 %   11/02/19 0214    (!) 190/102    11/02/19 0140    (!) 178/100    11/02/19 0028    (!) 200/120    11/02/19 0020 97.7 °F (36.5 °C) 79 19 (!) 201/119      11/02 0701 - 11/02 1900  In: -   Out: 400 [Urine:400]  Physical Exam:  General:Alert, No distress,   Eyes:No scleral icterus, No conjunctival pallor  Neck:Supple,no mass palpable,no thyromegaly  Lungs:Clears to auscultation Bilaterally, normal respiratory effort  CVS:RRR, S1 S2 normal,  No rub,  Abdomen:Soft, Non tender, No hepatosplenomegaly  Extremities: ++ LE edema  Skin:No rash or lesions, Warm and DRY   Psych: appropriate affect    :  No brice  Musculoskeletal : no redness, no joint tenderness  NEURO: Normal Speech, Non focal        CODE STATUS:  full  Care Plan discussed with:  Patient, nurse     Chart reviewed.       TOTAL TIME: 76 Minutes   y Reviewed previous records   y Discussion with patient and/or family and questions answered   y >50% of visit spent in counseling and coordination of care        ECG[de-identified] Rev:no  Xray/CT/US/MRI REV:yes  Lab Data Personally Reviewed: (see below)  Recent Labs     11/02/19 0255 11/01/19  1941 11/01/19  1209   WBC 6.1 6.2 6.5   HGB 8.6* 8.2* 10.0*    239 248   ANEU  --  5.1 5.0    142 143   K 3.8 3.9 3.9   * 122* 89   BUN 51* 52* 47*   CREA 5.37* 5.49* 4.98*   ALT  --  43 42   SGOT  --  35 32   TBILI  --  0.6 0.6   AP  --  83 93   CA 8.5 8.7 8.7     Lab Results   Component Value Date/Time    Color ABRAHAM 11/01/2019 09:05 PM Appearance CLOUDY (A) 11/01/2019 09:05 PM    Specific gravity 1.015 11/01/2019 09:05 PM    pH (UA) 5.5 11/01/2019 09:05 PM    Protein >300 (A) 11/01/2019 09:05 PM    Glucose NEGATIVE  11/01/2019 09:05 PM    Ketone NEGATIVE  11/01/2019 09:05 PM    Bilirubin NEGATIVE  11/01/2019 09:05 PM    Urobilinogen 0.2 11/01/2019 09:05 PM    Nitrites NEGATIVE  11/01/2019 09:05 PM    Leukocyte Esterase TRACE (A) 11/01/2019 09:05 PM    Epithelial cells FEW 11/01/2019 09:05 PM    Bacteria NEGATIVE  11/01/2019 09:05 PM    WBC 0-4 11/01/2019 09:05 PM    RBC  11/01/2019 09:05 PM       Lab Results   Component Value Date/Time    Iron 58 09/03/2019 11:30 AM    TIBC 200 (L) 09/03/2019 11:30 AM    Iron % saturation 29 09/03/2019 11:30 AM     Lab Results   Component Value Date/Time    Culture result: NO GROWTH 5 DAYS 09/04/2019 06:29 PM     Prior to Admission Medications   Prescriptions Last Dose Informant Patient Reported? Taking? MASSIMO PEN NEEDLE 32 gauge x 5/32\" ndle 11/1/2019 at 1000  Yes No   Sig: USE AS DIRECTED WITH INSULIN   amLODIPine (NORVASC) 10 mg tablet 11/1/2019 at 12 noon  No No   Sig: Take 1 Tab by mouth daily for 60 days. aspirin delayed-release 81 mg tablet 11/1/2019 at 12 noon  Yes No   Sig: Take  by mouth as needed for Pain. cloNIDine HCl (CATAPRES) 0.1 mg tablet 11/1/2019 at 12 noon  Yes No   Sig: Take 0.2 mg by mouth three (3) times daily. ferrous sulfate 325 mg (65 mg iron) tablet 11/1/2019 at 1200  Yes No   Sig: Take 1 Tab by mouth two (2) times a day. furosemide (LASIX) 40 mg tablet 11/1/2019 at 12 noon  Yes No   Sig: Take 40 mg by mouth daily. labetalol (NORMODYNE) 100 mg tablet 11/1/2019 at 12 noon  No No   Sig: Take 3 Tabs by mouth two (2) times a day for 60 days. lisinopril (PRINIVIL, ZESTRIL) 40 mg tablet 10/31/2019 at 1300  Yes No   Sig: Take 40 mg by mouth daily.  take one tablet by mouth every night at bedtime   methocarbamol (ROBAXIN) 500 mg tablet 10/31/2019 at 1500 Self Yes No   Sig: Take 500 mg by mouth two (2) times a day. oxyCODONE IR (ROXICODONE) 5 mg immediate release tablet 10/30/2019 at 1500  Yes No   Sig: Take 5 mg by mouth every four (4) hours as needed for Pain. spironolactone (ALDACTONE) 25 mg tablet 10/31/2019 at 2000 Self Yes No   Sig: Take 25 mg by mouth daily. Facility-Administered Medications: None     Imaging:    Medications list Personally Reviewed   [x]      Yes     []               No    Thank you for allowing us to participate in the care this patient. We will follow patient with you. Signed By: Katie Rodriguez MD  [facility]  [facility] SYST FRANCISCarte[facility] CareCARE [facility] UnitTONYA CarrascoB[facility] [address], [address] [place], [address]  Phone - [phone]         Fax - [phone] [facility] Office  [address] [place] [address]  Phone - [phone]        Fax - [phone]     [email or web address]

## 2019-11-02 NOTE — PROGRESS NOTES
Hospitalist Progress Note    NAME: Yosef Louise   :  1950   MRN:  [id number]       Assessment / Plan:    Hypertensive Urgency   - now improving   - continue BP medications including labetalol, clonidine , norvasc  - Hold Lisinopril due to ASHVIN/CKD  - Hydralazine as needed     Gross Hematuria   - possibly traumatic , post straight cath earlier in ER  - Hold Heparin for now   - Recheck cbc in AM   - Urology consult       Acute on chronic renal failure  Admit patient to telemetry monitoring  nephrology consultation  Avoid nephrotoxic medication    Generalized weakness most likely secondary to medical decompensation  Physical therapy evaluation    Anemia most likely secondary to chronic kidney disease  Monitoring  Transfuse as needed    DM  Start patient sliding scale insulin    Chronic diastolic heart failure  Hold Lasix because of worsening kidney function     Right humeral neck fracture.  -pain med PRN     30.0 - 39.9 Obese / There is no height or weight on file to calculate BMI. Code status: Full  Prophylaxis: SCD's  Recommended Disposition: Home w/Family     Subjective:     Chief Complaint / Reason for Physician Visit  \"Elevated BP \". Discussed with RN events overnight. Review of Systems:  Symptom Y/N Comments  Symptom Y/N Comments   Fever/Chills n   Chest Pain n    Poor Appetite    Edema y    Cough    Abdominal Pain n    Sputum    Joint Pain     SOB/POWELL n   Pruritis/Rash     Nausea/vomit n   Tolerating PT/OT     Diarrhea    Tolerating Diet     Constipation    Other hematuria       Could NOT obtain due to:      Objective:     VITALS:   Last 24hrs VS reviewed since prior progress note.  Most recent are:  Patient Vitals for the past 24 hrs:   Temp Pulse Resp BP SpO2   19 0828 98 °F (36.7 °C) 84 16 (!) 213/119 100 %   19 0312 98.3 °F (36.8 °C) 82 17 145/84 100 %   19 0214    (!) 190/102    19 0140    (!) 178/100    19 0028    (!) 200/120    19 0020 97.7 °F (36.5 °C) 79 19 (!) 201/119        Intake/Output Summary (Last 24 hours) at 11/2/2019 0927  Last data filed at 11/2/2019 0624  Gross per 24 hour   Intake    Output 50 ml   Net -50 ml        PHYSICAL EXAM:  General: WD, WN. Alert, cooperative, no acute distress    EENT:  EOMI. Anicteric sclerae. MMM  Resp:  CTA bilaterally, no wheezing or rales. No accessory muscle use  CV:  Regular  rhythm,  No edema  GI:  Soft, Non distended, Non tender.  +Bowel sounds  Neurologic:  Alert and oriented X 3, normal speech,   Psych:   Good insight. Not anxious nor agitated  Skin:  No rashes. No jaundice    Reviewed most current lab test results and cultures  YES  Reviewed most current radiology test results   YES  Review and summation of old records today    NO  Reviewed patient's current orders and MAR    YES  PMH/ reviewed - no change compared to H&P  ________________________________________________________________________  Care Plan discussed with:    Comments   Patient x    Family      RN x    Care Manager     Consultant                        Multidiciplinary team rounds were held today with , nursing, pharmacist and clinical coordinator. Patient's plan of care was discussed; medications were reviewed and discharge planning was addressed. ________________________________________________________________________  Total NON critical care TIME:  30   Minutes    Total CRITICAL CARE TIME Spent:   Minutes non procedure based      Comments   >50% of visit spent in counseling and coordination of care     ________________________________________________________________________  Johan Nolan MD     Procedures: see electronic medical records for all procedures/Xrays and details which were not copied into this note but were reviewed prior to creation of Plan. LABS:  I reviewed today's most current labs and imaging studies.   Pertinent labs include:  Recent Labs     11/02/19  0255 11/01/19 1941 11/01/19  1209   WBC 6.1 6.2 6.5   HGB 8.6* 8.2* 10.0*   HCT 26.5* 25.3* 31.2*    239 248     Recent Labs     11/02/19  0255 11/01/19 1941 11/01/19  1209    142 143   K 3.8 3.9 3.9    106 110*   CO2 24 27 22   * 122* 89   BUN 51* 52* 47*   CREA 5.37* 5.49* 4.98*   CA 8.5 8.7 8.7   ALB  --  3.0* 3.1*   TBILI  --  0.6 0.6   SGOT  --  35 32   ALT  --  43 42       Signed: Belen Arambula MD

## 2019-11-02 NOTE — ED NOTES
Verbal shift change report given to [address] [place] [address] Road  (oncoming nurse) by [employer]  (offgoing nurse). Report included the following information SBAR, Kardex, ED Summary, Intake/Output, MAR and Recent Results.

## 2019-11-02 NOTE — H&P
Hospitalist Admission Note    NAME: Leanna Cali   :  1950   MRN:  [id number]     Date/Time:  2019 5:54 AM    Patient PCP: Myah Shook MD  ______________________________________________________________________  Given the patient's current clinical presentation, I have a high level of concern for decompensation if discharged from the emergency department. Complex decision making was performed, which includes reviewing the patient's available past medical records, laboratory results, and x-ray films. My assessment of this patient's clinical condition and my plan of care is as follows.     Assessment / Plan:    Acute on chronic renal failure  Admit patient to telemetry monitoringnephrology consultation  Avoid nephrotoxic medication    Generalized weakness most likely secondary to medical decompensation  Physical therapy evaluation    Anemia most likely secondary to chronic kidney disease  Monitoring  Transfuse as needed    Hypertension  Continue home antihypertensive medication    DM  Start patient sliding scale insulin    Chronic diastolic heart failure  Hold Lasix because of worsening kidney function    Right humeral neck fracture.  -pain med PRN       Code Status: full   Surrogate Decision Maker:Emeterio Neely    DVT Prophylaxis: heparin   GI Prophylaxis: not indicated          Subjective:   CHIEF COMPLAINT: weakness , multiple fall     HISTORY OF PRESENT ILLNESS:     77years old male with past medical history significant for hypertension, DM, chronic kidney disease was transferred from [facility] ED for evaluation of generalized weakness associated with worsening kidney function, according to the patient patient stated he feel weak and had multiple fall yesterday, yesterday patient was seen at [facility] ED for high blood pressure and was discharged home patient denies any dizziness any chest pain any shortness of breath, blood work was significant for worsening kidney function creatinine 5.4 and BUN 52. We were asked to admit for work up and evaluation of the above problems. Past Medical History:   Diagnosis Date    Diabetes ([address]     Hypertension     Kidney disease         Past Surgical History:   Procedure Laterality Date    HX GI      colonoscopy 6-7 yrs ago    FL COLON CA SCRN NOT HI RSK IND  10/21/2015            Social History     Tobacco Use    Smoking status: Never Smoker    Smokeless tobacco: Never Used   Substance Use Topics    Alcohol use: No        Family History   Problem Relation Age of Onset    Hypertension Mother     Hypertension Father      No Known Allergies     Prior to Admission medications    Medication Sig Start Date End Date Taking? Authorizing Provider   furosemide (LASIX) 40 mg tablet Take 40 mg by mouth daily. Provider, Historical   lisinopril (PRINIVIL, ZESTRIL) 40 mg tablet Take 40 mg by mouth daily. take one tablet by mouth every night at bedtime    Provider, Historical   oxyCODONE IR (ROXICODONE) 5 mg immediate release tablet Take 5 mg by mouth every four (4) hours as needed for Pain. Wicho Felix MD   cloNIDine HCl (CATAPRES) 0.1 mg tablet Take 0.2 mg by mouth three (3) times daily. 9/8/19   Provider, Historical   ferrous sulfate 325 mg (65 mg iron) tablet Take 1 Tab by mouth two (2) times a day. Provider, Historical   labetalol (NORMODYNE) 100 mg tablet Take 3 Tabs by mouth two (2) times a day for 60 days. 9/5/19 11/4/19  Wicho Felix MD   amLODIPine (NORVASC) 10 mg tablet Take 1 Tab by mouth daily for 60 days. 9/6/19 11/5/19  Wicho Felix MD   methocarbamol (ROBAXIN) 500 mg tablet Take 500 mg by mouth two (2) times a day. Becca, MD Bita   spironolactone (ALDACTONE) 25 mg tablet Take 25 mg by mouth daily.     Other, MD Bita   MASSIMO PEN NEEDLE 32 gauge x 5/32\" ndle USE AS DIRECTED WITH INSULIN 1/14/18   Provider, Historical   aspirin delayed-release 81 mg tablet Take  by mouth as needed for Pain. Provider, Historical       REVIEW OF SYSTEMS:     I am not able to complete the review of systems because: The patient is intubated and sedated    The patient has altered mental status due to his acute medical problems    The patient has baseline aphasia from prior stroke(s)    The patient has baseline dementia and is not reliable historian    The patient is in acute medical distress and unable to provide information           Total of 12 systems reviewed as follows:       POSITIVE= underlined text  Negative = text not underlined  General:  fever, chills, sweats, generalized weakness, weight loss/gain,      loss of appetite   Eyes:    blurred vision, eye pain, loss of vision, double vision  ENT:    rhinorrhea, pharyngitis   Respiratory:   cough, sputum production, SOB, POWELL, wheezing, pleuritic pain   Cardiology:   chest pain, palpitations, orthopnea, PND, edema, syncope   Gastrointestinal:  abdominal pain , N/V, diarrhea, dysphagia, constipation, bleeding   Genitourinary:  frequency, urgency, dysuria, hematuria, incontinence   Muskuloskeletal :  arthralgia, myalgia, back pain  Hematology:  easy bruising, nose or gum bleeding, lymphadenopathy   Dermatological: rash, ulceration, pruritis, color change / jaundice  Endocrine:   hot flashes or polydipsia   Neurological:  headache, dizziness, confusion, focal weakness, paresthesia,     Speech difficulties, memory loss, gait difficulty  Psychological: Feelings of anxiety, depression, agitation    Objective:   VITALS:    Visit Vitals  /84 (BP 1 Location: Left arm)   Pulse 82   Temp 98.3 °F (36.8 °C)   Resp 17   SpO2 100%       PHYSICAL EXAM:    General:    Alert, cooperative, no distress, appears stated age. HEENT: Atraumatic, anicteric sclerae, pink conjunctivae     No oral ulcers, mucosa moist, throat clear, dentition fair  Neck:  Supple, symmetrical,  thyroid: non tender  Lungs:   Clear to auscultation bilaterally. No Wheezing or Rhonchi.  No rales.  Chest wall:  No tenderness  No Accessory muscle use. Heart:   Regular  rhythm,  No  murmur   B/l  edema  Abdomen:   Soft, non-tender. Not distended. Bowel sounds normal  Extremities: No cyanosis. No clubbing,      Skin turgor normal, Capillary refill normal, Radial dial pulse 2+  Skin:     Not pale. Not Jaundiced  No rashes   Psych:  Good insight. Not depressed. Not anxious or agitated. Neurologic: EOMs intact. No facial asymmetry. No aphasia or slurred speech. Symmetrical strength, Sensation grossly intact. Alert and oriented X 4.     _______________________________________________________________________  Care Plan discussed with:    Comments   Patient y    Family      RN y    Care Manager                    Consultant:      _______________________________________________________________________  Expected  Disposition:   Home with Family y   HH/PT/OT/RN    SNF/LTC    HALINA    ________________________________________________________________________  TOTAL TIME:  61  Minutes    Critical Care Provided     Minutes non procedure based      Comments    y Reviewed previous records   >50% of visit spent in counseling and coordination of care y Discussion with patient and/or family and questions answered       ________________________________________________________________________  Signed: Rashad Squires MD    Procedures: see electronic medical records for all procedures/Xrays and details which were not copied into this note but were reviewed prior to creation of Plan.     LAB DATA REVIEWED:    Recent Results (from the past 24 hour(s))   EKG, 12 LEAD, INITIAL    Collection Time: 11/01/19 11:51 AM   Result Value Ref Range    Ventricular Rate 86 BPM    Atrial Rate 86 BPM    P-R Interval 168 ms    QRS Duration 124 ms    Q-T Interval 398 ms    QTC Calculation (Bezet) 476 ms    Calculated P Axis 47 degrees    Calculated R Axis 123 degrees    Calculated T Axis -17 degrees    Diagnosis       ** Poor data quality, interpretation may be adversely affected  Normal sinus rhythm  Possible Left atrial enlargement  Right bundle branch block  Nonspecific ST abnormality  Abnormal ECG  Confirmed by Andrea Bautista MD. ([id number]) on 11/1/2019 5:13:44 PM     TROPONIN I    Collection Time: 11/01/19 12:09 PM   Result Value Ref Range    Troponin-I, Qt. <0.05 <7.73 ng/mL   METABOLIC PANEL, COMPREHENSIVE    Collection Time: 11/01/19 12:09 PM   Result Value Ref Range    Sodium 143 136 - 145 mmol/L    Potassium 3.9 3.5 - 5.1 mmol/L    Chloride 110 (H) 97 - 108 mmol/L    CO2 22 21 - 32 mmol/L    Anion gap 11 5 - 15 mmol/L    Glucose 89 65 - 100 mg/dL    BUN 47 (H) 6 - 20 MG/DL    Creatinine 4.98 (H) 0.70 - 1.30 MG/DL    BUN/Creatinine ratio 9 (L) 12 - 20      GFR est AA 14 (L) >60 ml/min/1.73m2    GFR est non-AA 12 (L) >60 ml/min/1.73m2    Calcium 8.7 8.5 - 10.1 MG/DL    Bilirubin, total 0.6 0.2 - 1.0 MG/DL    ALT (SGPT) 42 12 - 78 U/L    AST (SGOT) 32 15 - 37 U/L    Alk. phosphatase 93 45 - 117 U/L    Protein, total 8.0 6.4 - 8.2 g/dL    Albumin 3.1 (L) 3.5 - 5.0 g/dL    Globulin 4.9 (H) 2.0 - 4.0 g/dL    A-G Ratio 0.6 (L) 1.1 - 2.2     CBC WITH AUTOMATED DIFF    Collection Time: 11/01/19 12:09 PM   Result Value Ref Range    WBC 6.5 4.1 - 11.1 K/uL    RBC 3.31 (L) 4.10 - 5.70 M/uL    HGB 10.0 (L) 12.1 - 17.0 g/dL    HCT 31.2 (L) 36.6 - 50.3 %    MCV 94.3 80.0 - 99.0 FL    MCH 30.2 26.0 - 34.0 PG    MCHC 32.1 30.0 - 36.5 g/dL    RDW 14.6 (H) 11.5 - 14.5 %    PLATELET 744 891 - 514 K/uL    MPV 10.0 8.9 - 12.9 FL    NRBC 0.0 0  WBC    ABSOLUTE NRBC 0.00 0.00 - 0.01 K/uL    NEUTROPHILS 79 (H) 32 - 75 %    LYMPHOCYTES 12 12 - 49 %    MONOCYTES 7 5 - 13 %    EOSINOPHILS 1 0 - 7 %    BASOPHILS 0 0 - 1 %    IMMATURE GRANULOCYTES 1 (H) 0.0 - 0.5 %    ABS. NEUTROPHILS 5.0 1.8 - 8.0 K/UL    ABS. LYMPHOCYTES 0.8 0.8 - 3.5 K/UL    ABS. MONOCYTES 0.5 0.0 - 1.0 K/UL    ABS. EOSINOPHILS 0.1 0.0 - 0.4 K/UL    ABS. BASOPHILS 0.0 0.0 - 0.1 K/UL    ABS. IMM. GRANS. 0.1 (H) 0.00 - 0.04 K/UL    DF SMEAR SCANNED      RBC COMMENTS NORMOCYTIC, NORMOCHROMIC     SAMPLES BEING HELD    Collection Time: 11/01/19 12:09 PM   Result Value Ref Range    SAMPLES BEING HELD 1SST     COMMENT        Add-on orders for these samples will be processed based on acceptable specimen integrity and analyte stability, which may vary by analyte. CBC WITH AUTOMATED DIFF    Collection Time: 11/01/19  7:41 PM   Result Value Ref Range    WBC 6.2 4.1 - 11.1 K/uL    RBC 2.72 (L) 4.10 - 5.70 M/uL    HGB 8.2 (L) 12.1 - 17.0 g/dL    HCT 25.3 (L) 36.6 - 50.3 %    MCV 93.0 80.0 - 99.0 FL    MCH 30.1 26.0 - 34.0 PG    MCHC 32.4 30.0 - 36.5 g/dL    RDW 14.5 11.5 - 14.5 %    PLATELET 915 126 - 380 K/uL    MPV 10.4 8.9 - 12.9 FL    NRBC 0.0 0  WBC    ABSOLUTE NRBC 0.00 0.00 - 0.01 K/uL    NEUTROPHILS 83 (H) 32 - 75 %    LYMPHOCYTES 10 (L) 12 - 49 %    MONOCYTES 6 5 - 13 %    EOSINOPHILS 0 0 - 7 %    BASOPHILS 0 0 - 1 %    IMMATURE GRANULOCYTES 1 (H) 0.0 - 0.5 %    ABS. NEUTROPHILS 5.1 1.8 - 8.0 K/UL    ABS. LYMPHOCYTES 0.6 (L) 0.8 - 3.5 K/UL    ABS. MONOCYTES 0.4 0.0 - 1.0 K/UL    ABS. EOSINOPHILS 0.0 0.0 - 0.4 K/UL    ABS. BASOPHILS 0.0 0.0 - 0.1 K/UL    ABS. IMM. GRANS. 0.1 (H) 0.00 - 0.04 K/UL    DF SMEAR SCANNED      RBC COMMENTS ANISOCYTOSIS  1+       METABOLIC PANEL, COMPREHENSIVE    Collection Time: 11/01/19  7:41 PM   Result Value Ref Range    Sodium 142 136 - 145 mmol/L    Potassium 3.9 3.5 - 5.1 mmol/L    Chloride 106 97 - 108 mmol/L    CO2 27 21 - 32 mmol/L    Anion gap 9 5 - 15 mmol/L    Glucose 122 (H) 65 - 100 mg/dL    BUN 52 (H) 6 - 20 MG/DL    Creatinine 5.49 (H) 0.70 - 1.30 MG/DL    BUN/Creatinine ratio 9 (L) 12 - 20      GFR est AA 13 (L) >60 ml/min/1.73m2    GFR est non-AA 10 (L) >60 ml/min/1.73m2    Calcium 8.7 8.5 - 10.1 MG/DL    Bilirubin, total 0.6 0.2 - 1.0 MG/DL    ALT (SGPT) 43 12 - 78 U/L    AST (SGOT) 35 15 - 37 U/L    Alk.  phosphatase 83 45 - 117 U/L    Protein, total 7.5 6.4 - 8.2 g/dL    Albumin 3.0 (L) 3.5 - 5.0 g/dL    Globulin 4.5 (H) 2.0 - 4.0 g/dL    A-G Ratio 0.7 (L) 1.1 - 2.2     CK W/ CKMB & INDEX    Collection Time: 11/01/19  7:41 PM   Result Value Ref Range     39 - 308 U/L    CK - MB 3.0 <3.6 NG/ML    CK-MB Index 1.0 0.0 - 2.5     TROPONIN I    Collection Time: 11/01/19  7:41 PM   Result Value Ref Range    Troponin-I, Qt. <0.05 <0.05 ng/mL   EKG, 12 LEAD, INITIAL    Collection Time: 11/01/19  7:43 PM   Result Value Ref Range    Ventricular Rate 78 BPM    Atrial Rate 78 BPM    P-R Interval 146 ms    QRS Duration 108 ms    Q-T Interval 404 ms    QTC Calculation (Bezet) 460 ms    Calculated P Axis 19 degrees    Calculated R Axis -19 degrees    Calculated T Axis 89 degrees    Diagnosis       Normal sinus rhythm  Incomplete right bundle branch block  ST & T wave abnormality, consider lateral ischemia  Abnormal ECG  When compared with ECG of 01-NOV-2019 11:51,  Incomplete right bundle branch block has replaced Right bundle branch block  ST elevation now present in Inferior leads  T wave inversion no longer evident in Inferior leads  T wave inversion more evident in Lateral leads     URINALYSIS W/ REFLEX CULTURE    Collection Time: 11/01/19  9:05 PM   Result Value Ref Range    Color ABRAHAM      Appearance CLOUDY (A) CLEAR      Specific gravity 1.015 1.003 - 1.030      pH (UA) 5.5 5.0 - 8.0      Protein >300 (A) NEG mg/dL    Glucose NEGATIVE  NEG mg/dL    Ketone NEGATIVE  NEG mg/dL    Bilirubin NEGATIVE  NEG      Blood LARGE (A) NEG      Urobilinogen 0.2 0.2 - 1.0 EU/dL    Nitrites NEGATIVE  NEG      Leukocyte Esterase TRACE (A) NEG      WBC 0-4 0 - 4 /hpf    RBC  0 - 5 /hpf    Epithelial cells FEW FEW /lpf    Bacteria NEGATIVE  NEG /hpf    UA:UC IF INDICATED CULTURE NOT INDICATED BY UA RESULT CNI     DRUG SCREEN, URINE    Collection Time: 11/01/19  9:05 PM   Result Value Ref Range    AMPHETAMINES NEGATIVE  NEG      BARBITURATES NEGATIVE  NEG      BENZODIAZEPINES NEGATIVE NEG      COCAINE NEGATIVE  NEG      METHADONE NEGATIVE  NEG      OPIATES NEGATIVE  NEG      PCP(PHENCYCLIDINE) NEGATIVE  NEG      THC (TH-CANNABINOL) NEGATIVE  NEG      Drug screen comment (NOTE)    OCCULT BLOOD, STOOL    Collection Time: 11/01/19  9:06 PM   Result Value Ref Range    Occult blood, stool NEGATIVE  NEG     GLUCOSE, POC    Collection Time: 11/02/19  1:49 AM   Result Value Ref Range    Glucose (POC) 107 (H) 65 - 100 mg/dL    Performed by Unkown     METABOLIC PANEL, BASIC    Collection Time: 11/02/19  2:55 AM   Result Value Ref Range    Sodium 140 136 - 145 mmol/L    Potassium 3.8 3.5 - 5.1 mmol/L    Chloride 108 97 - 108 mmol/L    CO2 24 21 - 32 mmol/L    Anion gap 8 5 - 15 mmol/L    Glucose 114 (H) 65 - 100 mg/dL    BUN 51 (H) 6 - 20 MG/DL    Creatinine 5.37 (H) 0.70 - 1.30 MG/DL    BUN/Creatinine ratio 9 (L) 12 - 20      GFR est AA 13 (L) >60 ml/min/1.73m2    GFR est non-AA 11 (L) >60 ml/min/1.73m2    Calcium 8.5 8.5 - 10.1 MG/DL   CBC W/O DIFF    Collection Time: 11/02/19  2:55 AM   Result Value Ref Range    WBC 6.1 4.1 - 11.1 K/uL    RBC 2.85 (L) 4.10 - 5.70 M/uL    HGB 8.6 (L) 12.1 - 17.0 g/dL    HCT 26.5 (L) 36.6 - 50.3 %    MCV 93.0 80.0 - 99.0 FL    MCH 30.2 26.0 - 34.0 PG    MCHC 32.5 30.0 - 36.5 g/dL    RDW 14.3 11.5 - 14.5 %    PLATELET 585 420 - 993 K/uL    MPV 10.3 8.9 - 12.9 FL    NRBC 0.0 0  WBC    ABSOLUTE NRBC 0.00 0.00 - 0.01 K/uL   SAMPLES BEING HELD    Collection Time: 11/02/19  2:55 AM   Result Value Ref Range    SAMPLES BEING HELD SST BL     COMMENT        Add-on orders for these samples will be processed based on acceptable specimen integrity and analyte stability, which may vary by analyte.    TROPONIN I    Collection Time: 11/02/19  2:55 AM   Result Value Ref Range    Troponin-I, Qt. <0.05 <0.05 ng/mL

## 2019-11-03 LAB
ANION GAP SERPL CALC-SCNC: 7 MMOL/L (ref 5–15)
BUN SERPL-MCNC: 60 MG/DL (ref 6–20)
BUN/CREAT SERPL: 10 (ref 12–20)
CALCIUM SERPL-MCNC: 7.9 MG/DL (ref 8.5–10.1)
CHLORIDE SERPL-SCNC: 108 MMOL/L (ref 97–108)
CO2 SERPL-SCNC: 23 MMOL/L (ref 21–32)
CREAT SERPL-MCNC: 5.96 MG/DL (ref 0.7–1.3)
ERYTHROCYTE [DISTWIDTH] IN BLOOD BY AUTOMATED COUNT: 14.5 % (ref 11.5–14.5)
GLUCOSE BLD STRIP.AUTO-MCNC: 108 MG/DL (ref 65–100)
GLUCOSE BLD STRIP.AUTO-MCNC: 109 MG/DL (ref 65–100)
GLUCOSE BLD STRIP.AUTO-MCNC: 114 MG/DL (ref 65–100)
GLUCOSE BLD STRIP.AUTO-MCNC: 148 MG/DL (ref 65–100)
GLUCOSE SERPL-MCNC: 95 MG/DL (ref 65–100)
HCT VFR BLD AUTO: 22.4 % (ref 36.6–50.3)
HCT VFR BLD AUTO: 22.9 % (ref 36.6–50.3)
HGB BLD-MCNC: 7.2 G/DL (ref 12.1–17)
HGB BLD-MCNC: 7.4 G/DL (ref 12.1–17)
MCH RBC QN AUTO: 30.3 PG (ref 26–34)
MCHC RBC AUTO-ENTMCNC: 32.3 G/DL (ref 30–36.5)
MCV RBC AUTO: 93.9 FL (ref 80–99)
NRBC # BLD: 0 K/UL (ref 0–0.01)
NRBC BLD-RTO: 0 PER 100 WBC
PLATELET # BLD AUTO: 198 K/UL (ref 150–400)
PMV BLD AUTO: 10.4 FL (ref 8.9–12.9)
POTASSIUM SERPL-SCNC: 4.1 MMOL/L (ref 3.5–5.1)
RBC # BLD AUTO: 2.44 M/UL (ref 4.1–5.7)
SERVICE CMNT-IMP: ABNORMAL
SODIUM SERPL-SCNC: 138 MMOL/L (ref 136–145)
WBC # BLD AUTO: 4.2 K/UL (ref 4.1–11.1)

## 2019-11-03 PROCEDURE — 82962 GLUCOSE BLOOD TEST: CPT

## 2019-11-03 PROCEDURE — 74011250636 HC RX REV CODE- 250/636: Performed by: INTERNAL MEDICINE

## 2019-11-03 PROCEDURE — 3331090001 HH PPS REVENUE CREDIT

## 2019-11-03 PROCEDURE — 3331090002 HH PPS REVENUE DEBIT

## 2019-11-03 PROCEDURE — 65660000000 HC RM CCU STEPDOWN

## 2019-11-03 PROCEDURE — 80048 BASIC METABOLIC PNL TOTAL CA: CPT

## 2019-11-03 PROCEDURE — 85018 HEMOGLOBIN: CPT

## 2019-11-03 PROCEDURE — 51798 US URINE CAPACITY MEASURE: CPT

## 2019-11-03 PROCEDURE — 36415 COLL VENOUS BLD VENIPUNCTURE: CPT

## 2019-11-03 PROCEDURE — 74011250636 HC RX REV CODE- 250/636: Performed by: FAMILY MEDICINE

## 2019-11-03 PROCEDURE — 74011250637 HC RX REV CODE- 250/637: Performed by: INTERNAL MEDICINE

## 2019-11-03 PROCEDURE — 74011250637 HC RX REV CODE- 250/637: Performed by: UROLOGY

## 2019-11-03 PROCEDURE — 85027 COMPLETE CBC AUTOMATED: CPT

## 2019-11-03 RX ORDER — FINASTERIDE 5 MG/1
5 TABLET, FILM COATED ORAL DAILY
Status: DISCONTINUED | OUTPATIENT
Start: 2019-11-03 | End: 2019-11-18 | Stop reason: HOSPADM

## 2019-11-03 RX ADMIN — IRON SUCROSE 200 MG: 20 INJECTION, SOLUTION INTRAVENOUS at 12:27

## 2019-11-03 RX ADMIN — LABETALOL HYDROCHLORIDE 600 MG: 200 TABLET, FILM COATED ORAL at 08:42

## 2019-11-03 RX ADMIN — EPOETIN ALFA-EPBX 12000 UNITS: 10000 INJECTION, SOLUTION INTRAVENOUS; SUBCUTANEOUS at 21:15

## 2019-11-03 RX ADMIN — FINASTERIDE 5 MG: 5 TABLET, FILM COATED ORAL at 10:48

## 2019-11-03 RX ADMIN — Medication 10 ML: at 07:43

## 2019-11-03 RX ADMIN — Medication 10 ML: at 22:40

## 2019-11-03 RX ADMIN — AMLODIPINE BESYLATE 10 MG: 5 TABLET ORAL at 08:42

## 2019-11-03 RX ADMIN — Medication 10 ML: at 13:48

## 2019-11-03 RX ADMIN — LABETALOL HYDROCHLORIDE 600 MG: 200 TABLET, FILM COATED ORAL at 17:14

## 2019-11-03 NOTE — PROGRESS NOTES
Nephrology Progress Note     Jarvis Carrera     [email or web address]                  Phone - [phone]   Patient: Sharonda Steen   Date- 11/3/2019        Admit Date: 11/2/2019  YOB: 1950             CC: Follow up for ashvin on ckd          Subjective: Interval History:   -  bp much better  C/o hematuria  No c/o sob,   No c/o chest pain,   No c/o nausea or vomiting  No c/o  fever. ROS:- as above   Assessment & Plan:       ASHVIN  on ckd  - etiology multiple - acute hypertensive nephropathy or GN- hepatitis related  +ve hep b and hep c  + /- urinary retention  - follow bmp in am  - avoid diuretics  - hold lisinopril  - NO RRT indicated - no uremic symptoms     Hypertensive emergency  - continue norvasc,   - continue labetalol  - continue cardura  - stop clonidine due to side effect of dizziness. And if he is non compliant it will cause rebound hypertension.  - hold lisinopril     Anemia of ckd  - start epogen    Hematuria- urinary retention  - urology input noted     Edema  - he will need lasix restarted soon     CKD - f/b   - ckd likely due to dm + HTN          TYPE 2 DM  PROTEINURIA , Microscopic hematuria  - +ve hep b and hep c -      Right humeral neck fracture                Physical exam:   GEN: NAD  NECK- Supple, no mass  RESP: Clear b/l, no wheezing, No accessory muscle use  CVS: RRR,S1,S2    ABDO: soft , non tender, No mass  EXT: ++Edema   NEURO: normal speech, non focal    Care Plan discussed with: patient  Objective:   Visit Vitals  /84 (BP 1 Location: Right arm, BP Patient Position: Sitting)   Pulse 67   Temp 97.5 °F (36.4 °C)   Resp 18   SpO2 99%     There were no vitals filed for this visit. 11/01 1901 - 11/03 0700  In: -   Out: 800 [Urine:800]    Intake/Output Summary (Last 24 hours) at 11/3/2019 1125  Last data filed at 11/3/2019 0845  Gross per 24 hour   Intake    Output 1450 ml   Net -1450 ml      Chart reviewed.        Pertinent Notes reviewed. Medication list  reviewed   Current Facility-Administered Medications   Medication    finasteride (PROSCAR) tablet 5 mg    sodium chloride (NS) flush 5-40 mL    sodium chloride (NS) flush 5-40 mL    acetaminophen (TYLENOL) tablet 650 mg    ondansetron (ZOFRAN) injection 4 mg    bisacodyl (DULCOLAX) tablet 5 mg    glucose chewable tablet 16 g    dextrose (D50W) injection syrg 12.5-25 g    glucagon (GLUCAGEN) injection 1 mg    insulin lispro (HUMALOG) injection    [Held by provider] lisinopril (PRINIVIL, ZESTRIL) tablet 40 mg    amLODIPine (NORVASC) tablet 10 mg    aspirin delayed-release tablet 81 mg    hydrALAZINE (APRESOLINE) 20 mg/mL injection 20 mg    labetalol (NORMODYNE) tablet 600 mg    doxazosin (CARDURA) tablet 2 mg           Data Review :  Recent Labs     11/03/19  0038 11/02/19 0255 11/01/19 1941    140 142   K 4.1 3.8 3.9    108 106   CO2 23 24 27   BUN 60* 51* 52*   CREA 5.96* 5.37* 5.49*   GLU 95 114* 122*   CA 7.9* 8.5 8.7     Recent Labs     11/03/19  0038 11/02/19  1618 11/02/19 0255 11/01/19 1941   WBC 4.2  --  6.1 6.2   HGB 7.2*  7.4* 8.6* 8.6* 8.2*   HCT 22.4*  22.9* 27.0* 26.5* 25.3*     --  242 239     No results for input(s): FE, TIBC, PSAT, FERR in the last 72 hours.    Recent Labs     11/02/19  1205 11/02/19  0255 11/01/19 1941   CPK  --   --  290   CKNDX  --   --  1.0   TROIQ <0.05 <0.05 <0.05     Lab Results   Component Value Date/Time    Color ABRAHAM 11/01/2019 09:05 PM    Appearance CLOUDY (A) 11/01/2019 09:05 PM    Specific gravity 1.015 11/01/2019 09:05 PM    pH (UA) 5.5 11/01/2019 09:05 PM    Protein >300 (A) 11/01/2019 09:05 PM    Glucose NEGATIVE  11/01/2019 09:05 PM    Ketone NEGATIVE  11/01/2019 09:05 PM    Bilirubin NEGATIVE  11/01/2019 09:05 PM    Urobilinogen 0.2 11/01/2019 09:05 PM    Nitrites NEGATIVE  11/01/2019 09:05 PM    Leukocyte Esterase TRACE (A) 11/01/2019 09:05 PM    Epithelial cells FEW 11/01/2019 09:05 PM Bacteria NEGATIVE  11/01/2019 09:05 PM    WBC 0-4 11/01/2019 09:05 PM    RBC  11/01/2019 09:05 PM     Lab Results   Component Value Date/Time    Culture result: NO GROWTH 5 DAYS 09/04/2019 06:29 PM     No results found for: SDES  Lab Results   Component Value Date/Time    Sodium,urine random 65 01/24/2019 12:52 AM    Creatinine, urine 94.80 01/24/2019 12:52 AM       Results from [place] include 234 beds at the [facility] encounter on 11/01/19   XR CHEST PA LAT    Narrative EXAM: XR CHEST PA LAT    INDICATION: pneumonia    COMPARISON: Chest x-ray 9/2/2019. FINDINGS: PA and lateral radiographs of the chest demonstrate hyperinflated but  otherwise clear lungs. The cardiac and mediastinal contours and pulmonary  vascularity are normal. Atherosclerotic calcifications affect the aortic arch  and the thoracic aorta is tortuous. The chest wall structures and visualized  upper abdomen show no acute findings with incidental note of degenerative spine,  right humeral neck fracture, and diffuse osteopenia. Impression IMPRESSION: No acute cardiopulmonary findings. COPD. Right humeral neck  fracture. Jose Valero MD  [facility]   [email or web address]  [facility]  [address], [address]  [place], [address]  Phone - [phone]         Fax - [phone]

## 2019-11-03 NOTE — PROGRESS NOTES
Patient post void was only 200 and post void was 1   Called on call urologist, Dr Caro Monique, explained to him the above information and also informed him that the patient is refusing to be straight shawanda. Dr Nena Wheatley said he will speak with the patient further during rounds.

## 2019-11-03 NOTE — PROGRESS NOTES
Hospitalist Progress Note    NAME: Lorie Pacheco   :  1950   MRN:  [id number]       Assessment / Plan:    Hypertensive Urgency   - now improving   - continue BP medications including labetalol,  norvasc  - Hold Lisinopril due to ASHVIN/CKD  - Hydralazine as needed   - clonidine discontinued due to compliance issues and may cause rebound htn   - cardura added     Gross Hematuria   - still having blood urine   - possibly traumatic , post straight cath earlier in ER  - Keep  Holding  Heparin for now   - Recheck cbc in AM   - Urology consult . 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 Recommending brice cathter 22 fr for urinary retention to probably remove any clots however patient refused . Recommend follow up for enlarged Prostate eval out patient . - started on Cardura and Proscar      Acute on chronic renal failure  Admit patient to telemetry monitoring  nephrology consultation  Avoid nephrotoxic medication    Generalized weakness most likely secondary to medical decompensation  Physical therapy evaluation    Anemia most likely secondary to chronic kidney disease  - stable  Monitoring  Transfuse as needed if hgb < 7    DM  Start patient sliding scale insulin    Chronic diastolic heart failure  Hold Lasix because of worsening kidney function  - bl lower extremity +++ edema  - may need to restart will follow recommendations from Nephrology     Hx Right humeral neck fracture.  -pain med PRN     30.0 - 39.9 Obese / There is no height or weight on file to calculate BMI. Code status: Full  Prophylaxis: SCD's  Recommended Disposition: Home w/Family     Subjective:     Chief Complaint / Reason for Physician Visit  \"Elevated BP \". Discussed with RN events overnight.      Review of Systems:  Symptom Y/N Comments  Symptom Y/N Comments   Fever/Chills n   Chest Pain n    Poor Appetite    Edema y    Cough    Abdominal Pain n    Sputum    Joint Pain     SOB/POWELL n   Pruritis/Rash     Nausea/vomit n   Tolerating PT/OT Diarrhea    Tolerating Diet     Constipation    Other hematuria       Could NOT obtain due to:      Objective:     VITALS:   Last 24hrs VS reviewed since prior progress note. Most recent are:  Patient Vitals for the past 24 hrs:   Temp Pulse Resp BP SpO2   11/03/19 0742 97.5 °F (36.4 °C) 67 18 140/84 99 %   11/02/19 2213  62  127/67    11/02/19 2014 97.3 °F (36.3 °C) 64 16 103/64 100 %   11/02/19 1512 97.3 °F (36.3 °C) 68 16 124/81 100 %   11/02/19 1144 98 °F (36.7 °C) 80 16 154/87 100 %   11/02/19 0948    129/81        Intake/Output Summary (Last 24 hours) at 11/3/2019 0922  Last data filed at 11/3/2019 0845  Gross per 24 hour   Intake    Output 1450 ml   Net -1450 ml        PHYSICAL EXAM:  General: WD, WN. Alert, cooperative, no acute distress    EENT:  EOMI. Anicteric sclerae. MMM  Resp:  CTA bilaterally, no wheezing or rales. No accessory muscle use  CV:  Regular  rhythm,  No edema  GI:  Soft, Non distended, Non tender.  +Bowel sounds  Neurologic:  Alert and oriented X 3, normal speech,   Psych:   Good insight. Not anxious nor agitated  Skin:  No rashes. No jaundice    Reviewed most current lab test results and cultures  YES  Reviewed most current radiology test results   YES  Review and summation of old records today    NO  Reviewed patient's current orders and MAR    YES  PMH/ reviewed - no change compared to H&P  ________________________________________________________________________  Care Plan discussed with:    Comments   Patient x    Family      RN x    Care Manager     Consultant  x urology                     Multidiciplinary team rounds were held today with , nursing, pharmacist and clinical coordinator. Patient's plan of care was discussed; medications were reviewed and discharge planning was addressed.      ________________________________________________________________________  Total NON critical care TIME:  30   Minutes    Total CRITICAL CARE TIME Spent:   Minutes non procedure based      Comments   >50% of visit spent in counseling and coordination of care x    ________________________________________________________________________  Jak Villegas MD     Procedures: see electronic medical records for all procedures/Xrays and details which were not copied into this note but were reviewed prior to creation of Plan. LABS:  I reviewed today's most current labs and imaging studies.   Pertinent labs include:  Recent Labs     11/03/19 0038 11/02/19  1618 11/02/19 0255 11/01/19 1941   WBC 4.2  --  6.1 6.2   HGB 7.2*  7.4* 8.6* 8.6* 8.2*   HCT 22.4*  22.9* 27.0* 26.5* 25.3*     --  242 239     Recent Labs     11/03/19 0038 11/02/19  0255 11/01/19 1941 11/01/19  1209    140 142 143   K 4.1 3.8 3.9 3.9    108 106 110*   CO2 23 24 27 22   GLU 95 114* 122* 89   BUN 60* 51* 52* 47*   CREA 5.96* 5.37* 5.49* 4.98*   CA 7.9* 8.5 8.7 8.7   ALB  --   --  3.0* 3.1*   TBILI  --   --  0.6 0.6   SGOT  --   --  35 32   ALT  --   --  43 42       Signed: Jak Villegas MD

## 2019-11-03 NOTE — PROGRESS NOTES
Post void residual 700, advise RN to call urology for orders ( straight cath vs Stone ,  type of Stone ) and catheterization.

## 2019-11-03 NOTE — PROGRESS NOTES
Progress Note    Patient: Holly Peck MRN: [id number]  SSN: [id number]    YOB: 1950  Age: 77 y.o. Sex: male        ADMITTED:  2019 to Nohemy Kinsey *  for Acute on chronic renal failure ([facility] [N17.9, N18.9]         Holly Peck was admitted for Acute on chronic renal failure ([facility] [N17.9, N18.9]. He reports ongoing gross hematuria but denies difficulty voiding. He a bladder scan this morning of 640 cc but then voided 600 cc per nursing notes. He does not want a catheter. His renal function has deteriorated again. He states that he is \"not afraid to die\". Vitals:  Temp (24hrs), Av.5 °F (36.4 °C), Min:97.3 °F (36.3 °C), Max:98 °F (36.7 °C)     Blood pressure 140/84, pulse 67, temperature 97.5 °F (36.4 °C), resp. rate 18, SpO2 99 %. I&O's:  1901 - 700  In: -   Out: 800 [Urine:800]   701 - 1900  In: -   Out: 700 [Urine:700]     Exam:   NAD. abdomen soft, NT     Labs:   Recent Labs     19  0038 19  1618 195 19   WBC 4.2  --  6.1 6.2   HGB 7.2*  7.4* 8.6* 8.6* 8.2*   HCT 22.4*  22.9* 27.0* 26.5* 25.3*     --  242 239     Recent Labs     19  0038 19  0255 19    140 142   K 4.1 3.8 3.9    108 106   CO2 23 24 27   GLU 95 114* 122*   BUN 60* 51* 52*   CREA 5.96* 5.37* 5.49*   CA 7.9* 8.5 8.7        Cultures:      Imaging:       Assessment:     - Active Problems:    Acute on chronic renal failure ([address] (2019)      Hematuria (2019)    BPH with obstruction    Plan:     - Continued gross hematuria and worsening renal function. He has a very large prostate and obstruction may be part of the problem although he did seem to empty well this morning based on the bladder scan. We once again discussed a brice catheter to maximize drainage but he declined again.    If he agrees to a brice in the future, I would recommend a 22 Fr 3 way so clots can be irrigated out. He is already on doxazosin, we will add finasteride.     Signed By: Taylor Childers MD - November 3, 2019

## 2019-11-03 NOTE — PROGRESS NOTES
Bedside shift change report given to Abby (oncoming nurse) by Rahul Pate (offgoing nurse). Report included the following information SBAR, Kardex, Procedure Summary, MAR, Accordion, Recent Results and Med Rec Status.

## 2019-11-04 LAB
ANION GAP SERPL CALC-SCNC: 8 MMOL/L (ref 5–15)
BUN SERPL-MCNC: 72 MG/DL (ref 6–20)
BUN/CREAT SERPL: 12 (ref 12–20)
CALCIUM SERPL-MCNC: 7.9 MG/DL (ref 8.5–10.1)
CHLORIDE SERPL-SCNC: 103 MMOL/L (ref 97–108)
CO2 SERPL-SCNC: 23 MMOL/L (ref 21–32)
CREAT SERPL-MCNC: 6.19 MG/DL (ref 0.7–1.3)
ERYTHROCYTE [DISTWIDTH] IN BLOOD BY AUTOMATED COUNT: 14.5 % (ref 11.5–14.5)
GLUCOSE BLD STRIP.AUTO-MCNC: 100 MG/DL (ref 65–100)
GLUCOSE BLD STRIP.AUTO-MCNC: 100 MG/DL (ref 65–100)
GLUCOSE BLD STRIP.AUTO-MCNC: 101 MG/DL (ref 65–100)
GLUCOSE BLD STRIP.AUTO-MCNC: 91 MG/DL (ref 65–100)
GLUCOSE SERPL-MCNC: 81 MG/DL (ref 65–100)
HCT VFR BLD AUTO: 23.3 % (ref 36.6–50.3)
HGB BLD-MCNC: 7.4 G/DL (ref 12.1–17)
MCH RBC QN AUTO: 30 PG (ref 26–34)
MCHC RBC AUTO-ENTMCNC: 31.8 G/DL (ref 30–36.5)
MCV RBC AUTO: 94.3 FL (ref 80–99)
NRBC # BLD: 0 K/UL (ref 0–0.01)
NRBC BLD-RTO: 0 PER 100 WBC
PLATELET # BLD AUTO: 216 K/UL (ref 150–400)
PMV BLD AUTO: 10.6 FL (ref 8.9–12.9)
POTASSIUM SERPL-SCNC: 4.1 MMOL/L (ref 3.5–5.1)
RBC # BLD AUTO: 2.47 M/UL (ref 4.1–5.7)
SERVICE CMNT-IMP: ABNORMAL
SERVICE CMNT-IMP: NORMAL
SODIUM SERPL-SCNC: 134 MMOL/L (ref 136–145)
WBC # BLD AUTO: 4.8 K/UL (ref 4.1–11.1)

## 2019-11-04 PROCEDURE — 36415 COLL VENOUS BLD VENIPUNCTURE: CPT

## 2019-11-04 PROCEDURE — 74011250637 HC RX REV CODE- 250/637: Performed by: UROLOGY

## 2019-11-04 PROCEDURE — 74011250637 HC RX REV CODE- 250/637: Performed by: INTERNAL MEDICINE

## 2019-11-04 PROCEDURE — 51798 US URINE CAPACITY MEASURE: CPT

## 2019-11-04 PROCEDURE — 65660000000 HC RM CCU STEPDOWN

## 2019-11-04 PROCEDURE — 3331090002 HH PPS REVENUE DEBIT

## 2019-11-04 PROCEDURE — 3331090001 HH PPS REVENUE CREDIT

## 2019-11-04 PROCEDURE — 80048 BASIC METABOLIC PNL TOTAL CA: CPT

## 2019-11-04 PROCEDURE — 85027 COMPLETE CBC AUTOMATED: CPT

## 2019-11-04 PROCEDURE — 82962 GLUCOSE BLOOD TEST: CPT

## 2019-11-04 RX ADMIN — FINASTERIDE 5 MG: 5 TABLET, FILM COATED ORAL at 08:44

## 2019-11-04 RX ADMIN — DOXAZOSIN 2 MG: 2 TABLET ORAL at 08:45

## 2019-11-04 RX ADMIN — LABETALOL HYDROCHLORIDE 600 MG: 200 TABLET, FILM COATED ORAL at 08:44

## 2019-11-04 RX ADMIN — Medication 10 ML: at 21:26

## 2019-11-04 RX ADMIN — AMLODIPINE BESYLATE 10 MG: 5 TABLET ORAL at 08:45

## 2019-11-04 RX ADMIN — LABETALOL HYDROCHLORIDE 600 MG: 200 TABLET, FILM COATED ORAL at 17:25

## 2019-11-04 RX ADMIN — Medication 10 ML: at 15:55

## 2019-11-04 RX ADMIN — ACETAMINOPHEN 650 MG: 325 TABLET ORAL at 00:20

## 2019-11-04 RX ADMIN — Medication 10 ML: at 08:48

## 2019-11-04 NOTE — PROGRESS NOTES
Bedside and Verbal shift change report given to [facility] (oncoming nurse) by Moises Martell (offgoing nurse). Report included the following information SBAR, Kardex, Intake/Output, MAR and Recent Results.

## 2019-11-04 NOTE — PROGRESS NOTES
JADA  1) urology follow-up   2) PT/OT recommendations     Per chart review plan for brice catheter.      [address]

## 2019-11-04 NOTE — PROGRESS NOTES
Bedside shift change report given to [place] (oncoming nurse) by Eric Fall (offgoing nurse). Report included the following information SBAR, Kardex, Procedure Summary, Intake/Output, MAR, Accordion, Recent Results and Med Rec Status.

## 2019-11-04 NOTE — PROGRESS NOTES
Nephrology Progress Note     Jarvis Carrera     [email or web address]                  Phone - [phone]   Patient: Kaitlynn Tsang   Date- 11/4/2019        Admit Date: 11/2/2019  YOB: 1950             CC: Follow up for ASHVIN on ckd          Subjective: Interval History:   -  Bp stable  Cr. Worse - it is likely due to urinary retention. No C/O of chest pain,SOB, vomiting, abdominal pain,fever,chills    ROS:- as above   Assessment & Plan:     ASHVIN   on ckd  - etiology multiple - acute hypertensive nephropathy or GN- hepatitis related  +ve hep b and hep c  + /- urinary retention  - follow bmp in am  - avoid diuretics  - recommend to place brice cath- if patient agrees. - hold lisinopril  - NO RRT indicated - no uremic symptoms     Hypertensive emergency  - continue norvasc,   - continue labetalol  - continue cardura  - stop clonidine due to side effect of dizziness. And if he is non compliant it will cause rebound hypertension.  - hold lisinopril     anemia of ckd  - continue epogen    Hematuria- urinary retention  - urology input noted  - place brice cath     Edema  - he will need lasix restarted soon     CKD - f/b   - ckd likely due to dm + HTN          TYPE 2 DM  PROTEINURIA , Microscopic hematuria  - +ve hep b and hep c -      Right humeral neck fracture                Physical exam:   GEN: NAD  NECK- Supple, no mass  RESP: clear b/l, no wheezing  CVS: RRR,S1,S2    ABDO: soft, NT  EXT: ++Edema   NEURO: normal speech, non focal    Care Plan discussed with: patient  Objective:   Visit Vitals  /85 (BP 1 Location: Left arm, BP Patient Position: Sitting)   Pulse 70   Temp 97.7 °F (36.5 °C)   Resp 18   SpO2 100%     There were no vitals filed for this visit. 11/02 1901 - 11/04 0700  In: -   Out: 700 [Urine:700]  No intake or output data in the 24 hours ending 11/04/19 0955   Chart reviewed. Pertinent Notes reviewed.        Medication list  reviewed   Current Facility-Administered Medications   Medication    finasteride (PROSCAR) tablet 5 mg    epoetin charisse-epbx (RETACRIT) 12,000 Units combo injection    sodium chloride (NS) flush 5-40 mL    sodium chloride (NS) flush 5-40 mL    acetaminophen (TYLENOL) tablet 650 mg    ondansetron (ZOFRAN) injection 4 mg    bisacodyl (DULCOLAX) tablet 5 mg    glucose chewable tablet 16 g    dextrose (D50W) injection syrg 12.5-25 g    glucagon (GLUCAGEN) injection 1 mg    insulin lispro (HUMALOG) injection    [Held by provider] lisinopril (PRINIVIL, ZESTRIL) tablet 40 mg    amLODIPine (NORVASC) tablet 10 mg    aspirin delayed-release tablet 81 mg    hydrALAZINE (APRESOLINE) 20 mg/mL injection 20 mg    labetalol (NORMODYNE) tablet 600 mg    doxazosin (CARDURA) tablet 2 mg           Data Review :  Recent Labs     11/04/19  0605 11/03/19  0038 11/02/19  0255   * 138 140   K 4.1 4.1 3.8    108 108   CO2 23 23 24   BUN 72* 60* 51*   CREA 6.19* 5.96* 5.37*   GLU 81 95 114*   CA 7.9* 7.9* 8.5     Recent Labs     11/04/19  0605 11/03/19  0038 11/02/19  1618 11/02/19  0255   WBC 4.8 4.2  --  6.1   HGB 7.4* 7.2*  7.4* 8.6* 8.6*   HCT 23.3* 22.4*  22.9* 27.0* 26.5*    198  --  242     No results for input(s): FE, TIBC, PSAT, FERR in the last 72 hours.    Recent Labs     11/02/19  1205 11/02/19  0255 11/01/19  1941   CPK  --   --  290   CKNDX  --   --  1.0   TROIQ <0.05 <0.05 <0.05     Lab Results   Component Value Date/Time    Color ABRAHAM 11/01/2019 09:05 PM    Appearance CLOUDY (A) 11/01/2019 09:05 PM    Specific gravity 1.015 11/01/2019 09:05 PM    pH (UA) 5.5 11/01/2019 09:05 PM    Protein >300 (A) 11/01/2019 09:05 PM    Glucose NEGATIVE  11/01/2019 09:05 PM    Ketone NEGATIVE  11/01/2019 09:05 PM    Bilirubin NEGATIVE  11/01/2019 09:05 PM    Urobilinogen 0.2 11/01/2019 09:05 PM    Nitrites NEGATIVE  11/01/2019 09:05 PM    Leukocyte Esterase TRACE (A) 11/01/2019 09:05 PM    Epithelial cells FEW 11/01/2019 09:05 PM    Bacteria NEGATIVE  11/01/2019 09:05 PM    WBC 0-4 11/01/2019 09:05 PM    RBC  11/01/2019 09:05 PM     Lab Results   Component Value Date/Time    Culture result: NO GROWTH 5 DAYS 09/04/2019 06:29 PM     No results found for: SDES  Lab Results   Component Value Date/Time    Sodium,urine random 65 01/24/2019 12:52 AM    Creatinine, urine 94.80 01/24/2019 12:52 AM       Results from [facility] encounter on 11/01/19   XR CHEST PA LAT    Narrative EXAM: XR CHEST PA LAT    INDICATION: pneumonia    COMPARISON: Chest x-ray 9/2/2019. FINDINGS: PA and lateral radiographs of the chest demonstrate hyperinflated but  otherwise clear lungs. The cardiac and mediastinal contours and pulmonary  vascularity are normal. Atherosclerotic calcifications affect the aortic arch  and the thoracic aorta is tortuous. The chest wall structures and visualized  upper abdomen show no acute findings with incidental note of degenerative spine,  right humeral neck fracture, and diffuse osteopenia. Impression IMPRESSION: No acute cardiopulmonary findings. COPD. Right humeral neck  fracture. Munira Perez MD  [facility]   [email or web address]  [facility]  [address]  [place], [address]  Phone - [phone]         Fax - [phone]

## 2019-11-04 NOTE — PROGRESS NOTES
Bedside shift change report given to [facility], RN (oncoming nurse) by Jeanice Epley, RN (offgoing nurse). Report included the following information SBAR, Kardex, Intake/Output, MAR, Accordion, Recent Results, Med Rec Status and Cardiac Rhythm NSR.

## 2019-11-04 NOTE — PROGRESS NOTES
Patient: Bradley Burris MRN: [id number]  SSN: [id number]    YOB: 1950  Age: 77 y.o. Sex: male        ADMITTED: 2019 to Andrey Messina * by Franc Meyer MD for Acute on chronic renal failure [facility]) [N17.9, N18.9]      Bradley Burris is being followed by Urology for gross hematuria following straight cath and urinary retention. He reports being bladder scanned this am for over 900 mL. He was able to pass some urine w/ abdominal maipulation/pressure and moving from bed to chair. His renal function has deteriorated again this am, creatinine now 6. 19. Vitals: Temp (24hrs), Av.6 °F (36.4 °C), Min:97.3 °F (36.3 °C), Max:97.7 °F (36.5 °C)    Blood pressure 141/85, pulse 70, temperature 97.7 °F (36.5 °C), resp. rate 18, SpO2 100 %. Intake and Output:   1901 -  0700  In: -   Out: 700 [Urine:700]  No intake/output data recorded. JOE Output lats 24 hrs: No data found. JOE Output last 8 hrs: No data found. BM over last 24 hrs: No data found.     Exam:  Appearance: Well-developed no acute distress  Conjunctiva: Conjunctiva and lids normal  External ears/nose: Normal no lesions or deformities  Hearing: Grossly intact  Respiratory effort: Breathing easily  Lower extremity:  Edema, L>R  Abdomen/flank: Soft, nontender, without masses, no CVA tenderness  Digits/nails: No clubbing cyanosis or petechiae  Skin inspection: Warm and dry  Sensation: No sensory deficits  Judgment/Insight: intact  Mood/Affect: normal    Labs:  CBC:   Lab Results   Component Value Date/Time    WBC 4.8 2019 06:05 AM    HCT 23.3 (L) 2019 06:05 AM    PLATELET 273  06:05 AM     BMP:   Lab Results   Component Value Date/Time    Glucose 81 2019 06:05 AM    Sodium 134 (L) 2019 06:05 AM    Potassium 4.1 2019 06:05 AM    Chloride 103 2019 06:05 AM    CO2 23 2019 06:05 AM    BUN 72 (H) 2019 06:05 AM    Creatinine 6.19 (H) 2019 06:05 AM    Calcium 7.9 (L) 11/04/2019 06:05 AM         Imaging: CT from 11/1 reviewed. Enlarged prostate  Assessment/Plan:     1. Acute on chronic renal failure  2. Hematuria  3. BPH w/ obstruction    -W/D the benefits of catheter insertion to maximize drainage and possibly help renal function if large prostate and obstruction are the issues. He seems agreeable to catheter insertion but wanted some time and a conversation w/ wife & daughter before he fully agrees. I will go back and insert catheter myself if he agrees.  -Continue w/ doxazosin & finasteride.  -Following creatinine    Signed By: Jan Rodriguez NP - November 4, 2019   agree with brice placement, follow creatinine.  Hand irrigate prn

## 2019-11-04 NOTE — PROGRESS NOTES
Hospitalist Progress Note    NAME: Sumi Setting   :  1950   MRN:  [id number]       Assessment / Plan:    Hypertensive Urgency   - now improving   - continue BP medications including labetalol,  norvasc  - Hold Lisinopril due to ASHVIN/CKD  - Hydralazine as needed   - clonidine discontinued due to compliance issues and may cause rebound htn   - cardura added     Gross Hematuria   - still having blood in urine   - possibly traumatic , post straight cath earlier in ER  - Recheck cbc in AM   - Brice cathter placed   - Urology consult . 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 Recommending brice cathter 22 fr for urinary retention to probably remove any clots however patient refused . Recommend follow up for enlarged Prostate eval out patient . - On Cardura and Proscar      Acute on chronic renal failure  - Cr trending up 6.19 today r/o obstructive uropathy vs hypertensive nephropathy  - continue IVF , avoid nephrotoxins   nephrology consultation. A[ppreciate recommendations   - Now accepted brice insertion   Avoid nephrotoxic medication    Urinary  Retention   - likely 2/2 BPH vs blood clots   - patient had 900 cc on bladder scan yesterday pm  - he was initially refusing brice catheter but now accepted after speaking with his PCP  - Urology following     Generalized weakness most likely secondary to medical decompensation  Physical therapy evaluation  - pt/ot eval    Anemia most likely secondary to chronic kidney disease  - stable  Monitoring cbc  Transfuse as needed if hgb < 7    DM  ISS    Chronic diastolic heart failure  Hold Lasix because of worsening kidney function  - bl lower extremity +++ edema  - may need to restart will follow recommendations from Nephrology     Hx Right humeral neck fracture.  -pain med PRN     30.0 - 39.9 Obese / There is no height or weight on file to calculate BMI.     Code status: Full  Prophylaxis: SCD's  Recommended Disposition: Home w/Family     Subjective:     Chief Complaint / Reason for Physician Visit  \"Elevated BP \". Discussed with RN events overnight. Review of Systems:  Symptom Y/N Comments  Symptom Y/N Comments   Fever/Chills n   Chest Pain n    Poor Appetite    Edema y    Cough    Abdominal Pain n    Sputum    Joint Pain     SOB/POWELL n   Pruritis/Rash     Nausea/vomit n   Tolerating PT/OT     Diarrhea    Tolerating Diet     Constipation    Other hematuria       Could NOT obtain due to:      Objective:     VITALS:   Last 24hrs VS reviewed since prior progress note. Most recent are:  Patient Vitals for the past 24 hrs:   Temp Pulse Resp BP SpO2   11/04/19 1155 97.9 °F (36.6 °C) 74 18 134/88 100 %   11/04/19 0755 97.7 °F (36.5 °C) 70 18 141/85 100 %   11/04/19 0349 97.6 °F (36.4 °C) 67 18 114/73 100 %   11/04/19 0028 97.3 °F (36.3 °C) 66 18 134/80 100 %   11/03/19 2020 97.7 °F (36.5 °C) 72 18 130/77 100 %   11/03/19 1609 97.6 °F (36.4 °C) 70 18 134/80 100 %     No intake or output data in the 24 hours ending 11/04/19 1509     PHYSICAL EXAM:  General: WD, WN. Alert, cooperative, no acute distress    EENT:  EOMI. Anicteric sclerae. MMM  Resp:  CTA bilaterally, no wheezing or rales. No accessory muscle use  CV:  Regular  rhythm,  No edema  GI:  Soft, Non distended, Non tender.  +Bowel sounds  Neurologic:  Alert and oriented X 3, normal speech,   Psych:   Good insight. Not anxious nor agitated  Skin:  No rashes.   No jaundice    Reviewed most current lab test results and cultures  YES  Reviewed most current radiology test results   YES  Review and summation of old records today    NO  Reviewed patient's current orders and MAR    YES  PMH/SH reviewed - no change compared to H&P  ________________________________________________________________________  Care Plan discussed with:    Comments   Patient x    Family      RN x    Care Manager x    Consultant  x urology                     Multidiciplinary team rounds were held today with , nursing, pharmacist and clinical coordinator. Patient's plan of care was discussed; medications were reviewed and discharge planning was addressed. ________________________________________________________________________  Total NON critical care TIME:  30   Minutes    Total CRITICAL CARE TIME Spent:   Minutes non procedure based      Comments   >50% of visit spent in counseling and coordination of care x    ________________________________________________________________________  Ondina Jara MD     Procedures: see electronic medical records for all procedures/Xrays and details which were not copied into this note but were reviewed prior to creation of Plan. LABS:  I reviewed today's most current labs and imaging studies.   Pertinent labs include:  Recent Labs     11/04/19  0605 11/03/19  0038 11/02/19  1618 11/02/19  0255   WBC 4.8 4.2  --  6.1   HGB 7.4* 7.2*  7.4* 8.6* 8.6*   HCT 23.3* 22.4*  22.9* 27.0* 26.5*    198  --  242     Recent Labs     11/04/19  0605 11/03/19  0038 11/02/19  0255 11/01/19  1941   * 138 140 142   K 4.1 4.1 3.8 3.9    108 108 106   CO2 23 23 24 27   GLU 81 95 114* 122*   BUN 72* 60* 51* 52*   CREA 6.19* 5.96* 5.37* 5.49*   CA 7.9* 7.9* 8.5 8.7   ALB  --   --   --  3.0*   TBILI  --   --   --  0.6   SGOT  --   --   --  35   ALT  --   --   --  43       Signed: Ondina Jara MD

## 2019-11-05 LAB
ANION GAP SERPL CALC-SCNC: 10 MMOL/L (ref 5–15)
BUN SERPL-MCNC: 75 MG/DL (ref 6–20)
BUN/CREAT SERPL: 11 (ref 12–20)
CALCIUM SERPL-MCNC: 7.8 MG/DL (ref 8.5–10.1)
CHLORIDE SERPL-SCNC: 104 MMOL/L (ref 97–108)
CO2 SERPL-SCNC: 21 MMOL/L (ref 21–32)
CREAT SERPL-MCNC: 6.85 MG/DL (ref 0.7–1.3)
ERYTHROCYTE [DISTWIDTH] IN BLOOD BY AUTOMATED COUNT: 14.9 % (ref 11.5–14.5)
GLUCOSE BLD STRIP.AUTO-MCNC: 109 MG/DL (ref 65–100)
GLUCOSE BLD STRIP.AUTO-MCNC: 111 MG/DL (ref 65–100)
GLUCOSE BLD STRIP.AUTO-MCNC: 74 MG/DL (ref 65–100)
GLUCOSE BLD STRIP.AUTO-MCNC: 75 MG/DL (ref 65–100)
GLUCOSE BLD STRIP.AUTO-MCNC: 78 MG/DL (ref 65–100)
GLUCOSE BLD STRIP.AUTO-MCNC: 85 MG/DL (ref 65–100)
GLUCOSE BLD STRIP.AUTO-MCNC: 88 MG/DL (ref 65–100)
GLUCOSE SERPL-MCNC: 72 MG/DL (ref 65–100)
HCT VFR BLD AUTO: 22 % (ref 36.6–50.3)
HEMOCCULT STL QL: NEGATIVE
HGB BLD-MCNC: 6.9 G/DL (ref 12.1–17)
MCH RBC QN AUTO: 29.5 PG (ref 26–34)
MCHC RBC AUTO-ENTMCNC: 31.4 G/DL (ref 30–36.5)
MCV RBC AUTO: 94 FL (ref 80–99)
NRBC # BLD: 0 K/UL (ref 0–0.01)
NRBC BLD-RTO: 0 PER 100 WBC
PLATELET # BLD AUTO: 224 K/UL (ref 150–400)
PMV BLD AUTO: 10.8 FL (ref 8.9–12.9)
POTASSIUM SERPL-SCNC: 4.5 MMOL/L (ref 3.5–5.1)
RBC # BLD AUTO: 2.34 M/UL (ref 4.1–5.7)
SERVICE CMNT-IMP: ABNORMAL
SERVICE CMNT-IMP: ABNORMAL
SERVICE CMNT-IMP: NORMAL
SODIUM SERPL-SCNC: 135 MMOL/L (ref 136–145)
WBC # BLD AUTO: 5.8 K/UL (ref 4.1–11.1)

## 2019-11-05 PROCEDURE — 97530 THERAPEUTIC ACTIVITIES: CPT | Performed by: OCCUPATIONAL THERAPIST

## 2019-11-05 PROCEDURE — 80048 BASIC METABOLIC PNL TOTAL CA: CPT

## 2019-11-05 PROCEDURE — 74011250637 HC RX REV CODE- 250/637: Performed by: UROLOGY

## 2019-11-05 PROCEDURE — 30233N1 TRANSFUSION OF NONAUTOLOGOUS RED BLOOD CELLS INTO PERIPHERAL VEIN, PERCUTANEOUS APPROACH: ICD-10-PCS | Performed by: INTERNAL MEDICINE

## 2019-11-05 PROCEDURE — 65660000000 HC RM CCU STEPDOWN

## 2019-11-05 PROCEDURE — P9016 RBC LEUKOCYTES REDUCED: HCPCS

## 2019-11-05 PROCEDURE — 3331090002 HH PPS REVENUE DEBIT

## 2019-11-05 PROCEDURE — 74011000250 HC RX REV CODE- 250: Performed by: INTERNAL MEDICINE

## 2019-11-05 PROCEDURE — 82272 OCCULT BLD FECES 1-3 TESTS: CPT

## 2019-11-05 PROCEDURE — 74011250636 HC RX REV CODE- 250/636: Performed by: FAMILY MEDICINE

## 2019-11-05 PROCEDURE — 97166 OT EVAL MOD COMPLEX 45 MIN: CPT | Performed by: OCCUPATIONAL THERAPIST

## 2019-11-05 PROCEDURE — 82962 GLUCOSE BLOOD TEST: CPT

## 2019-11-05 PROCEDURE — 85027 COMPLETE CBC AUTOMATED: CPT

## 2019-11-05 PROCEDURE — 74011000258 HC RX REV CODE- 258: Performed by: INTERNAL MEDICINE

## 2019-11-05 PROCEDURE — 3331090001 HH PPS REVENUE CREDIT

## 2019-11-05 PROCEDURE — 86850 RBC ANTIBODY SCREEN: CPT

## 2019-11-05 PROCEDURE — 36430 TRANSFUSION BLD/BLD COMPNT: CPT

## 2019-11-05 PROCEDURE — 36415 COLL VENOUS BLD VENIPUNCTURE: CPT

## 2019-11-05 PROCEDURE — 86923 COMPATIBILITY TEST ELECTRIC: CPT

## 2019-11-05 PROCEDURE — 74011250637 HC RX REV CODE- 250/637: Performed by: INTERNAL MEDICINE

## 2019-11-05 RX ORDER — SODIUM CHLORIDE 9 MG/ML
250 INJECTION, SOLUTION INTRAVENOUS AS NEEDED
Status: DISCONTINUED | OUTPATIENT
Start: 2019-11-05 | End: 2019-11-06

## 2019-11-05 RX ORDER — CLONIDINE HYDROCHLORIDE 0.2 MG/1
0.2 TABLET ORAL 3 TIMES DAILY
COMMUNITY
End: 2019-11-18

## 2019-11-05 RX ORDER — SODIUM BICARBONATE 650 MG/1
650 TABLET ORAL 2 TIMES DAILY
Status: DISCONTINUED | OUTPATIENT
Start: 2019-11-06 | End: 2019-11-11

## 2019-11-05 RX ADMIN — DOXAZOSIN 2 MG: 2 TABLET ORAL at 08:31

## 2019-11-05 RX ADMIN — Medication 10 ML: at 13:05

## 2019-11-05 RX ADMIN — SODIUM CHLORIDE: 450 INJECTION, SOLUTION INTRAVENOUS at 10:54

## 2019-11-05 RX ADMIN — EPOETIN ALFA-EPBX 12000 UNITS: 10000 INJECTION, SOLUTION INTRAVENOUS; SUBCUTANEOUS at 22:29

## 2019-11-05 RX ADMIN — Medication 10 ML: at 22:30

## 2019-11-05 RX ADMIN — Medication 10 ML: at 05:56

## 2019-11-05 RX ADMIN — LABETALOL HYDROCHLORIDE 600 MG: 200 TABLET, FILM COATED ORAL at 17:24

## 2019-11-05 RX ADMIN — FINASTERIDE 5 MG: 5 TABLET, FILM COATED ORAL at 08:31

## 2019-11-05 RX ADMIN — AMLODIPINE BESYLATE 10 MG: 5 TABLET ORAL at 08:31

## 2019-11-05 RX ADMIN — LABETALOL HYDROCHLORIDE 600 MG: 200 TABLET, FILM COATED ORAL at 08:31

## 2019-11-05 NOTE — PROGRESS NOTES
Hospitalist Progress Note    NAME: Virgen Morin   :  1950   MRN:  [id number]       Assessment / Plan:    Hypertensive Urgency   - improved   - continue BP medications including labetalol,  norvasc  - Hold Lisinopril due to ASHVIN/CKD  - Hydralazine as needed   - clonidine discontinued due to compliance issues and may cause rebound htn   - cardura added     Gross Hematuria   - resolved   - possibly traumatic , post straight cath earlier in ER  - Recheck cbc in AM   - Brice cathter placed   - Urology consult . 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 Recommending brice cathter 22 fr for urinary retention to probably remove any clots however patient refused . Recommend follow up for enlarged Prostate eval out patient . - On Cardura and Proscar      Acute on chronic renal failure  - Cr trending up 6.19-> 6.9  today , likely  obstructive uropathy vs hypertensive nephropathy  - continue IVF , avoid nephrotoxins   nephrology consultation. Appreciate recommendations   - Now accepted brice insertion   Avoid nephrotoxic medication    Urinary  Retention   - likely 2/2 BPH vs blood clots   - patient had 900 cc on bladder scan yesterday pm  - he was initially refusing brice catheter but now accepted after speaking with his PCP  - continue Proscar and Cardura   - Urology following . Follow recommendations    Generalized weakness most likely secondary to medical decompensation  Physical therapy evaluation  - pt/ot eval    Anemia most likely secondary to chronic kidney disease  - Hgb 6.9 today , symptomatic   - Recommended 1 unit PRBC today however patient refused. But later caaled me back after he had dizziness with OT and has agreed to be transfused.  I will go ahead and order 1 unit PRBC for today  Monitoring cbc  Transfuse as needed if hgb < 7  - check stool occult     DM  ISS    Chronic diastolic heart failure  Hold Lasix because of worsening kidney function  - bl lower extremity ++ edema  - may need to restart will follow recommendations from Nephrology     Hx Right humeral neck fracture.  -pain med PRN     30.0 - 39.9 Obese / There is no height or weight on file to calculate BMI. Code status: Full  Prophylaxis: SCD's  Recommended Disposition: Home w/Family     Subjective:     Chief Complaint / Reason for Physician Visit  \"Elevated BP \". Discussed with RN events overnight. Review of Systems:  Symptom Y/N Comments  Symptom Y/N Comments   Fever/Chills n   Chest Pain n    Poor Appetite    Edema y    Cough    Abdominal Pain n    Sputum    Joint Pain     SOB/POWELL n   Pruritis/Rash     Nausea/vomit n   Tolerating PT/OT     Diarrhea    Tolerating Diet     Constipation    Other       Could NOT obtain due to:      Objective:     VITALS:   Last 24hrs VS reviewed since prior progress note. Most recent are:  Patient Vitals for the past 24 hrs:   Temp Pulse Resp BP SpO2   11/05/19 0754 98.7 °F (37.1 °C) 73 18 147/83 99 %   11/05/19 0300 98.4 °F (36.9 °C) 73 17 122/82 98 %   11/04/19 2319 98.7 °F (37.1 °C) 74 17 125/83 97 %   11/04/19 1919 98.4 °F (36.9 °C) 80 18 140/75 100 %   11/04/19 1559 98.3 °F (36.8 °C) 77 18 114/73 99 %   11/04/19 1155 97.9 °F (36.6 °C) 74 18 134/88 100 %       Intake/Output Summary (Last 24 hours) at 11/5/2019 0950  Last data filed at 11/5/2019 0300  Gross per 24 hour   Intake 960 ml   Output 1650 ml   Net -690 ml        PHYSICAL EXAM:  General: WD, WN. Alert, cooperative, no acute distress    EENT:  EOMI. Anicteric sclerae. MMM  Resp:  CTA bilaterally, no wheezing or rales. No accessory muscle use  CV:  Regular  rhythm,  No edema  GI:  Soft, Non distended, Non tender.  +Bowel sounds  Neurologic:  Alert and oriented X 3, normal speech,   Psych:   Good insight. Not anxious nor agitated  Skin:  No rashes.   No jaundice    Reviewed most current lab test results and cultures  YES  Reviewed most current radiology test results   YES  Review and summation of old records today    NO  Reviewed patient's current orders and MAR    YES  PMH/SH reviewed - no change compared to H&P  ________________________________________________________________________  Care Plan discussed with:    Comments   Patient x    Family      RN x    Care Manager x    Consultant  x urology                     Multidiciplinary team rounds were held today with , nursing, pharmacist and clinical coordinator. Patient's plan of care was discussed; medications were reviewed and discharge planning was addressed. ________________________________________________________________________  Total NON critical care TIME:  30   Minutes    Total CRITICAL CARE TIME Spent:   Minutes non procedure based      Comments   >50% of visit spent in counseling and coordination of care x    ________________________________________________________________________  Gilma Andrew MD     Procedures: see electronic medical records for all procedures/Xrays and details which were not copied into this note but were reviewed prior to creation of Plan. LABS:  I reviewed today's most current labs and imaging studies.   Pertinent labs include:  Recent Labs     11/05/19 0258 11/04/19  0605 11/03/19  0038   WBC 5.8 4.8 4.2   HGB 6.9* 7.4* 7.2*  7.4*   HCT 22.0* 23.3* 22.4*  22.9*    216 198     Recent Labs     11/05/19 0258 11/04/19  0605 11/03/19  0038   * 134* 138   K 4.5 4.1 4.1    103 108   CO2 21 23 23   GLU 72 81 95   BUN 75* 72* 60*   CREA 6.85* 6.19* 5.96*   CA 7.8* 7.9* 7.9*       Signed: Gilma Andrew MD

## 2019-11-05 NOTE — PROGRESS NOTES
Orthopedic End of Shift Note    Bedside and verbal shift change report given to Gabby Urbina RN (oncoming nurse) by Loraine Moore RN (offgoing nurse). Report included the following information SBAR, Intake/Output, Recent Results, Cardiac Rhythm Sinus Rhythm and Quality Measures. POD#     Significant issues during shift: Continued care for acute renal failure r/t rentention. Stone in place draining clear yellow urine. Patient denies pain. Vitals stable on room air. No significant issues on overnight.     Issues for Physician to address: Same as above    Activity This Shift  (check all that apply) [] chair  [] dangle   [] bathroom  [] bedside commode [] hallway  [] bedrest   Nausea/Vomiting [] yes [x] no     Voiding Status [] void [x] Stone [] I&O Cath   Bowel Movements [] yes [x] no     Foot Pumps or SCD [x] yes [] no    Ice Pack [] yes    [x] no    Incentive Spirometer [] yes [x] no Volume:      Telemetry Monitoring   [x] yes [] no Rhythm:   Supplemental O2 [] yes [x] no Sat off O2:   98%

## 2019-11-05 NOTE — PROGRESS NOTES
Nephrology Progress Note     Jarvis Carrera     [email or web address]                  Phone - [phone]   Patient: Talya Hi   Date- 11/5/2019        Admit Date: 11/2/2019  YOB: 1950             CC: Follow up for ASHVIN  on ckd          Subjective: Interval History:   -  CR. CONTINUE TO GET WORSE  He had brice placed yesterday  Clear urine now. No more hematuria  bp stable  No c/o sob,   No c/o chest pain,   No c/o nausea or vomiting  No c/o  fever. ROS:- as above   Assessment & Plan:     ASHVIN   on ckd  - LIKELY DUE TO URINARY RETENTION +/- normalisation of bp causing renal hypoperfusion  - follow bmp in am  - start IVF  - avoid diuretics  - hold lisinopril  - NO RRT indicated - no uremic symptoms     Hypertensive emergency  - continue norvasc,  Labetalol ,cardura  - stop clonidine due to side effect of dizziness. And if he is non compliant it will cause rebound hypertension.  - hold lisinopril     anemia of ckd  - continue epogen  - consider PRBC TX, he was seen [facility] during last admission    Hematuria- urinary retention  - urology input noted     metabolic acidosis  - start po bicarb    Edema  - avoid diuretics for now     CKD - f/b   - ckd likely due to dm + HTN          TYPE 2 DM  PROTEINURIA , Microscopic hematuria  - +ve hep b and hep c -      Right humeral neck fracture                Physical exam:   GEN:  NAD  NECK:  Supple, no thyromegaly  RESP: CTA  b/l, no  wheezing,   CVS: RRR,S1,S2   ABDO:  soft , non tender, No mass  NEURO: non focal, normal speech  EXT: Edema +nt      - brice +    Care Plan discussed with: patient  Objective:   Visit Vitals  /83 (BP 1 Location: Right arm, BP Patient Position: At rest)   Pulse 73   Temp 98.7 °F (37.1 °C)   Resp 18   SpO2 99%     There were no vitals filed for this visit.   11/03 1901 - 11/05 0700  In: 960 [P.O.:960]  Out: 1650 [Urine:1650]    Intake/Output Summary (Last 24 hours) at 11/5/2019 0932  Last data filed at 11/5/2019 0300  Gross per 24 hour   Intake 960 ml   Output 1650 ml   Net -690 ml      Chart reviewed. Pertinent Notes reviewed. Medication list  reviewed   Current Facility-Administered Medications   Medication    0.45% sodium chloride 1,000 mL with sodium bicarbonate (8.4%) 75 mEq infusion    finasteride (PROSCAR) tablet 5 mg    epoetin charisse-epbx (RETACRIT) 12,000 Units combo injection    sodium chloride (NS) flush 5-40 mL    sodium chloride (NS) flush 5-40 mL    acetaminophen (TYLENOL) tablet 650 mg    ondansetron (ZOFRAN) injection 4 mg    bisacodyl (DULCOLAX) tablet 5 mg    glucose chewable tablet 16 g    dextrose (D50W) injection syrg 12.5-25 g    glucagon (GLUCAGEN) injection 1 mg    insulin lispro (HUMALOG) injection    amLODIPine (NORVASC) tablet 10 mg    aspirin delayed-release tablet 81 mg    hydrALAZINE (APRESOLINE) 20 mg/mL injection 20 mg    labetalol (NORMODYNE) tablet 600 mg    doxazosin (CARDURA) tablet 2 mg           Data Review :  Recent Labs     11/05/19  0258 11/04/19  0605 11/03/19  0038   * 134* 138   K 4.5 4.1 4.1    103 108   CO2 21 23 23   BUN 75* 72* 60*   CREA 6.85* 6.19* 5.96*   GLU 72 81 95   CA 7.8* 7.9* 7.9*     Recent Labs     11/05/19  0258 11/04/19  0605 11/03/19  0038   WBC 5.8 4.8 4.2   HGB 6.9* 7.4* 7.2*  7.4*   HCT 22.0* 23.3* 22.4*  22.9*    216 198     No results for input(s): FE, TIBC, PSAT, FERR in the last 72 hours.    Recent Labs     11/02/19  1205   TROIQ <0.05     Lab Results   Component Value Date/Time    Color ABRAHAM 11/01/2019 09:05 PM    Appearance CLOUDY (A) 11/01/2019 09:05 PM    Specific gravity 1.015 11/01/2019 09:05 PM    pH (UA) 5.5 11/01/2019 09:05 PM    Protein >300 (A) 11/01/2019 09:05 PM    Glucose NEGATIVE  11/01/2019 09:05 PM    Ketone NEGATIVE  11/01/2019 09:05 PM    Bilirubin NEGATIVE  11/01/2019 09:05 PM    Urobilinogen 0.2 11/01/2019 09:05 PM    Nitrites NEGATIVE  11/01/2019 09:05 PM Leukocyte Esterase TRACE (A) 11/01/2019 09:05 PM    Epithelial cells FEW 11/01/2019 09:05 PM    Bacteria NEGATIVE  11/01/2019 09:05 PM    WBC 0-4 11/01/2019 09:05 PM    RBC  11/01/2019 09:05 PM     Lab Results   Component Value Date/Time    Culture result: NO GROWTH 5 DAYS 09/04/2019 06:29 PM     No results found for: SDES  Lab Results   Component Value Date/Time    Sodium,urine random 65 01/24/2019 12:52 AM    Creatinine, urine 94.80 01/24/2019 12:52 AM       Results from [facility] encounter on 11/01/19   XR CHEST PA LAT    Narrative EXAM: XR CHEST PA LAT    INDICATION: pneumonia    COMPARISON: Chest x-ray 9/2/2019. FINDINGS: PA and lateral radiographs of the chest demonstrate hyperinflated but  otherwise clear lungs. The cardiac and mediastinal contours and pulmonary  vascularity are normal. Atherosclerotic calcifications affect the aortic arch  and the thoracic aorta is tortuous. The chest wall structures and visualized  upper abdomen show no acute findings with incidental note of degenerative spine,  right humeral neck fracture, and diffuse osteopenia. Impression IMPRESSION: No acute cardiopulmonary findings. COPD. Right humeral neck  fracture. Eva No MD  [facility]   [email or web address]  [facility]  [address], [address]  [place], [address]  Phone - [phone]         Fax - [phone]

## 2019-11-05 NOTE — PROGRESS NOTES
Bedside and Verbal shift change report given to [address] [place] [address] Elen (oncoming nurse) by Mitchell Negron RN (offgoing nurse). Report included the following information SBAR, Kardex, Procedure Summary, Intake/Output, MAR and Recent Results.

## 2019-11-05 NOTE — PROGRESS NOTES
Physical Therapy  Patient awaiting blood transfusion. Will defer evaluation today and follow back tomorrow.   Thank you,  Laurie Bain, PT

## 2019-11-05 NOTE — PROGRESS NOTES
Vitals:    11/05/19 0948 11/05/19 0951 11/05/19 0957 11/05/19 1000   BP: 112/62 97/65 97/58 111/67   BP 1 Location: Left arm Left arm Left arm Left arm   BP Patient Position: Sitting  Comment: EOB Sitting  Comment: after standing attempt Sitting;Post activity  Comment: transfer to chair Supine; Post activity   Pulse: 74      Resp:       Temp:       SpO2:

## 2019-11-05 NOTE — PROGRESS NOTES
Patient: Virgen Morin MRN: [id number]  SSN: [id number]    YOB: 1950  Age: 77 y.o. Sex: male        ADMITTED: 2019 to [facility] * by Bobby Bowman MD for Acute on chronic renal failure [facility]) [N17.9, N18.9]      Virgen Morin is resting in bed w/o complaints. Stone placed yesterday, draining clear/yellow urine. Nephrology note read, ASHVIN on CKD likely d/t urinary retention +/- normalization of bp causing renal hypoperfusion. T-98.7 BP-147/83 P-73  Hgb-6.9, consider PRBC transfusion  BUN-75  Cr-6.85      Vitals: Temp (24hrs), Av.4 °F (36.9 °C), Min:97.9 °F (36.6 °C), Max:98.7 °F (37.1 °C)    Blood pressure 147/83, pulse 73, temperature 98.7 °F (37.1 °C), resp. rate 18, SpO2 99 %. Intake and Output:   1901 -  0700  In: 960 [P.O.:960]  Out: 1650 [Urine:1650]  No intake/output data recorded. JOE Output lats 24 hrs: No data found. JOE Output last 8 hrs: No data found. BM over last 24 hrs: No data found.     Exam:   Appearance: Well-developed no acute distress  Conjunctiva: Conjunctiva and lids normal  External ears/nose: Normal no lesions or deformities  Hearing: Grossly intact  Respiratory effort: Breathing easily  Lower extremity: edema  Abdomen/flank: Soft, nontender, without masses, no CVA tenderness  Digits/nails: No clubbing cyanosis or petechiae  Skin inspection: Warm and dry  Sensation: No sensory deficits  Judgment/Insight: intact  Mood/Affect: normal    Labs:  CBC:   Lab Results   Component Value Date/Time    WBC 5.8 2019 02:58 AM    HCT 22.0 (L) 2019 02:58 AM    PLATELET 588  02:58 AM     BMP:   Lab Results   Component Value Date/Time    Glucose 72 2019 02:58 AM    Sodium 135 (L) 2019 02:58 AM    Potassium 4.5 2019 02:58 AM    Chloride 104 2019 02:58 AM    CO2 21 2019 02:58 AM    BUN 75 (H) 2019 02:58 AM    Creatinine 6.85 (H) 2019 02:58 AM    Calcium 7.8 (L) 2019 02:58 AM Imagin/1 CT reviewed    Assessment/Plan:     1. Urinary retention  2. ASHVIN on CKD    -Creatinine worsening today, brice placed yesterday.   -Keep brice in place, following nephrology recommendations.  -Monitor labs, hgb & creatinine. Consider PRBC tx    Agree with above. Pt seen. Continue brice, finasteride and doxazosin. Urine clear. Will arrange follow up at our office. Discharge with brice.              Signed By: Clemente Frankel, NP - 2019

## 2019-11-05 NOTE — PROGRESS NOTES
Pharmacy Medication Reconciliation     The patient was interviewed regarding current PTA medication list, use and drug allergies. The patient was questioned regarding use of any other inhalers, topical products, over the counter medications, herbal medications, vitamin products or ophthalmic/nasal/otic medication use. Allergy Update: Patient has no known allergies. Informants: Rx query, patient, [facility] pharmacist     Recommendations/Findings: The following amendments were made to the patient's active medication list on file at [facility]:   1) Additions: NONE    2) Deletions:     Methocarbamol (Robaxin) 500 mg tablets: patient stated that he had stopped this medication after reading the side effects. He explained that he did not need the medication anymore. Oxycodone IR (Roxicodone) 5 mg tablets:  patient stated that he had finished the medication and no longer needs it. 3) Changes: NONE       Pertinent Findings:     Patient states he was taking all blood pressure medications as directed prior to admission, however; the following medications and recent fill date are listed below:  1. amLODIPine (NORVASC) 10 mg tablet last filled 9/6/19 for 30 day supply  2. cloNIDine HCl (CATAPRES) 0.1 mg tablet last filled 5/29/19 for 30 day supply  3. labetalol (NORMODYNE) 100 mg tablet last filled 9/6/2019 for 30 day supply   The information above was confirmed with the pharmacist at [facility] and [facility])    Patient also stated that he thinks that he is on too many blood pressure medications. He confirmed signs of lightheadedness and dizziness when he takes them, and he was supposed to have an appointment with his PCP, but was not able to attend due to hospital admission. Prior to Admission Medications   Prescriptions Last Dose Informant Patient Reported? Taking? amLODIPine (NORVASC) 10 mg tablet 11/5/2019 at 12 noon  No Yes   Sig: Take 1 Tab by mouth daily for 60 days.    aspirin delayed-release 81 mg tablet 11/1/2019 at 12 noon  Yes No   Sig: Take  by mouth as needed for Pain. cloNIDine HCl (CATAPRES) 0.2 mg tablet   Yes Yes   Sig: Take 0.2 mg by mouth three (3) times daily. ferrous sulfate 325 mg (65 mg iron) tablet 11/1/2019 at 1200  Yes No   Sig: Take 1 Tab by mouth two (2) times a day. furosemide (LASIX) 40 mg tablet 11/1/2019 at 12 noon  Yes No   Sig: Take 40 mg by mouth daily. labetalol (NORMODYNE) 100 mg tablet 11/1/2019 at 12 noon  No No   Sig: Take 3 Tabs by mouth two (2) times a day for 60 days. lisinopril (PRINIVIL, ZESTRIL) 40 mg tablet 11/5/2019 at 1300  Yes Yes   Sig: Take 40 mg by mouth daily. take one tablet by mouth every night at bedtime   spironolactone (ALDACTONE) 25 mg tablet 10/31/2019 at 2000 Self Yes No   Sig: Take 25 mg by mouth daily.       Facility-Administered Medications: None       Thank you,  Zina Marques

## 2019-11-05 NOTE — PROGRESS NOTES
Problem: Falls - Risk of  Goal: *Absence of Falls  Description  Document Marilu Ortez Fall Risk and appropriate interventions in the flowsheet.   Outcome: Progressing Towards Goal  Note:   Fall Risk Interventions:  Mobility Interventions: Patient to call before getting OOB, PT Consult for mobility concerns, PT Consult for assist device competence         Medication Interventions: Assess postural VS orthostatic hypotension, Evaluate medications/consider consulting pharmacy, Patient to call before getting OOB    Elimination Interventions: Call light in reach, Patient to call for help with toileting needs, Stay With Me (per policy)    History of Falls Interventions: Consult care management for discharge planning, Door open when patient unattended         Problem: Patient Education: Go to Patient Education Activity  Goal: Patient/Family Education  Outcome: Progressing Towards Goal     Problem: Chronic Renal Failure  Goal: *Fluid and electrolytes stabilized  Outcome: Progressing Towards Goal     Problem: Patient Education: Go to Patient Education Activity  Goal: Patient/Family Education  Outcome: Progressing Towards Goal

## 2019-11-05 NOTE — PROGRESS NOTES
Physical Therapy  Consult received and chart reviewed. OT has just worked with patient and reports that patient demonstrated symptomatic hypotension during session requiring return to bed. Hgb noted to be 6.9. Will defer therapy at this time and follow back later as appropriate.   Thank you,  Robb Maloney, PT

## 2019-11-05 NOTE — PROGRESS NOTES
Problem: Self Care Deficits Care Plan (Adult)  Goal: *Acute Goals and Plan of Care (Insert Text)  Description  FUNCTIONAL STATUS PRIOR TO ADMISSION: Ambulated with SPC. Could manage stairs to second floor of home without assist.  Sling had been discontinued per pt. Performed all ADLS on his own including showering. Pt was seen on 9/19/19 by Dr. Angeles Krishnamurthy for RUE fracture. Per Dr. Angeles Krishnamurthy note on 9/19/19 \"Radiographs show evidence of healing so Mr. Joshua Duque fracture should heal over time. I have elected to place him in physical therapy for gentle range of motion. He should be careful to avoid motions that involve any lifting. The patient may continue activities as tolerated and will follow up in 4 weeks. \"  Pt reports that he was to start PT today and assisted pt with trying to get in touch with the OP clinic. HOME SUPPORT PRIOR TO ADMISSION: The patient lived alone, but recently moved into his daughters house. Daughter works during the day and pts grandson provides transportation but also works. Occupational Therapy Goals:  Initiated 11/5/2019  1. Patient will perform grooming standing with supervision/set-up within 7 days. 2. Patient will perform toileting with supervision/set-up within 7 days. 3. Patient will perform upper body dressing and lower body dressing with supervision/set-up within 7 days. 4. Patient will transfer from toilet with supervision/set-up using the least restrictive device and appropriate durable medical equipment within 7 days. Outcome: Progressing Towards Goal   OCCUPATIONAL THERAPY EVALUATION  Patient: Lorie Pacheco (37 y.o. male)  Date: 11/5/2019  Primary Diagnosis: Acute on chronic renal failure (HCC) [N17.9, N18.9]        Precautions:  Fall(no lifting RUE)    ASSESSMENT  Based on the objective data described below, the patient presents with decreased endurance, general weakness and slow rate of movement with all tasks.   Tremors noted in RUE but pt has a healing humeral fracture and just recently was cleared to start using RUE. He needs cues not to lift items with RUE and I am unsure if pt should not push with RUE. Pt was cued not to push with RUE this session and needed frequent reminders. Limited activity tolerance today and pt was only able to stand x2 trials and could not tolerate remaining OOB to chair due to fatigue. HGB was 6.9 and pt was dizzy with limited activity tolerance. Limited ability to clearly write phone numbers or letters using right UE due to to weakness. Current Level of Function Impacting Discharge (ADLs/self-care): SBA for containers but able to self feed and groom seated with increased time, min to moderate assist for sit to stand and stand pivot transfers only (unable to mobilize past this today), max assist LB ADLS, moderate assist bathing, min assist UB dressing    Functional Outcome Measure: The patient scored 20/100 on the barthel outcome measure which is indicative of significant decline in ADLS. Other factors to consider for discharge: home alone during the day, recent falls, generally weak with poor activity tolerance     Patient will benefit from skilled therapy intervention to address the above noted impairments. PLAN :  Recommendations and Planned Interventions: self care training, functional mobility training, therapeutic exercise, balance training, therapeutic activities, endurance activities, patient education, home safety training and family training/education    Frequency/Duration: Patient will be followed by occupational therapy 4 times a week to address goals.     Recommendation for discharge: (in order for the patient to meet his/her long term goals)  Therapy up to 5 days/week in SNF setting    This discharge recommendation:  Has been made in collaboration with the attending provider and/or case management    IF patient discharges home will need the following DME: to be determined (TBD)       SUBJECTIVE:   Patient stated This is the same feeling I had at home and I pushed through it and fell.  report with sit to stand and mobility attempts    OBJECTIVE DATA SUMMARY:   HISTORY:   Past Medical History:   Diagnosis Date    Diabetes ([address]     Hypertension     Kidney disease      Past Surgical History:   Procedure Laterality Date    HX GI      colonoscopy 6-7 yrs ago    [place] COLON CA SCRN NOT HI RSK IND  10/21/2015            Expanded or extensive additional review of patient history:     Home Situation  Home Environment: Private residence  # Steps to Enter: 3  Rails to Enter: Yes  Hand Rails : Bilateral  One/Two Story Residence: Two story  # of Interior Steps: 15  Height of Each Step (in): (not known)  Interior Rails: Right  Lift Chair Available: No  Living Alone: No(daughter, nurse 3x for vitals)  Support Systems: Family member(s)  Patient Expects to be Discharged to[de-identified] Private residence  Current DME Used/Available at Home: Cane, straight  Tub or Shower Type: Tub/Shower combination(1/2 bath on 1st level)    Hand dominance: Right    EXAMINATION OF PERFORMANCE DEFICITS:  Cognitive/Behavioral Status:           Perception: Appears intact  Perseveration: No perseveration noted  Safety/Judgement: Awareness of environment; Fall prevention;Home safety        Hearing: Auditory  Auditory Impairment: None    Vision/Perceptual:                                Corrective Lenses: Glasses    Range of Motion:    AROM: Generally decreased, functional                         Strength:  Generally decreased but functional                   Coordination:  Coordination: Generally decreased, functional(RUE, general weakness)  Fine Motor Skills-Upper: Left Intact; Right Impaired    Gross Motor Skills-Upper: Left Intact; Right Impaired    Tone & Sensation:    Tone: Normal  Sensation: (grossly intact)                      Balance:  Sitting: Intact  Standing: Impaired  Standing - Static: Fair;Constant support(flexed posture)  Standing - Dynamic : Poor;Constant support    Functional Mobility and Transfers for ADLs:  Bed Mobility:  Rolling: Stand-by assistance  Supine to Sit: Minimum assistance(additional time)  Sit to Supine: Moderate assistance  Scooting: Contact guard assistance; Additional time    Transfers:  Sit to Stand: Minimum assistance; Moderate assistance  Stand to Sit: Moderate assistance  Bed to Chair: Moderate assistance; Additional time;Minimum assistance; Other (comment)(stand pivot hand held assist left, cues to not push RUE)  Bathroom Mobility: (unable)  Toilet Transfer : Minimum assistance; Moderate assistance; Additional time;Assist x2; Other (comment)(stand pivot, flexed posture)    ADL Assessment:  Feeding: Stand-by assistance(cutting food, containers)    Oral Facial Hygiene/Grooming: Stand-by assistance    Bathing: Moderate assistance    Upper Body Dressing: Minimum assistance    Lower Body Dressing: Maximum assistance    Toileting: Maximum assistance                ADL Intervention and task modifications: Focus on mobility and reinforcement of precautions. Attempted to have pt write with dominant RUE (general weakness noted)    Cognitive Retraining  Safety/Judgement: Awareness of environment; Fall prevention;Home safety      Functional Measure:  Barthel Index:    Bathin  Bladder: 0  Bowels: 5  Groomin  Dressin  Feedin  Mobility: 0  Stairs: 0  Toilet Use: 0  Transfer (Bed to Chair and Back): 5  Total: 20/100        The Barthel ADL Index: Guidelines  1. The index should be used as a record of what a patient does, not as a record of what a patient could do. 2. The main aim is to establish degree of independence from any help, physical or verbal, however minor and for whatever reason. 3. The need for supervision renders the patient not independent. 4. A patient's performance should be established using the best available evidence.  Asking the patient, friends/relatives and nurses are the usual sources, but direct observation and common sense are also important. However direct testing is not needed. 5. Usually the patient's performance over the preceding 24-48 hours is important, but occasionally longer periods will be relevant. 6. Middle categories imply that the patient supplies over 50 per cent of the effort. 7. Use of aids to be independent is allowed. Ivan Bianchi., Barthel, D.W. (2551). Functional evaluation: the Barthel Index. [address] [facility] (14)2. NGOZI Mak, Alexander Allen., Lilli Mercado., Charlie, [address] Measuring the change indisability after inpatient rehabilitation; comparison of the responsiveness of the Barthel Index and Functional [place] Measure. Journal of Neurology, Neurosurgery, and Psychiatry, 66(4), 235-326. LUNA Rodarte, TATO Paredes, & Imani Paulson, M.A. (2004.) Assessment of post-stroke quality of life in cost-effectiveness studies: The usefulness of the Barthel Index and the EuroQoL-5D. Quality of Life Research, 15, 851-04         Occupational Therapy Evaluation Charge Determination   History Examination Decision-Making   MEDIUM Complexity : Expanded review of history including physical, cognitive and psychosocial  history  MEDIUM Complexity : 3-5 performance deficits relating to physical, cognitive , or psychosocial skils that result in activity limitations and / or participation restrictions MEDIUM Complexity : Patient may present with comorbidities that affect occupational performnce. Miniml to moderate modification of tasks or assistance (eg, physical or verbal ) with assesment(s) is necessary to enable patient to complete evaluation       Based on the above components, the patient evaluation is determined to be of the following complexity level: MEDIUM  Pain Ratin/10    Activity Tolerance:   Fair and requires frequent rest breaks  Please refer to the flowsheet for vital signs taken during this treatment.     After treatment patient left in no apparent distress: Supine in bed and Call bell within reach    COMMUNICATION/EDUCATION:   The patients plan of care was discussed with: Physical Therapist, Registered Nurse, Physician and patient\ . Patient/family agree to work toward stated goals and plan of care. This patients plan of care is appropriate for delegation to [facility].     Thank you for this referral.  Radha Jacome, OTR/L  Time Calculation: 34 mins

## 2019-11-05 NOTE — PROGRESS NOTES
Pt was seen on 9/19/19 by Dr. Elisha Arambula for RUE fracture. Per Dr. Elisha Arambula note on 9/19/19 \"Radiographs show evidence of healing so Mr. Elizabeth Faustin fracture should heal over time. I have elected to place him in physical therapy for gentle range of motion. He should be careful to avoid motions that involve any lifting. The patient may continue activities as tolerated and will follow up in 4 weeks. \"  Pt reports that he was to start PT today and assisted pt with trying to get in touch with the OP clinic.

## 2019-11-05 NOTE — PROGRESS NOTES
Patient had large bowel movement. Occult stool sample sent to lab. MD at bedside to sign consent for blood transfusion form.

## 2019-11-06 LAB
ABO + RH BLD: NORMAL
ALBUMIN SERPL-MCNC: 2.3 G/DL (ref 3.5–5)
ANION GAP SERPL CALC-SCNC: 10 MMOL/L (ref 5–15)
BLD PROD TYP BPU: NORMAL
BLOOD GROUP ANTIBODIES SERPL: NORMAL
BPU ID: NORMAL
BUN SERPL-MCNC: 80 MG/DL (ref 6–20)
BUN/CREAT SERPL: 11 (ref 12–20)
CALCIUM SERPL-MCNC: 7.9 MG/DL (ref 8.5–10.1)
CHLORIDE SERPL-SCNC: 104 MMOL/L (ref 97–108)
CO2 SERPL-SCNC: 22 MMOL/L (ref 21–32)
CREAT SERPL-MCNC: 7.18 MG/DL (ref 0.7–1.3)
CROSSMATCH RESULT,%XM: NORMAL
ERYTHROCYTE [DISTWIDTH] IN BLOOD BY AUTOMATED COUNT: 15.7 % (ref 11.5–14.5)
GLUCOSE BLD STRIP.AUTO-MCNC: 113 MG/DL (ref 65–100)
GLUCOSE BLD STRIP.AUTO-MCNC: 118 MG/DL (ref 65–100)
GLUCOSE BLD STRIP.AUTO-MCNC: 78 MG/DL (ref 65–100)
GLUCOSE BLD STRIP.AUTO-MCNC: 89 MG/DL (ref 65–100)
GLUCOSE BLD STRIP.AUTO-MCNC: 97 MG/DL (ref 65–100)
GLUCOSE SERPL-MCNC: 64 MG/DL (ref 65–100)
HCT VFR BLD AUTO: 24.1 % (ref 36.6–50.3)
HGB BLD-MCNC: 7.8 G/DL (ref 12.1–17)
MCH RBC QN AUTO: 29.8 PG (ref 26–34)
MCHC RBC AUTO-ENTMCNC: 32.4 G/DL (ref 30–36.5)
MCV RBC AUTO: 92 FL (ref 80–99)
NRBC # BLD: 0 K/UL (ref 0–0.01)
NRBC BLD-RTO: 0 PER 100 WBC
PHOSPHATE SERPL-MCNC: 5.9 MG/DL (ref 2.6–4.7)
PLATELET # BLD AUTO: 229 K/UL (ref 150–400)
PMV BLD AUTO: 11.1 FL (ref 8.9–12.9)
POTASSIUM SERPL-SCNC: 4.3 MMOL/L (ref 3.5–5.1)
RBC # BLD AUTO: 2.62 M/UL (ref 4.1–5.7)
SERVICE CMNT-IMP: ABNORMAL
SERVICE CMNT-IMP: ABNORMAL
SERVICE CMNT-IMP: NORMAL
SODIUM SERPL-SCNC: 136 MMOL/L (ref 136–145)
SPECIMEN EXP DATE BLD: NORMAL
STATUS OF UNIT,%ST: NORMAL
UNIT DIVISION, %UDIV: 0
WBC # BLD AUTO: 5.8 K/UL (ref 4.1–11.1)

## 2019-11-06 PROCEDURE — 74011000250 HC RX REV CODE- 250: Performed by: INTERNAL MEDICINE

## 2019-11-06 PROCEDURE — 97535 SELF CARE MNGMENT TRAINING: CPT

## 2019-11-06 PROCEDURE — 74011250637 HC RX REV CODE- 250/637: Performed by: UROLOGY

## 2019-11-06 PROCEDURE — 74011250637 HC RX REV CODE- 250/637: Performed by: INTERNAL MEDICINE

## 2019-11-06 PROCEDURE — 80069 RENAL FUNCTION PANEL: CPT

## 2019-11-06 PROCEDURE — 74011000258 HC RX REV CODE- 258: Performed by: INTERNAL MEDICINE

## 2019-11-06 PROCEDURE — 65660000000 HC RM CCU STEPDOWN

## 2019-11-06 PROCEDURE — 94760 N-INVAS EAR/PLS OXIMETRY 1: CPT

## 2019-11-06 PROCEDURE — 82962 GLUCOSE BLOOD TEST: CPT

## 2019-11-06 PROCEDURE — 97116 GAIT TRAINING THERAPY: CPT | Performed by: PHYSICAL THERAPIST

## 2019-11-06 PROCEDURE — 85027 COMPLETE CBC AUTOMATED: CPT

## 2019-11-06 PROCEDURE — 97530 THERAPEUTIC ACTIVITIES: CPT | Performed by: PHYSICAL THERAPIST

## 2019-11-06 PROCEDURE — 36415 COLL VENOUS BLD VENIPUNCTURE: CPT

## 2019-11-06 PROCEDURE — 97161 PT EVAL LOW COMPLEX 20 MIN: CPT | Performed by: PHYSICAL THERAPIST

## 2019-11-06 RX ADMIN — FINASTERIDE 5 MG: 5 TABLET, FILM COATED ORAL at 08:36

## 2019-11-06 RX ADMIN — SODIUM CHLORIDE: 450 INJECTION, SOLUTION INTRAVENOUS at 04:14

## 2019-11-06 RX ADMIN — LABETALOL HYDROCHLORIDE 600 MG: 200 TABLET, FILM COATED ORAL at 17:52

## 2019-11-06 RX ADMIN — LABETALOL HYDROCHLORIDE 600 MG: 200 TABLET, FILM COATED ORAL at 08:35

## 2019-11-06 RX ADMIN — SODIUM BICARBONATE 650 MG: 650 TABLET ORAL at 17:52

## 2019-11-06 RX ADMIN — AMLODIPINE BESYLATE 10 MG: 5 TABLET ORAL at 08:36

## 2019-11-06 RX ADMIN — DOXAZOSIN 2 MG: 2 TABLET ORAL at 08:34

## 2019-11-06 RX ADMIN — SODIUM BICARBONATE 650 MG: 650 TABLET ORAL at 08:36

## 2019-11-06 RX ADMIN — Medication 10 ML: at 22:08

## 2019-11-06 NOTE — PROGRESS NOTES
Orthopedic End of Shift Note    Bedside and verbal shift change report given to Facundo Lang RN (oncoming nurse) by Al Leung RN (offgoing nurse). Report included the following information SBAR, Intake/Output, Recent Results, Cardiac Rhythm Sinus Rhythm and Quality Measures. POD#     Significant issues during shift: Continued care for acute renal failure r/t rentention. Stone in place draining clear yellow urine. Patient denies pain. Vitals stable on room air. No significant issues on overnight.     Issues for Physician to address: Same as above    Activity This Shift  (check all that apply) [] chair  [] dangle   [] bathroom  [] bedside commode [] hallway  [] bedrest   Nausea/Vomiting [] yes [x] no     Voiding Status [] void [x] Stone [] I&O Cath   Bowel Movements [x] yes [] no     Foot Pumps or SCD [x] yes [] no    Ice Pack [] yes    [x] no    Incentive Spirometer [] yes [x] no Volume:      Telemetry Monitoring   [x] yes [] no Rhythm:   Supplemental O2 [] yes [x] no Sat off O2:   98%

## 2019-11-06 NOTE — PROGRESS NOTES
FOC  1) SNF  Pending review  2) will need auth   3) IM letter to be signed at discharge      2:25pm: CM contacted [facility] [address] to follow-up on referral. CM was informed that Pt has been accepted and auth could be started, however Pt is needing updated PT/OT notes to submit auth. CM notified therapy of this. 11:30am CM met with Pt to discuss recommendation for SNF. Pt was receptive. Pt stated that he would like referral to be sent to [facility]. FOC signed and placed on chart.      Andrea Pickett, [address] [place] [address]

## 2019-11-06 NOTE — PROGRESS NOTES
Bedside shift change report given to Kathleen Chawla RN (oncoming nurse) by Parveen Interiano RN (offgoing nurse). Report included the following information SBAR, Kardex, Procedure Summary, Intake/Output, MAR, Accordion, Recent Results, Med Rec Status and Cardiac Rhythm NSR.

## 2019-11-06 NOTE — PROGRESS NOTES
Problem: Falls - Risk of  Goal: *Absence of Falls  Description  Document Montez Pro Fall Risk and appropriate interventions in the flowsheet.   Outcome: Progressing Towards Goal  Note:   Fall Risk Interventions:  Mobility Interventions: Patient to call before getting OOB, PT Consult for mobility concerns, PT Consult for assist device competence, Utilize walker, cane, or other assistive device         Medication Interventions: Assess postural VS orthostatic hypotension, Evaluate medications/consider consulting pharmacy, Patient to call before getting OOB    Elimination Interventions: Call light in reach, Patient to call for help with toileting needs, Stay With Me (per policy), Toilet paper/wipes in reach, Toileting schedule/hourly rounds    History of Falls Interventions: Door open when patient unattended, Evaluate medications/consider consulting pharmacy, Investigate reason for fall         Problem: Patient Education: Go to Patient Education Activity  Goal: Patient/Family Education  Outcome: Progressing Towards Goal     Problem: Chronic Renal Failure  Goal: *Fluid and electrolytes stabilized  Outcome: Progressing Towards Goal     Problem: Patient Education: Go to Patient Education Activity  Goal: Patient/Family Education  Outcome: Progressing Towards Goal     Problem: Patient Education: Go to Patient Education Activity  Goal: Patient/Family Education  Outcome: Progressing Towards Goal

## 2019-11-06 NOTE — PROGRESS NOTES
Problem: Mobility Impaired (Adult and Pediatric)  Goal: *Acute Goals and Plan of Care (Insert Text)  Description  FUNCTIONAL STATUS PRIOR TO ADMISSION: Patient was modified independent using a single point cane for functional mobility. HOME SUPPORT PRIOR TO ADMISSION: The patient lived with daughter who works during day. [facility] nursing 3x/wk. Pt was seen on 9/19/19 by Dr. Loulou Hernandez for RUE fracture. Per Dr. Loulou Hernandez note on 9/19/19 \"Radiographs show evidence of healing so Mr. Samantha Arellano fracture should heal over time. I have elected to place him in physical therapy for gentle range of motion. He should be careful to avoid motions that involve any lifting. The patient may continue activities as tolerated and will follow up in 4 weeks. \"  Pt reports that he was to start PT today and assisted pt with trying to get in touch with the OP clinic    Physical Therapy Goals  Initiated 11/6/2019  1. Patient will move from supine to sit and sit to supine , scoot up and down and roll side to side in bed with independence within 7 day(s). 2.  Patient will transfer from bed to chair and chair to bed with modified independence using the least restrictive device within 7 day(s). 3.  Patient will perform sit to stand with modified independence within 7 day(s). 4.  Patient will ambulate with modified independence for 200 feet with the least restrictive device within 7 day(s). 5.  Patient will ascend/descend 15 stairs with 1 handrail(s) with supervision/set-up within 7 day(s).    Outcome: Not Met   PHYSICAL THERAPY EVALUATION  Patient: Phylicia Kwan (65 y.o. male)  Date: 11/6/2019  Primary Diagnosis: Acute on chronic renal failure (HCC) [N17.9, N18.9]        Precautions:   Fall(no lifting RUE)      ASSESSMENT: Patient with recent R humerus fx but has been cleared gentle ROM and activity as tolerated but no lifting  Based on the objective data described below, the patient presents with generalized weakness and impaired functional mobility, balance and endurance. Patient demonstrating generalized LE instability in standing with subsequent knee buckling noted during ambulation but no overt LOB noted. Patient able to ambulate 30 feet using RW for support. Patient reports modified independence at baseline using cane for support. He is currently well below his functional baseline and would benefit from further PT following discharge. Recommend SNF. Current Level of Function Impacting Discharge (mobility/balance): CGA/min A for OOB mobility    Functional Outcome Measure: The patient scored 6/20 on the Elderly mobility scale outcome measure which is indicative of moderate functional impairments. Patient will benefit from skilled therapy intervention to address the above noted impairments. PLAN :  Recommendations and Planned Interventions: bed mobility training, transfer training, gait training, therapeutic exercises, patient and family training/education and therapeutic activities      Frequency/Duration: Patient will be followed by physical therapy:  4 times a week to address goals. Recommendation for discharge: (in order for the patient to meet his/her long term goals)  Therapy up to 5 days/week in SNF setting    This discharge recommendation:  A follow-up discussion with the attending provider and/or case management is planned    IF patient discharges home will need the following DME: rolling walker         SUBJECTIVE:   Patient stated I'm weak.     OBJECTIVE DATA SUMMARY:   HISTORY:    Past Medical History:   Diagnosis Date    Diabetes ([address]     Hypertension     Kidney disease      Past Surgical History:   Procedure Laterality Date    HX GI      colonoscopy 6-7 yrs ago    [place] COLON CA SCRN NOT HI RSK IND  10/21/2015              Home Situation  Home Environment: Private residence  # Steps to Enter: 3  Rails to Enter: Yes  Hand Rails : Bilateral  One/Two Story Residence: Two story  # of Interior Steps: 15  Height of Each Step (in): (not known)  Interior Rails: Right  Lift Chair Available: No  Living Alone: No(daughter, nurse 3x for vitals)  Support Systems: Family member(s)  Patient Expects to be Discharged to[de-identified] Private residence  Current DME Used/Available at Home: Cane, straight  Tub or Shower Type: Tub/Shower combination(1/2 bath on 1st level)    EXAMINATION/PRESENTATION/DECISION MAKING:   Critical Behavior:  Neurologic State: Alert, Appropriate for age  Orientation Level: Oriented X4  Cognition: Appropriate decision making, Appropriate for age attention/concentration, Appropriate safety awareness, Follows commands  Safety/Judgement: Awareness of environment, Fall prevention, Home safety  Hearing: Auditory  Auditory Impairment: None    Edema: B ankles are edematous  Range Of Motion:  AROM: Generally decreased, functional                       Strength:    Strength: Generally decreased, functional                    Tone & Sensation:   Tone: Normal                              Coordination:  Coordination: Generally decreased, functional       Functional Mobility:  Bed Mobility:  Rolling: Stand-by assistance  Supine to Sit: Stand-by assistance     Scooting: Stand-by assistance  Transfers:  Sit to Stand: Contact guard assistance;Minimum assistance;Assist x2  Stand to Sit: Contact guard assistance;Minimum assistance        Bed to Chair: Minimum assistance              Balance:   Sitting: Intact  Standing: Impaired  Standing - Static: Fair;Constant support  Standing - Dynamic : Fair;Constant support  Ambulation/Gait Training:  Distance (ft): 30 Feet (ft)  Assistive Device: Walker, rolling;Gait belt  Ambulation - Level of Assistance: Minimal assistance        Gait Abnormalities: Decreased step clearance(B knee buckling with general LE instability)        Base of Support: Widened     Speed/Sarah: Pace decreased (<100 feet/min); Slow  Step Length: Left shortened;Right shortened         Gait is slow and unsteady          Functional Measure:    Elder Mobility Scale    6/20         Scores under 10  generally these patients are dependent in mobility maneuvers; require help with  basic ADL, such as transfers, toileting and dressing. Scores between 10  13  generally these patients are borderline in terms of safe mobility and  independence in ADL i.e. they require some help with some mobility maneuvers. Scores over 14  Generally these patients are able to perform mobility maneuvers alone and safely  and are independent in basic ADL. Activity Tolerance:   Fair and requires rest breaks  Please refer to the flowsheet for vital signs taken during this treatment. After treatment patient left in no apparent distress:   Sitting in chair and Call bell within reach    COMMUNICATION/EDUCATION:   The patients plan of care was discussed with: Occupational Therapist and Registered Nurse. Fall prevention education was provided and the patient/caregiver indicated understanding., Patient/family have participated as able in goal setting and plan of care. and Patient/family agree to work toward stated goals and plan of care.     Thank you for this referral.  Timbo Espinoza, PT   Time Calculation: 37 mins

## 2019-11-06 NOTE — PROGRESS NOTES
Patient: Lexx Farmer MRN: [id number]  SSN: [id number]    YOB: 1950  Age: 77 y.o. Sex: male        ADMITTED: 2019 to Abdirahman Griffith MD by Waylon Corea MD for Acute on chronic renal failure [facility]) [N17.9, N18.9]      Lexx Farmer is doing well, OOB, sitting in chair. Afebrile/VSS. Stone in place, good uop overnight, 1300 ml. On proscar. Creatinine continues to rise, now 7.18 from 6.85. He received a blood transfusion yesterday, hgb now 7.8. Vitals: Temp (24hrs), Av.8 °F (36.6 °C), Min:96.5 °F (35.8 °C), Max:98.3 °F (36.8 °C)    Blood pressure 146/82, pulse 76, temperature 98.3 °F (36.8 °C), resp. rate 16, weight 79.2 kg (174 lb 9.7 oz), SpO2 100 %. Intake and Output:   1901 -  0700  In: 1835.5 [I.V.:1506.3]  Out: 2650 [Urine:2650]  No intake/output data recorded. JOE Output lats 24 hrs: No data found. JOE Output last 8 hrs: No data found.   BM over last 24 hrs:   Patient Vitals for the past 24 hrs:   Stool Occurrence(s)   19 1345 1       Exam:   Appearance: Well-developed no acute distress  Conjunctiva: Conjunctiva and lids normal  External ears/nose: Normal no lesions or deformities  Hearing: Grossly intact  Respiratory effort: Breathing easily  Lower extremity:  Edema improved  Abdomen/flank: Soft, nontender, without masses, no CVA tenderness  Digits/nails: No clubbing cyanosis or petechiae  Skin inspection: Warm and dryity  Sensation: No sensory deficits  Judgment/Insight: intact  Mood/Affect: normal    Labs:  CBC:   Lab Results   Component Value Date/Time    WBC 5.8 2019 02:42 AM    HCT 24.1 (L) 2019 02:42 AM    PLATELET 814  02:42 AM     BMP:   Lab Results   Component Value Date/Time    Glucose 64 (L) 2019 02:42 AM    Sodium 136 2019 02:42 AM    Potassium 4.3 2019 02:42 AM    Chloride 104 2019 02:42 AM    CO2 22 2019 02:42 AM    BUN 80 (H) 2019 02:42 AM    Creatinine 7.18 (H) 2019 02:42 AM Calcium 7.9 (L) 11/06/2019 02:42 AM           Assessment/Plan:     1. Urinary retention  2. Gross hematuria, resolved    -brice in place, creatinine continues to trend upward. CT from 11/1 showing no hydro. UOP good. Appreciate nephrology recommendations.     Signed By: Flory Barger NP - November 6, 2019

## 2019-11-06 NOTE — PROGRESS NOTES
Nephrology Progress Note     Jarvis Carrera     [email or web address]                  Phone - [phone]   Patient: Silvina Nobles   Date- 11/6/2019        Admit Date: 11/2/2019  YOB: 1950             CC: Follow up for ASHVIN  on ckd          Subjective: Interval History:   -   He had brice placed on 11/4  Clear urine now. No more hematuria    Pt says he feels fine. No complaints. Appetite is good. ROS:- as above, plus: no HEENT complaints. No SOB. No chest pain. No abd pain. No N/V. No joint pain. No skin rashes or lesions. Assessment & Plan:     ASHVIN   on ckd  -unfortunately, the renal function is worse despite placement of the brice. - bun/creat up to 80/7.2  - there is no urgent need for dialysis and the patient is refusing it anyway. - His creat was 5 in 9/19, so he was ALREADY stage 5. The renal function may not get better. - LIKELY DUE TO URINARY RETENTION +/- normalisation of bp causing renal hypoperfusion  - follow bmp in am  - No more IV fluids: pt is overloaded. - avoid diuretics  - hold lisinopril    Chronic renal insufficiency, stage V.  Creat was 5 in 9/19.  - pt followed by Dr. Jackson Truong. Etiology likely HTN and DM.     Hypertensive emergency  - continue norvasc,  Labetalol ,cardura  - stop clonidine due to side effect of dizziness. And if he is non compliant it will cause rebound hypertension.  - hold lisinopril     anemia of ckd  - continue epogen  - Hb up to 7.8 after PRBC's. Hematuria- urinary retention  - urology input noted  - s/p placement of a brice     metabolic acidosis  - start po bicarb    Edema  - avoid diuretics for now      TYPE 2 DM  PROTEINURIA , Microscopic hematuria  - +ve hep b and hep c -      Right humeral neck fracture                Physical exam:   GEN:  Elderly man in no distress. Up in a chair.   NECK:  Supple, no thyromegaly  RESP: lungs clear; no resp distress  CVS: RRR,S1,S2   ABDO:  soft , non tender, No mass  NEURO: non focal, normal speech  EXT: 2+ edema   - brice +; clear urine  Psych: normal affect and mood    Care Plan discussed with: patient at length. Do not see any urgent need for dialysis but am a bit concerned by his refusal to have it regardless. Objective:   Visit Vitals  /86   Pulse 69   Temp 98.3 °F (36.8 °C)   Resp 16   Wt 79.2 kg (174 lb 9.7 oz)   SpO2 100%   BMI 25.78 kg/m²     Last 3 Recorded Weights in this Encounter    11/05/19 1253   Weight: 79.2 kg (174 lb 9.7 oz)     11/04 1901 - 11/06 0700  In: 1835.5 [I.V.:1506.3]  Out: 2650 [Urine:2650]    Intake/Output Summary (Last 24 hours) at 11/6/2019 1751  Last data filed at 11/6/2019 0659  Gross per 24 hour   Intake 1506.25 ml   Output 1300 ml   Net 206.25 ml      Chart reviewed. Pertinent Notes reviewed.        Medication list  reviewed   Current Facility-Administered Medications   Medication    sodium bicarbonate tablet 650 mg    finasteride (PROSCAR) tablet 5 mg    epoetin charisse-epbx (RETACRIT) 12,000 Units combo injection    sodium chloride (NS) flush 5-40 mL    sodium chloride (NS) flush 5-40 mL    acetaminophen (TYLENOL) tablet 650 mg    ondansetron (ZOFRAN) injection 4 mg    bisacodyl (DULCOLAX) tablet 5 mg    glucose chewable tablet 16 g    dextrose (D50W) injection syrg 12.5-25 g    glucagon (GLUCAGEN) injection 1 mg    insulin lispro (HUMALOG) injection    amLODIPine (NORVASC) tablet 10 mg    aspirin delayed-release tablet 81 mg    hydrALAZINE (APRESOLINE) 20 mg/mL injection 20 mg    labetalol (NORMODYNE) tablet 600 mg    doxazosin (CARDURA) tablet 2 mg           Data Review :  Recent Labs     11/06/19  0242 11/05/19  0258 11/04/19  0605    135* 134*   K 4.3 4.5 4.1    104 103   CO2 22 21 23   BUN 80* 75* 72*   CREA 7.18* 6.85* 6.19*   GLU 64* 72 81   CA 7.9* 7.8* 7.9*   PHOS 5.9*  --   --      Recent Labs     11/06/19  0242 11/05/19  0258 11/04/19  0605   WBC 5.8 5.8 4.8   HGB 7.8* 6.9* 7.4*   HCT 24.1* 22.0* 23.3*    224 216     No results for input(s): FE, TIBC, PSAT, FERR in the last 72 hours. No results for input(s): CPK, CKNDX, TROIQ in the last 72 hours. No lab exists for component: CPKMB  Lab Results   Component Value Date/Time    Color ABRAHAM 11/01/2019 09:05 PM    Appearance CLOUDY (A) 11/01/2019 09:05 PM    Specific gravity 1.015 11/01/2019 09:05 PM    pH (UA) 5.5 11/01/2019 09:05 PM    Protein >300 (A) 11/01/2019 09:05 PM    Glucose NEGATIVE  11/01/2019 09:05 PM    Ketone NEGATIVE  11/01/2019 09:05 PM    Bilirubin NEGATIVE  11/01/2019 09:05 PM    Urobilinogen 0.2 11/01/2019 09:05 PM    Nitrites NEGATIVE  11/01/2019 09:05 PM    Leukocyte Esterase TRACE (A) 11/01/2019 09:05 PM    Epithelial cells FEW 11/01/2019 09:05 PM    Bacteria NEGATIVE  11/01/2019 09:05 PM    WBC 0-4 11/01/2019 09:05 PM    RBC  11/01/2019 09:05 PM     Lab Results   Component Value Date/Time    Culture result: NO GROWTH 5 DAYS 09/04/2019 06:29 PM     No results found for: SDES  Lab Results   Component Value Date/Time    Sodium,urine random 65 01/24/2019 12:52 AM    Creatinine, urine 94.80 01/24/2019 12:52 AM       Results from Hospital Encounter encounter on 11/01/19   XR CHEST PA LAT    Narrative EXAM: XR CHEST PA LAT    INDICATION: pneumonia    COMPARISON: Chest x-ray 9/2/2019. FINDINGS: PA and lateral radiographs of the chest demonstrate hyperinflated but  otherwise clear lungs. The cardiac and mediastinal contours and pulmonary  vascularity are normal. Atherosclerotic calcifications affect the aortic arch  and the thoracic aorta is tortuous. The chest wall structures and visualized  upper abdomen show no acute findings with incidental note of degenerative spine,  right humeral neck fracture, and diffuse osteopenia. Impression IMPRESSION: No acute cardiopulmonary findings. COPD. Right humeral neck  fracture. Anyi Sorenson MD  [facility]   www. [email or web address] [facility] [address] [facility], [address], [address]  Phone - [phone]         Fax - [phone]

## 2019-11-07 LAB
ALBUMIN SERPL-MCNC: 2.4 G/DL (ref 3.5–5)
ANION GAP SERPL CALC-SCNC: 9 MMOL/L (ref 5–15)
BUN SERPL-MCNC: 86 MG/DL (ref 6–20)
BUN/CREAT SERPL: 12 (ref 12–20)
CALCIUM SERPL-MCNC: 7.7 MG/DL (ref 8.5–10.1)
CHLORIDE SERPL-SCNC: 105 MMOL/L (ref 97–108)
CO2 SERPL-SCNC: 24 MMOL/L (ref 21–32)
CREAT SERPL-MCNC: 7.44 MG/DL (ref 0.7–1.3)
ERYTHROCYTE [DISTWIDTH] IN BLOOD BY AUTOMATED COUNT: 15.5 % (ref 11.5–14.5)
GLUCOSE BLD STRIP.AUTO-MCNC: 111 MG/DL (ref 65–100)
GLUCOSE BLD STRIP.AUTO-MCNC: 114 MG/DL (ref 65–100)
GLUCOSE BLD STRIP.AUTO-MCNC: 134 MG/DL (ref 65–100)
GLUCOSE BLD STRIP.AUTO-MCNC: 81 MG/DL (ref 65–100)
GLUCOSE SERPL-MCNC: 80 MG/DL (ref 65–100)
HCT VFR BLD AUTO: 25 % (ref 36.6–50.3)
HGB BLD-MCNC: 8 G/DL (ref 12.1–17)
MCH RBC QN AUTO: 29.5 PG (ref 26–34)
MCHC RBC AUTO-ENTMCNC: 32 G/DL (ref 30–36.5)
MCV RBC AUTO: 92.3 FL (ref 80–99)
NRBC # BLD: 0 K/UL (ref 0–0.01)
NRBC BLD-RTO: 0 PER 100 WBC
PHOSPHATE SERPL-MCNC: 6.3 MG/DL (ref 2.6–4.7)
PLATELET # BLD AUTO: 254 K/UL (ref 150–400)
PMV BLD AUTO: 10.3 FL (ref 8.9–12.9)
POTASSIUM SERPL-SCNC: 4.5 MMOL/L (ref 3.5–5.1)
RBC # BLD AUTO: 2.71 M/UL (ref 4.1–5.7)
SERVICE CMNT-IMP: ABNORMAL
SERVICE CMNT-IMP: NORMAL
SODIUM SERPL-SCNC: 138 MMOL/L (ref 136–145)
WBC # BLD AUTO: 5.2 K/UL (ref 4.1–11.1)

## 2019-11-07 PROCEDURE — 65660000000 HC RM CCU STEPDOWN

## 2019-11-07 PROCEDURE — 74011250637 HC RX REV CODE- 250/637: Performed by: UROLOGY

## 2019-11-07 PROCEDURE — 74011250636 HC RX REV CODE- 250/636: Performed by: FAMILY MEDICINE

## 2019-11-07 PROCEDURE — 82962 GLUCOSE BLOOD TEST: CPT

## 2019-11-07 PROCEDURE — 74011250637 HC RX REV CODE- 250/637: Performed by: INTERNAL MEDICINE

## 2019-11-07 PROCEDURE — 97116 GAIT TRAINING THERAPY: CPT

## 2019-11-07 PROCEDURE — 97110 THERAPEUTIC EXERCISES: CPT

## 2019-11-07 PROCEDURE — 80069 RENAL FUNCTION PANEL: CPT

## 2019-11-07 PROCEDURE — 85027 COMPLETE CBC AUTOMATED: CPT

## 2019-11-07 PROCEDURE — 36415 COLL VENOUS BLD VENIPUNCTURE: CPT

## 2019-11-07 RX ADMIN — SODIUM BICARBONATE 650 MG: 650 TABLET ORAL at 17:24

## 2019-11-07 RX ADMIN — LABETALOL HYDROCHLORIDE 600 MG: 200 TABLET, FILM COATED ORAL at 08:04

## 2019-11-07 RX ADMIN — DOXAZOSIN 2 MG: 2 TABLET ORAL at 08:07

## 2019-11-07 RX ADMIN — Medication 10 ML: at 22:31

## 2019-11-07 RX ADMIN — SODIUM BICARBONATE 650 MG: 650 TABLET ORAL at 08:06

## 2019-11-07 RX ADMIN — AMLODIPINE BESYLATE 10 MG: 5 TABLET ORAL at 08:06

## 2019-11-07 RX ADMIN — LABETALOL HYDROCHLORIDE 600 MG: 200 TABLET, FILM COATED ORAL at 17:24

## 2019-11-07 RX ADMIN — FINASTERIDE 5 MG: 5 TABLET, FILM COATED ORAL at 08:07

## 2019-11-07 RX ADMIN — Medication 10 ML: at 05:48

## 2019-11-07 RX ADMIN — EPOETIN ALFA-EPBX 12000 UNITS: 10000 INJECTION, SOLUTION INTRAVENOUS; SUBCUTANEOUS at 22:30

## 2019-11-07 NOTE — PROGRESS NOTES
[facility] [address] [facility]      9:10am: CM notified Georges of [facility]. attending. Per attending creatinine was elevated Awaiting clearance from nephrology. Andrea Moulton, [facility] [address] [facility],[address]     9am: Information obtained from [facility]) providing authorization for SNF placement. Authorization number [id number]. [facility] representative will call admission's liaison to provide information on authorization as well. Stephen Ram Person, [address] - ED [facility]  [employer] ACP Facilitator  Zone Phone: [phone]      Mobile: [phone]

## 2019-11-07 NOTE — PROGRESS NOTES
Hospitalist Progress Note    NAME: Yosef Louise   :  1950   MRN:  [id number]       Assessment / Plan:  Acute on chronic kidney disease stage V  - Cr continues to trend up in spite of Stone placement. Creatinine baseline is around 5 and currently is about 7  -Nephrology is following. Per renal patient is refusing dialysis and there is no imminent need for Hd  -Repeat BMP in a.m. Avoid nephrotoxic medications. Dose medications per GFR.  -Monitor urine output      Hypertension  controlled  -Continue amlodipine, labetalol and added Cardura  - Hold Lisinopril due to ASHVIN/CKD  - Hydralazine as needed   - clonidine discontinued due to compliance issues and may cause rebound htn     Gross Hematuria   In Stone with clear urine now. Continue Stone for now. Outpatient follow-up with urology. Urinary  Retention   - likely 2/2 BPH vs blood clots   -Continue Stone catheter for now s    Generalized weakness likely multifactorial  continue PT/OT    Anemia most likely secondary to chronic kidney disease  -Hemoglobin is 7.8 after 1 unit of PRBC transfusion on     DM type II  continue sliding scale insulin with blood sugar checks    Chronic diastolic heart failure  Hold Lasix because of worsening kidney function  -Decision to resume diuretics per nephrology     Hx Right humeral neck fracture.  -pain med PRN  next    Disposition: Discharged to once cleared by renal    30.0 - 39.9 Obese / Body mass index is 25.78 kg/m². Code status: Full  Prophylaxis: SCD's  Recommended Disposition: Home w/Family     Subjective:     Chief Complaint / Reason for Physician Visit  Does not report any abdominal pain, nausea or vomiting. Urine is clear.  -No chest pain  Overnight events noted      Objective:     VITALS:   Last 24hrs VS reviewed since prior progress note.  Most recent are:  Patient Vitals for the past 24 hrs:   Temp Pulse Resp BP SpO2   19 98.1 °F (36.7 °C) 75 18 142/79 99 %   19 1556 98.3 °F (36.8 °C) 69 16 135/86 100 %   11/06/19 1418  74  133/85    11/06/19 1126 97.8 °F (36.6 °C) 70 16 142/80 100 %   11/06/19 0752 98.3 °F (36.8 °C) 76 16 146/82 100 %   11/06/19 0236 98.3 °F (36.8 °C) 72 16 132/79 96 %   11/05/19 2233 98.2 °F (36.8 °C) 70 16 126/77 98 %       Intake/Output Summary (Last 24 hours) at 11/6/2019 2225  Last data filed at 11/6/2019 6005  Gross per 24 hour   Intake 1506.25 ml   Output 1300 ml   Net 206.25 ml        PHYSICAL EXAM:  General: Alert, cooperative, no acute distress    EENT:  EOMI. Anicteric sclerae. MMM  Resp:  CTA bilaterally, no wheezing or rales. No accessory muscle use  CV:  Regular  rhythm,  No edema  GI:  Soft, Non distended, Non tender.  +Bowel sounds, Stone with clear urine  Neurologic:  Alert and oriented X 3, normal speech,   Psych:   Good insight. Not anxious nor agitated  Skin:  No rashes. No jaundice    Reviewed most current lab test results and cultures  YES  Reviewed most current radiology test results   YES  Review and summation of old records today    NO  Reviewed patient's current orders and MAR    YES  PMH/SH reviewed - no change compared to H&P  ________________________________________________________________________  Care Plan discussed with:    Comments   Patient x    Family      RN x    Care Manager x    Consultant  x                      Multidiciplinary team rounds were held today with , nursing, pharmacist and clinical coordinator. Patient's plan of care was discussed; medications were reviewed and discharge planning was addressed.      ________________________________________________________________________  Total NON critical care TIME:  35    Minutes    Total CRITICAL CARE TIME Spent:   Minutes non procedure based      Comments   >50% of visit spent in counseling and coordination of care x    ________________________________________________________________________  Akiko Caceres MD     Procedures: see electronic medical records for all procedures/Xrays and details which were not copied into this note but were reviewed prior to creation of Plan. LABS:  I reviewed today's most current labs and imaging studies.   Pertinent labs include:  Recent Labs     11/06/19 0242 11/05/19 0258 11/04/19 0605   WBC 5.8 5.8 4.8   HGB 7.8* 6.9* 7.4*   HCT 24.1* 22.0* 23.3*    224 216     Recent Labs     11/06/19 0242 11/05/19 0258 11/04/19 0605    135* 134*   K 4.3 4.5 4.1    104 103   CO2 22 21 23   GLU 64* 72 81   BUN 80* 75* 72*   CREA 7.18* 6.85* 6.19*   CA 7.9* 7.8* 7.9*   PHOS 5.9*  --   --    ALB 2.3*  --   --        Signed: Leeann Kincaid MD

## 2019-11-07 NOTE — PROGRESS NOTES
Hospitalist Progress Note    NAME: Lupe Garcia   :  1950   MRN:  [id number]       Assessment / Plan:  Acute on chronic kidney disease stage V worse  - Cr continues to trend up in spite of Stone placement. Creatinine baseline is around 5 and currently is about 7.4  -Nephrology is following. Per renal patient is refusing dialysis and there is no imminent need for Hd  -Repeat BMP in a.m. Avoid nephrotoxic medications. Dose medications per GFR.  -Monitor urine output   -Waiting on renal clearance for discharge      Hypertension  controlled  -Continue amlodipine, labetalol and added Cardura  - Hold Lisinopril due to ASHVIN/CKD  - Hydralazine as needed   - clonidine discontinued due to compliance issues and may cause rebound htn     Gross Hematuria   In Stone with clear urine now. Continue Stone for now. Outpatient follow-up with urology. Urinary  Retention   - likely 2/2 BPH vs blood clots   -Continue Stone catheter for now     Generalized weakness likely multifactorial  continue PT/OT    Anemia most likely secondary to chronic kidney disease  -Hemoglobin is 8 today. He is s/p  1 unit of PRBC transfusion on     DM type II  continue sliding scale insulin with blood sugar checks    Chronic diastolic heart failure  Hold Lasix because of worsening kidney function  -Decision to resume diuretics per nephrology     Hx Right humeral neck fracture.  -pain med PRN      Disposition: Discharged to SNF per PT recs once cleared by renal    30.0 - 39.9 Obese / Body mass index is 25.78 kg/m². Code status: Full  Prophylaxis: SCD's  Recommended Disposition: Home w/Family     Subjective:     Chief Complaint / Reason for Physician Visit  Does not report any new complaints today. Denies abdominal pain. Denies nausea vomiting. Creatinine continues to trend up      Objective:     VITALS:   Last 24hrs VS reviewed since prior progress note.  Most recent are:  Patient Vitals for the past 24 hrs:   Temp Pulse Resp BP SpO2   11/07/19 1512 98.3 °F (36.8 °C) 71 18 134/71 97 %   11/07/19 1102 98 °F (36.7 °C) 69 16 121/78 95 %   11/07/19 0813 97.9 °F (36.6 °C)  16  100 %   11/07/19 0804  73  143/89    11/07/19 0345 98.2 °F (36.8 °C) 78 17 138/81 97 %   11/06/19 2254 98.3 °F (36.8 °C) 75 16 135/78 95 %   11/06/19 2023 98.1 °F (36.7 °C) 75 18 142/79 99 %       Intake/Output Summary (Last 24 hours) at 11/7/2019 1641  Last data filed at 11/7/2019 0553  Gross per 24 hour   Intake    Output 800 ml   Net -800 ml        PHYSICAL EXAM:  General:           Alert, cooperative, no acute distress    EENT:  EOMI. Anicteric sclerae. MMM  Resp:  CTA bilaterally, no wheezing or rales. No accessory muscle use  CV:  Regular  rhythm,  No edema  GI:  Soft, Non distended, Non tender.  +Bowel sounds, Stone with clear urine  Neurologic:  Alert and oriented X 3, normal speech,   Psych:   Good insight. Not anxious nor agitated  Skin:  No rashes. No jaundice    Reviewed most current lab test results and cultures  YES  Reviewed most current radiology test results   YES  Review and summation of old records today    NO  Reviewed patient's current orders and MAR    YES  PMH/ reviewed - no change compared to H&P  ________________________________________________________________________  Care Plan discussed with:    Comments   Patient x    Family      RN x    Care Manager x    Consultant                        Multidiciplinary team rounds were held today with , nursing, pharmacist and clinical coordinator. Patient's plan of care was discussed; medications were reviewed and discharge planning was addressed.      ________________________________________________________________________  Total NON critical care TIME:  35    Minutes    Total CRITICAL CARE TIME Spent:   Minutes non procedure based      Comments   >50% of visit spent in counseling and coordination of care x ________________________________________________________________________  Shantell Simmons MD     Procedures: see electronic medical records for all procedures/Xrays and details which were not copied into this note but were reviewed prior to creation of Plan. LABS:  I reviewed today's most current labs and imaging studies.   Pertinent labs include:  Recent Labs     11/07/19 0359 11/06/19 0242 11/05/19 0258   WBC 5.2 5.8 5.8   HGB 8.0* 7.8* 6.9*   HCT 25.0* 24.1* 22.0*    229 224     Recent Labs     11/07/19 0359 11/06/19 0242 11/05/19 0258    136 135*   K 4.5 4.3 4.5    104 104   CO2 24 22 21   GLU 80 64* 72   BUN 86* 80* 75*   CREA 7.44* 7.18* 6.85*   CA 7.7* 7.9* 7.8*   PHOS 6.3* 5.9*  --    ALB 2.4* 2.3*  --        Signed: Shantell Simmons MD

## 2019-11-07 NOTE — PROGRESS NOTES
Orthopedic End of Shift Note    Bedside and verbal shift change report given to RENZO Verdugo (oncoming nurse) by Sarahy Murray RN (offgoing nurse). Report included the following information SBAR, Intake/Output, Recent Results, Cardiac Rhythm Sinus Rhythm and Quality Measures. POD#     Significant issues during shift: Continued care for acute renal failure r/t rentention. Stone in place draining clear yellow urine. Patient denies pain. Vitals stable on room air. No significant issues on overnight.     Issues for Physician to address: Same as above    Activity This Shift  (check all that apply) [] chair  [] dangle   [] bathroom  [] bedside commode [] hallway  [] bedrest   Nausea/Vomiting [] yes [x] no     Voiding Status [] void [x] Stone [] I&O Cath   Bowel Movements [x] yes [] no     Foot Pumps or SCD [x] yes [] no    Ice Pack [] yes    [x] no    Incentive Spirometer [] yes [x] no Volume:      Telemetry Monitoring   [x] yes [] no Rhythm:   Supplemental O2 [] yes [x] no Sat off O2:   98%

## 2019-11-07 NOTE — PROGRESS NOTES
Problem: Falls - Risk of  Goal: *Absence of Falls  Description  Document Nikunj Cleveland Fall Risk and appropriate interventions in the flowsheet.   Outcome: Progressing Towards Goal  Note:   Fall Risk Interventions:  Mobility Interventions: Utilize walker, cane, or other assistive device         Medication Interventions: Assess postural VS orthostatic hypotension, Evaluate medications/consider consulting pharmacy, Patient to call before getting OOB    Elimination Interventions: Elevated toilet seat, Patient to call for help with toileting needs, Stay With Me (per policy)    History of Falls Interventions: Utilize gait belt for transfer/ambulation, Assess for delayed presentation/identification of injury for 48 hrs (comment for end date)         Problem: Patient Education: Go to Patient Education Activity  Goal: Patient/Family Education  Outcome: Progressing Towards Goal     Problem: Chronic Renal Failure  Goal: *Fluid and electrolytes stabilized  Outcome: Progressing Towards Goal     Problem: Patient Education: Go to Patient Education Activity  Goal: Patient/Family Education  Outcome: Progressing Towards Goal     Problem: Patient Education: Go to Patient Education Activity  Goal: Patient/Family Education  Outcome: Progressing Towards Goal     Problem: Patient Education: Go to Patient Education Activity  Goal: Patient/Family Education  Outcome: Progressing Towards Goal

## 2019-11-07 NOTE — PROGRESS NOTES
Nephrology Progress Note     Jarvis Carrera     [email or web address]                  Phone - [phone]   Patient: Xi Moss   Date- 11/7/2019        Admit Date: 11/2/2019  YOB: 1950             CC: Follow up for ASHVIN  on ckd          Subjective: Interval History:   -   He had brice placed on 11/4  Clear urine now. No more hematuria    Pt says he feels fine. No complaints. Appetite is good. ROS:- as above, plus: no HEENT complaints. No SOB. No chest pain. No abd pain. No N/V. No joint pain. No skin rashes or lesions. Assessment & Plan:     ASHVIN   on ckd  -unfortunately, the renal function is worse despite placement of the brice. - bun/creat up to 86/7.4 on 11/7  - there is no urgent need for dialysis and the patient is refusing it anyway. - His creat was 5 in 9/19, so he was ALREADY stage 5. The renal function may not get better. - LIKELY DUE TO URINARY RETENTION +/- normalisation of bp causing renal hypoperfusion  - follow bmp in am  - No more IV fluids: pt is overloaded. - avoid diuretics  - hold lisinopril    Chronic renal insufficiency, stage V.  Creat was 5 in 9/19.  - pt followed by Dr. Tri Patricio. Etiology likely HTN and DM.     Hypertensive emergency  - continue norvasc,  Labetalol ,cardura  - stop clonidine due to side effect of dizziness. And if he is non compliant it will cause rebound hypertension.  - hold lisinopril     anemia of ckd  - continue epogen  - Hb up to 7.8 after PRBC's. Hematuria- urinary retention  - urology input noted  - s/p placement of a brice     metabolic acidosis  - start po bicarb    Edema  - avoid diuretics for now      TYPE 2 DM  PROTEINURIA , Microscopic hematuria  - +ve hep b and hep c -      Right humeral neck fracture                Physical exam:   GEN:  Elderly man in no distress.    NECK:  Supple, no thyromegaly  RESP: lungs clear; no resp distress  CVS: RRR,S1,S2   ABDO:  soft , non tender, No mass  NEURO: non focal, normal speech  EXT: 2+ edema   - brice +; clear urine  Psych: normal affect and mood    Care Plan discussed with: patient at length. Do not see any urgent need for dialysis but am a bit concerned by his refusal to have it regardless. Objective:   Visit Vitals  /71   Pulse 71   Temp 98.3 °F (36.8 °C)   Resp 18   Wt 79.2 kg (174 lb 9.7 oz)   SpO2 97%   BMI 25.78 kg/m²     Last 3 Recorded Weights in this Encounter    11/05/19 1253   Weight: 79.2 kg (174 lb 9.7 oz)     11/05 1901 - 11/07 0700  In: 1506.3 [I.V.:1506.3]  Out: 2100 [Urine:2100]    Intake/Output Summary (Last 24 hours) at 11/7/2019 1622  Last data filed at 11/7/2019 0553  Gross per 24 hour   Intake    Output 800 ml   Net -800 ml      Chart reviewed. Pertinent Notes reviewed.        Medication list  reviewed   Current Facility-Administered Medications   Medication    sodium bicarbonate tablet 650 mg    finasteride (PROSCAR) tablet 5 mg    epoetin charisse-epbx (RETACRIT) 12,000 Units combo injection    sodium chloride (NS) flush 5-40 mL    sodium chloride (NS) flush 5-40 mL    acetaminophen (TYLENOL) tablet 650 mg    ondansetron (ZOFRAN) injection 4 mg    bisacodyl (DULCOLAX) tablet 5 mg    glucose chewable tablet 16 g    dextrose (D50W) injection syrg 12.5-25 g    glucagon (GLUCAGEN) injection 1 mg    insulin lispro (HUMALOG) injection    amLODIPine (NORVASC) tablet 10 mg    aspirin delayed-release tablet 81 mg    hydrALAZINE (APRESOLINE) 20 mg/mL injection 20 mg    labetalol (NORMODYNE) tablet 600 mg    doxazosin (CARDURA) tablet 2 mg           Data Review :  Recent Labs     11/07/19  0359 11/06/19  0242 11/05/19  0258    136 135*   K 4.5 4.3 4.5    104 104   CO2 24 22 21   BUN 86* 80* 75*   CREA 7.44* 7.18* 6.85*   GLU 80 64* 72   CA 7.7* 7.9* 7.8*   PHOS 6.3* 5.9*  --      Recent Labs     11/07/19  0359 11/06/19  0242 11/05/19  0258   WBC 5.2 5.8 5.8   HGB 8.0* 7.8* 6.9*   HCT 25.0* 24.1* 22.0*   PLT 254 229 224     No results for input(s): FE, TIBC, PSAT, FERR in the last 72 hours. No results for input(s): CPK, CKNDX, TROIQ in the last 72 hours. No lab exists for component: CPKMB  Lab Results   Component Value Date/Time    Color ABRAHAM 11/01/2019 09:05 PM    Appearance CLOUDY (A) 11/01/2019 09:05 PM    Specific gravity 1.015 11/01/2019 09:05 PM    pH (UA) 5.5 11/01/2019 09:05 PM    Protein >300 (A) 11/01/2019 09:05 PM    Glucose NEGATIVE  11/01/2019 09:05 PM    Ketone NEGATIVE  11/01/2019 09:05 PM    Bilirubin NEGATIVE  11/01/2019 09:05 PM    Urobilinogen 0.2 11/01/2019 09:05 PM    Nitrites NEGATIVE  11/01/2019 09:05 PM    Leukocyte Esterase TRACE (A) 11/01/2019 09:05 PM    Epithelial cells FEW 11/01/2019 09:05 PM    Bacteria NEGATIVE  11/01/2019 09:05 PM    WBC 0-4 11/01/2019 09:05 PM    RBC  11/01/2019 09:05 PM     Lab Results   Component Value Date/Time    Culture result: NO GROWTH 5 DAYS 09/04/2019 06:29 PM     No results found for: SDES  Lab Results   Component Value Date/Time    Sodium,urine random 65 01/24/2019 12:52 AM    Creatinine, urine 94.80 01/24/2019 12:52 AM       Results from Hospital Encounter encounter on 11/01/19   XR CHEST PA LAT    Narrative EXAM: XR CHEST PA LAT    INDICATION: pneumonia    COMPARISON: Chest x-ray 9/2/2019. FINDINGS: PA and lateral radiographs of the chest demonstrate hyperinflated but  otherwise clear lungs. The cardiac and mediastinal contours and pulmonary  vascularity are normal. Atherosclerotic calcifications affect the aortic arch  and the thoracic aorta is tortuous. The chest wall structures and visualized  upper abdomen show no acute findings with incidental note of degenerative spine,  right humeral neck fracture, and diffuse osteopenia. Impression IMPRESSION: No acute cardiopulmonary findings. COPD. Right humeral neck  fracture. Katharine Key MD  [facility]   www. [facility]  [employer] / [address] [place] [address] [facility], [address] [place], [address]  Phone - [phone]         Fax - [phone]

## 2019-11-07 NOTE — ROUTINE PROCESS
Bedside shift change report given to Sabrina Hale (oncoming nurse) by Chun MOROCHO RN (offgoing nurse). Report included the following information SBAR, Kardex and MAR.

## 2019-11-07 NOTE — PROGRESS NOTES
Problem: Mobility Impaired (Adult and Pediatric)  Goal: *Acute Goals and Plan of Care (Insert Text)  Description  FUNCTIONAL STATUS PRIOR TO ADMISSION: Patient was modified independent using a single point cane for functional mobility. HOME SUPPORT PRIOR TO ADMISSION: The patient lived with daughter who works during day. [facility] nursing 3x/wk. Physical Therapy Goals  Initiated 11/6/2019  1. Patient will move from supine to sit and sit to supine , scoot up and down and roll side to side in bed with independence within 7 day(s). 2.  Patient will transfer from bed to chair and chair to bed with modified independence using the least restrictive device within 7 day(s). 3.  Patient will perform sit to stand with modified independence within 7 day(s). 4.  Patient will ambulate with modified independence for 200 feet with the least restrictive device within 7 day(s). 5.  Patient will ascend/descend 15 stairs with 1 handrail(s) with supervision/set-up within 7 day(s). Outcome: Progressing Towards Goal   PHYSICAL THERAPY TREATMENT  Patient: Ravi Roberson (65 y.o. male)  Date: 11/7/2019  Diagnosis: Acute on chronic renal failure (HCC) [N17.9, N18.9] <principal problem not specified>       Precautions: Fall(no lifting RUE)  Chart, physical therapy assessment, plan of care and goals were reviewed. ASSESSMENT  Patient continues with skilled PT services and is progressing towards goals. Needed more assistance with supine to sit with strict no use of RUE - would likely need less assistance if going from L side. Gait distance increased but limited by rapid fatigue - increased unsteadiness when turning. Tolerated lower extremity exercises well. Left up in bedside chair with all needs met when the session ended. Current Level of Function Impacting Discharge (mobility/balance): mod a x 1 supine to sit.  Cg assist sit to stand and bed to chair transfers with RW. Gait tolerated for 65 feet with RW and cg assistance. Other factors to consider for discharge: limited support at home; history of falls with current high fall risk status. PLAN :  Patient continues to benefit from skilled intervention to address the above impairments. Continue treatment per established plan of care. to address goals. Recommendation for discharge: (in order for the patient to meet his/her long term goals)  Therapy up to 5 days/week in SNF setting    This discharge recommendation:  Has been made in collaboration with the attending provider and/or case management    IF patient discharges home will need the following DME: rolling walker - if discharged home       SUBJECTIVE:   Patient stated  I want to go to rehab to get stronger before I go home.     OBJECTIVE DATA SUMMARY:   Critical Behavior:  Neurologic State: Alert  Orientation Level: Oriented X4  Cognition: Appropriate decision making, Appropriate for age attention/concentration, Appropriate safety awareness, Follows commands  Safety/Judgement: Awareness of environment, Fall prevention, Good awareness of safety precautions(LE weakness)  Functional Mobility Training:  Bed Mobility:  Rolling: Stand-by assistance(L only)  Supine to Sit: Moderate assistance;Assist x1(going R; RUE not used)     Scooting: Stand-by assistance        Transfers:  Sit to Stand: Contact guard assistance  Stand to Sit: Stand-by assistance        Bed to Chair: Contact guard assistance;Assist x1                    Balance:  Sitting: Intact  Standing: Impaired  Standing - Static: Good;Constant support  Standing - Dynamic : Fair;Constant support  Ambulation/Gait Training:  Distance (ft): 65 Feet (ft)  Assistive Device: Walker, rolling;Gait belt  Ambulation - Level of Assistance: Contact guard assistance;Assist x1        Gait Abnormalities: Path deviations;Decreased step clearance  Right Side Weight Bearing: Full  Left Side Weight Bearing: Full  Base of Support: Widened     Speed/Sarah: Pace decreased (<100 feet/min)  Step Length: Right shortened;Left shortened                      Therapeutic Exercises:   Supine - ankle pumps, quad sets, glut sets, heel slides. All x 10 reps each  Pain Ratin/10 R shoulder    Activity Tolerance:   Fair, SpO2 stable on RA and requires rest breaks  Please refer to the flowsheet for vital signs taken during this treatment.     After treatment patient left in no apparent distress:   Sitting in chair and Call bell within reach    COMMUNICATION/COLLABORATION:   The patients plan of care was discussed with: Registered Nurse    Valerie Dillon, PT   Time Calculation: 31 mins

## 2019-11-08 ENCOUNTER — APPOINTMENT (OUTPATIENT)
Dept: ULTRASOUND IMAGING | Age: 69
DRG: 674 | End: 2019-11-08
Attending: NURSE PRACTITIONER
Payer: MEDICARE

## 2019-11-08 LAB
ALBUMIN SERPL-MCNC: 2.3 G/DL (ref 3.5–5)
ANION GAP SERPL CALC-SCNC: 10 MMOL/L (ref 5–15)
BUN SERPL-MCNC: 86 MG/DL (ref 6–20)
BUN/CREAT SERPL: 11 (ref 12–20)
CALCIUM SERPL-MCNC: 8 MG/DL (ref 8.5–10.1)
CHLORIDE SERPL-SCNC: 106 MMOL/L (ref 97–108)
CO2 SERPL-SCNC: 23 MMOL/L (ref 21–32)
COMMENT, HOLDF: NORMAL
CREAT SERPL-MCNC: 7.64 MG/DL (ref 0.7–1.3)
GLUCOSE BLD STRIP.AUTO-MCNC: 102 MG/DL (ref 65–100)
GLUCOSE BLD STRIP.AUTO-MCNC: 125 MG/DL (ref 65–100)
GLUCOSE BLD STRIP.AUTO-MCNC: 95 MG/DL (ref 65–100)
GLUCOSE BLD STRIP.AUTO-MCNC: 98 MG/DL (ref 65–100)
GLUCOSE SERPL-MCNC: 78 MG/DL (ref 65–100)
PHOSPHATE SERPL-MCNC: 6 MG/DL (ref 2.6–4.7)
POTASSIUM SERPL-SCNC: 4.6 MMOL/L (ref 3.5–5.1)
SAMPLES BEING HELD,HOLD: NORMAL
SERVICE CMNT-IMP: ABNORMAL
SERVICE CMNT-IMP: ABNORMAL
SERVICE CMNT-IMP: NORMAL
SERVICE CMNT-IMP: NORMAL
SODIUM SERPL-SCNC: 139 MMOL/L (ref 136–145)

## 2019-11-08 PROCEDURE — 97535 SELF CARE MNGMENT TRAINING: CPT

## 2019-11-08 PROCEDURE — 65660000000 HC RM CCU STEPDOWN

## 2019-11-08 PROCEDURE — 36415 COLL VENOUS BLD VENIPUNCTURE: CPT

## 2019-11-08 PROCEDURE — 94760 N-INVAS EAR/PLS OXIMETRY 1: CPT

## 2019-11-08 PROCEDURE — 74011250637 HC RX REV CODE- 250/637: Performed by: INTERNAL MEDICINE

## 2019-11-08 PROCEDURE — 76770 US EXAM ABDO BACK WALL COMP: CPT

## 2019-11-08 PROCEDURE — 74011250637 HC RX REV CODE- 250/637: Performed by: UROLOGY

## 2019-11-08 PROCEDURE — 80069 RENAL FUNCTION PANEL: CPT

## 2019-11-08 PROCEDURE — 82962 GLUCOSE BLOOD TEST: CPT

## 2019-11-08 RX ADMIN — AMLODIPINE BESYLATE 10 MG: 5 TABLET ORAL at 08:35

## 2019-11-08 RX ADMIN — Medication 10 ML: at 06:12

## 2019-11-08 RX ADMIN — DOXAZOSIN 2 MG: 2 TABLET ORAL at 08:35

## 2019-11-08 RX ADMIN — SODIUM BICARBONATE 650 MG: 650 TABLET ORAL at 17:01

## 2019-11-08 RX ADMIN — Medication 10 ML: at 21:02

## 2019-11-08 RX ADMIN — FINASTERIDE 5 MG: 5 TABLET, FILM COATED ORAL at 08:35

## 2019-11-08 RX ADMIN — LABETALOL HYDROCHLORIDE 600 MG: 200 TABLET, FILM COATED ORAL at 17:01

## 2019-11-08 RX ADMIN — SODIUM BICARBONATE 650 MG: 650 TABLET ORAL at 08:35

## 2019-11-08 RX ADMIN — LABETALOL HYDROCHLORIDE 600 MG: 200 TABLET, FILM COATED ORAL at 08:35

## 2019-11-08 NOTE — PROGRESS NOTES
Nephrology Progress Note     Jarvis Carrera     [email or web address] [email or web address]                  Phone - [phone]   Patient: Sandra Archer   Date- 11/8/2019        Admit Date: 11/2/2019  YOB: 1950             CC: Follow up for ASHVIN  on ckd          Subjective: Interval History:   -   He had brice placed on 11/4  Clear urine now. No more hematuria    Pt says he feels fine. No complaints. Appetite is good. ROS:- as above, plus: no HEENT complaints. No SOB. No chest pain. No abd pain. No N/V. No joint pain. No skin rashes or lesions. Assessment & Plan:     ASHVIN   on ckd  -unfortunately, the renal function is worse despite placement of the brice. - bun/creat up to 86/7.6 on 11/8  - there is no urgent need for dialysis and the patient is refusing it anyway. - However, he may change his mind if he becomes symptomatic  - His creat was 5 in 9/19, so he was ALREADY stage 5. The renal function may not get better. - LIKELY DUE TO URINARY RETENTION +/- normalisation of bp causing renal hypoperfusion  - follow bmp in am  - No more IV fluids: pt is overloaded. - avoid diuretics  - hold lisinopril    Chronic renal insufficiency, stage V.  Creat was 5 in 9/19.  - pt followed by Dr. Shelley Hargrove. Etiology likely HTN and DM.     Hypertensive emergency  - continue norvasc,  Labetalol ,cardura  - stop clonidine due to side effect of dizziness. And if he is non compliant it will cause rebound hypertension.  - hold lisinopril     anemia of ckd  - continue epogen  - Hb up to 7.8 after PRBC's. Hematuria- urinary retention  - urology input noted  - s/p placement of a brice     metabolic acidosis  - start po bicarb    Edema  - avoid diuretics for now      TYPE 2 DM  PROTEINURIA , Microscopic hematuria  - +ve hep b and hep c -      Right humeral neck fracture                Physical exam:   GEN:  Elderly man in no distress.    NECK:  Supple, no thyromegaly  RESP: lungs clear; no resp distress  CVS: RRR; no gallop or rub  ABDO:  soft , non tender, No mass  NEURO: non focal, normal speech  EXT: 1-2+ edema   - brice +; clear urine  Psych: normal affect and mood    Care Plan discussed with: patient at length. Do not see any urgent need for dialysis but am a bit concerned by his refusal to have it regardless. Also d/w Dr. Sundeep Nolan: would not discharge the pt until the creat is better or at least stable. Objective:   Visit Vitals  /75   Pulse 76   Temp 98.1 °F (36.7 °C)   Resp 16   Wt 79.2 kg (174 lb 9.7 oz)   SpO2 97%   BMI 25.78 kg/m²     Last 3 Recorded Weights in this Encounter    11/05/19 1253   Weight: 79.2 kg (174 lb 9.7 oz)     11/06 1901 - 11/08 0700  In: -   Out: 2150 [Urine:2150]    Intake/Output Summary (Last 24 hours) at 11/8/2019 1607  Last data filed at 11/8/2019 0939  Gross per 24 hour   Intake 360 ml   Output 1350 ml   Net -990 ml      Chart reviewed. Pertinent Notes reviewed.        Medication list  reviewed   Current Facility-Administered Medications   Medication    sodium bicarbonate tablet 650 mg    finasteride (PROSCAR) tablet 5 mg    epoetin charisse-epbx (RETACRIT) 12,000 Units combo injection    sodium chloride (NS) flush 5-40 mL    sodium chloride (NS) flush 5-40 mL    acetaminophen (TYLENOL) tablet 650 mg    ondansetron (ZOFRAN) injection 4 mg    bisacodyl (DULCOLAX) tablet 5 mg    glucose chewable tablet 16 g    dextrose (D50W) injection syrg 12.5-25 g    glucagon (GLUCAGEN) injection 1 mg    insulin lispro (HUMALOG) injection    amLODIPine (NORVASC) tablet 10 mg    aspirin delayed-release tablet 81 mg    hydrALAZINE (APRESOLINE) 20 mg/mL injection 20 mg    labetalol (NORMODYNE) tablet 600 mg    doxazosin (CARDURA) tablet 2 mg           Data Review :  Recent Labs     11/08/19 0317 11/07/19  0359 11/06/19  0242    138 136   K 4.6 4.5 4.3    105 104   CO2 23 24 22   BUN 86* 86* 80*   CREA 7.64* 7.44* 7.18*   GLU 78 80 64*   CA 8.0* 7.7* 7.9* PHOS 6.0* 6.3* 5.9*     Recent Labs     11/07/19  0359 11/06/19  0242   WBC 5.2 5.8   HGB 8.0* 7.8*   HCT 25.0* 24.1*    229     No results for input(s): FE, TIBC, PSAT, FERR in the last 72 hours. No results for input(s): CPK, CKNDX, TROIQ in the last 72 hours. No lab exists for component: CPKMB  Lab Results   Component Value Date/Time    Color ABRAHAM 11/01/2019 09:05 PM    Appearance CLOUDY (A) 11/01/2019 09:05 PM    Specific gravity 1.015 11/01/2019 09:05 PM    pH (UA) 5.5 11/01/2019 09:05 PM    Protein >300 (A) 11/01/2019 09:05 PM    Glucose NEGATIVE  11/01/2019 09:05 PM    Ketone NEGATIVE  11/01/2019 09:05 PM    Bilirubin NEGATIVE  11/01/2019 09:05 PM    Urobilinogen 0.2 11/01/2019 09:05 PM    Nitrites NEGATIVE  11/01/2019 09:05 PM    Leukocyte Esterase TRACE (A) 11/01/2019 09:05 PM    Epithelial cells FEW 11/01/2019 09:05 PM    Bacteria NEGATIVE  11/01/2019 09:05 PM    WBC 0-4 11/01/2019 09:05 PM    RBC  11/01/2019 09:05 PM     Lab Results   Component Value Date/Time    Culture result: NO GROWTH 5 DAYS 09/04/2019 06:29 PM     No results found for: SDES  Lab Results   Component Value Date/Time    Sodium,urine random 65 01/24/2019 12:52 AM    Creatinine, urine 94.80 01/24/2019 12:52 AM       Results from Hospital Encounter encounter on 11/01/19   XR CHEST PA LAT    Narrative EXAM: XR CHEST PA LAT    INDICATION: pneumonia    COMPARISON: Chest x-ray 9/2/2019. FINDINGS: PA and lateral radiographs of the chest demonstrate hyperinflated but  otherwise clear lungs. The cardiac and mediastinal contours and pulmonary  vascularity are normal. Atherosclerotic calcifications affect the aortic arch  and the thoracic aorta is tortuous. The chest wall structures and visualized  upper abdomen show no acute findings with incidental note of degenerative spine,  right humeral neck fracture, and diffuse osteopenia. Impression IMPRESSION: No acute cardiopulmonary findings. COPD.  Right humeral neck  fracture. Dominic Sahni MD  [facility]   www. [facility].[employer]  [facility]  [address] [place]  [place], [address]  Phone - [phone]         Fax - [phone]

## 2019-11-08 NOTE — PROGRESS NOTES
Spiritual Care Assessment/Progress Note  [facility]      NAME: Soledad Segura      MRN: [id number]  AGE: 77 y.o.  SEX: male  Restorationist Affiliation: Uatsdin   Language: English     11/8/2019     Total Time (in minutes): 45     Spiritual Assessment begun in [facility] through conversation with:         [x]Patient        [] Family    [] Friend(s)        Reason for Consult: Initial/Spiritual assessment, patient floor     Spiritual beliefs: (Please include comment if needed)     [x] Identifies with a nahomi tradition:         [x] Supported by a nahomi community:            [] Claims no spiritual orientation:           [] Seeking spiritual identity:                [] Adheres to an individual form of spirituality:           [] Not able to assess:                           Identified resources for coping:      [x] Prayer                               [] Music                  [] Guided Imagery     [x] Family/friends                 [] Pet visits     [] Devotional reading                         [] Unknown     [] Other:                                             Interventions offered during this visit: (See comments for more details)    Patient Interventions: Affirmation of emotions/emotional suffering, Affirmation of nahomi, Iconic (affirming the presence of God/Higher Power), Prayer (actual), Prayer (assurance of), Restorationist beliefs/image of God discussed           Plan of Care:     [] Support spiritual and/or cultural needs    [] Support AMD and/or advance care planning process      [] Support grieving process   [] Coordinate Rites and/or Rituals    [] Coordination with community clergy   [x] No spiritual needs identified at this time   [] Detailed Plan of Care below (See Comments)  [] Make referral to Music Therapy  [] Make referral to Pet Therapy     [] Make referral to Addiction services  [] Make referral to [facility]  [] Make referral to Spiritual Care Partner  [] No future visits requested        [x] Follow up visits as needed     Comments: The patient was visited on the Ortho unit for the purpose of making a spiritual assessment. . The patient was alone during the visit. The patient shared information about his health issues. The patient expressed a lack of confidence in the entire healthcare industry. The patient blamed many of his symptoms on the large number of prescriptions that have been ordered for him. The patient shared at some length, that his spirituality is how he gains strength to deal with his health. The patient expressed that knows that he is in God's hands. The patient agreed that we would have prayer before the end of the visit. We prayed together. Advised of  availability. Chaplains will follow as able and/or needed. Rev.  Lesia Back EdD MDiv     For  Assistance Page [phone])

## 2019-11-08 NOTE — PROGRESS NOTES
Initial Nutrition Assessment:    INTERVENTIONS/RECOMMENDATIONS:   · Continue renal diet  · Will provide renal diet education prior to discharge    ASSESSMENT:   Chart reviewed, medically noted for ASHVIN on CKD5, urine retention, HTN, T2DM and PMH shown below. Assessing pt due to LOS. Pt reports good appetite and eating well. Phos elevated and K+ climbing, renal diet was ordered today. Past Medical History:   Diagnosis Date    Diabetes ([facility] [address]     Hypertension     Kidney disease        Diet Order: Renal  % Eaten:    Patient Vitals for the past 72 hrs:   % Diet Eaten   11/08/19 0939 100 %     Pertinent Medications: [x]Reviewed: humalog, Na bicarb  Pertinent Labs: [x]Reviewed: phos 6.0  Food Allergies: [x]NKFA  []Other   Last BM: 11/6  Edema:        []RUE   []LUE   [x]RLE 2+  [x]LLE 2+     Pressure Injury:      [] Stage I   [] Stage II   [] Stage III   [] Stage IV      Wt Readings from Last 30 Encounters:   11/05/19 79.2 kg (174 lb 9.7 oz)   11/01/19 79.2 kg (174 lb 8 oz)   11/01/19 81.6 kg (180 lb)   09/08/19 82.6 kg (182 lb 1.6 oz)   09/05/19 82.6 kg (182 lb)   09/02/19 85.7 kg (189 lb)   01/23/19 86.2 kg (190 lb)   12/11/15 94.1 kg (207 lb 6.4 oz)   10/21/15 89.5 kg (197 lb 6 oz)       Anthropometrics:   Height:   Weight: 79.2 kg (174 lb 9.7 oz)(11/01/19)   IBW (%IBW):   ( ) UBW (%UBW):   (  %)   Last Weight Metrics:  Weight Loss Metrics 11/5/2019 11/2/2019 11/1/2019 11/1/2019 9/8/2019 9/5/2019 9/2/2019   Today's Wt 174 lb 9.7 oz - 174 lb 8 oz 180 lb 182 lb 1.6 oz 182 lb -   BMI - 25.78 kg/m2 25.77 kg/m2 25.83 kg/m2 26.13 kg/m2 - 26.11 kg/m2       BMI: Body mass index is 25.78 kg/m². This BMI is indicative of:   []Underweight    []Normal    [x]Overweight    [] Obesity   [] Extreme Obesity (BMI>40)     Estimated Nutrition Needs (Based on):   2000 Kcals/day(BMR: 1525 x 1.3) , 65 g(0.8 g/kg) Protein  Carbohydrate:  At Least 130 g/day  Fluids: 2000 mL/day (1ml/kcal) or per primary team    NUTRITION DIAGNOSES: Problem:  Altered nutrition-related lab values      Etiology: related to CKD     Signs/Symptoms: as evidenced by elevated phos      NUTRITION INTERVENTIONS:  Meals/Snacks: General/healthful diet                  GOAL:   consume >75% of meals while stabilizing Phos WNL    LEARNING NEEDS (Diet, Food/Nutrient-Drug Interaction):    [x] None Identified   [] Identified and Education Provided/Documented   [] Identified and Pt declined/was not appropriate     Cultureal, Buddhism, OR Ethnic Dietary Needs:    [x] None Identified   [] Identified and Addressed     [x] Interdisciplinary Care Plan Reviewed/Documented    [x] Discharge Planning:  Consistent carb, low Na and likely renal friendly diet      MONITORING /EVALUATION:      Food/Nutrient Intake Outcomes:  Total energy intake  Physical Signs/Symptoms Outcomes: Weight/weight change, Electrolyte and renal profile, Glucose profile, GI    NUTRITION RISK:    [] High              [x] Moderate    to       [x]  Low  []  Minimal/Uncompromised    PT SEEN FOR:    []  MD Consult: []Calorie Count      []Diabetic Diet Education        []Diet Education     []Electrolyte Management     []General Nutrition Management and Supplements     []Management of Tube Feeding     []TPN Recommendations    []  RN Referral:  []MST score >=2     []Enteral/Parenteral Nutrition PTA     []Pregnant: Gestational DM or Multigestation     []Pressure Ulcer/Wound Care needs        []  Low BMI  [x]  LOS Referral       Lilo Saleem RDN  Pager [phone]  Weekend Pager [phone]

## 2019-11-08 NOTE — PROGRESS NOTES
Bedside and Verbal shift change report given to Lucina (oncoming nurse) by Janet Albarado (offgoing nurse). Report included the following information SBAR, Kardex, Intake/Output, MAR, Recent Results, Med Rec Status and Cardiac Rhythm NSR.

## 2019-11-08 NOTE — PROGRESS NOTES
Problem: Self Care Deficits Care Plan (Adult)  Goal: *Acute Goals and Plan of Care (Insert Text)  Description  FUNCTIONAL STATUS PRIOR TO ADMISSION: Ambulated with SPC. Could manage stairs to second floor of home without assist.  Sling had been discontinued per pt. Performed all ADLS on his own including showering. Pt was seen on 9/19/19 by Dr. Travis Gill for RUE fracture. Per Dr. Travis Gill note on 9/19/19 \"Radiographs show evidence of healing so Mr. Mona Chavez fracture should heal over time. I have elected to place him in physical therapy for gentle range of motion. He should be careful to avoid motions that involve any lifting. The patient may continue activities as tolerated and will follow up in 4 weeks. \"  Pt reports that he was to start PT today and assisted pt with trying to get in touch with the OP clinic. HOME SUPPORT PRIOR TO ADMISSION: The patient lived alone, but recently moved into his daughters house. Daughter works during the day and pts grandson provides transportation but also works. Occupational Therapy Goals:  Initiated 11/5/2019  1. Patient will perform grooming standing with supervision/set-up within 7 days. 2. Patient will perform toileting with supervision/set-up within 7 days. 3. Patient will perform upper body dressing and lower body dressing with supervision/set-up within 7 days. 4. Patient will transfer from toilet with supervision/set-up using the least restrictive device and appropriate durable medical equipment within 7 days. Outcome: Progressing Towards Goal   OCCUPATIONAL THERAPY TREATMENT  Patient: Macario Brittle (89 y.o. male)  Date: 11/8/2019  Diagnosis: Acute on chronic renal failure (HCC) [N17.9, N18.9] <principal problem not specified>      Precautions: Fall(no lifting RUE)  Chart, occupational therapy assessment, plan of care, and goals were reviewed. ASSESSMENT  Patient continues with skilled OT services and is progressing towards goals.   Mostly restricted with UB ADLS and general safety when it comes to not using RUE. Able to tailor sit which helps with LB ADL. Would benefit from continued intervention. He is awaiting medical clearance to discharge to SNF Rehab at this time. Declined need for other ADL at this time and has catheter. Current Level of Function Impacting Discharge (ADLs): Mod A    Other factors to consider for discharge: none         PLAN :  Patient continues to benefit from skilled intervention to address the above impairments. Continue treatment per established plan of care. to address goals. Recommend with staff: walk to bathroom    Recommend next OT session: UB compensatory strategies for kamlesh technique    Recommendation for discharge: (in order for the patient to meet his/her long term goals)  Therapy up to 5 days/week in SNF setting    This discharge recommendation:  A follow-up discussion with the attending provider and/or case management is planned    IF patient discharges home will need the following DME: none       SUBJECTIVE:   Patient stated Cate Diss have been walking me to the bathroom.     OBJECTIVE DATA SUMMARY:   Cognitive/Behavioral Status:         alert           ADL Intervention:    Lower Body Dressing Assistance  Socks: Supervision  Leg Crossed Method Used: Yes  Continued education on UB ADL/function with one arm use              Pain:  Discomfort in RUE    Activity Tolerance:   Good  Please refer to the flowsheet for vital signs taken during this treatment.     After treatment patient left in no apparent distress:   Sitting in chair    COMMUNICATION/COLLABORATION:   The patients plan of care was discussed with: Physical Therapist    Arnaldo Prasad  Time Calculation: 9 mins

## 2019-11-08 NOTE — PROGRESS NOTES
Patient: Karyle Cuff MRN: [id number]  SSN: [id number]    YOB: 1950  Age: 77 y.o. Sex: male        ADMITTED: 2019 to Michaela Jara MD by Raul Liu MD for Acute on chronic renal failure ([facility] [N17.9, N18.9]  POD# * No surgery found *     Karyle Cuff is doing well, OOB, sitting in chair. Afebrile/VSS. Stone in place, good uop overnight, 600 ml. On proscar. Creatinine continues to rise daily, now 7.64. Nephrology notes acknowledged- no need for emergent dialysis. He received a blood transfusion 19, hgb now 8.0. Vitals: Temp (24hrs), Av.4 °F (36.9 °C), Min:97.6 °F (36.4 °C), Max:98.9 °F (37.2 °C)    Blood pressure 147/82, pulse 75, temperature 98.8 °F (37.1 °C), resp. rate 16, weight 79.2 kg (174 lb 9.7 oz), SpO2 100 %. Intake and Output:   1901 -  0700  In: -   Out: 2150 [Urine:2150]   0701 -  1900  In: 360 [P.O.:360]  Out: -   JOE Output lats 24 hrs: No data found. JOE Output last 8 hrs: No data found.   BM over last 24 hrs:   Patient Vitals for the past 24 hrs:   Stool Occurrence(s)   19 0939 1       Exam:   Appearance: Well-developed no acute distress  Conjunctiva: Conjunctiva and lids normal  Pupil/iris: Pupils equal round reactive  External ears/nose: Normal no lesions or deformities  Hearing: Grossly intact  Respiratory effort: Breathing easily  Lower extremity: edema  Abdomen/flank: Soft, nontender, without masses, no CVA tenderness  Digits/nails: No clubbing cyanosis or petechiae  Skin inspection: Warm and dry  Skin palpation: No subcutaneous nodularity  Sensation: No sensory deficits  Judgment/Insight: intact  Mood/Affect: normal    Labs:  CBC:   Lab Results   Component Value Date/Time    WBC 5.2 2019 03:59 AM    HCT 25.0 (L) 2019 03:59 AM    PLATELET 039  03:59 AM     BMP:   Lab Results   Component Value Date/Time    Glucose 78 2019 03:17 AM    Sodium 139 2019 03:17 AM    Potassium 4.6 2019 03:17 AM    Chloride 106 11/08/2019 03:17 AM    CO2 23 11/08/2019 03:17 AM    BUN 86 (H) 11/08/2019 03:17 AM    Creatinine 7.64 (H) 11/08/2019 03:17 AM    Calcium 8.0 (L) 11/08/2019 03:17 AM       Assessment/Plan:     1. Urinary retention  2. Gross hematuria, resolved     - renal US ordered, will follow results in AM  -brice in place, creatinine continues to trend upward. CT from 11/1 showing no hydro. UOP good. Appreciate nephrology recommendations.     Signed By: Leon Kiser NP - November 8, 2019

## 2019-11-09 LAB
ALBUMIN SERPL-MCNC: 2.4 G/DL (ref 3.5–5)
ANION GAP SERPL CALC-SCNC: 8 MMOL/L (ref 5–15)
BUN SERPL-MCNC: 86 MG/DL (ref 6–20)
BUN/CREAT SERPL: 11 (ref 12–20)
CALCIUM SERPL-MCNC: 8.4 MG/DL (ref 8.5–10.1)
CHLORIDE SERPL-SCNC: 107 MMOL/L (ref 97–108)
CO2 SERPL-SCNC: 23 MMOL/L (ref 21–32)
CREAT SERPL-MCNC: 7.51 MG/DL (ref 0.7–1.3)
ERYTHROCYTE [DISTWIDTH] IN BLOOD BY AUTOMATED COUNT: 15.9 % (ref 11.5–14.5)
GLUCOSE BLD STRIP.AUTO-MCNC: 113 MG/DL (ref 65–100)
GLUCOSE BLD STRIP.AUTO-MCNC: 121 MG/DL (ref 65–100)
GLUCOSE BLD STRIP.AUTO-MCNC: 130 MG/DL (ref 65–100)
GLUCOSE BLD STRIP.AUTO-MCNC: 84 MG/DL (ref 65–100)
GLUCOSE SERPL-MCNC: 76 MG/DL (ref 65–100)
HCT VFR BLD AUTO: 28.8 % (ref 36.6–50.3)
HGB BLD-MCNC: 9.2 G/DL (ref 12.1–17)
MCH RBC QN AUTO: 29.8 PG (ref 26–34)
MCHC RBC AUTO-ENTMCNC: 31.9 G/DL (ref 30–36.5)
MCV RBC AUTO: 93.2 FL (ref 80–99)
NRBC # BLD: 0 K/UL (ref 0–0.01)
NRBC BLD-RTO: 0 PER 100 WBC
PHOSPHATE SERPL-MCNC: 6.2 MG/DL (ref 2.6–4.7)
PLATELET # BLD AUTO: 333 K/UL (ref 150–400)
PMV BLD AUTO: 10.2 FL (ref 8.9–12.9)
POTASSIUM SERPL-SCNC: 4.5 MMOL/L (ref 3.5–5.1)
RBC # BLD AUTO: 3.09 M/UL (ref 4.1–5.7)
SERVICE CMNT-IMP: ABNORMAL
SERVICE CMNT-IMP: NORMAL
SODIUM SERPL-SCNC: 138 MMOL/L (ref 136–145)
WBC # BLD AUTO: 5.9 K/UL (ref 4.1–11.1)

## 2019-11-09 PROCEDURE — 74011250636 HC RX REV CODE- 250/636: Performed by: FAMILY MEDICINE

## 2019-11-09 PROCEDURE — 82962 GLUCOSE BLOOD TEST: CPT

## 2019-11-09 PROCEDURE — 74011250637 HC RX REV CODE- 250/637: Performed by: INTERNAL MEDICINE

## 2019-11-09 PROCEDURE — 94760 N-INVAS EAR/PLS OXIMETRY 1: CPT

## 2019-11-09 PROCEDURE — 80069 RENAL FUNCTION PANEL: CPT

## 2019-11-09 PROCEDURE — 85027 COMPLETE CBC AUTOMATED: CPT

## 2019-11-09 PROCEDURE — 74011250637 HC RX REV CODE- 250/637: Performed by: UROLOGY

## 2019-11-09 PROCEDURE — 36415 COLL VENOUS BLD VENIPUNCTURE: CPT

## 2019-11-09 PROCEDURE — 65660000000 HC RM CCU STEPDOWN

## 2019-11-09 RX ADMIN — SODIUM BICARBONATE 650 MG: 650 TABLET ORAL at 09:25

## 2019-11-09 RX ADMIN — LABETALOL HYDROCHLORIDE 600 MG: 200 TABLET, FILM COATED ORAL at 17:12

## 2019-11-09 RX ADMIN — Medication 10 ML: at 21:32

## 2019-11-09 RX ADMIN — EPOETIN ALFA-EPBX 12000 UNITS: 10000 INJECTION, SOLUTION INTRAVENOUS; SUBCUTANEOUS at 22:15

## 2019-11-09 RX ADMIN — Medication 10 ML: at 17:13

## 2019-11-09 RX ADMIN — SODIUM BICARBONATE 650 MG: 650 TABLET ORAL at 17:12

## 2019-11-09 RX ADMIN — LABETALOL HYDROCHLORIDE 600 MG: 200 TABLET, FILM COATED ORAL at 09:26

## 2019-11-09 RX ADMIN — Medication 10 ML: at 06:41

## 2019-11-09 RX ADMIN — DOXAZOSIN 2 MG: 2 TABLET ORAL at 09:25

## 2019-11-09 RX ADMIN — AMLODIPINE BESYLATE 10 MG: 5 TABLET ORAL at 09:24

## 2019-11-09 RX ADMIN — FINASTERIDE 5 MG: 5 TABLET, FILM COATED ORAL at 09:24

## 2019-11-09 RX ADMIN — ACETAMINOPHEN 650 MG: 325 TABLET ORAL at 14:34

## 2019-11-09 NOTE — PROGRESS NOTES
Nephrology Progress Note     Jarvis Carrera     [email or web address]                  Phone - [phone]   Patient: Kaitlynn Tsang   Date- 11/9/2019        Admit Date: 11/2/2019  YOB: 1950             CC: Follow up for ASHVIN  on ckd          Subjective: Interval History:   HE SAYS SOME ONE TALKED TO HIM ABOUT DIALYSIS TODAY    he is considering dialysis and Kidney Transplant as options   He says that he is not totally opposed to dialysos       ROS:- as above, plus: no HEENT complaints. No SOB. No chest pain. No abd pain. No N/V. No joint pain. No skin rashes or lesions. Assessment & Plan:     NO CHANGES MADE DURING THE VISIT   CONTINUE W/ DIALYSIS EDUCATION       ASHVIN   on ckd  -unfortunately, the renal function is worse despite placement of the brice. - bun/creat up to 86/7.6 on 11/8  - there is no urgent need for dialysis and the patient is refusing it anyway. - However, he may change his mind if he becomes symptomatic  - His creat was 5 in 9/19, so he was ALREADY stage 5. The renal function may not get better. - LIKELY DUE TO URINARY RETENTION +/- normalisation of bp causing renal hypoperfusion  - follow bmp in am  - No more IV fluids: pt is overloaded. - avoid diuretics  - hold lisinopril    Chronic renal insufficiency, stage V.  Creat was 5 in 9/19.  - pt followed by Dr. Alfredo Kc. Etiology likely HTN and DM.     Hypertensive emergency  - continue norvasc,  Labetalol ,cardura  - stop clonidine due to side effect of dizziness. And if he is non compliant it will cause rebound hypertension.  - hold lisinopril     anemia of ckd  - continue epogen  - Hb up to 7.8 after PRBC's.     Hematuria- urinary retention  - urology input noted  - s/p placement of a brice     metabolic acidosis  - start po bicarb    Edema  - avoid diuretics for now      TYPE 2 DM  PROTEINURIA , Microscopic hematuria  - +ve hep b and hep c -      Right humeral neck fracture                Physical exam:   GEN:  Elderly man in no distress. NECK:  Supple, no thyromegaly  RESP: lungs clear; no resp distress  CVS: RRR; no gallop or rub  ABDO:  soft , non tender, No mass  NEURO: non focal, normal speech  EXT: 1-2+ edema   - brice +; clear urine  Psych: normal affect and mood    Care Plan discussed with: patient at length. Do not see any urgent need for dialysis but am a bit concerned by his refusal to have it regardless. Also d/w Dr. Ron Naylor: would not discharge the pt until the creat is better or at least stable. Objective:   Visit Vitals  /80 (BP 1 Location: Left arm, BP Patient Position: At rest)   Pulse 73   Temp 97.7 °F (36.5 °C)   Resp 17   Wt 79.2 kg (174 lb 9.7 oz)   SpO2 98%   BMI 25.78 kg/m²     Last 3 Recorded Weights in this Encounter    11/05/19 1253   Weight: 79.2 kg (174 lb 9.7 oz)     11/07 1901 - 11/09 0700  In: 360 [P.O.:360]  Out: 2350 [Urine:2350]    Intake/Output Summary (Last 24 hours) at 11/9/2019 1549  Last data filed at 11/9/2019 1425  Gross per 24 hour   Intake 480 ml   Output 1000 ml   Net -520 ml      Chart reviewed. Pertinent Notes reviewed.        Medication list  reviewed   Current Facility-Administered Medications   Medication    sodium bicarbonate tablet 650 mg    finasteride (PROSCAR) tablet 5 mg    epoetin charisse-epbx (RETACRIT) 12,000 Units combo injection    sodium chloride (NS) flush 5-40 mL    sodium chloride (NS) flush 5-40 mL    acetaminophen (TYLENOL) tablet 650 mg    ondansetron (ZOFRAN) injection 4 mg    bisacodyl (DULCOLAX) tablet 5 mg    glucose chewable tablet 16 g    dextrose (D50W) injection syrg 12.5-25 g    glucagon (GLUCAGEN) injection 1 mg    insulin lispro (HUMALOG) injection    amLODIPine (NORVASC) tablet 10 mg    aspirin delayed-release tablet 81 mg    hydrALAZINE (APRESOLINE) 20 mg/mL injection 20 mg    labetalol (NORMODYNE) tablet 600 mg    doxazosin (CARDURA) tablet 2 mg           Data Review :  Recent Labs 11/09/19 0343 11/08/19 0317 11/07/19 0359    139 138   K 4.5 4.6 4.5    106 105   CO2 23 23 24   BUN 86* 86* 86*   CREA 7.51* 7.64* 7.44*   GLU 76 78 80   CA 8.4* 8.0* 7.7*   PHOS 6.2* 6.0* 6.3*     Recent Labs     11/09/19 0343 11/07/19 0359   WBC 5.9 5.2   HGB 9.2* 8.0*   HCT 28.8* 25.0*    254     No results for input(s): FE, TIBC, PSAT, FERR in the last 72 hours. No results for input(s): CPK, CKNDX, TROIQ in the last 72 hours. No lab exists for component: CPKMB  Lab Results   Component Value Date/Time    Color ABRAHAM 11/01/2019 09:05 PM    Appearance CLOUDY (A) 11/01/2019 09:05 PM    Specific gravity 1.015 11/01/2019 09:05 PM    pH (UA) 5.5 11/01/2019 09:05 PM    Protein >300 (A) 11/01/2019 09:05 PM    Glucose NEGATIVE  11/01/2019 09:05 PM    Ketone NEGATIVE  11/01/2019 09:05 PM    Bilirubin NEGATIVE  11/01/2019 09:05 PM    Urobilinogen 0.2 11/01/2019 09:05 PM    Nitrites NEGATIVE  11/01/2019 09:05 PM    Leukocyte Esterase TRACE (A) 11/01/2019 09:05 PM    Epithelial cells FEW 11/01/2019 09:05 PM    Bacteria NEGATIVE  11/01/2019 09:05 PM    WBC 0-4 11/01/2019 09:05 PM    RBC  11/01/2019 09:05 PM     Lab Results   Component Value Date/Time    Culture result: NO GROWTH 5 DAYS 09/04/2019 06:29 PM     No results found for: SDES  Lab Results   Component Value Date/Time    Sodium,urine random 65 01/24/2019 12:52 AM    Creatinine, urine 94.80 01/24/2019 12:52 AM       Results from Hospital Encounter encounter on 11/01/19   XR CHEST PA LAT    Narrative EXAM: XR CHEST PA LAT    INDICATION: pneumonia    COMPARISON: Chest x-ray 9/2/2019. FINDINGS: PA and lateral radiographs of the chest demonstrate hyperinflated but  otherwise clear lungs. The cardiac and mediastinal contours and pulmonary  vascularity are normal. Atherosclerotic calcifications affect the aortic arch  and the thoracic aorta is tortuous.  The chest wall structures and visualized  upper abdomen show no acute findings with incidental note of degenerative spine,  right humeral neck fracture, and diffuse osteopenia. Impression IMPRESSION: No acute cardiopulmonary findings. COPD. Right humeral neck  fracture. Arnold Belcher MD  [facility]   [email or web address]  [facility]  [address], [address]  [place], [address]  Phone - [phone]         Fax - [phone]

## 2019-11-09 NOTE — PROGRESS NOTES
Pharmacy - EPO Dosing & Monitoring    Dose and schedule: epoetin 80632 units TIW (TRSa)    Labs:  Recent Labs     11/09/19  0343 11/07/19  0359   HGB 9.2* 8.0*       Impression/Plan:   - Hgb < 10 = Dispense  - CBC w/o diff TTS     Thanks,  Khloe Abreu, PHARMD      [email or web address]. [email or web address]

## 2019-11-09 NOTE — PROGRESS NOTES
Problem: Falls - Risk of  Goal: *Absence of Falls  Description  Document Wendy Loyd Fall Risk and appropriate interventions in the flowsheet. Outcome: Progressing Towards Goal  Note:   Fall Risk Interventions:  Mobility Interventions: OT consult for ADLs, Patient to call before getting OOB, PT Consult for mobility concerns         Medication Interventions: Evaluate medications/consider consulting pharmacy, Patient to call before getting OOB, Teach patient to arise slowly    Elimination Interventions: Call light in reach, Stay With Me (per policy), Toilet paper/wipes in reach    History of Falls Interventions: Evaluate medications/consider consulting pharmacy, Assess for delayed presentation/identification of injury for 48 hrs (comment for end date)         Problem: Patient Education: Go to Patient Education Activity  Goal: Patient/Family Education  Outcome: Progressing Towards Goal     Problem: Chronic Renal Failure  Goal: *Fluid and electrolytes stabilized  Outcome: Progressing Towards Goal     Problem: Patient Education: Go to Patient Education Activity  Goal: Patient/Family Education  Outcome: Progressing Towards Goal     Problem: Patient Education: Go to Patient Education Activity  Goal: Patient/Family Education  Outcome: Progressing Towards Goal     Problem: Patient Education: Go to Patient Education Activity  Goal: Patient/Family Education  Outcome: Progressing Towards Goal     Problem: Pressure Injury - Risk of  Goal: *Prevention of pressure injury  Description  Document Zaki Scale and appropriate interventions in the flowsheet.   Outcome: Progressing Towards Goal  Note:   Pressure Injury Interventions:  Sensory Interventions: Assess changes in LOC, Avoid rigorous massage over bony prominences, Discuss PT/OT consult with provider, Keep linens dry and wrinkle-free, Maintain/enhance activity level, Minimize linen layers, Monitor skin under medical devices    Moisture Interventions: Absorbent underpads, Limit adult briefs, Maintain skin hydration (lotion/cream), Minimize layers    Activity Interventions: Increase time out of bed, Pressure redistribution bed/mattress(bed type), PT/OT evaluation    Mobility Interventions: HOB 30 degrees or less, Pressure redistribution bed/mattress (bed type), PT/OT evaluation    Nutrition Interventions: Document food/fluid/supplement intake                     Problem: Patient Education: Go to Patient Education Activity  Goal: Patient/Family Education  Outcome: Progressing Towards Goal

## 2019-11-09 NOTE — PROGRESS NOTES
Problem: Falls - Risk of  Goal: *Absence of Falls  Description  Document Jonderrick Alixgeovanna Fall Risk and appropriate interventions in the flowsheet.   Outcome: Progressing Towards Goal  Note:   Fall Risk Interventions:  Mobility Interventions: Assess mobility with egress test         Medication Interventions: Assess postural VS orthostatic hypotension    Elimination Interventions: Call light in reach    History of Falls Interventions: Consult care management for discharge planning

## 2019-11-09 NOTE — PROGRESS NOTES
Orthopedic End of Shift Note    Bedside and verbal shift change report given to Costa Fitzgerald RN (oncoming nurse) by Mayank Mora RN (offgoing nurse). Report included the following information SBAR, Intake/Output, Recent Results, Cardiac Rhythm Sinus Rhythm and Quality Measures. POD#     Significant issues during shift: Continued care for acute renal failure r/t rentention. Stone in place draining clear yellow urine. Patient denies pain. Vitals stable on room air. AM labs per orders. No significant issues on overnight.     Issues for Physician to address: Same as above    Activity This Shift  (check all that apply) [] chair  [] dangle   [] bathroom  [] bedside commode [] hallway  [] bedrest   Nausea/Vomiting [] yes [x] no     Voiding Status [] void [x] Stone [] I&O Cath   Bowel Movements [] yes [x] no     Foot Pumps or SCD [] yes [x] no refusing   Ice Pack [] yes    [x] no    Incentive Spirometer [] yes [x] no Volume:      Telemetry Monitoring   [x] yes [] no Rhythm:   Supplemental O2 [] yes [x] no Sat off O2:   98%

## 2019-11-09 NOTE — PROGRESS NOTES
Hospitalist Progress Note    NAME: Kaitlynn Tsang   :  1950   MRN:  [id number]     00-[id number]-VQI with history of CKD stage V was admitted to the hospital with worsening creatinine and he is refusing dialysis. Assessment / Plan:  Acute on chronic kidney disease stage V worse  - Cr continues to trend up in spite of Stone placement. Creatinine baseline is around 5 and currently is about 7.6  -He is continuing to refuse hemodialysis. -I discussed with on-call nephrologist Dr. Halima azevedo who recommended that, patient will need to stay in the hospital until Monday hoping that he will accept dialysis by Monday if he becomes symptomatic. If his creatinine plateaus then renal will consider clearing him for discharge Monday  -Repeat BMP in a.m. Avoid nephrotoxic medications. Dose medications per GFR.  -Monitor urine output         Hypertension  controlled  -Continue amlodipine, labetalol and added Cardura  - Hold Lisinopril due to ASHVIN/CKD  - Hydralazine as needed   - clonidine discontinued due to compliance issues and may cause rebound htn     Gross Hematuria   In Stone with clear urine now. Continue Stone for now. Outpatient follow-up with urology. Ultrasound done today showed no acute process other than renal cysts      Urinary  Retention   - likely 2/2 BPH vs blood clots   -Continue Stone catheter for now     Generalized weakness likely multifactorial  continue PT/OT    Anemia most likely secondary to chronic kidney disease  -Hemoglobin is 8 today. He is s/p  1 unit of PRBC transfusion on     DM type II  continue sliding scale insulin with blood sugar checks    Chronic diastolic heart failure  Hold Lasix because of worsening kidney function  -Decision to resume diuretics per nephrology     Hx Right humeral neck fracture.  -pain med PRN      Disposition: Discharged to SNF per PT recs once cleared by renal    30.0 - 39.9 Obese / Body mass index is 25.78 kg/m².     Code status: Full  Prophylaxis: SCD's  Recommended Disposition: Home w/Family     Subjective:     Chief Complaint / Reason for Physician Visit  Denies nausea, vomiting or abdominal pain. Continues to refuse hemodialysis  No other complaints today and reports doing very well      Objective:     VITALS:   Last 24hrs VS reviewed since prior progress note. Most recent are:  Patient Vitals for the past 24 hrs:   Temp Pulse Resp BP SpO2   11/08/19 1930 98.2 °F (36.8 °C) 77 17 159/90 100 %   11/08/19 1540 98.1 °F (36.7 °C) 76 16 148/75 97 %   11/08/19 1236 97.2 °F (36.2 °C) 77 18 158/89 98 %   11/08/19 0720 98.8 °F (37.1 °C) 75 16 147/82 100 %   11/08/19 0329 98.6 °F (37 °C) 78 16 145/76 97 %   11/07/19 2335 98.9 °F (37.2 °C) 76 16 139/76 98 %       Intake/Output Summary (Last 24 hours) at 11/8/2019 2315  Last data filed at 11/8/2019 8068  Gross per 24 hour   Intake 360 ml   Output 600 ml   Net -240 ml        PHYSICAL EXAM:  General:           Alert, cooperative, no acute distress    EENT:  EOMI. Anicteric sclerae. MMM  Resp:  CTA bilaterally, no wheezing or rales. No accessory muscle use  CV:  Regular  rhythm,  No edema  GI:  Soft, Non distended, Non tender.  +Bowel sounds, Stone with clear urine  Neurologic:  Alert and oriented X 3, normal speech,   Psych:   Good insight. Not anxious nor agitated  Skin:  No rashes. No jaundice    Reviewed most current lab test results and cultures  YES  Reviewed most current radiology test results   YES  Review and summation of old records today    NO  Reviewed patient's current orders and MAR    YES  PMH/SH reviewed - no change compared to H&P  ________________________________________________________________________  Care Plan discussed with:    Comments   Patient x    Family      RN x    Care Manager x    Consultant                        Multidiciplinary team rounds were held today with , nursing, pharmacist and clinical coordinator.   Patient's plan of care was discussed; medications were reviewed and discharge planning was addressed. ________________________________________________________________________  Total NON critical care TIME:  35    Minutes    Total CRITICAL CARE TIME Spent:   Minutes non procedure based      Comments   >50% of visit spent in counseling and coordination of care x    ________________________________________________________________________  Daniel Fam MD     Procedures: see electronic medical records for all procedures/Xrays and details which were not copied into this note but were reviewed prior to creation of Plan. LABS:  I reviewed today's most current labs and imaging studies.   Pertinent labs include:  Recent Labs     11/07/19 0359 11/06/19 0242   WBC 5.2 5.8   HGB 8.0* 7.8*   HCT 25.0* 24.1*    229     Recent Labs     11/08/19 0317 11/07/19 0359 11/06/19 0242    138 136   K 4.6 4.5 4.3    105 104   CO2 23 24 22   GLU 78 80 64*   BUN 86* 86* 80*   CREA 7.64* 7.44* 7.18*   CA 8.0* 7.7* 7.9*   PHOS 6.0* 6.3* 5.9*   ALB 2.3* 2.4* 2.3*       Signed: Daniel Fam MD

## 2019-11-09 NOTE — PROGRESS NOTES
0700:  Bedside Verbal Report for shift change received from [facility]. Bedside, Verbal and Written shift change report GIVEN TO RENZO Dent. Report included the following information SBAR.          SIGNIFICANT CHANGES DURING SHIFT:  None      CONCERNS TO ADDRESS WITH MD:  None

## 2019-11-09 NOTE — PROGRESS NOTES
Renal function about the same   Partners will see him again Monday   Please call if any renal issues over the w/e      [facility]  Office :[phone]  Fax: [phone]

## 2019-11-09 NOTE — PROGRESS NOTES
Progress Note      Pt Name  Domenic Mesisna   Date of Birth 1950   Medical Record Number  [id number]      Age  77 y.o. PCP Dinah Martinez MD   Admit date:  11/2/2019    Room Number  4230/71  @ [facility]   Date of Service  11/9/2019     Admission Diagnoses:  Acute on Chronic Renal Failure      Assessment and plan:     Acute on chronic kidney disease stage V worse  - Cr continues to trend up in spite of Stone placement. Creatinine baseline is around 5 and currently is about 7.6  -He is continuing to refuse hemodialysis. -I discussed with on-call nephrologist Dr. Magali azevedo who recommended that, patient will need to stay in the hospital until Monday hoping that he will accept dialysis by Monday if he becomes symptomatic. If his creatinine plateaus then renal will consider clearing him for discharge Monday  -Repeat BMP in a.m. Avoid nephrotoxic medications. Dose medications per GFR.  -Monitor urine output      I discussed with pt about this reluctance to proceed with HD if /when needed. He reported that he has seen people suffering and he does not wish to go through that process. Upon asking if he would be a candidate for renal transplant, would he consider HD as a bridge to that end. He stated that he would consider and would like to think about it.         Hypertension  controlled  -Continue amlodipine, labetalol and added Cardura  - Hold Lisinopril due to ASHVIN/CKD  - Hydralazine as needed   - clonidine discontinued due to compliance issues and may cause rebound htn      Gross Hematuria   In Stone with clear urine now. Continue Stone for now. Outpatient follow-up with urology.   Ultrasound done today showed no acute process other than renal cysts        Urinary  Retention   - likely 2/2 BPH vs blood clots   -Continue Stone catheter for now     Generalized weakness likely multifactorial  continue PT/OT    Anemia most likely secondary to chronic kidney disease  -Hemoglobin is 8 today. He is s/p  1 unit of PRBC transfusion on 11/5    DM type II  continue sliding scale insulin with blood sugar checks    Chronic diastolic heart failure  Hold Lasix because of worsening kidney function  -Decision to resume diuretics per nephrology     Hx Right humeral neck fracture.  -pain med PRN                   CODE STATUS   Full    Functional Status  Pt resides in his own house   He is independent with most of ADLs     Surrogate decision maker:  ? Prophylaxis   H2B/PPI   Discharge Plan:   PT, OT, RN,  ? Misc   Benefit:  Payor: [employer] / Plan: [address] [place] [address] HMO / Product Type: Managed Care Medicare /    Isolation :  There are currently no Active Isolations   ADT status:  INPATIENT      Query   None noted today    Prognosis   Guarded    Social issues  Date  Comment     11/09/19   Met with pt's ex-wife in the room.                    Subjective Data     \"OK \"  Review of Systems - History obtained from the patient  Respiratory ROS: no cough, shortness of breath, or wheezing  Cardiovascular ROS: no chest pain or dyspnea on exertion    Objective Data       Comments  Pleasant gentleman sitting on bedside chair in no distress      Patient Vitals for the past 24 hrs:   BP   11/09/19 1130 143/81   11/09/19 0757 (!) 168/92   11/09/19 0333 162/87   11/09/19 0034 153/85   11/08/19 1930 159/90   11/08/19 1540 148/75      Patient Vitals for the past 24 hrs:   Pulse   11/09/19 1130 71   11/09/19 0757 75   11/09/19 0333 76   11/09/19 0034 78   11/08/19 1930 77   11/08/19 1540 76      Patient Vitals for the past 24 hrs:   Resp   11/09/19 1130 17   11/09/19 0757 16   11/09/19 0333 16   11/09/19 0034 16   11/08/19 1930 17   11/08/19 1540 16      Patient Vitals for the past 24 hrs:   Temp   11/09/19 1130 97.5 °F (36.4 °C)   11/09/19 0757 97.6 °F (36.4 °C)   11/09/19 0333 98.4 °F (36.9 °C)   11/09/19 0034 98.5 °F (36.9 °C)   11/08/19 1930 98.2 °F (36.8 °C)   11/08/19 1540 98.1 °F (36.7 °C) SpO2 Readings from Last 6 Encounters:   11/09/19 100%   11/01/19 99%   11/01/19 99%   11/01/19 98%   10/10/19 98%   09/26/19 98%          O2 Device: Room air Body mass index is 25.78 kg/m². -  Wt Readings from Last 10 Encounters:   11/05/19 79.2 kg (174 lb 9.7 oz)   11/01/19 79.2 kg (174 lb 8 oz)   11/01/19 81.6 kg (180 lb)   09/08/19 82.6 kg (182 lb 1.6 oz)   09/05/19 82.6 kg (182 lb)   09/02/19 85.7 kg (189 lb)   01/23/19 86.2 kg (190 lb)   12/11/15 94.1 kg (207 lb 6.4 oz)   10/21/15 89.5 kg (197 lb 6 oz)        Physical Exam:             General:  Alert, cooperative,   well noursished,   well developed,   appears stated age    Ears/Eyes:  Hearing intact  Sclera anicteric. Pupils equal   Mouth/Throat:  Mucous membranes normal pink and moist  Oral pharynx clear    Neck:     Lungs:  Trachea midline  Chest excursion symmetrical   Auscultation B/L Symmetrical with   Vesicular breath sounds     No Crepitations noted         CVS:  Regular rate and rhythm   Systolic  murmur,   No click, rub or gallop  S1 normal   S2 normal   Pedal pulses  b/l symmetrical - barely palpable due to edema    Abdomen:    Soft, non-tender  Bowel sounds normal  No distension      Extremities:    No cyanosis, jaundice  3+  edema noted on both legs   No sign of DVT/cord like lesion on palpation  No sign of acute trauma   Age appropriate OA changes noted.     Skin:    Skin color, texture, turgor normal. no acute rash or lesions    Lymph nodes:     Musculoskeletal Muscle bulk B/L symmetrical   Neuro Cranial nerves are intact,   motor movement b/l symmetrical,   Sensory evaluation b/l symmetrical    Psych:  Alert and oriented,   normal mood & affect          Medications reviewed     Current Facility-Administered Medications   Medication Dose Route Frequency    sodium bicarbonate tablet 650 mg  650 mg Oral BID    finasteride (PROSCAR) tablet 5 mg  5 mg Oral DAILY    epoetin charisse-epbx (RETACRIT) 12,000 Units combo injection  12,000 Units SubCUTAneous Q TUE, THU & SAT    sodium chloride (NS) flush 5-40 mL  5-40 mL IntraVENous Q8H    sodium chloride (NS) flush 5-40 mL  5-40 mL IntraVENous PRN    acetaminophen (TYLENOL) tablet 650 mg  650 mg Oral Q6H PRN    ondansetron (ZOFRAN) injection 4 mg  4 mg IntraVENous Q6H PRN    bisacodyl (DULCOLAX) tablet 5 mg  5 mg Oral DAILY PRN    glucose chewable tablet 16 g  4 Tab Oral PRN    dextrose (D50W) injection syrg 12.5-25 g  25-50 mL IntraVENous PRN    glucagon (GLUCAGEN) injection 1 mg  1 mg IntraMUSCular PRN    insulin lispro (HUMALOG) injection   SubCUTAneous AC&HS    amLODIPine (NORVASC) tablet 10 mg  10 mg Oral DAILY    aspirin delayed-release tablet 81 mg  81 mg Oral PRN    hydrALAZINE (APRESOLINE) 20 mg/mL injection 20 mg  20 mg IntraVENous Q6H PRN    labetalol (NORMODYNE) tablet 600 mg  600 mg Oral BID    doxazosin (CARDURA) tablet 2 mg  2 mg Oral DAILY       Relevant other informations: Other medical conditions listed in [facility] hospital problem list section; all of these and other pertinent data were taken into consideration when treatment plan is developed and customized to this patient's unique overall circumstances and needs. We have reviewed available old medical records within the constraints of this admission process.         Data Review:   Recent Days:  All Micro Results     None          Recent Labs     11/09/19  0343 11/07/19  0359   WBC 5.9 5.2   HGB 9.2* 8.0*   HCT 28.8* 25.0*    254     Recent Labs     11/09/19  0343 11/08/19  0317 11/07/19  0359    139 138   K 4.5 4.6 4.5    106 105   CO2 23 23 24   GLU 76 78 80   BUN 86* 86* 86*   CREA 7.51* 7.64* 7.44*   CA 8.4* 8.0* 7.7*   PHOS 6.2* 6.0* 6.3*   ALB 2.4* 2.3* 2.4*      Lab Results   Component Value Date/Time    TSH 2.10 09/03/2019 03:23 AM            Care Plan discussed with:Patient/Family and Nurse   Other medical conditions are listed in the active hospital problem list section; these and other pertinent data were taken into consideration when the treatment plan was developed and customized to this patient's unique overall circumstances and needs. High complexity decision making was performed for this patient who is at high risk for decompensation with multiple organ involvement. Today total floor/unit time was 35  minutes while caring for this patient and greater than 50% of that time was spent with patient (and/or family) coordinating patients clinical issues; this includes time spent during multidisciplinary rounds.         Bree Calrke MD MPH FACP    11/9/2019         [delayed entry 11/10/2019 ]

## 2019-11-10 LAB
ALBUMIN SERPL-MCNC: 2.5 G/DL (ref 3.5–5)
ANION GAP SERPL CALC-SCNC: 8 MMOL/L (ref 5–15)
BUN SERPL-MCNC: 81 MG/DL (ref 6–20)
BUN/CREAT SERPL: 11 (ref 12–20)
CALCIUM SERPL-MCNC: 8.1 MG/DL (ref 8.5–10.1)
CHLORIDE SERPL-SCNC: 107 MMOL/L (ref 97–108)
CO2 SERPL-SCNC: 24 MMOL/L (ref 21–32)
CREAT SERPL-MCNC: 7.21 MG/DL (ref 0.7–1.3)
GLUCOSE BLD STRIP.AUTO-MCNC: 125 MG/DL (ref 65–100)
GLUCOSE BLD STRIP.AUTO-MCNC: 89 MG/DL (ref 65–100)
GLUCOSE BLD STRIP.AUTO-MCNC: 94 MG/DL (ref 65–100)
GLUCOSE BLD STRIP.AUTO-MCNC: 95 MG/DL (ref 65–100)
GLUCOSE SERPL-MCNC: 76 MG/DL (ref 65–100)
HAV IGM SER QL: NONREACTIVE
HBV CORE IGM SER QL: NONREACTIVE
HBV SURFACE AG SER QL: >1000 INDEX
HBV SURFACE AG SER QL: POSITIVE
HCV AB SER IA-ACNC: >11 INDEX
HCV AB SER IA-ACNC: >11 INDEX
HCV AB SERPL QL IA: REACTIVE
HCV AB SERPL QL IA: REACTIVE
HCV COMMENT,HCGAC: ABNORMAL
HCV COMMENT,HCGAC: ABNORMAL
PHOSPHATE SERPL-MCNC: 6.1 MG/DL (ref 2.6–4.7)
POTASSIUM SERPL-SCNC: 4.7 MMOL/L (ref 3.5–5.1)
SERVICE CMNT-IMP: ABNORMAL
SERVICE CMNT-IMP: NORMAL
SODIUM SERPL-SCNC: 139 MMOL/L (ref 136–145)
SP1: ABNORMAL
SP2: ABNORMAL
SP3: ABNORMAL

## 2019-11-10 PROCEDURE — 80074 ACUTE HEPATITIS PANEL: CPT

## 2019-11-10 PROCEDURE — 74011250637 HC RX REV CODE- 250/637: Performed by: INTERNAL MEDICINE

## 2019-11-10 PROCEDURE — 36415 COLL VENOUS BLD VENIPUNCTURE: CPT

## 2019-11-10 PROCEDURE — 80069 RENAL FUNCTION PANEL: CPT

## 2019-11-10 PROCEDURE — 82962 GLUCOSE BLOOD TEST: CPT

## 2019-11-10 PROCEDURE — 74011250637 HC RX REV CODE- 250/637: Performed by: UROLOGY

## 2019-11-10 PROCEDURE — 94760 N-INVAS EAR/PLS OXIMETRY 1: CPT

## 2019-11-10 PROCEDURE — 65660000000 HC RM CCU STEPDOWN

## 2019-11-10 PROCEDURE — 86803 HEPATITIS C AB TEST: CPT

## 2019-11-10 PROCEDURE — 86704 HEP B CORE ANTIBODY TOTAL: CPT

## 2019-11-10 RX ADMIN — SODIUM BICARBONATE 650 MG: 650 TABLET ORAL at 10:13

## 2019-11-10 RX ADMIN — FINASTERIDE 5 MG: 5 TABLET, FILM COATED ORAL at 10:13

## 2019-11-10 RX ADMIN — Medication 10 ML: at 05:44

## 2019-11-10 RX ADMIN — LABETALOL HYDROCHLORIDE 600 MG: 200 TABLET, FILM COATED ORAL at 10:13

## 2019-11-10 RX ADMIN — DOXAZOSIN 2 MG: 2 TABLET ORAL at 10:13

## 2019-11-10 RX ADMIN — AMLODIPINE BESYLATE 10 MG: 5 TABLET ORAL at 10:13

## 2019-11-10 RX ADMIN — SODIUM BICARBONATE 650 MG: 650 TABLET ORAL at 17:03

## 2019-11-10 RX ADMIN — Medication 10 ML: at 21:19

## 2019-11-10 RX ADMIN — LABETALOL HYDROCHLORIDE 600 MG: 200 TABLET, FILM COATED ORAL at 17:03

## 2019-11-10 RX ADMIN — Medication 10 ML: at 13:42

## 2019-11-10 NOTE — PROGRESS NOTES
Nephrology Progress Note     Jarvis Carrera     [email or web address]                  Phone - [phone]   Patient: Yosef Louise   Date- 11/10/2019        Admit Date: 11/2/2019  YOB: 1950             CC: Follow up for ASHVIN  on ckd          Subjective: Interval History:     He had discussed dialysis with Dr Dawna Olivares for last 2 days   He now wants to proceed with Hemodialysis and wants to be referred for evaluation for Kidney transplant   I have note discussed AVF placement with him -- have left for the partners to discuss this with him tomorrow       ROS:- as above, plus: no HEENT complaints. No SOB. No chest pain. No abd pain. No N/V. No joint pain. No skin rashes or lesions. Assessment & Plan:     CKD Stage 5 :  - Pt now wants dialysis   - NPO after MN and permacath placement tomorrow   - Partners to discuss long term dialysis access with him   - HD planned for tomorrow     Urinary retention :  - ordered brice removal     Chronic renal insufficiency, stage V.  Creat was 5 in 9/19.  - pt followed by Dr. Giuseppe Winn. Etiology likely HTN and DM.     Hypertensive emergency  - continue norvasc,  Labetalol ,cardura  - stop clonidine due to side effect of dizziness. And if he is non compliant it will cause rebound hypertension.  - hold lisinopril     anemia of ckd  - continue epogen  - Hb up to 7.8 after PRBC's. Hematuria- urinary retention  - urology input noted  - s/p placement of a brice     metabolic acidosis  - start po bicarb    Edema  - avoid diuretics for now      TYPE 2 DM  PROTEINURIA , Microscopic hematuria  - +ve hep b and hep c -      Right humeral neck fracture                Physical exam:   GEN:  Elderly man in no distress.    NECK:  Supple, no thyromegaly  RESP: lungs clear; no resp distress  CVS: RRR; no gallop or rub  ABDO:  soft , non tender, No mass  NEURO: non focal, normal speech  EXT: 1-2+ edema   - brice +; clear urine  Psych: normal affect and mood    Care Plan discussed with: patient at length. Do not see any urgent need for dialysis but am a bit concerned by his refusal to have it regardless. Also d/w Dr. Nicolasa Torres: would not discharge the pt until the creat is better or at least stable. Objective:   Visit Vitals  /80   Pulse 74   Temp 97.9 °F (36.6 °C)   Resp 16   Wt 79.2 kg (174 lb 9.7 oz)   SpO2 99%   BMI 25.78 kg/m²     Last 3 Recorded Weights in this Encounter    11/05/19 1253   Weight: 79.2 kg (174 lb 9.7 oz)     11/08 1901 - 11/10 0700  In: 720 [P.O.:720]  Out: 1500 [Urine:1500]    Intake/Output Summary (Last 24 hours) at 11/10/2019 1416  Last data filed at 11/10/2019 0827  Gross per 24 hour   Intake 480 ml   Output 1350 ml   Net -870 ml      Chart reviewed. Pertinent Notes reviewed.        Medication list  reviewed   Current Facility-Administered Medications   Medication    sodium bicarbonate tablet 650 mg    finasteride (PROSCAR) tablet 5 mg    epoetin charisse-epbx (RETACRIT) 12,000 Units combo injection    sodium chloride (NS) flush 5-40 mL    sodium chloride (NS) flush 5-40 mL    acetaminophen (TYLENOL) tablet 650 mg    ondansetron (ZOFRAN) injection 4 mg    bisacodyl (DULCOLAX) tablet 5 mg    glucose chewable tablet 16 g    dextrose (D50W) injection syrg 12.5-25 g    glucagon (GLUCAGEN) injection 1 mg    insulin lispro (HUMALOG) injection    amLODIPine (NORVASC) tablet 10 mg    aspirin delayed-release tablet 81 mg    hydrALAZINE (APRESOLINE) 20 mg/mL injection 20 mg    labetalol (NORMODYNE) tablet 600 mg    doxazosin (CARDURA) tablet 2 mg           Data Review :  Recent Labs     11/10/19  0413 11/09/19  0343 11/08/19  0317    138 139   K 4.7 4.5 4.6    107 106   CO2 24 23 23   BUN 81* 86* 86*   CREA 7.21* 7.51* 7.64*   GLU 76 76 78   CA 8.1* 8.4* 8.0*   PHOS 6.1* 6.2* 6.0*     Recent Labs     11/09/19  0343   WBC 5.9   HGB 9.2*   HCT 28.8*        No results for input(s): FE, TIBC, PSAT, FERR in the last 72 hours. No results for input(s): CPK, CKNDX, TROIQ in the last 72 hours. No lab exists for component: CPKMB  Lab Results   Component Value Date/Time    Color ABRAHAM 11/01/2019 09:05 PM    Appearance CLOUDY (A) 11/01/2019 09:05 PM    Specific gravity 1.015 11/01/2019 09:05 PM    pH (UA) 5.5 11/01/2019 09:05 PM    Protein >300 (A) 11/01/2019 09:05 PM    Glucose NEGATIVE  11/01/2019 09:05 PM    Ketone NEGATIVE  11/01/2019 09:05 PM    Bilirubin NEGATIVE  11/01/2019 09:05 PM    Urobilinogen 0.2 11/01/2019 09:05 PM    Nitrites NEGATIVE  11/01/2019 09:05 PM    Leukocyte Esterase TRACE (A) 11/01/2019 09:05 PM    Epithelial cells FEW 11/01/2019 09:05 PM    Bacteria NEGATIVE  11/01/2019 09:05 PM    WBC 0-4 11/01/2019 09:05 PM    RBC  11/01/2019 09:05 PM     Lab Results   Component Value Date/Time    Culture result: NO GROWTH 5 DAYS 09/04/2019 06:29 PM     No results found for: SDES  Lab Results   Component Value Date/Time    Sodium,urine random 65 01/24/2019 12:52 AM    Creatinine, urine 94.80 01/24/2019 12:52 AM       Results from Hospital Encounter encounter on 11/01/19   XR CHEST PA LAT    Narrative EXAM: XR CHEST PA LAT    INDICATION: pneumonia    COMPARISON: Chest x-ray 9/2/2019. FINDINGS: PA and lateral radiographs of the chest demonstrate hyperinflated but  otherwise clear lungs. The cardiac and mediastinal contours and pulmonary  vascularity are normal. Atherosclerotic calcifications affect the aortic arch  and the thoracic aorta is tortuous. The chest wall structures and visualized  upper abdomen show no acute findings with incidental note of degenerative spine,  right humeral neck fracture, and diffuse osteopenia. Impression IMPRESSION: No acute cardiopulmonary findings. COPD. Right humeral neck  fracture. Eduarda Reyez MD  [facility]   [email or web address]  [facility]  [address], [address]  [place], [address]  Phone - [phone]         Fax - [phone]

## 2019-11-10 NOTE — PROGRESS NOTES
Progress Note      Pt Name  Xi Moss   Date of Birth 1950   Medical Record Number  [id number]      Age  77 y.o. PCP Ha Martin MD   Admit date:  11/2/2019    Room Number  9614/34  @ [facility]   Date of Service  11/10/2019     Admission Diagnoses:  Acute on Chronic Renal Failure      Assessment and plan:     Acute on chronic kidney disease Stage V   Pt is agreeable to proceed with HD. - will ask nephrologist to proceed. He is interested in receiving transplant   He does not have transportation for going back and forth to HD. - will ask CM to assist.   - Cr continues to trend up in spite of Stone placement. Creatinine baseline is around 5 and currently is about 7.6  -He is continuing to refuse hemodialysis. -I discussed with on-call nephrologist Dr. Rosanna azevedo who recommended that, patient will need to stay in the hospital until Monday hoping that he will accept dialysis by Monday if he becomes symptomatic. If his creatinine plateaus then renal will consider clearing him for discharge Monday  -Repeat BMP in a.m. Avoid nephrotoxic medications. Dose medications per GFR.  -Monitor urine output      I discussed with pt about this reluctance to proceed with HD if /when needed. He reported that he has seen people suffering and he does not wish to go through that process. Upon asking if he would be a candidate for renal transplant, would he consider HD as a bridge to that end. He stated that he would consider and would like to think about it.         Hypertension  controlled  -Continue amlodipine, labetalol and added Cardura  - Hold Lisinopril due to ASHVIN/CKD  - Hydralazine as needed   - clonidine discontinued due to compliance issues and may cause rebound htn      Gross Hematuria   In Stone with clear urine now. Continue Stone for now. Outpatient follow-up with urology.   Ultrasound done today showed no acute process other than renal cysts        Urinary  Retention - likely 2/2 BPH vs blood clots   -Continue Stone catheter for now     Generalized weakness likely multifactorial  continue PT/OT    Anemia most likely secondary to chronic kidney disease  -Hemoglobin is 8 today. He is s/p  1 unit of PRBC transfusion on 11/5    DM type II  continue sliding scale insulin with blood sugar checks    Chronic diastolic heart failure  Hold Lasix because of worsening kidney function  -Decision to resume diuretics per nephrology     Hx Right humeral neck fracture.  -pain med PRN                   CODE STATUS   Full    Functional Status  Pt resides in his own house   He is independent with most of ADLs     Surrogate decision maker:  ? Prophylaxis   H2B/PPI   Discharge Plan:   PT, OT, RN,  ? Misc   Benefit:  Payor: Gertrudis Montemayor / Plan: [address] [place] [address] HMO / Product Type: Managed Care Medicare /    Isolation :  There are currently no Active Isolations   ADT status:  INPATIENT      Query   None noted today    Prognosis   Guarded    Social issues  Date  Comment     11/09/19   Met with pt's ex-wife in the room.     11/10/19 12:22 PM No family or visitor here with the patient today                 Subjective Data     \"OK \"  Review of Systems - History obtained from the patient  Respiratory ROS: no cough, shortness of breath, or wheezing  Cardiovascular ROS: no chest pain or dyspnea on exertion    Objective Data       Comments  Pleasant gentleman sitting on bedside chair in no distress      Patient Vitals for the past 24 hrs:   BP   11/10/19 0822 (!) 177/92   11/10/19 0339 158/87   11/09/19 2352 149/82   11/09/19 1952 144/79   11/09/19 1531 140/80      Patient Vitals for the past 24 hrs:   Pulse   11/10/19 0822 81   11/10/19 0339 73   11/09/19 2352 74   11/09/19 1952 76   11/09/19 1531 73      Patient Vitals for the past 24 hrs:   Resp   11/10/19 0822 14   11/10/19 0339 15   11/09/19 2352 16   11/09/19 1952 17   11/09/19 1531 17      Patient Vitals for the past 24 hrs: Temp   11/10/19 0822 97.7 °F (36.5 °C)   11/10/19 0339 98.2 °F (36.8 °C)   11/09/19 2352 98.2 °F (36.8 °C)   11/09/19 1952 98 °F (36.7 °C)   11/09/19 1531 97.7 °F (36.5 °C)        SpO2 Readings from Last 6 Encounters:   11/10/19 99%   11/01/19 99%   11/01/19 99%   11/01/19 98%   10/10/19 98%   09/26/19 98%          O2 Device: Room air Body mass index is 25.78 kg/m². -  Wt Readings from Last 10 Encounters:   11/05/19 79.2 kg (174 lb 9.7 oz)   11/01/19 79.2 kg (174 lb 8 oz)   11/01/19 81.6 kg (180 lb)   09/08/19 82.6 kg (182 lb 1.6 oz)   09/05/19 82.6 kg (182 lb)   09/02/19 85.7 kg (189 lb)   01/23/19 86.2 kg (190 lb)   12/11/15 94.1 kg (207 lb 6.4 oz)   10/21/15 89.5 kg (197 lb 6 oz)        Physical Exam:             General:  Alert, cooperative,   well noursished,   well developed,   appears stated age    Ears/Eyes:  Hearing intact  Sclera anicteric. Pupils equal   Mouth/Throat:  Mucous membranes normal pink and moist  Oral pharynx clear    Neck:     Lungs:  Trachea midline  Chest excursion symmetrical   Auscultation B/L Symmetrical with   Vesicular breath sounds     No Crepitations noted         CVS:  Regular rate and rhythm   Systolic  murmur,   No click, rub or gallop  S1 normal   S2 normal   Pedal pulses  b/l symmetrical - barely palpable due to edema    Abdomen:    Soft, non-tender  Bowel sounds normal  No distension      Extremities:    No cyanosis, jaundice  3+  edema noted on both legs   No sign of DVT/cord like lesion on palpation  No sign of acute trauma   Age appropriate OA changes noted.     Skin:    Skin color, texture, turgor normal. no acute rash or lesions    Lymph nodes:     Musculoskeletal Muscle bulk B/L symmetrical   Neuro Cranial nerves are intact,   motor movement b/l symmetrical,   Sensory evaluation b/l symmetrical    Psych:  Alert and oriented,   normal mood & affect          Medications reviewed     Current Facility-Administered Medications   Medication Dose Route Frequency    sodium bicarbonate tablet 650 mg  650 mg Oral BID    finasteride (PROSCAR) tablet 5 mg  5 mg Oral DAILY    epoetin charisse-epbx (RETACRIT) 12,000 Units combo injection  12,000 Units SubCUTAneous Q TUE, THU & SAT    sodium chloride (NS) flush 5-40 mL  5-40 mL IntraVENous Q8H    sodium chloride (NS) flush 5-40 mL  5-40 mL IntraVENous PRN    acetaminophen (TYLENOL) tablet 650 mg  650 mg Oral Q6H PRN    ondansetron (ZOFRAN) injection 4 mg  4 mg IntraVENous Q6H PRN    bisacodyl (DULCOLAX) tablet 5 mg  5 mg Oral DAILY PRN    glucose chewable tablet 16 g  4 Tab Oral PRN    dextrose (D50W) injection syrg 12.5-25 g  25-50 mL IntraVENous PRN    glucagon (GLUCAGEN) injection 1 mg  1 mg IntraMUSCular PRN    insulin lispro (HUMALOG) injection   SubCUTAneous AC&HS    amLODIPine (NORVASC) tablet 10 mg  10 mg Oral DAILY    aspirin delayed-release tablet 81 mg  81 mg Oral PRN    hydrALAZINE (APRESOLINE) 20 mg/mL injection 20 mg  20 mg IntraVENous Q6H PRN    labetalol (NORMODYNE) tablet 600 mg  600 mg Oral BID    doxazosin (CARDURA) tablet 2 mg  2 mg Oral DAILY       Relevant other informations: Other medical conditions listed in [facility] problem list section; all of these and other pertinent data were taken into consideration when treatment plan is developed and customized to this patient's unique overall circumstances and needs. We have reviewed available old medical records within the constraints of this admission process.         Data Review:   Recent Days:  All Micro Results     None          Recent Labs     11/09/19  0343   WBC 5.9   HGB 9.2*   HCT 28.8*        Recent Labs     11/10/19  0413 11/09/19  0343 11/08/19  0317    138 139   K 4.7 4.5 4.6    107 106   CO2 24 23 23   GLU 76 76 78   BUN 81* 86* 86*   CREA 7.21* 7.51* 7.64*   CA 8.1* 8.4* 8.0*   PHOS 6.1* 6.2* 6.0*   ALB 2.5* 2.4* 2.3*      Lab Results   Component Value Date/Time    TSH 2.10 09/03/2019 03:23 AM            Care Plan discussed with:Patient/Family and Nurse   Other medical conditions are listed in the active hospital problem list section; these and other pertinent data were taken into consideration when the treatment plan was developed and customized to this patient's unique overall circumstances and needs. High complexity decision making was performed for this patient who is at high risk for decompensation with multiple organ involvement. Today total floor/unit time was 25  minutes while caring for this patient and greater than 50% of that time was spent with patient (and/or family) coordinating patients clinical issues; this includes time spent during multidisciplinary rounds.         Jt Gabriel MD MPH FACP    11/10/2019         [delayed entry 11/10/2019 ]

## 2019-11-10 NOTE — PROGRESS NOTES
Problem: Falls - Risk of  Goal: *Absence of Falls  Description  Document Kae Patches Fall Risk and appropriate interventions in the flowsheet. Outcome: Progressing Towards Goal  Note:   Fall Risk Interventions:  Mobility Interventions: Assess mobility with egress test, OT consult for ADLs, Patient to call before getting OOB, PT Consult for mobility concerns         Medication Interventions: Assess postural VS orthostatic hypotension, Evaluate medications/consider consulting pharmacy, Patient to call before getting OOB    Elimination Interventions: Call light in reach, Patient to call for help with toileting needs, Stay With Me (per policy)    History of Falls Interventions: Consult care management for discharge planning, Door open when patient unattended, Evaluate medications/consider consulting pharmacy         Problem: Patient Education: Go to Patient Education Activity  Goal: Patient/Family Education  Outcome: Progressing Towards Goal     Problem: Chronic Renal Failure  Goal: *Fluid and electrolytes stabilized  Outcome: Progressing Towards Goal     Problem: Patient Education: Go to Patient Education Activity  Goal: Patient/Family Education  Outcome: Progressing Towards Goal     Problem: Patient Education: Go to Patient Education Activity  Goal: Patient/Family Education  Outcome: Progressing Towards Goal     Problem: Patient Education: Go to Patient Education Activity  Goal: Patient/Family Education  Outcome: Progressing Towards Goal     Problem: Pressure Injury - Risk of  Goal: *Prevention of pressure injury  Description  Document Zaki Scale and appropriate interventions in the flowsheet.   Outcome: Progressing Towards Goal  Note:   Pressure Injury Interventions:  Sensory Interventions: Assess changes in LOC, Discuss PT/OT consult with provider, Float heels, Keep linens dry and wrinkle-free, Monitor skin under medical devices, Minimize linen layers, Maintain/enhance activity level    Moisture Interventions: Absorbent underpads, Internal/External urinary devices, Minimize layers    Activity Interventions: Increase time out of bed, Pressure redistribution bed/mattress(bed type), PT/OT evaluation    Mobility Interventions: HOB 30 degrees or less, Pressure redistribution bed/mattress (bed type), PT/OT evaluation    Nutrition Interventions: Document food/fluid/supplement intake                     Problem: Patient Education: Go to Patient Education Activity  Goal: Patient/Family Education  Outcome: Progressing Towards Goal

## 2019-11-10 NOTE — PROGRESS NOTES
Orthopedic End of Shift Note    Bedside and verbal shift change report given to [facility], RN (oncoming nurse) by Abbi Glass RN (offgoing nurse). Report included the following information SBAR, Intake/Output, Recent Results, Cardiac Rhythm Sinus Rhythm and Quality Measures. POD#     Significant issues during shift: Continued care for acute renal failure r/t rentention. Stone in place draining clear yellow urine. Vitals stable on room air. AM labs per orders. No significant issues on overnight.     Issues for Physician to address: Same as above    Activity This Shift  (check all that apply) [] chair  [] dangle   [] bathroom  [] bedside commode [] hallway  [] bedrest   Nausea/Vomiting [] yes [x] no     Voiding Status [] void [x] Stone [] I&O Cath   Bowel Movements [] yes [x] no     Foot Pumps or SCD [] yes [x] no refusing   Ice Pack [] yes    [x] no    Incentive Spirometer [] yes [x] no Volume:      Telemetry Monitoring   [x] yes [] no Rhythm:   Supplemental O2 [] yes [x] no Sat off O2:   98%

## 2019-11-11 ENCOUNTER — HOSPITAL ENCOUNTER (OUTPATIENT)
Dept: INTERVENTIONAL RADIOLOGY/VASCULAR | Age: 69
Discharge: HOME | DRG: 674 | End: 2019-11-11
Attending: INTERNAL MEDICINE
Payer: MEDICARE

## 2019-11-11 LAB
ALBUMIN SERPL-MCNC: 2.4 G/DL (ref 3.5–5)
ANION GAP SERPL CALC-SCNC: 10 MMOL/L (ref 5–15)
BUN SERPL-MCNC: 77 MG/DL (ref 6–20)
BUN/CREAT SERPL: 11 (ref 12–20)
CALCIUM SERPL-MCNC: 8.3 MG/DL (ref 8.5–10.1)
CHLORIDE SERPL-SCNC: 109 MMOL/L (ref 97–108)
CO2 SERPL-SCNC: 21 MMOL/L (ref 21–32)
CREAT SERPL-MCNC: 7.22 MG/DL (ref 0.7–1.3)
GLUCOSE BLD STRIP.AUTO-MCNC: 103 MG/DL (ref 65–100)
GLUCOSE BLD STRIP.AUTO-MCNC: 119 MG/DL (ref 65–100)
GLUCOSE BLD STRIP.AUTO-MCNC: 129 MG/DL (ref 65–100)
GLUCOSE BLD STRIP.AUTO-MCNC: 76 MG/DL (ref 65–100)
GLUCOSE BLD STRIP.AUTO-MCNC: 78 MG/DL (ref 65–100)
GLUCOSE BLD STRIP.AUTO-MCNC: 83 MG/DL (ref 65–100)
GLUCOSE SERPL-MCNC: 71 MG/DL (ref 65–100)
HBV CORE IGM SER QL: NONREACTIVE
PHOSPHATE SERPL-MCNC: 5.8 MG/DL (ref 2.6–4.7)
POTASSIUM SERPL-SCNC: 4.4 MMOL/L (ref 3.5–5.1)
SERVICE CMNT-IMP: ABNORMAL
SERVICE CMNT-IMP: NORMAL
SODIUM SERPL-SCNC: 140 MMOL/L (ref 136–145)

## 2019-11-11 PROCEDURE — 77030039266 HC ADH SKN EXOFIN S2SG -A

## 2019-11-11 PROCEDURE — 02HV33Z INSERTION OF INFUSION DEVICE INTO SUPERIOR VENA CAVA, PERCUTANEOUS APPROACH: ICD-10-PCS | Performed by: RADIOLOGY

## 2019-11-11 PROCEDURE — 80069 RENAL FUNCTION PANEL: CPT

## 2019-11-11 PROCEDURE — 74011250637 HC RX REV CODE- 250/637: Performed by: INTERNAL MEDICINE

## 2019-11-11 PROCEDURE — 90935 HEMODIALYSIS ONE EVALUATION: CPT

## 2019-11-11 PROCEDURE — 82962 GLUCOSE BLOOD TEST: CPT

## 2019-11-11 PROCEDURE — C1750 CATH, HEMODIALYSIS,LONG-TERM: HCPCS

## 2019-11-11 PROCEDURE — 97535 SELF CARE MNGMENT TRAINING: CPT

## 2019-11-11 PROCEDURE — C1892 INTRO/SHEATH,FIXED,PEEL-AWAY: HCPCS

## 2019-11-11 PROCEDURE — 74011250636 HC RX REV CODE- 250/636

## 2019-11-11 PROCEDURE — 77030031139 HC SUT VCRL2 J&J -A

## 2019-11-11 PROCEDURE — 36558 INSERT TUNNELED CV CATH: CPT

## 2019-11-11 PROCEDURE — 94760 N-INVAS EAR/PLS OXIMETRY 1: CPT

## 2019-11-11 PROCEDURE — 74011250636 HC RX REV CODE- 250/636: Performed by: INTERNAL MEDICINE

## 2019-11-11 PROCEDURE — 87350 HEPATITIS BE AG IA: CPT

## 2019-11-11 PROCEDURE — 74011250636 HC RX REV CODE- 250/636: Performed by: RADIOLOGY

## 2019-11-11 PROCEDURE — 65660000000 HC RM CCU STEPDOWN

## 2019-11-11 PROCEDURE — 74011250637 HC RX REV CODE- 250/637: Performed by: UROLOGY

## 2019-11-11 PROCEDURE — 86707 HEPATITIS BE ANTIBODY: CPT

## 2019-11-11 PROCEDURE — 87517 HEPATITIS B DNA QUANT: CPT

## 2019-11-11 PROCEDURE — 0JH63XZ INSERTION OF TUNNELED VASCULAR ACCESS DEVICE INTO CHEST SUBCUTANEOUS TISSUE AND FASCIA, PERCUTANEOUS APPROACH: ICD-10-PCS | Performed by: RADIOLOGY

## 2019-11-11 PROCEDURE — 36415 COLL VENOUS BLD VENIPUNCTURE: CPT

## 2019-11-11 PROCEDURE — 5A1D70Z PERFORMANCE OF URINARY FILTRATION, INTERMITTENT, LESS THAN 6 HOURS PER DAY: ICD-10-PCS | Performed by: INTERNAL MEDICINE

## 2019-11-11 PROCEDURE — 77030019698 HC SYR ANGI MDLON MRTM -A

## 2019-11-11 PROCEDURE — 86705 HEP B CORE ANTIBODY IGM: CPT

## 2019-11-11 PROCEDURE — 74011000250 HC RX REV CODE- 250: Performed by: RADIOLOGY

## 2019-11-11 RX ORDER — FENTANYL CITRATE 50 UG/ML
200 INJECTION, SOLUTION INTRAMUSCULAR; INTRAVENOUS
Status: DISCONTINUED | OUTPATIENT
Start: 2019-11-11 | End: 2019-11-11

## 2019-11-11 RX ORDER — HEPARIN SODIUM 1000 [USP'U]/ML
10000 INJECTION, SOLUTION INTRAVENOUS; SUBCUTANEOUS
Status: ACTIVE | OUTPATIENT
Start: 2019-11-11 | End: 2019-11-11

## 2019-11-11 RX ORDER — HEPARIN SODIUM 200 [USP'U]/100ML
200 INJECTION, SOLUTION INTRAVENOUS ONCE
Status: COMPLETED | OUTPATIENT
Start: 2019-11-11 | End: 2019-11-11

## 2019-11-11 RX ORDER — FUROSEMIDE 40 MG/1
40 TABLET ORAL DAILY
Status: DISCONTINUED | OUTPATIENT
Start: 2019-11-12 | End: 2019-11-18 | Stop reason: HOSPADM

## 2019-11-11 RX ORDER — HEPARIN SODIUM 1000 [USP'U]/ML
3800 INJECTION, SOLUTION INTRAVENOUS; SUBCUTANEOUS
Status: DISCONTINUED | OUTPATIENT
Start: 2019-11-11 | End: 2019-11-18 | Stop reason: HOSPADM

## 2019-11-11 RX ORDER — HEPARIN SODIUM 1000 [USP'U]/ML
INJECTION, SOLUTION INTRAVENOUS; SUBCUTANEOUS
Status: COMPLETED
Start: 2019-11-11 | End: 2019-11-11

## 2019-11-11 RX ORDER — MIDAZOLAM HYDROCHLORIDE 1 MG/ML
10 INJECTION, SOLUTION INTRAMUSCULAR; INTRAVENOUS
Status: DISCONTINUED | OUTPATIENT
Start: 2019-11-11 | End: 2019-11-11

## 2019-11-11 RX ORDER — CEFAZOLIN SODIUM 1 G/3ML
2 INJECTION, POWDER, FOR SOLUTION INTRAMUSCULAR; INTRAVENOUS ONCE
Status: COMPLETED | OUTPATIENT
Start: 2019-11-11 | End: 2019-11-11

## 2019-11-11 RX ORDER — LIDOCAINE HYDROCHLORIDE 20 MG/ML
20 INJECTION, SOLUTION INFILTRATION; PERINEURAL ONCE
Status: COMPLETED | OUTPATIENT
Start: 2019-11-11 | End: 2019-11-11

## 2019-11-11 RX ADMIN — MIDAZOLAM 1 MG: 1 INJECTION INTRAMUSCULAR; INTRAVENOUS at 12:05

## 2019-11-11 RX ADMIN — CEFAZOLIN 2 G: 1 INJECTION, POWDER, FOR SOLUTION INTRAMUSCULAR; INTRAVENOUS; PARENTERAL at 11:45

## 2019-11-11 RX ADMIN — Medication 10 ML: at 22:02

## 2019-11-11 RX ADMIN — FENTANYL CITRATE 25 MCG: 50 INJECTION, SOLUTION INTRAMUSCULAR; INTRAVENOUS at 12:05

## 2019-11-11 RX ADMIN — HEPARIN SODIUM 3800 UNITS: 1000 INJECTION INTRAVENOUS; SUBCUTANEOUS at 18:08

## 2019-11-11 RX ADMIN — FINASTERIDE 5 MG: 5 TABLET, FILM COATED ORAL at 09:37

## 2019-11-11 RX ADMIN — FENTANYL CITRATE 50 MCG: 50 INJECTION, SOLUTION INTRAMUSCULAR; INTRAVENOUS at 11:55

## 2019-11-11 RX ADMIN — LABETALOL HYDROCHLORIDE 600 MG: 200 TABLET, FILM COATED ORAL at 09:37

## 2019-11-11 RX ADMIN — MIDAZOLAM 1 MG: 1 INJECTION INTRAMUSCULAR; INTRAVENOUS at 12:01

## 2019-11-11 RX ADMIN — LIDOCAINE HYDROCHLORIDE 200 MG: 20 INJECTION, SOLUTION INFILTRATION; PERINEURAL at 11:55

## 2019-11-11 RX ADMIN — Medication 10 ML: at 05:27

## 2019-11-11 RX ADMIN — HEPARIN SODIUM IN SODIUM CHLORIDE 200 UNITS: 200 INJECTION INTRAVENOUS at 11:55

## 2019-11-11 RX ADMIN — AMLODIPINE BESYLATE 10 MG: 5 TABLET ORAL at 09:37

## 2019-11-11 RX ADMIN — HEPARIN SODIUM 3800 UNITS: 1000 INJECTION, SOLUTION INTRAVENOUS; SUBCUTANEOUS at 11:55

## 2019-11-11 RX ADMIN — DOXAZOSIN 2 MG: 2 TABLET ORAL at 09:37

## 2019-11-11 RX ADMIN — SODIUM BICARBONATE 650 MG: 650 TABLET ORAL at 09:37

## 2019-11-11 RX ADMIN — FENTANYL CITRATE 25 MCG: 50 INJECTION, SOLUTION INTRAMUSCULAR; INTRAVENOUS at 12:01

## 2019-11-11 RX ADMIN — MIDAZOLAM 2 MG: 1 INJECTION INTRAMUSCULAR; INTRAVENOUS at 11:55

## 2019-11-11 NOTE — PROGRESS NOTES
Hospitalist Progress Note    NAME: Di Oconnell   :  1950   MRN:  [id number]       Assessment / Plan:    Acute on chronic kidney disease Stage V   Metabolic acidosis   - Cr continues to trend up in spite of Brice placement.  Creatinine baseline is around 5 and currently is around 7  -pt is now agreeable to start HD , had been refusing all this time. Bicarb stopped   - permacath to be placed today and start HD   - OP HD to be set up by CM once schedule known       Positive hepC and Hep B  No indication for acute treatment , can FU as OP with Dr arana      Hypertension  controlled  -Continue amlodipine, labetalol and added Cardura  - Hold Lisinopril due to ASHVIN/CKD  - Hydralazine as needed   - clonidine discontinued due to compliance issues and may cause rebound htn      Gross Hematuria   In Brice with clear urine now.  Continue Brice for now.  Outpatient follow-up with urology.  Ultrasound  showed no acute process other than renal cysts        Urinary  Retention s/p brice   - likely 2/2 BPH vs blood clots   -Continue Brice catheter for now     Generalized weakness likely multifactorial  continue PT/OT    Anemia most likely secondary to chronic kidney disease  -Hemoglobin 9.2, c/w epoetin     DM type II  continue sliding scale insulin with blood sugar checks    Chronic diastolic heart failure  start lasix 40mg daily per renal -      Hx Right humeral neck fracture.  -pain med PRN      25.0 - 29.9 Overweight / Body mass index is 27.98 kg/m². Code status: Full  Prophylaxis: SCD's  Recommended Disposition: Home w/Family     Subjective:     Chief Complaint / Reason for Physician Visit  FU ESRD needing HD the patient . No acute complaints . Discussed with RN events overnight.      Review of Systems:  Symptom Y/N Comments  Symptom Y/N Comments   Fever/Chills n   Chest Pain n    Poor Appetite    Edema     Cough n   Abdominal Pain n    Sputum n   Joint Pain     SOB/POWELL n   Pruritis/Rash     Nausea/vomit n Tolerating PT/OT     Diarrhea    Tolerating Diet y    Constipation    Other       Could NOT obtain due to:      Objective:     VITALS:   Last 24hrs VS reviewed since prior progress note. Most recent are:  Patient Vitals for the past 24 hrs:   Temp Pulse Resp BP SpO2   11/11/19 0742 97.7 °F (36.5 °C) 70 20 (!) 154/92 99 %   11/11/19 0356 98.5 °F (36.9 °C) 72 17 154/89 98 %   11/10/19 2311 98.1 °F (36.7 °C) 73 16 146/86 98 %   11/10/19 1840 97.7 °F (36.5 °C) 72 16 (!) 145/93 97 %   11/10/19 1610 97.9 °F (36.6 °C)  15 152/81 98 %   11/10/19 1233 97.9 °F (36.6 °C) 74 16 137/80 99 %       Intake/Output Summary (Last 24 hours) at 11/11/2019 0928  Last data filed at 11/10/2019 1552  Gross per 24 hour   Intake    Output 400 ml   Net -400 ml        PHYSICAL EXAM:  General: WD, WN. Alert, cooperative, no acute distress    EENT:  EOMI. Anicteric sclerae. MMM  Resp:  CTA bilaterally, no wheezing or rales. No accessory muscle use  CV:  Regular  rhythm,  No edema  GI:  Soft, Non distended, Non tender.  +Bowel sounds  Neurologic:  Alert and oriented X 3, normal speech,   Psych:   Good insight. Not anxious nor agitated  Skin:  No rashes. No jaundice    Reviewed most current lab test results and cultures  YES  Reviewed most current radiology test results   YES  Review and summation of old records today    NO  Reviewed patient's current orders and MAR    YES  PMH/ reviewed - no change compared to H&P  ________________________________________________________________________  Care Plan discussed with:    Comments   Patient x    Family      RN x    Care Manager     Consultant  x Dr Homer Markham                    x Multidiciplinary team rounds were held today with , nursing, pharmacist and clinical coordinator. Patient's plan of care was discussed; medications were reviewed and discharge planning was addressed.      ________________________________________________________________________  Total NON critical care TIME:  35   Minutes    Total CRITICAL CARE TIME Spent:   Minutes non procedure based      Comments   >50% of visit spent in counseling and coordination of care x    ________________________________________________________________________  Ladan Hernandez MD     Procedures: see electronic medical records for all procedures/Xrays and details which were not copied into this note but were reviewed prior to creation of Plan. LABS:  I reviewed today's most current labs and imaging studies.   Pertinent labs include:  Recent Labs     11/09/19  0343   WBC 5.9   HGB 9.2*   HCT 28.8*        Recent Labs     11/11/19  0353 11/10/19  0413 11/09/19  0343    139 138   K 4.4 4.7 4.5   * 107 107   CO2 21 24 23   GLU 71 76 76   BUN 77* 81* 86*   CREA 7.22* 7.21* 7.51*   CA 8.3* 8.1* 8.4*   PHOS 5.8* 6.1* 6.2*   ALB 2.4* 2.5* 2.4*       Signed: Ladan Hernandez MD

## 2019-11-11 NOTE — PROGRESS NOTES
Problem: Falls - Risk of  Goal: *Absence of Falls  Description  Document Kenny Jacobs Fall Risk and appropriate interventions in the flowsheet. Outcome: Progressing Towards Goal  Note:   Fall Risk Interventions:  Mobility Interventions: Communicate number of staff needed for ambulation/transfer, PT Consult for mobility concerns, PT Consult for assist device competence, Strengthening exercises (ROM-active/passive)         Medication Interventions: Assess postural VS orthostatic hypotension, Evaluate medications/consider consulting pharmacy, Patient to call before getting OOB    Elimination Interventions: Call light in reach, Patient to call for help with toileting needs, Stay With Me (per policy)    History of Falls Interventions: Evaluate medications/consider consulting pharmacy         Problem: Patient Education: Go to Patient Education Activity  Goal: Patient/Family Education  Outcome: Progressing Towards Goal     Problem: Chronic Renal Failure  Goal: *Fluid and electrolytes stabilized  Outcome: Progressing Towards Goal     Problem: Patient Education: Go to Patient Education Activity  Goal: Patient/Family Education  Outcome: Progressing Towards Goal     Problem: Patient Education: Go to Patient Education Activity  Goal: Patient/Family Education  Outcome: Progressing Towards Goal     Problem: Patient Education: Go to Patient Education Activity  Goal: Patient/Family Education  Outcome: Progressing Towards Goal     Problem: Pressure Injury - Risk of  Goal: *Prevention of pressure injury  Description  Document Zaki Scale and appropriate interventions in the flowsheet.   Outcome: Progressing Towards Goal  Note:   Pressure Injury Interventions:  Sensory Interventions: Assess changes in LOC, Discuss PT/OT consult with provider, Float heels, Keep linens dry and wrinkle-free, Monitor skin under medical devices, Minimize linen layers, Maintain/enhance activity level    Moisture Interventions: Absorbent underpads, Limit adult briefs, Minimize layers    Activity Interventions: Increase time out of bed, Pressure redistribution bed/mattress(bed type), PT/OT evaluation    Mobility Interventions: HOB 30 degrees or less, PT/OT evaluation    Nutrition Interventions: Document food/fluid/supplement intake    Friction and Shear Interventions: Minimize layers                Problem: Patient Education: Go to Patient Education Activity  Goal: Patient/Family Education  Outcome: Progressing Towards Goal

## 2019-11-11 NOTE — H&P
Interventional and Vascular Radiology History and Physical    Patient: Domenic Messina 77 y.o. male       Chief Complaint: No chief complaint on file.       History of Present Illness: dialysis     History:    Past Medical History:   Diagnosis Date    Diabetes ([address]     Hypertension     Kidney disease      Family History   Problem Relation Age of Onset    Hypertension Mother     Hypertension Father      Social History     Socioeconomic History    Marital status:      Spouse name: Not on file    Number of children: Not on file    Years of education: Not on file    Highest education level: Not on file   Occupational History    Not on file   Social Needs    Financial resource strain: Not on file    Food insecurity:     Worry: Not on file     Inability: Not on file    Transportation needs:     Medical: Not on file     Non-medical: Not on file   Tobacco Use    Smoking status: Never Smoker    Smokeless tobacco: Never Used   Substance and Sexual Activity    Alcohol use: No    Drug use: No    Sexual activity: Not on file   Lifestyle    Physical activity:     Days per week: Not on file     Minutes per session: Not on file    Stress: Not on file   Relationships    Social connections:     Talks on phone: Not on file     Gets together: Not on file     Attends [employer] service: Not on file     Active member of club or organization: Not on file     Attends meetings of clubs or organizations: Not on file     Relationship status: Not on file    Intimate partner violence:     Fear of current or ex partner: Not on file     Emotionally abused: Not on file     Physically abused: Not on file     Forced sexual activity: Not on file   Other Topics Concern    Not on file   Social History Narrative    Not on file       Allergies: No Known Allergies    Current Medications:  Current Facility-Administered Medications   Medication Dose Route Frequency    finasteride (PROSCAR) tablet 5 mg  5 mg Oral DAILY    epoetin charisse-epbx (RETACRIT) 12,000 Units combo injection  12,000 Units SubCUTAneous Q TUE, THU & SAT    sodium chloride (NS) flush 5-40 mL  5-40 mL IntraVENous Q8H    sodium chloride (NS) flush 5-40 mL  5-40 mL IntraVENous PRN    acetaminophen (TYLENOL) tablet 650 mg  650 mg Oral Q6H PRN    ondansetron (ZOFRAN) injection 4 mg  4 mg IntraVENous Q6H PRN    bisacodyl (DULCOLAX) tablet 5 mg  5 mg Oral DAILY PRN    glucose chewable tablet 16 g  4 Tab Oral PRN    dextrose (D50W) injection syrg 12.5-25 g  25-50 mL IntraVENous PRN    glucagon (GLUCAGEN) injection 1 mg  1 mg IntraMUSCular PRN    insulin lispro (HUMALOG) injection   SubCUTAneous AC&HS    amLODIPine (NORVASC) tablet 10 mg  10 mg Oral DAILY    aspirin delayed-release tablet 81 mg  81 mg Oral PRN    hydrALAZINE (APRESOLINE) 20 mg/mL injection 20 mg  20 mg IntraVENous Q6H PRN    labetalol (NORMODYNE) tablet 600 mg  600 mg Oral BID    doxazosin (CARDURA) tablet 2 mg  2 mg Oral DAILY     Facility-Administered Medications Ordered in Other Encounters   Medication Dose Route Frequency    heparin (porcine) 1,000 unit/mL injection 10,000 Units  10,000 Units IntraVENous RAD ONCE        Physical Exam:  Blood pressure 119/77, pulse 64, temperature 97.3 °F (36.3 °C), temperature source Oral, resp. rate 16, weight 86 kg (189 lb 8 oz), SpO2 98 %.   LUNG: clear to auscultation bilaterally, HEART: regular rate and rhythm, S1, S2 normal, no murmur, click, rub or gallop      Alerts:    Hospital Problems  Date Reviewed: 9/2/2019          Codes Class Noted POA    Acute on chronic renal failure ([facility] [address] ICD-10-CM: N17.9, N18.9  ICD-9-CM: 584.9, 585.9  11/2/2019 Unknown        Hematuria ICD-10-CM: R31.9  ICD-9-CM: 599.70  11/2/2019 Unknown              Laboratory:      Recent Labs     11/11/19  0353  11/09/19  0343   HGB  --   --  9.2*   HCT  --   --  28.8*   WBC  --   --  5.9   PLT  --   --  333   BUN 77*   < > 86*   CREA 7.22*   < > 7.51*   K 4.4   < > 4.5    < > = values in this interval not displayed. Plan of Care/Planned Procedure:  Risks, benefits, and alternatives reviewed with patient and he agrees to proceed with the procedure. Conscious sedation will be performed with IV fentanyl and versed.  Plan is for permcath placement       Lynette aKsper MD

## 2019-11-11 NOTE — PROGRESS NOTES
Physical therapy:    Attempted PT session. Pt off the floor for permacath and then dialysis. Will defer and continue to follow.  Thank you    Lawson Wagoner, PT, DPT

## 2019-11-11 NOTE — PROGRESS NOTES
Orthopedic End of Shift Note    Bedside and verbal shift change report given to Lizzy Kumar RN (oncoming nurse) by Rodolfo Escobar RN (offgoing nurse). Report included the following information SBAR, Intake/Output, Recent Results, Cardiac Rhythm Sinus Rhythm and Quality Measures. POD#     Significant issues during shift: Continued care for acute renal failure r/t rentention. Stone out, patient voiding  Vitals stable on room air. AM labs per orders. No significant issues on overnight. Patient to have dialysis port placed on next shift.     Issues for Physician to address: Same as above    Activity This Shift  (check all that apply) [] chair  [] dangle   [] bathroom  [] bedside commode [] hallway  [] bedrest   Nausea/Vomiting [] yes [x] no     Voiding Status [x] void [] Stone [] I&O Cath   Bowel Movements [x] yes [] no     Foot Pumps or SCD [x] yes [] no refusing   Ice Pack [] yes    [x] no    Incentive Spirometer [] yes [x] no Volume:      Telemetry Monitoring   [x] yes [] no Rhythm:   Supplemental O2 [] yes [x] no Sat off O2:   98%

## 2019-11-11 NOTE — PROGRESS NOTES
Name of procedure: Permacath placement    Complications, if any, r/t procedure: none    Sedation medications given: 4 mg Versed, 100 mcg Fentanyl     Sedation time: 10 min    Sedation tolerated: well    VS : Stable     Post Procedure Care Needed/order sets in [facility]: see orders    Pt tolerated procedure well. VSS. No C/O pain. Dressing to site D&I. No bleeding or hematoma noted to site. HOB elevated. Pt taken to room by transport. NAD noted at time of transfer. Floor RN given to report and will assume care of pt. .       TRANSFER - OUT REPORT:    Verbal report given to Jess Mcmahan RN on Silvina Nobles  being transferred to room [phone] for routine progression of care       Report consisted of patients Situation, Background, Assessment and   Recommendations(SBAR). Information from the following report(s) SBAR, Kardex, Procedure Summary, Intake/Output and MAR was reviewed with the receiving nurse. Lines:   Peripheral IV 11/01/19 Left Antecubital (Active)   Site Assessment Clean, dry, & intact 11/11/2019  7:42 AM   Phlebitis Assessment 0 11/11/2019  7:42 AM   Infiltration Assessment 0 11/11/2019  7:42 AM   Dressing Status Clean, dry, & intact 11/11/2019  7:42 AM   Dressing Type Transparent 11/11/2019  7:42 AM   Hub Color/Line Status Blue 11/11/2019  7:42 AM        Opportunity for questions and clarification was provided.

## 2019-11-11 NOTE — PROGRESS NOTES
Problem: Self Care Deficits Care Plan (Adult)  Goal: *Acute Goals and Plan of Care (Insert Text)  Description  FUNCTIONAL STATUS PRIOR TO ADMISSION: Ambulated with SPC. Could manage stairs to second floor of home without assist.  Sling had been discontinued per pt. Performed all ADLS on his own including showering. Pt was seen on 9/19/19 by Dr. Birdie Coleman for RUE fracture. Per Dr. Birdie Coleman note on 9/19/19 \"Radiographs show evidence of healing so Mr. Stanley Joseph fracture should heal over time. I have elected to place him in physical therapy for gentle range of motion. He should be careful to avoid motions that involve any lifting. The patient may continue activities as tolerated and will follow up in 4 weeks. \"  Pt reports that he was to start PT today and assisted pt with trying to get in touch with the OP clinic. HOME SUPPORT PRIOR TO ADMISSION: The patient lived alone, but recently moved into his daughters house. Daughter works during the day and pts grandson provides transportation but also works. Occupational Therapy Goals:  Initiated 11/5/2019  1. Patient will perform grooming standing with supervision/set-up within 7 days. 2. Patient will perform toileting with supervision/set-up within 7 days. 3. Patient will perform upper body dressing and lower body dressing with supervision/set-up within 7 days. 4. Patient will transfer from toilet with supervision/set-up using the least restrictive device and appropriate durable medical equipment within 7 days. Outcome: Progressing Towards Goal   OCCUPATIONAL THERAPY TREATMENT  Patient: Kari Paul (18 y.o. male)  Date: 11/11/2019  Diagnosis: Acute on chronic renal failure (HCC) [N17.9, N18.9] <principal problem not specified>       Precautions: Fall(no lifting RUE)  Chart, occupational therapy assessment, plan of care, and goals were reviewed. ASSESSMENT  Patient continues with skilled OT services and is progressing slowly towards goals.   Patient with good mobility at [facility] level however needing frequent cues with sit <> stand to decrease use of RUE pushing. Ed on positioning of RW with self care transfers and standing at sink with fair understanding but poor carry over most likely as patient used SPC at baseline. Continued to recommend SNF as patient lives with daughter but is home alone during the day. Current Level of Function Impacting Discharge (ADLs): toileting CGA at RW level, grooming at sink SBA    Other factors to consider for discharge: Home alone during the day. Recent fall prior to admission and needing for cues for safety at this time. PLAN :  Patient continues to benefit from skilled intervention to address the above impairments. Continue treatment per established plan of care. to address goals. Recommend with staff: CGA to bathroom for toileting at RW level and up in chair for meals with CGA for transfer    Recommend next OT session: LB dressing training    Recommendation for discharge: (in order for the patient to meet his/her long term goals)  Therapy up to 5 days/week in SNF setting    This discharge recommendation:  Has not yet been discussed the attending provider and/or case management    IF patient discharges home will need the following DME: to be determined (TBD)       SUBJECTIVE:   Patient stated Silvino Britton had an upset stomach yesterday.     OBJECTIVE DATA SUMMARY:   Cognitive/Behavioral Status:  Neurologic State: Alert  Orientation Level: Appropriate for age  Cognition: Appropriate decision making             Functional Mobility and Transfers for ADLs:  Bed Mobility:       Transfers:  Sit to Stand: Contact guard assistance(cues for decrease use of RUE with pushing )  Functional Transfers  Toilet Transfer : Contact guard assistance(cues for limited RUE support with sit <> stand)       Balance:  Sitting: Intact  Standing: Impaired  Standing - Static: Constant support;Good  Standing - Dynamic : Constant support; Fair    ADL Intervention:       Grooming  Washing Face: Stand-by assistance(cues for safer positioning at sink)  Washing Hands: Stand-by assistance  Brushing Teeth: Stand-by assistance  Education on RW placement/proximity at sink and to step closer to the sink to reduce twisting and fall risk. Toileting  Bowel Hygiene: Contact guard assistance  RW level with CGA transfer, cues not to use RUE with grab bar for sit <>stand aspects    Pain:  Denies pain    Activity Tolerance:   Fair  Please refer to the flowsheet for vital signs taken during this treatment.     After treatment patient left in no apparent distress:   Sitting in chair and Call bell within reach    COMMUNICATION/COLLABORATION:   The patients plan of care was discussed with: Physical Therapist and Registered Nurse    Marcella Setting, OT  Time Calculation: 24 mins

## 2019-11-11 NOTE — PROGRESS NOTES
Nephrology Progress Note     Jarvis Crarera     www. [email or web address].[employer]                  Phone - [phone]   Patient: Leanna Cali   Date- 11/11/2019        Admit Date: 11/2/2019  YOB: 1950             CC: Follow up for ASHVIN on ckd          Subjective: Interval History:   -  Cr. Remained high  He is NPO for permacath placement today  No c/o sob,   No c/o chest pain,   No c/o nausea or vomiting  No c/o  fever. He c/o leg edema  He has h.o hepatitis. He was seen by GI during last admission. No out pt follow up done per patient  ROS:- as above   Assessment & Plan:     ASHVIN  on ckd-ATN  - LIKELY DUE TO URINARY RETENTION +/- normalisation of bp causing renal hypoperfusion -  - start dialysis today  - permacath placement today  - he will need out pt hd unit set up. He will go back to [facility]     Hypertensive emergency - improved  - continue norvasc,  Labetalol ,cardura    H/o hep B AND HEP C  - consult GI  - check hep b RNA, hep b E antigen. We will need to know if he has active HEP B or not for placement for out pt hd unit.     Anemia of ckd  - continue epogen  - consider PR[facility] TX, he was seen [facility] GI during last admission    Hematuria- urinary retention  - urology input noted     metabolic acidosis  - stop bicarb as he is starting hd today    Edema  - should improve with fluid removal on hd  - start lasix 40 mg daily     CKD - f/b   - ckd likely due to dm + HTN          TYPE 2 DM  PROTEINURIA , Microscopic hematuria  - +ve hep b and hep c -      Right humeral neck fracture                Physical exam:   GEN:  NAD  NECK:  Supple, no thyromegaly  RESP: CTA  b/l, no  wheezing,   CVS: RRR,S1,S2   ABDO:  soft , non tender, No mass  NEURO: non focal, normal speech  EXT: Edema +nt         Care Plan discussed with: patient  Objective:   Visit Vitals  BP (!) 154/92 (BP 1 Location: Left arm, BP Patient Position: At rest)   Pulse 70   Temp 97.7 °F (36.5 °C)   Resp 20   Wt 86 kg (189 lb 8 oz)   SpO2 99%   BMI 27.98 kg/m²     Last 3 Recorded Weights in this Encounter    11/05/19 1253 11/10/19 2311   Weight: 79.2 kg (174 lb 9.7 oz) 86 kg (189 lb 8 oz)     11/09 1901 - 11/11 0700  In: -   Out: 6045 [Urine:1250]    Intake/Output Summary (Last 24 hours) at 11/11/2019 1034  Last data filed at 11/10/2019 1552  Gross per 24 hour   Intake    Output 400 ml   Net -400 ml      Chart reviewed. Pertinent Notes reviewed. Medication list  reviewed   Current Facility-Administered Medications   Medication    sodium bicarbonate tablet 650 mg    finasteride (PROSCAR) tablet 5 mg    epoetin charisse-epbx (RETACRIT) 12,000 Units combo injection    sodium chloride (NS) flush 5-40 mL    sodium chloride (NS) flush 5-40 mL    acetaminophen (TYLENOL) tablet 650 mg    ondansetron (ZOFRAN) injection 4 mg    bisacodyl (DULCOLAX) tablet 5 mg    glucose chewable tablet 16 g    dextrose (D50W) injection syrg 12.5-25 g    glucagon (GLUCAGEN) injection 1 mg    insulin lispro (HUMALOG) injection    amLODIPine (NORVASC) tablet 10 mg    aspirin delayed-release tablet 81 mg    hydrALAZINE (APRESOLINE) 20 mg/mL injection 20 mg    labetalol (NORMODYNE) tablet 600 mg    doxazosin (CARDURA) tablet 2 mg           Data Review :  Recent Labs     11/11/19  0353 11/10/19  0413 11/09/19  0343    139 138   K 4.4 4.7 4.5   * 107 107   CO2 21 24 23   BUN 77* 81* 86*   CREA 7.22* 7.21* 7.51*   GLU 71 76 76   CA 8.3* 8.1* 8.4*   PHOS 5.8* 6.1* 6.2*     Recent Labs     11/09/19  0343   WBC 5.9   HGB 9.2*   HCT 28.8*        No results for input(s): FE, TIBC, PSAT, FERR in the last 72 hours. No results for input(s): CPK, CKNDX, TROIQ in the last 72 hours.     No lab exists for component: CPKMB  Lab Results   Component Value Date/Time    Color ABRAHAM 11/01/2019 09:05 PM    Appearance CLOUDY (A) 11/01/2019 09:05 PM    Specific gravity 1.015 11/01/2019 09:05 PM    pH (UA) 5.5 11/01/2019 09:05 PM Protein >300 (A) 11/01/2019 09:05 PM    Glucose NEGATIVE  11/01/2019 09:05 PM    Ketone NEGATIVE  11/01/2019 09:05 PM    Bilirubin NEGATIVE  11/01/2019 09:05 PM    Urobilinogen 0.2 11/01/2019 09:05 PM    Nitrites NEGATIVE  11/01/2019 09:05 PM    Leukocyte Esterase TRACE (A) 11/01/2019 09:05 PM    Epithelial cells FEW 11/01/2019 09:05 PM    Bacteria NEGATIVE  11/01/2019 09:05 PM    WBC 0-4 11/01/2019 09:05 PM    RBC  11/01/2019 09:05 PM     Lab Results   Component Value Date/Time    Culture result: NO GROWTH 5 DAYS 09/04/2019 06:29 PM     No results found for: SDES  Lab Results   Component Value Date/Time    Sodium,urine random 65 01/24/2019 12:52 AM    Creatinine, urine 94.80 01/24/2019 12:52 AM       Results from Hospital Encounter encounter on 11/01/19   XR CHEST PA LAT    Narrative EXAM: XR CHEST PA LAT    INDICATION: pneumonia    COMPARISON: Chest x-ray 9/2/2019. FINDINGS: PA and lateral radiographs of the chest demonstrate hyperinflated but  otherwise clear lungs. The cardiac and mediastinal contours and pulmonary  vascularity are normal. Atherosclerotic calcifications affect the aortic arch  and the thoracic aorta is tortuous. The chest wall structures and visualized  upper abdomen show no acute findings with incidental note of degenerative spine,  right humeral neck fracture, and diffuse osteopenia. Impression IMPRESSION: No acute cardiopulmonary findings. COPD. Right humeral neck  fracture. Nya Woods MD  [facility]   [email or web address] [facility]  [address], [address]  [place], [address]  Phone - [phone]         Fax - [phone]

## 2019-11-11 NOTE — PROGRESS NOTES
JADA  1) SNF Placement at [address] [facility]  2) Arrange dialysis chair    Per chart review, Pt to receive perma cath for HD.        Andrea Martinez, [address] [place] [address]

## 2019-11-11 NOTE — DIALYSIS
[facility]  [phone]    Vitals   Pre   Post   Assessment   Pre   Post     Temp  Temp: 97.3 °F (36.3 °C) (11/11/19 1553)  97.8 LOC  Alert and oriented x 3 Alert and oriented   HR   Pulse (Heart Rate): 64 (11/11/19 1553) 68 Lungs   Unlabored, even  unlabored, even, pulse oximetry 100% on room air   B/P   BP: 119/77 (11/11/19 1553) 161/94 Cardiac   Regular   RRR   Resp   Resp Rate: 16 (11/11/19 1553) 16 Skin   Warm and dry   warm and dry    Pain level  Pain Intensity 1: 0 (11/11/19 1252) No verbal complaints Edema    + BLE   Trace to 1 BLE   Orders:    Duration:   Start:   3744 End:   1823 Total:   2.5   Dialyzer:   Dialyzer/Set Up Inspection: Revaclear (11/11/19 1553)   K Bath:   Dialysate K (mEq/L): 3.5 (11/11/19 1553)   Ca Bath:   Dialysate CA (mEq/L): 2.5 (11/11/19 1553)   Na/Bicarb:   Dialysate NA (mEq/L): 140 (11/11/19 1553)   Target Fluid Removal:   Goal/Amount of Fluid to Remove (mL): 1000 mL (11/11/19 1553)   Access     Type & Location:   Right tunneled cvc, confirmation of placement today, dressing dated 11/11/19 clean, dry and intact, Each catheter limb disinfected for 60 seconds per limb with alcohol swabs.  Caps removed, dialysis CVC hub scrubbed with Prevantics for 5 seconds, followed by a 5 second dry time per Hospital P&P, aspirated 5 ml both lumens, labs drawn, and flushed with normal saline.      '   Labs     Obtained/Reviewed   Critical Results Called   Date when labs were drawn-  Hgb-    HGB   Date Value Ref Range Status   11/09/2019 9.2 (L) 12.1 - 17.0 g/dL Final     K-    Potassium   Date Value Ref Range Status   11/11/2019 4.4 3.5 - 5.1 mmol/L Final     Ca-   Calcium   Date Value Ref Range Status   11/11/2019 8.3 (L) 8.5 - 10.1 MG/DL Final     Bun-   BUN   Date Value Ref Range Status   11/11/2019 77 (H) 6 - 20 MG/DL Final     Creat-   Creatinine   Date Value Ref Range Status   11/11/2019 7.22 (H) 0.70 - 1.30 MG/DL Final        Medications/ Blood Products Given     Name   Dose   Route and Time     heparin 1:1000 1.9 arterial and 1.9 venous post HD dwell CVC             Blood Volume Processed (BVP):    36 Net Fluid   Removed:  500   Comments   Time Out Done: 1545  Primary Nurse Rpt Pre: Jimena Chavira RN   Primary Nurse Rpt Post:  Jimena Chavira RN   Pt Education: cvc infection control  Care Plan: continue current HD plan of care   Tx Summary:   9043 after obtaining informed consent and placement confirmation HD initiated as ordered. 1630 blood pressure 104/71, easily awakens skin warm and dry no complaints, discussed with dr. Nini Salvador UF decreased to 500 or as tolerated. 1823 treatment completed, all possible blood returned, Each catheter limb disinfected for 60 seconds per limb with alcohol swabs. Dialysis CVC hubs scrubbed with Prevantics for 5 seconds, followed by a 5 second dry time per Hospital P&P, flushed, heparinized red and blue dialysis caps applied to ports. SBAR to primary nurse    Admiting Diagnosis: ASHVIN on CKD  Pt's previous clinic-n/a  Consent signed - Informed Consent Verified: Yes (11/11/19 1553)  [facility] Consent - obtained  Hepatitis Status- 11/10/19 Antigen positive   Machine #- Machine Number: [id number] (11/11/19 1553)  Telemetry status-n/a  Pre-dialysis wt. -  n/a

## 2019-11-11 NOTE — PROGRESS NOTES
Bedside shift change report given to Toya Mohamud (oncoming nurse) by Orlando Montana (offgoing nurse). Report included the following information SBAR, Kardex, Procedure Summary, Intake/Output, MAR and Recent Results.

## 2019-11-11 NOTE — CONSULTS
GI CONSULTATION NOTE  Leighton Gallagher, NP  [phone] NP in-hospital cell phone M-F until 4:30  After 5pm or on weekends, please call  for physician on call    NAME: Taty Rueda   :  1950   MRN:  [id number]   Attending: Dr Gwen Blackman  Primary GI: Dr Victorino Bryson  Date/Time:  2019 11:59 AM  Assessment:   Hepatitis B and C - untreated - Dx 2019  - Hep B surface Ag +  - Hep C virus Ab +  - Neg HIV - 2019  - Normal LFTs - 19    ASHVIN on CKD  HTN  DM  Weakness  Multiple falls    Plan:   1. Nothing to complete during inpatient stay - not acute - these infections are chronic  2. Pt needs to attend current appointment that is scheduled with Dr Basilia Huddleston ([facility] [employer] on January 3, 2020 at 1:30pm.   It can be difficult and complex to treat both Hep B and Hep Cand should be done concurrently by hepatologist.   3. Continue to avoid hepatotoxic drugs. Will sign off at this time. Feel free to call if further questions    Plan discussed with Dr Victorino Bryson  Subjective:     HISTORY OF PRESENT ILLNESS:     Taty Rueda is an 77 y.o.  male who we are asked to see for complaint of Hep B/C. Medical history includes HTN, renal failure, and DM. Presented to the hospital for weakness and multiple falls. Currently being treated for ASHVIN on CKD now requiring dialysis. Pt endorses having 1 tattoo at a clean shop but denies any history of blood transfusions, IV or intranasal drug use, male-male intercourse. Pt was seen by us in 2019 for same reason and actually follow up with Dr Victorino Bryson at our office in 10/2019. He was actually referred to Dr Basilia Huddleston at that time but has been waiting to see him. No medications or further evaluation completed during inpatient office visit.        Past Medical History:   Diagnosis Date    Diabetes ([address]     Hypertension     Kidney disease       Past Surgical History:   Procedure Laterality Date    HX GI      colonoscopy 6-7 yrs ago    CO COLON CA SCRN NOT HI RSK IND  10/21/2015          Social History     Tobacco Use    Smoking status: Never Smoker    Smokeless tobacco: Never Used   Substance Use Topics    Alcohol use: No      Family History   Problem Relation Age of Onset    Hypertension Mother     Hypertension Father       No Known Allergies   Prior to Admission medications    Medication Sig Start Date End Date Taking? Authorizing Provider   cloNIDine HCl (CATAPRES) 0.2 mg tablet Take 0.2 mg by mouth three (3) times daily. Yes Provider, Historical   lisinopril (PRINIVIL, ZESTRIL) 40 mg tablet Take 40 mg by mouth daily. take one tablet by mouth every night at bedtime   Yes Provider, Historical   furosemide (LASIX) 40 mg tablet Take 40 mg by mouth daily. Provider, Historical   ferrous sulfate 325 mg (65 mg iron) tablet Take 1 Tab by mouth two (2) times a day. Provider, Historical   spironolactone (ALDACTONE) 25 mg tablet Take 25 mg by mouth daily. Other, MD Bita   aspirin delayed-release 81 mg tablet Take  by mouth as needed for Pain.     Provider, Historical       Patient Active Problem List   Diagnosis Code    Hypoglycemia E16.2    Hypertensive emergency I16.1    ARF (acute renal failure) ([facility]) N17.9    DM (diabetes mellitus) ([facility] E11.9    Humerus fracture S42.309A    Acute on chronic renal failure (HCC) N17.9, N18.9    Hematuria R31.9       REVIEW OF SYSTEMS:    Constitutional: negative fever, negative chills, negative weight loss  Eyes:   negative visual changes  ENT:   negative sore throat, tongue or lip swelling   Respiratory:  negative cough, negative dyspnea  Cards:  negative for chest pain, palpitations, lower extremity edema  GI:   See HPI  :  negative for frequency, dysuria  Integument:  negative for rash and pruritus  Heme:  negative for easy bruising and gum/nose bleeding  Musculoskel: negative for myalgias,  back pain  Neuro: negative for headaches, dizziness, vertigo  Psych: negative for feelings of anxiety, depression     Pertinent Positives: fatigue, depression weakness      Objective:   VITALS:    Visit Vitals  /89 (BP 1 Location: Left arm, BP Patient Position: At rest)   Pulse 68   Temp 98 °F (36.7 °C)   Resp 20   Wt 86 kg (189 lb 8 oz)   SpO2 99%   BMI 27.98 kg/m²       PHYSICAL EXAM:   General:          Alert, WD, WN, cooperative, no distress, appears stated age. Head:               Normocephalic, without obvious abnormality, atraumatic. Eyes:               Conjunctivae clear and pale, anicteric sclerae. Pupils are equal  Nose:               Nares normal.  Throat:             Lips, mucosa, and tongue normal.   Neck:               Supple, symmetrical,  no adenopathy,   Back:               Symmetric  Lungs:             CTA bilaterally. No wheezing/rhonchi/rales. Chest wall:      No tenderness or deformity. No Accessory muscle use. Heart:              Regular rate and rhythm,  no murmur, rub or gallop. Abdomen:        Soft, non-tender. Not distended. Bowel sounds normal. No masses  Extremities:     Atraumatic, No cyanosis. +2 BLE and +1BUE edema. No clubbing  Skin:                Texture, turgor normal. No rashes/lesions/jaundice  Lymph:            Cervical, supraclavicular normal.  Psych:             Good insight. Not depressed. Not anxious or agitated. Neurologic:      EOMs intact. No facial asymmetry. No aphasia or slurred speech. Normal strength, A/O X 3. LAB DATA REVIEWED:    Recent Results (from the past 24 hour(s))   HEPATITIS PANEL, ACUTE    Collection Time: 11/10/19  3:36 PM   Result Value Ref Range    Hepatitis A, IgM NONREACTIVE NR      __          Hepatitis B surface Ag >1,000.00 Index    Hep B surface Ag Interp. POSITIVE (A) NEG      __          Hepatitis B core, IgM NONREACTIVE NR      __          Hepatitis C virus Ab >11.00 Index    Hep C  virus Ab Interp.  REACTIVE (A) NR      Hep C  virus Ab comment Method used is Siemens Advia Zazzyaur     HEPATITIS C AB    Collection Time: 11/10/19  3:36 PM   Result Value Ref Range    Hepatitis C virus Ab >11.00 Index    Hep C  virus Ab Interp. REACTIVE (A) NR      Hep C  virus Ab comment Method used is [facility]     GLUCOSE, POC    Collection Time: 11/10/19  4:30 PM   Result Value Ref Range    Glucose (POC) 95 65 - 100 mg/dL    Performed by Mai Pradhan (PCT)    GLUCOSE, POC    Collection Time: 11/10/19  9:17 PM   Result Value Ref Range    Glucose (POC) 94 65 - 100 mg/dL    Performed by Marycruz Hogan    RENAL FUNCTION PANEL    Collection Time: 11/11/19  3:53 AM   Result Value Ref Range    Sodium 140 136 - 145 mmol/L    Potassium 4.4 3.5 - 5.1 mmol/L    Chloride 109 (H) 97 - 108 mmol/L    CO2 21 21 - 32 mmol/L    Anion gap 10 5 - 15 mmol/L    Glucose 71 65 - 100 mg/dL    BUN 77 (H) 6 - 20 MG/DL    Creatinine 7.22 (H) 0.70 - 1.30 MG/DL    BUN/Creatinine ratio 11 (L) 12 - 20      GFR est AA 9 (L) >60 ml/min/1.73m2    GFR est non-AA 8 (L) >60 ml/min/1.73m2    Calcium 8.3 (L) 8.5 - 10.1 MG/DL    Phosphorus 5.8 (H) 2.6 - 4.7 MG/DL    Albumin 2.4 (L) 3.5 - 5.0 g/dL   GLUCOSE, POC    Collection Time: 11/11/19  7:38 AM   Result Value Ref Range    Glucose (POC) 83 65 - 100 mg/dL    Performed by Tanda Pimple        IMAGING RESULTS:  I have personally reviewed the imaging reports      Total time spent with patient: 50 minutes ________________________________________________________________________  Care Plan discussed with:  Patient y   Family     RENZO roberts              Consultant:       CT  11/11/2019:  ________________________________________________________________________    ___________________________________________________  Consulting Provider:  Mary Jane Montoya NP      11/11/2019  11:59 AM

## 2019-11-11 NOTE — PROGRESS NOTES
Bedside shift change report given to Toya Mohamud (oncoming nurse) by Loan Adair (offgoing nurse). Report included the following information SBAR, Kardex, Intake/Output, MAR and Recent Results.

## 2019-11-12 LAB
ALBUMIN SERPL-MCNC: 2.5 G/DL (ref 3.5–5)
ANION GAP SERPL CALC-SCNC: 7 MMOL/L (ref 5–15)
BUN SERPL-MCNC: 46 MG/DL (ref 6–20)
BUN/CREAT SERPL: 10 (ref 12–20)
CALCIUM SERPL-MCNC: 8.4 MG/DL (ref 8.5–10.1)
CHLORIDE SERPL-SCNC: 106 MMOL/L (ref 97–108)
CO2 SERPL-SCNC: 26 MMOL/L (ref 21–32)
CREAT SERPL-MCNC: 4.82 MG/DL (ref 0.7–1.3)
ERYTHROCYTE [DISTWIDTH] IN BLOOD BY AUTOMATED COUNT: 16.7 % (ref 11.5–14.5)
GLUCOSE BLD STRIP.AUTO-MCNC: 106 MG/DL (ref 65–100)
GLUCOSE BLD STRIP.AUTO-MCNC: 65 MG/DL (ref 65–100)
GLUCOSE BLD STRIP.AUTO-MCNC: 72 MG/DL (ref 65–100)
GLUCOSE BLD STRIP.AUTO-MCNC: 78 MG/DL (ref 65–100)
GLUCOSE BLD STRIP.AUTO-MCNC: 82 MG/DL (ref 65–100)
GLUCOSE BLD STRIP.AUTO-MCNC: 83 MG/DL (ref 65–100)
GLUCOSE BLD STRIP.AUTO-MCNC: 85 MG/DL (ref 65–100)
GLUCOSE SERPL-MCNC: 79 MG/DL (ref 65–100)
HBV CORE AB SERPL QL IA: POSITIVE
HBV DNA SERPL NAA+PROBE-ACNC: NORMAL IU/ML
HBV DNA SERPL NAA+PROBE-LOG IU: 4.83 LOG10 IU/ML
HBV E AG SERPL QL IA: POSITIVE
HCT VFR BLD AUTO: 28.8 % (ref 36.6–50.3)
HGB BLD-MCNC: 9.1 G/DL (ref 12.1–17)
MCH RBC QN AUTO: 30 PG (ref 26–34)
MCHC RBC AUTO-ENTMCNC: 31.6 G/DL (ref 30–36.5)
MCV RBC AUTO: 95 FL (ref 80–99)
NRBC # BLD: 0 K/UL (ref 0–0.01)
NRBC BLD-RTO: 0 PER 100 WBC
PHOSPHATE SERPL-MCNC: 4.3 MG/DL (ref 2.6–4.7)
PLATELET # BLD AUTO: 318 K/UL (ref 150–400)
PMV BLD AUTO: 9.8 FL (ref 8.9–12.9)
POTASSIUM SERPL-SCNC: 4 MMOL/L (ref 3.5–5.1)
RBC # BLD AUTO: 3.03 M/UL (ref 4.1–5.7)
SERVICE CMNT-IMP: ABNORMAL
SERVICE CMNT-IMP: NORMAL
SODIUM SERPL-SCNC: 139 MMOL/L (ref 136–145)
TEST INFORMATION: NORMAL
WBC # BLD AUTO: 6.3 K/UL (ref 4.1–11.1)

## 2019-11-12 PROCEDURE — 74011250637 HC RX REV CODE- 250/637: Performed by: UROLOGY

## 2019-11-12 PROCEDURE — 80069 RENAL FUNCTION PANEL: CPT

## 2019-11-12 PROCEDURE — 74011250636 HC RX REV CODE- 250/636: Performed by: INTERNAL MEDICINE

## 2019-11-12 PROCEDURE — 65660000000 HC RM CCU STEPDOWN

## 2019-11-12 PROCEDURE — 90935 HEMODIALYSIS ONE EVALUATION: CPT

## 2019-11-12 PROCEDURE — 97116 GAIT TRAINING THERAPY: CPT

## 2019-11-12 PROCEDURE — 82962 GLUCOSE BLOOD TEST: CPT

## 2019-11-12 PROCEDURE — 94760 N-INVAS EAR/PLS OXIMETRY 1: CPT

## 2019-11-12 PROCEDURE — 36415 COLL VENOUS BLD VENIPUNCTURE: CPT

## 2019-11-12 PROCEDURE — 74011250637 HC RX REV CODE- 250/637: Performed by: INTERNAL MEDICINE

## 2019-11-12 PROCEDURE — 85027 COMPLETE CBC AUTOMATED: CPT

## 2019-11-12 PROCEDURE — 74011250636 HC RX REV CODE- 250/636: Performed by: FAMILY MEDICINE

## 2019-11-12 RX ORDER — SPIRONOLACTONE 25 MG/1
25 TABLET ORAL DAILY
Status: DISCONTINUED | OUTPATIENT
Start: 2019-11-12 | End: 2019-11-18 | Stop reason: HOSPADM

## 2019-11-12 RX ORDER — HEPARIN SODIUM 5000 [USP'U]/ML
5000 INJECTION, SOLUTION INTRAVENOUS; SUBCUTANEOUS EVERY 8 HOURS
Status: DISCONTINUED | OUTPATIENT
Start: 2019-11-12 | End: 2019-11-18 | Stop reason: HOSPADM

## 2019-11-12 RX ADMIN — HEPARIN SODIUM 5000 UNITS: 5000 INJECTION INTRAVENOUS; SUBCUTANEOUS at 17:46

## 2019-11-12 RX ADMIN — HEPARIN SODIUM 5000 UNITS: 5000 INJECTION INTRAVENOUS; SUBCUTANEOUS at 23:53

## 2019-11-12 RX ADMIN — EPOETIN ALFA-EPBX 12000 UNITS: 10000 INJECTION, SOLUTION INTRAVENOUS; SUBCUTANEOUS at 20:37

## 2019-11-12 RX ADMIN — IRON SUCROSE 100 MG: 20 INJECTION, SOLUTION INTRAVENOUS at 17:47

## 2019-11-12 RX ADMIN — LABETALOL HYDROCHLORIDE 300 MG: 200 TABLET, FILM COATED ORAL at 17:13

## 2019-11-12 RX ADMIN — HEPARIN SODIUM 3800 UNITS: 1000 INJECTION INTRAVENOUS; SUBCUTANEOUS at 14:35

## 2019-11-12 RX ADMIN — LABETALOL HYDROCHLORIDE 300 MG: 200 TABLET, FILM COATED ORAL at 20:37

## 2019-11-12 RX ADMIN — FUROSEMIDE 40 MG: 40 TABLET ORAL at 17:13

## 2019-11-12 RX ADMIN — Medication 10 ML: at 22:14

## 2019-11-12 RX ADMIN — SPIRONOLACTONE 25 MG: 25 TABLET ORAL at 17:46

## 2019-11-12 RX ADMIN — AMLODIPINE BESYLATE 10 MG: 5 TABLET ORAL at 17:13

## 2019-11-12 RX ADMIN — Medication 10 ML: at 05:53

## 2019-11-12 RX ADMIN — FINASTERIDE 5 MG: 5 TABLET, FILM COATED ORAL at 09:21

## 2019-11-12 NOTE — PROGRESS NOTES
Nephrology Progress Note     Jarvis Carrera     www. [facility]                  Phone - [phone]   Patient: Alexandra Bocanegra   Date- 11/12/2019        Admit Date: 11/2/2019  YOB: 1950             CC: Follow up for ASHVIN ON CKD         Subjective: Interval History:   -  S/p hd yesterday  No sob  bp high   No c/o sob,   No c/o chest pain,   No c/o nausea or vomiting  No c/o  fever. ROS:- as above   Assessment & Plan:     NEW ONSET esrd  - HD # 2 TODAY  - follow hepatitis b labs  - permacath placement today  - he will need out pt hd unit set up. He will go back to DR. Gagan Chowdhury'      ASHVIN on ckd-ATN  - LIKELY DUE TO URINARY RETENTION +/- normalisation of bp causing renal hypoperfusion -       Hypertensive emergency - improved  - continue norvasc,  Labetalol   - stop cardura  - bp should improve with hd and fluid removal    H/o hep B AND HEP C  - GI INPUT NOTED AND APPRECIATED. - check hep b RNA, hep b E antigen. We will need to know if he has active HEP B or not for placement for out pt hd unit.      Anemia of ckd  - continue epogen  - give venofer today    Hematuria- urinary retention  - urology input noted     metabolic acidosis  - improved    Edema  - should improve with fluid removal on hd  -  lasix 40 mg daily     CKD - f/b   - ckd likely due to dm + HTN          TYPE 2 DM  PROTEINURIA , Microscopic hematuria  - +ve hep b and hep c -      Right humeral neck fracture                Physical exam:   GEN:  NAD  NECK:  Supple, no thyromegaly  RESP: CTA  b/l, no  wheezing,   CVS: RRR,S1,S2   ABDO:  soft , non tender, No mass  NEURO: non focal, normal speech  EXT: Edema +nt     Right ij permacath +      Care Plan discussed with: patient  Objective:   Visit Vitals  /89   Pulse 78   Temp 98.4 °F (36.9 °C)   Resp 16   Wt 86.3 kg (190 lb 4.8 oz)   SpO2 95%   BMI 28.10 kg/m²     Last 3 Recorded Weights in this Encounter    11/05/19 1253 11/10/19 2311 11/11/19 2031 Weight: 79.2 kg (174 lb 9.7 oz) 86 kg (189 lb 8 oz) 86.3 kg (190 lb 4.8 oz)     11/10 1901 - 11/12 0700  In: -   Out: 500     Intake/Output Summary (Last 24 hours) at 11/12/2019 1019  Last data filed at 11/11/2019 1823  Gross per 24 hour   Intake    Output 500 ml   Net -500 ml      Chart reviewed. Pertinent Notes reviewed. Medication list  reviewed   Current Facility-Administered Medications   Medication    heparin (porcine) 1,000 unit/mL injection 3,800 Units    furosemide (LASIX) tablet 40 mg    labetalol (NORMODYNE) tablet 300 mg    finasteride (PROSCAR) tablet 5 mg    epoetin charisse-epbx (RETACRIT) 12,000 Units combo injection    sodium chloride (NS) flush 5-40 mL    sodium chloride (NS) flush 5-40 mL    acetaminophen (TYLENOL) tablet 650 mg    ondansetron (ZOFRAN) injection 4 mg    bisacodyl (DULCOLAX) tablet 5 mg    glucose chewable tablet 16 g    dextrose (D50W) injection syrg 12.5-25 g    glucagon (GLUCAGEN) injection 1 mg    insulin lispro (HUMALOG) injection    amLODIPine (NORVASC) tablet 10 mg    aspirin delayed-release tablet 81 mg    hydrALAZINE (APRESOLINE) 20 mg/mL injection 20 mg           Data Review :  Recent Labs     11/12/19  0447 11/11/19  0353 11/10/19  0413    140 139   K 4.0 4.4 4.7    109* 107   CO2 26 21 24   BUN 46* 77* 81*   CREA 4.82* 7.22* 7.21*   GLU 79 71 76   CA 8.4* 8.3* 8.1*   PHOS 4.3 5.8* 6.1*     Recent Labs     11/12/19 0447   WBC 6.3   HGB 9.1*   HCT 28.8*        No results for input(s): FE, TIBC, PSAT, FERR in the last 72 hours. No results for input(s): CPK, CKNDX, TROIQ in the last 72 hours.     No lab exists for component: CPKMB  Lab Results   Component Value Date/Time    Color ABRAHAM 11/01/2019 09:05 PM    Appearance CLOUDY (A) 11/01/2019 09:05 PM    Specific gravity 1.015 11/01/2019 09:05 PM    pH (UA) 5.5 11/01/2019 09:05 PM    Protein >300 (A) 11/01/2019 09:05 PM    Glucose NEGATIVE  11/01/2019 09:05 PM    Ketone NEGATIVE  11/01/2019 09:05 PM    Bilirubin NEGATIVE  11/01/2019 09:05 PM    Urobilinogen 0.2 11/01/2019 09:05 PM    Nitrites NEGATIVE  11/01/2019 09:05 PM    Leukocyte Esterase TRACE (A) 11/01/2019 09:05 PM    Epithelial cells FEW 11/01/2019 09:05 PM    Bacteria NEGATIVE  11/01/2019 09:05 PM    WBC 0-4 11/01/2019 09:05 PM    RBC  11/01/2019 09:05 PM     Lab Results   Component Value Date/Time    Culture result: NO GROWTH 5 DAYS 09/04/2019 06:29 PM     No results found for: SDES  Lab Results   Component Value Date/Time    Sodium,urine random 65 01/24/2019 12:52 AM    Creatinine, urine 94.80 01/24/2019 12:52 AM       Results from Hospital Encounter encounter on 11/01/19   XR CHEST PA LAT    Narrative EXAM: XR CHEST PA LAT    INDICATION: pneumonia    COMPARISON: Chest x-ray 9/2/2019. FINDINGS: PA and lateral radiographs of the chest demonstrate hyperinflated but  otherwise clear lungs. The cardiac and mediastinal contours and pulmonary  vascularity are normal. Atherosclerotic calcifications affect the aortic arch  and the thoracic aorta is tortuous. The chest wall structures and visualized  upper abdomen show no acute findings with incidental note of degenerative spine,  right humeral neck fracture, and diffuse osteopenia. Impression IMPRESSION: No acute cardiopulmonary findings. COPD. Right humeral neck  fracture. Mikayla Nixon MD  [facility]   www. [email or web address]."[email or web address]  [facility]  [address], [address]  [place], [address]  Phone - [phone]         Fax - [phone]

## 2019-11-12 NOTE — PROGRESS NOTES
Orthopedic End of Shift Note    Bedside and verbal shift change report given to RENZO Verdugo (oncoming nurse) by Gill Stringer RN (offgoing nurse). Report included the following information SBAR, Intake/Output, Recent Results, Cardiac Rhythm Sinus Rhythm and Quality Measures. POD#     Significant issues during shift: Continued care for acute renal failure r/t rentention. Dialysis initiated for continued kidney failure, Vitals stable on room air. AM labs per orders. No significant issues on overnight.      Issues for Physician to address: Same as above    Activity This Shift  (check all that apply) [] chair  [] dangle   [] bathroom  [] bedside commode [] hallway  [] bedrest   Nausea/Vomiting [] yes [x] no     Voiding Status [x] void [] Stone [] I&O Cath   Bowel Movements [x] yes [] no     Foot Pumps or SCD [x] yes [] no refusing   Ice Pack [] yes    [x] no    Incentive Spirometer [] yes [x] no Volume:      Telemetry Monitoring   [x] yes [] no Rhythm:   Supplemental O2 [] yes [x] no Sat off O2:   98%

## 2019-11-12 NOTE — PROGRESS NOTES
Nutrition Assessment:    INTERVENTIONS/RECOMMENDATIONS:   Continue renal diet   Initial/Brief Nutrition Education: Purpose of nutrition education: renal HD diet    ASSESSMENT:   Chart reviewed. Pt continues with good appetite and consuming 100% of meals. Phos now WNL. Pt was provided with verbal education as well as printed material on renal diet (low K+, phos, Na and increased protein needs due to HD). Diet Order: Renal  % Eaten:    Patient Vitals for the past 72 hrs:   % Diet Eaten   11/09/19 1813 100 %   11/09/19 1425 100 %   11/09/19 1246 100 %     Pertinent Medications: [x]Reviewed: lasix,   Pertinent Labs: [x]Reviewed:   Food Allergies: [x]NKFA  []Other   Last BM:  11/10  Edema:      []RUE   []LUE   [x]RLE 2+  [x]LLE 2+      Pressure Ulcer:      [] Stage I   [] Stage II   [] Stage III   [] Stage IV      Anthropometrics: Height:   Weight: 86.3 kg (190 lb 4.8 oz)    IBW (%IBW):   ( ) UBW (%UBW):   (  %)    BMI: Body mass index is 28.1 kg/m². This BMI is indicative of:  []Underweight   []Normal   [x]Overweight   [] Obesity   [] Extreme Obesity (BMI>40)  Last Weight Metrics:  Weight Loss Metrics 11/11/2019 11/2/2019 11/1/2019 11/1/2019 9/8/2019 9/5/2019 9/2/2019   Today's Wt 190 lb 4.8 oz - 174 lb 8 oz 180 lb 182 lb 1.6 oz 182 lb -   BMI - 28.1 kg/m2 25.77 kg/m2 25.83 kg/m2 26.13 kg/m2 - 26.11 kg/m2       Estimated Nutrition Needs (Based on): 2000 Kcals/day(BMR: 1525 x 1.3) , 65 g(0.8 g/kg) Protein  Carbohydrate:  At Least 130 g/day  Fluids: 2000 mL/day or per primary team    NUTRITION DIAGNOSES:   Problem:  Altered nutrition-related lab values      Etiology: related to CKD     Signs/Symptoms: as evidenced by elevated phos    Previous Nutrition Dx:  [x] Resolved  [] Unresolved           [] Progressing    NUTRITION INTERVENTIONS:  Meals/Snacks: General/healthful diet         Initial/Brief Nutrition Education: Purpose of nutrition education        GOAL:   consume >75% of meals while stabilizing Phos WNL    NUTRITION MONITORING AND EVALUATION      Food/Nutrient Intake Outcomes:  Total energy intake  Physical Signs/Symptoms Outcomes: Weight/weight change, Electrolyte and renal profile, Glucose profile, GI    Previous Goal Met:   [x] Met              [] Progressing Towards Goal              [] Not Progressing Towards Goal   Previous Recommendations:   [] Implemented          [] Not Implemented          [x] Not Applicable    LEARNING NEEDS (Diet, Food/Nutrient-Drug Interaction):    [] None Identified   [x] Identified and Education Provided/Documented   [] Identified and Pt declined/was not appropriate     Cultural, Sabianist, OR Ethnic Dietary Needs:    [x] None Identified   [] Identified and Addressed     [x] Interdisciplinary Care Plan Reviewed/Documented    [x] Discharge Planning: renal friendly diet   [] Participated in Interdisciplinary Rounds    NUTRITION RISK:    [] High              [] Moderate           [x]  Low  []  Minimal/Uncompromised      Suzanne Mejia RDN  Pager [phone]  Weekend Pager [phone]

## 2019-11-12 NOTE — PROGRESS NOTES
Hospitalist Progress Note    NAME: Taty Levels   :  1950   MRN:  [id number]       Assessment / Plan:    Acute on chronic kidney disease Stage V   Metabolic acidosis   New HD patient , HD started on   - Cr continues to trend up in spite of Brice placement.  Creatinine baseline is around 5   -pt is now agreeable to start HD , had been refusing all this time. Bicarb stopped   -R  permacath  placed today and HD started on   - OP HD to be set up by CM once schedule known       Positive hepC and Hep B  No indication for acute treatment , can FU as OP with Dr arana      Hypertension  controlled  -Continue amlodipine, labetalol and added Cardura  - Hold Lisinopril due to ASHVIN/CKD  - Hydralazine as needed   - clonidine discontinued due to compliance issues and may cause rebound htn      Gross Hematuria   In Brice with clear urine now.  Continue Brice for now.  Outpatient follow-up with urology.  Ultrasound  showed no acute process other than renal cysts        Urinary  Retention s/p brice   - likely 2/2 BPH vs blood clots   -brice out   - pt voiding     Generalized weakness likely multifactorial  continue PT/OT    Anemia most likely secondary to chronic kidney disease  -Hemoglobin 9.2, c/w epoetin     DM type II  continue sliding scale insulin with blood sugar checks    Chronic diastolic heart failure  start lasix 40mg daily per renal -      Hx Right humeral neck fracture.  -pain med PRN      25.0 - 29.9 Overweight / Body mass index is 28.1 kg/m². Code status: Full  Prophylaxis: SCD's  Recommended Disposition: Home w/Family     Subjective:     Chief Complaint / Reason for Physician Visit  FU ESRD on  HD the patient . No acute complaints . Discussed with RN events overnight.      Review of Systems:  Symptom Y/N Comments  Symptom Y/N Comments   Fever/Chills n   Chest Pain n    Poor Appetite    Edema     Cough n   Abdominal Pain n    Sputum n   Joint Pain     SOB/POWELL n   Pruritis/Rash Nausea/vomit n   Tolerating PT/OT     Diarrhea    Tolerating Diet y    Constipation    Other       Could NOT obtain due to:      Objective:     VITALS:   Last 24hrs VS reviewed since prior progress note. Most recent are:  Patient Vitals for the past 24 hrs:   Temp Pulse Resp BP SpO2   11/12/19 0758 98.4 °F (36.9 °C) 73 16 (!) 172/98 95 %   11/12/19 0442 98.6 °F (37 °C) 72 16 (!) 162/98 99 %   11/12/19 0045 98 °F (36.7 °C) 68 16 154/70 97 %   11/11/19 2031 97.8 °F (36.6 °C) 72 17 148/83 98 %   11/11/19 1823 97.8 °F (36.6 °C) 68 16 (!) 161/94 100 %   11/11/19 1815  68 16 (!) 156/91    11/11/19 1800  66 16 138/82    11/11/19 1745  66 16 137/69    11/11/19 1730  67 16 136/84    11/11/19 1715  67 16 133/78    11/11/19 1700  64 16 113/73    11/11/19 1645  64 16 112/69    11/11/19 1630  64 16 104/71    11/11/19 1615  63 16 113/73    11/11/19 1600  64 16 117/76    11/11/19 1553 97.3 °F (36.3 °C) 64 16 119/77    11/11/19 1252  61 15 134/84 98 %   11/11/19 1230  63 16 118/68 98 %   11/11/19 1215  64 16 124/62 98 %   11/11/19 1210  62 16 117/74 99 %   11/11/19 1205  60 16 124/68 99 %   11/11/19 1200  64 16 126/80 99 %   11/11/19 1155  60 16 138/76 99 %   11/11/19 1114 98 °F (36.7 °C) 68 20 150/89 99 %       Intake/Output Summary (Last 24 hours) at 11/12/2019 0838  Last data filed at 11/11/2019 1823  Gross per 24 hour   Intake    Output 500 ml   Net -500 ml        PHYSICAL EXAM:  General: WD, WN. Alert, cooperative, no acute distress    EENT:  EOMI. Anicteric sclerae. MMM  Resp:  CTA bilaterally, no wheezing or rales. No accessory muscle use  CV:  Regular  rhythm,  No edema  GI:  Soft, Non distended, Non tender.  +Bowel sounds  Neurologic:  Alert and oriented X 3, normal speech,   Psych:   Good insight. Not anxious nor agitated  Skin:  No rashes.   No jaundice    Reviewed most current lab test results and cultures  YES  Reviewed most current radiology test results   YES  Review and summation of old records today    NO  Reviewed patient's current orders and MAR    YES  PMH/SH reviewed - no change compared to H&P  ________________________________________________________________________  Care Plan discussed with:    Comments   Patient x    Family      RN x    Care Manager     Consultant  x Dr Rigo Trinh                    x Multidiciplinary team rounds were held today with , nursing, pharmacist and clinical coordinator. Patient's plan of care was discussed; medications were reviewed and discharge planning was addressed. ________________________________________________________________________  Total NON critical care TIME:  35   Minutes    Total CRITICAL CARE TIME Spent:   Minutes non procedure based      Comments   >50% of visit spent in counseling and coordination of care x    ________________________________________________________________________  Mike Kiser MD     Procedures: see electronic medical records for all procedures/Xrays and details which were not copied into this note but were reviewed prior to creation of Plan. LABS:  I reviewed today's most current labs and imaging studies.   Pertinent labs include:  Recent Labs     11/12/19 0447   WBC 6.3   HGB 9.1*   HCT 28.8*        Recent Labs     11/12/19  0447 11/11/19  0353 11/10/19  0413    140 139   K 4.0 4.4 4.7    109* 107   CO2 26 21 24   GLU 79 71 76   BUN 46* 77* 81*   CREA 4.82* 7.22* 7.21*   CA 8.4* 8.3* 8.1*   PHOS 4.3 5.8* 6.1*   ALB 2.5* 2.4* 2.5*       Signed: Mike Kiser MD

## 2019-11-12 NOTE — PROGRESS NOTES
JADA  1) SNF Placement at [address] [facility]  2) Arrange dialysis chair     2pm: CM contacted [facility] to notify of Pt needing HD chair arranged. CM was informed that Pt is followed Outpatient by Seng Hoffman. CM was informed that physician typically goes to the [facility]. CM Clinicals faxed to [address] [place] [address] admissions: [phone].      Pt will need updated Therapy for auth to be re-submitted.          ISIDRA Patel, [address] [place] [address]

## 2019-11-13 LAB
ALBUMIN SERPL-MCNC: 2.4 G/DL (ref 3.5–5)
ANION GAP SERPL CALC-SCNC: 5 MMOL/L (ref 5–15)
BUN SERPL-MCNC: 29 MG/DL (ref 6–20)
BUN/CREAT SERPL: 8 (ref 12–20)
CALCIUM SERPL-MCNC: 7.8 MG/DL (ref 8.5–10.1)
CHLORIDE SERPL-SCNC: 105 MMOL/L (ref 97–108)
CO2 SERPL-SCNC: 28 MMOL/L (ref 21–32)
CREAT SERPL-MCNC: 3.85 MG/DL (ref 0.7–1.3)
GLUCOSE BLD STRIP.AUTO-MCNC: 103 MG/DL (ref 65–100)
GLUCOSE BLD STRIP.AUTO-MCNC: 83 MG/DL (ref 65–100)
GLUCOSE BLD STRIP.AUTO-MCNC: 86 MG/DL (ref 65–100)
GLUCOSE BLD STRIP.AUTO-MCNC: 96 MG/DL (ref 65–100)
GLUCOSE SERPL-MCNC: 81 MG/DL (ref 65–100)
PHOSPHATE SERPL-MCNC: 3.7 MG/DL (ref 2.6–4.7)
POTASSIUM SERPL-SCNC: 4 MMOL/L (ref 3.5–5.1)
SERVICE CMNT-IMP: ABNORMAL
SERVICE CMNT-IMP: NORMAL
SODIUM SERPL-SCNC: 138 MMOL/L (ref 136–145)

## 2019-11-13 PROCEDURE — 74011250636 HC RX REV CODE- 250/636: Performed by: INTERNAL MEDICINE

## 2019-11-13 PROCEDURE — 74011250637 HC RX REV CODE- 250/637: Performed by: UROLOGY

## 2019-11-13 PROCEDURE — 94760 N-INVAS EAR/PLS OXIMETRY 1: CPT

## 2019-11-13 PROCEDURE — 97530 THERAPEUTIC ACTIVITIES: CPT | Performed by: OCCUPATIONAL THERAPIST

## 2019-11-13 PROCEDURE — 97535 SELF CARE MNGMENT TRAINING: CPT | Performed by: OCCUPATIONAL THERAPIST

## 2019-11-13 PROCEDURE — 74011250637 HC RX REV CODE- 250/637: Performed by: INTERNAL MEDICINE

## 2019-11-13 PROCEDURE — 97116 GAIT TRAINING THERAPY: CPT

## 2019-11-13 PROCEDURE — 80069 RENAL FUNCTION PANEL: CPT

## 2019-11-13 PROCEDURE — 82962 GLUCOSE BLOOD TEST: CPT

## 2019-11-13 PROCEDURE — 65660000000 HC RM CCU STEPDOWN

## 2019-11-13 PROCEDURE — 36415 COLL VENOUS BLD VENIPUNCTURE: CPT

## 2019-11-13 RX ADMIN — FINASTERIDE 5 MG: 5 TABLET, FILM COATED ORAL at 09:19

## 2019-11-13 RX ADMIN — Medication 10 ML: at 14:02

## 2019-11-13 RX ADMIN — HEPARIN SODIUM 5000 UNITS: 5000 INJECTION INTRAVENOUS; SUBCUTANEOUS at 09:19

## 2019-11-13 RX ADMIN — AMLODIPINE BESYLATE 10 MG: 5 TABLET ORAL at 09:19

## 2019-11-13 RX ADMIN — HEPARIN SODIUM 5000 UNITS: 5000 INJECTION INTRAVENOUS; SUBCUTANEOUS at 16:37

## 2019-11-13 RX ADMIN — SPIRONOLACTONE 25 MG: 25 TABLET ORAL at 09:19

## 2019-11-13 RX ADMIN — LABETALOL HYDROCHLORIDE 300 MG: 200 TABLET, FILM COATED ORAL at 09:19

## 2019-11-13 RX ADMIN — Medication 10 ML: at 06:26

## 2019-11-13 RX ADMIN — Medication 10 ML: at 21:28

## 2019-11-13 RX ADMIN — FUROSEMIDE 40 MG: 40 TABLET ORAL at 09:19

## 2019-11-13 RX ADMIN — LABETALOL HYDROCHLORIDE 300 MG: 200 TABLET, FILM COATED ORAL at 21:26

## 2019-11-13 NOTE — PROGRESS NOTES
Bedside shift change report given to Camille Edmonds RN by Kristine Us. Report included the following information SBAR, ED Summary, Intake/Output and Cardiac Rhythm NSR.

## 2019-11-13 NOTE — ACP (ADVANCE CARE PLANNING)
Advance Care Planning (ACP) Provider Conversation Snapshot    Date of ACP Conversation: 11/13/19  Persons included in Conversation:  patient  Length of ACP Conversation in minutes:  30 minutes    Authorized Decision Maker (if patient is incapable of making informed decisions): This person is:   verbally wants his ex-wife jose l, but after this admission this will revert to his daughter unless he completes the AMD prior to discharge      1. Met with Mr Eloisa Johnson at the bedside, he is a very pleasant gentleman  2. He shared with me that he has been in the hospital \"to long\" and is anxious to get out to rehab, so he can get home  3. He also shared his journey with his kidneys, in his words \"they came in here every day asking if I want dialysis, but that's not a decision you make lightly\". It was his grandson that finally convinced him to start on dialysis- he shared that his family is very important to him and they are why he has opted to go through this drastic lifestyle change  4. So far he has had 2 session of dialysis, and has no regrets thus far- he is a little frustrated that he has to be in the hospital still, but understands that it is because they are waiting for confirmation of placement in both a rehab facility and a dialysis chair that has transportation two and from rehab  5. We talked about his wishes for his health, and he is VERY clear on what he wants. He tells me that he would not want to have CPR (unprompted, just offered this statement to me)  6. We talked about mPOA, and he wants that person to be his ex-wife, but he has a daughter who is his legal next of kin. I shared with him [place] law, and the importance of completing an AMD with his ex-wife listed as primary mpoa. 7. I also shared the importance of documenting his wishes as far as CPR, so that his wishes are known even if he cannot speak to us  8.  He wants to talk with his daughter and ex-wife prior to completing the paperwork, they will be here tomorrow. I offered to come back to complete the documentation with him, and he asked for my # to call if they are ready to put it on paper  9.  Also let him know that although I encourage him to complete this paperwork before he leaves, he can complete it with his PCP outpatient

## 2019-11-13 NOTE — PROGRESS NOTES
Nephrology Progress Note     Jarvis Carrera     [facility]. [email or web address]                  Phone - [phone]   Patient: Karyle Cuff   Date- 11/13/2019        Admit Date: 11/2/2019  YOB: 1950             CC: Follow up for  New onset ESRD         Subjective: Interval History:   - s/p hd yesterday  bp improved  No C/O of chest pain,SOB, vomiting, abdominal pain,fever,chills      ROS:- as above   Assessment & Plan:     NEW ONSET ESRD  - NO HD TODAY  - HD TTS schedule  - he will need out pt hd unit set up. He will go back to DR. Segun [facility]'      ASHVIN on ckd-ATN  - LIKELY DUE TO URINARY RETENTION +/- normalisation of bp causing renal hypoperfusion -       hypertension  - continue norvasc,  Labetalol , aldactone    H/o hep B AND HEP C  - GI INPUT NOTED AND APPRECIATED. - check hep b RNA, hep b E antigen. We will need to know if he has active HEP B or not for placement for out pt hd unit.      Anemia of ckd  - continue epogen and venofer    Hematuria- urinary retention  - urology input noted     metabolic acidosis  - improved    Edema  - should improve with fluid removal on hd  -  lasix 40 mg daily     CKD - f/b   - ckd likely due to dm + HTN          TYPE 2 DM  PROTEINURIA , Microscopic hematuria  - +ve hep b and hep c -      Right humeral neck fracture       OKAY TO D/C WHEN OUT PT HD UNIT IS SET UP         Physical exam:   GEN:  NAD  NECK:  Supple, no thyromegaly  RESP: CTA  b/l,   no  wheezing,   CVS: RRR,S1,S2   ABDO:  soft , non tender,  No mass  NEURO: non focal, normal speech  EXT: Edema +nt         Right ij permacath +      Care Plan discussed with: patient  Objective:   Visit Vitals  BP (!) 149/91   Pulse 75   Temp 98.2 °F (36.8 °C)   Resp 17   Wt 82.3 kg (181 lb 8 oz)   SpO2 98%   BMI 26.80 kg/m²     Last 3 Recorded Weights in this Encounter    11/10/19 2311 11/11/19 2031 11/13/19 0350   Weight: 86 kg (189 lb 8 oz) 86.3 kg (190 lb 4.8 oz) 82.3 kg (181 lb 8 oz) 11/11 1901 - 11/13 0700  In: -   Out: 2000     Intake/Output Summary (Last 24 hours) at 11/13/2019 1210  Last data filed at 11/12/2019 1505  Gross per 24 hour   Intake    Output 2000 ml   Net -2000 ml      Chart reviewed. Pertinent Notes reviewed. Medication list  reviewed   Current Facility-Administered Medications   Medication    heparin (porcine) injection 5,000 Units    spironolactone (ALDACTONE) tablet 25 mg    heparin (porcine) 1,000 unit/mL injection 3,800 Units    furosemide (LASIX) tablet 40 mg    labetalol (NORMODYNE) tablet 300 mg    finasteride (PROSCAR) tablet 5 mg    epoetin charisse-epbx (RETACRIT) 12,000 Units combo injection    sodium chloride (NS) flush 5-40 mL    sodium chloride (NS) flush 5-40 mL    acetaminophen (TYLENOL) tablet 650 mg    ondansetron (ZOFRAN) injection 4 mg    bisacodyl (DULCOLAX) tablet 5 mg    glucose chewable tablet 16 g    dextrose (D50W) injection syrg 12.5-25 g    glucagon (GLUCAGEN) injection 1 mg    insulin lispro (HUMALOG) injection    amLODIPine (NORVASC) tablet 10 mg    aspirin delayed-release tablet 81 mg    hydrALAZINE (APRESOLINE) 20 mg/mL injection 20 mg           Data Review :  Recent Labs     11/13/19 0352 11/12/19 0447 11/11/19 0353    139 140   K 4.0 4.0 4.4    106 109*   CO2 28 26 21   BUN 29* 46* 77*   CREA 3.85* 4.82* 7.22*   GLU 81 79 71   CA 7.8* 8.4* 8.3*   PHOS 3.7 4.3 5.8*     Recent Labs     11/12/19 0447   WBC 6.3   HGB 9.1*   HCT 28.8*        No results for input(s): FE, TIBC, PSAT, FERR in the last 72 hours. No results for input(s): CPK, CKNDX, TROIQ in the last 72 hours.     No lab exists for component: CPKMB  Lab Results   Component Value Date/Time    Color ABRAHAM 11/01/2019 09:05 PM    Appearance CLOUDY (A) 11/01/2019 09:05 PM    Specific gravity 1.015 11/01/2019 09:05 PM    pH (UA) 5.5 11/01/2019 09:05 PM    Protein >300 (A) 11/01/2019 09:05 PM    Glucose NEGATIVE  11/01/2019 09:05 PM Ketone NEGATIVE  11/01/2019 09:05 PM    Bilirubin NEGATIVE  11/01/2019 09:05 PM    Urobilinogen 0.2 11/01/2019 09:05 PM    Nitrites NEGATIVE  11/01/2019 09:05 PM    Leukocyte Esterase TRACE (A) 11/01/2019 09:05 PM    Epithelial cells FEW 11/01/2019 09:05 PM    Bacteria NEGATIVE  11/01/2019 09:05 PM    WBC 0-4 11/01/2019 09:05 PM    RBC  11/01/2019 09:05 PM     Lab Results   Component Value Date/Time    Culture result: NO GROWTH 5 DAYS 09/04/2019 06:29 PM     No results found for: SDES  Lab Results   Component Value Date/Time    Sodium,urine random 65 01/24/2019 12:52 AM    Creatinine, urine 94.80 01/24/2019 12:52 AM       Results from Hospital Encounter encounter on 11/01/19   XR CHEST PA LAT    Narrative EXAM: XR CHEST PA LAT    INDICATION: pneumonia    COMPARISON: Chest x-ray 9/2/2019. FINDINGS: PA and lateral radiographs of the chest demonstrate hyperinflated but  otherwise clear lungs. The cardiac and mediastinal contours and pulmonary  vascularity are normal. Atherosclerotic calcifications affect the aortic arch  and the thoracic aorta is tortuous. The chest wall structures and visualized  upper abdomen show no acute findings with incidental note of degenerative spine,  right humeral neck fracture, and diffuse osteopenia. Impression IMPRESSION: No acute cardiopulmonary findings. COPD. Right humeral neck  fracture. Tasneem Velez MD  [facility]   [email or web address]  [facility]  [address], [address]  [place], [address]  Phone - [phone]         Fax - [phone]

## 2019-11-13 NOTE — PROGRESS NOTES
Bedside shift change report given to [address] [place] [address] Road (oncoming nurse) by Linus Oliva RN (offgoing nurse). Report included the following information SBAR, Kardex, Intake/Output, MAR and Recent Results.

## 2019-11-13 NOTE — PROGRESS NOTES
Problem: Mobility Impaired (Adult and Pediatric)  Goal: *Acute Goals and Plan of Care (Insert Text)  Description  FUNCTIONAL STATUS PRIOR TO ADMISSION: Patient was modified independent using a single point cane for functional mobility. HOME SUPPORT PRIOR TO ADMISSION: The patient lived with daughter who works during day. [facility] nursing 3x/wk. Physical Therapy Goals  Initiated 11/6/2019; reviewed goals 11/12/19 - still appropriate  1. Patient will move from supine to sit and sit to supine , scoot up and down and roll side to side in bed with independence within 7 day(s). 2.  Patient will transfer from bed to chair and chair to bed with modified independence using the least restrictive device within 7 day(s). 3.  Patient will perform sit to stand with modified independence within 7 day(s). 4.  Patient will ambulate with modified independence for 200 feet with the least restrictive device within 7 day(s). 5.  Patient will ascend/descend 15 stairs with 1 handrail(s) with supervision/set-up within 7 day(s). Outcome: Progressing Towards Goal   PHYSICAL THERAPY TREATMENT  Patient: Danielle Comer (67 y.o. male)  Date: 11/13/2019  Diagnosis: Acute on chronic renal failure (HCC) [N17.9, N18.9] <principal problem not specified>       Precautions: Fall, Other (comment)(no lifting with RUE due to fx)  Chart, physical therapy assessment, plan of care and goals were reviewed. ASSESSMENT  Patient continues with skilled PT services and is progressing towards goals. Pt tolerated gait training with RW and assist for balance, cues for walker mgmt. Pt declined up to chair due to pending dialysis session. Barriers to indep with mobility remain general weakness, impaired balance, gait dysfunction, increased risk for fall. Will progress mobility training as tolerated.      Current Level of Function Impacting Discharge (mobility/balance): bed mob supervision, transfer CGA, amb with RW min A    Other factors to consider for discharge: pt remains below functional baseline; will require 24/7 assist if d/c to home         PLAN :  Patient continues to benefit from skilled intervention to address the above impairments. Continue treatment per established plan of care. to address goals. Recommendation for discharge: (in order for the patient to meet his/her long term goals)  Therapy up to 5 days/week in SNF setting    This discharge recommendation:  Has not yet been discussed the attending provider and/or case management    IF patient discharges home will need the following DME: to be determined (TBD)       SUBJECTIVE:   Patient stated I have dialysis soon.     OBJECTIVE DATA SUMMARY:   Critical Behavior:  Neurologic State: Alert, Appropriate for age, Eyes open spontaneously  Orientation Level: Oriented X4  Cognition: Appropriate decision making, Appropriate for age attention/concentration, Appropriate safety awareness, Follows commands  Safety/Judgement: Fall prevention, Awareness of environment, Decreased insight into deficits  Functional Mobility Training:  Bed Mobility:     Supine to Sit: Supervision;Bed Modified(elevated HOB)  Sit to Supine: Supervision           Transfers:  Sit to Stand: Contact guard assistance(assist for balance)  Stand to Sit: Stand-by assistance                             Balance:  Sitting: Intact  Standing: Impaired; With support  Standing - Static: Good;Constant support  Standing - Dynamic : Fair;Constant support  Ambulation/Gait Training:  Distance (ft): 80 Feet (ft)  Assistive Device: Gait belt;Walker, rolling  Ambulation - Level of Assistance: Minimal assistance(cues for walker mgmt, especially w/ turns and turn to sit)        Gait Abnormalities: Decreased step clearance; Path deviations              Speed/Sarah: Pace decreased (<100 feet/min)  Step Length: Left shortened;Right shortened     Pain Rating:  No c/o pain during session    Activity Tolerance:   Good  Please refer to the flowsheet for vital signs taken during this treatment.     After treatment patient left in no apparent distress:   Supine in bed, Call bell within reach and Side rails x 3    COMMUNICATION/COLLABORATION:   The patients plan of care was discussed with: Registered Nurse    Lalitha Tripp, PT   Time Calculation: 9 mins

## 2019-11-13 NOTE — CONSULTS
Palliative Medicine Consult  Kranthi: [phone]    Patient Name: Chemo King  YOB: 1950    Date of Initial Consult: 11/13/2019  Reason for Consult: End stage disease  Requesting Provider: Esau Nolan MD  Primary Care Physician: Sheree Tovar MD     SUMMARY:   Chemo King is a 77 y.o. with a past history of DM, HTN, Kidney disease, who was admitted on 11/2/2019 from home through [facility] ED with a diagnosis of acute on chronic renal failure. Current medical issues leading to Palliative Medicine involvement include: goals of care discussion in setting of new HD and ESRD    Social:  Mr Jeni Omalley is no longer , but still close with his ex-wife Shashank Valentin, He has one daughter and 4 grandchildren with whom he is close with     PALLIATIVE DIAGNOSES:   1. DNR Discussion  2. Goals of Care  3. Advanced Care Planning  4. Debility       PLAN:   1. Met with Mr Jeni Omalley at the bedside, he is a very pleasant gentleman  2. He shared with me that he has been in the hospital \"to long\" and is anxious to get out to rehab, so he can get home  3. He also shared his journey with his kidneys, in his words \"they came in here every day asking if I want dialysis, but that's not a decision you make lightly\". It was his grandson that finally convinced him to start on dialysis- he shared that his family is very important to him and they are why he has opted to go through this drastic lifestyle change  4. So far he has had 2 session of dialysis, and has no regrets thus far- he is a little frustrated that he has to be in the hospital still, but understands that it is because they are waiting for confirmation of placement in both a rehab facility and a dialysis chair that has transportation two and from rehab  5. We talked about his wishes for his health, and he is VERY clear on what he wants. He tells me that he would not want to have CPR (unprompted, just offered this statement to me)  6.  We talked about mPOA, and he wants that person to be his ex-wife, but he has a daughter who is his legal next of kin. I shared with him [place], and the importance of completing an AMD with his ex-wife listed as primary mpoa. 7. I also shared the importance of documenting his wishes as far as CPR, so that his wishes are known even if he cannot speak to us  8. He wants to talk with his daughter and ex-wife prior to completing the paperwork, they will be here tomorrow. I offered to come back to complete the documentation with him, and he asked for my # to call if they are ready to put it on paper  9. Also let him know that although I encourage him to complete this paperwork before he leaves, he can complete it with his PCP outpatient  10. Initial consult note routed to primary continuity provider and/or primary health care team members  6. Communicated plan of care with: Palliative [facility] Team     GOALS OF CARE / TREATMENT PREFERENCES:     GOALS OF CARE:  Patient/Health Care Proxy Stated Goals: Rehabilitation    TREATMENT PREFERENCES:   Code Status: DNR    Advance Care Planning:  [x] [facility] Interdisciplinary Team has updated the ACP Navigator with Health Care Decision Maker and Patient Capacity      Advance Care Planning 11/2/2019   Patient's Healthcare Decision Maker is: Legal Next of Kin   Confirm Advance Directive None   Patient Would Like to Complete Advance Directive No   Does the patient have other document types (No Data)       Medical Interventions: Limited additional interventions     Other Instructions: Other:    As far as possible, the palliative care team has discussed with patient / health care proxy about goals of care / treatment preferences for patient.      HISTORY:     History obtained from: patient, chart, attending    CHIEF COMPLAINT: none- he is just ready to get out of the hospital    HPI/SUBJECTIVE:    The patient is:   [x] Verbal and participatory  [] Non-participatory due to: Alert, oriented, sweet and thoughtful gentleman    Clinical Pain Assessment (nonverbal scale for severity on nonverbal patients):   Clinical Pain Assessment  Severity: 0          Duration: for how long has pt been experiencing pain (e.g., 2 days, 1 month, years)  Frequency: how often pain is an issue (e.g., several times per day, once every few days, constant)     FUNCTIONAL ASSESSMENT:     Palliative Performance Scale (PPS):          PSYCHOSOCIAL/SPIRITUAL SCREENING:     Palliative IDT has assessed this patient for cultural preferences / practices and a referral made as appropriate to needs (Cultural Services, Patient Advocacy, Ethics, etc.)    Any spiritual / Anabaptist concerns:  [] Yes /  [x] No    Caregiver Burnout:  [] Yes /  [x] No /  [] No Caregiver Present      Anticipatory grief assessment:   [x] Normal  / [] Maladaptive       ESAS Anxiety: Anxiety: 0    ESAS Depression: Depression: 0        REVIEW OF SYSTEMS:     Positive and pertinent negative findings in ROS are noted above in HPI. The following systems were [x] reviewed / [] unable to be reviewed as noted in HPI  Other findings are noted below. Systems: constitutional, ears/nose/mouth/throat, respiratory, gastrointestinal, genitourinary, musculoskeletal, integumentary, neurologic, psychiatric, endocrine. Positive findings noted below. Modified ESAS Completed by: provider   Fatigue: 0     Depression: 0 Pain: 0   Anxiety: 0     Anorexia: 0 Dyspnea: 0           Stool Occurrence(s): 1        PHYSICAL EXAM:     From RN flowsheet:  Wt Readings from Last 3 Encounters:   11/13/19 181 lb 8 oz (82.3 kg)   11/01/19 174 lb 8 oz (79.2 kg)   11/01/19 180 lb (81.6 kg)     Blood pressure (!) 149/91, pulse 75, temperature 98.2 °F (36.8 °C), resp. rate 17, weight 181 lb 8 oz (82.3 kg), SpO2 98 %.     Pain Scale 1: Numeric (0 - 10)  Pain Intensity 1: 0     Pain Location 1: Arm, Shoulder  Pain Orientation 1: Right, Upper  Pain Description 1: Aching  Pain Intervention(s) 1: Medication (see MAR)  Last bowel movement, if known:     Constitutional: alert, oriented,   Eyes: pupils equal, anicteric  ENMT: no nasal discharge, moist mucous membranes  Cardiovascular: regular rhythm, distal pulses intact  Respiratory: breathing not labored, symmetric  Gastrointestinal: soft non-tender, +bowel sounds  Musculoskeletal: no deformity, no tenderness to palpation  Skin: warm, dry  Neurologic: following commands, moving all extremities  Psychiatric: full affect, no hallucinations  Other:       HISTORY:     Active Problems:    Acute on chronic renal failure ([address] (11/2/2019)      Hematuria (11/2/2019)      Past Medical History:   Diagnosis Date    Diabetes ([address]     Hypertension     Kidney disease       Past Surgical History:   Procedure Laterality Date    HX GI      colonoscopy 6-7 yrs ago    IR INSERT TUNL CVC W/O PORT OVER 5 YR  11/11/2019    NE COLON CA SCRN NOT HI RSK IND  10/21/2015           Family History   Problem Relation Age of Onset    Hypertension Mother     Hypertension Father       History reviewed, no pertinent family history.   Social History     Tobacco Use    Smoking status: Never Smoker    Smokeless tobacco: Never Used   Substance Use Topics    Alcohol use: No     No Known Allergies   Current Facility-Administered Medications   Medication Dose Route Frequency    heparin (porcine) injection 5,000 Units  5,000 Units SubCUTAneous Q8H    spironolactone (ALDACTONE) tablet 25 mg  25 mg Oral DAILY    heparin (porcine) 1,000 unit/mL injection 3,800 Units  3,800 Units Hemodialysis DIALYSIS PRN    furosemide (LASIX) tablet 40 mg  40 mg Oral DAILY    labetalol (NORMODYNE) tablet 300 mg  300 mg Oral BID    finasteride (PROSCAR) tablet 5 mg  5 mg Oral DAILY    epoetin charisse-epbx (RETACRIT) 12,000 Units combo injection  12,000 Units SubCUTAneous Q TUE, THU & SAT    sodium chloride (NS) flush 5-40 mL  5-40 mL IntraVENous Q8H    sodium chloride (NS) flush 5-40 mL  5-40 mL IntraVENous PRN    acetaminophen (TYLENOL) tablet 650 mg  650 mg Oral Q6H PRN    ondansetron (ZOFRAN) injection 4 mg  4 mg IntraVENous Q6H PRN    bisacodyl (DULCOLAX) tablet 5 mg  5 mg Oral DAILY PRN    glucose chewable tablet 16 g  4 Tab Oral PRN    dextrose (D50W) injection syrg 12.5-25 g  25-50 mL IntraVENous PRN    glucagon (GLUCAGEN) injection 1 mg  1 mg IntraMUSCular PRN    insulin lispro (HUMALOG) injection   SubCUTAneous AC&HS    amLODIPine (NORVASC) tablet 10 mg  10 mg Oral DAILY    aspirin delayed-release tablet 81 mg  81 mg Oral PRN    hydrALAZINE (APRESOLINE) 20 mg/mL injection 20 mg  20 mg IntraVENous Q6H PRN          LAB AND IMAGING FINDINGS:     Lab Results   Component Value Date/Time    WBC 6.3 11/12/2019 04:47 AM    HGB 9.1 (L) 11/12/2019 04:47 AM    PLATELET 494 33/02/4763 04:47 AM     Lab Results   Component Value Date/Time    Sodium 138 11/13/2019 03:52 AM    Potassium 4.0 11/13/2019 03:52 AM    Chloride 105 11/13/2019 03:52 AM    CO2 28 11/13/2019 03:52 AM    BUN 29 (H) 11/13/2019 03:52 AM    Creatinine 3.85 (H) 11/13/2019 03:52 AM    Calcium 7.8 (L) 11/13/2019 03:52 AM    Magnesium 2.1 09/03/2019 03:23 AM    Phosphorus 3.7 11/13/2019 03:52 AM      Lab Results   Component Value Date/Time    AST (SGOT) 35 11/01/2019 07:41 PM    Alk.  phosphatase 83 11/01/2019 07:41 PM    Protein, total 7.5 11/01/2019 07:41 PM    Albumin 2.4 (L) 11/13/2019 03:52 AM    Globulin 4.5 (H) 11/01/2019 07:41 PM     No results found for: INR, PTMR, PTP, PT1, PT2, APTT, INREXT, INREXT   Lab Results   Component Value Date/Time    Iron 58 09/03/2019 11:30 AM    TIBC 200 (L) 09/03/2019 11:30 AM    Iron % saturation 29 09/03/2019 11:30 AM      No results found for: PH, PCO2, PO2  No components found for: [place]   Lab Results   Component Value Date/Time     11/01/2019 07:41 PM    CK - MB 3.0 11/01/2019 07:41 PM                Total time:   Counseling / coordination time, spent as noted above:   > 50% counseling / coordination?:     Prolonged service was provided for  []30 min   []75 min in face to face time in the presence of the patient, spent as noted above. Time Start:   Time End:   Note: this can only be billed with 31390 (initial) or 19489 (follow up). If multiple start / stop times, list each separately.

## 2019-11-13 NOTE — PROGRESS NOTES
Orthopedic End of Shift Note    Bedside and verbal shift change report given to Bijal Pineda RN (oncoming nurse) by Thea Brizuela RN (offgoing nurse). Report included the following information SBAR, Intake/Output, Recent Results, Cardiac Rhythm Sinus Rhythm and Quality Measures. POD#     Significant issues during shift: Continued care for acute renal failure r/t rentention. Dialysis for continued kidney failure, Vitals stable on room air. AM labs per orders. No significant issues on overnight.      Issues for Physician to address: Same as above    Activity This Shift  (check all that apply) [] chair  [] dangle   [] bathroom  [] bedside commode [] hallway  [] bedrest   Nausea/Vomiting [] yes [x] no     Voiding Status [x] void [] Stone [] I&O Cath   Bowel Movements [x] yes [] no     Foot Pumps or SCD [x] yes [] no refusing   Ice Pack [] yes    [x] no    Incentive Spirometer [] yes [x] no Volume:      Telemetry Monitoring   [x] yes [] no Rhythm:   Supplemental O2 [] yes [x] no Sat off O2:   98%

## 2019-11-13 NOTE — PROGRESS NOTES
Problem: Self Care Deficits Care Plan (Adult)  Goal: *Acute Goals and Plan of Care (Insert Text)  Description  FUNCTIONAL STATUS PRIOR TO ADMISSION: Ambulated with SPC. Could manage stairs to second floor of home without assist.  Sling had been discontinued per pt. Performed all ADLS on his own including showering. Pt was seen on 9/19/19 by Dr. Whitehead for RUE fracture. Per Dr. Whitehead note on 9/19/19 \"Radiographs show evidence of healing so Mr. Jaziel Hsu fracture should heal over time. I have elected to place him in physical therapy for gentle range of motion. He should be careful to avoid motions that involve any lifting. The patient may continue activities as tolerated and will follow up in 4 weeks. \"  Pt reports that he was to start PT today and assisted pt with trying to get in touch with the OP clinic. HOME SUPPORT PRIOR TO ADMISSION: The patient lived alone, but recently moved into his daughters house. Daughter works during the day and pts grandson provides transportation but also works. Occupational Therapy Goals:  Initiated 11/5/2019, Goals revised as per 11/13 weekly Reevaluation. 1. Patient will perform grooming standing with supervision/set-up within 7 days. Goal met, change to independent. 2. Patient will perform toileting with supervision/set-up within 7 days. Goal met, change to independent. 3. Patient will perform upper body dressing and lower body dressing with supervision/set-up within 7 days. Goal met, change to independent. 4. Patient will transfer from toilet with supervision/set-up using the least restrictive device and appropriate durable medical equipment within 7 days. Goal met, change to independent.         Outcome: Progressing Towards Goal    OCCUPATIONAL THERAPY TREATMENT/WEEKLY RE-ASSESSMENT  Patient: Chemo King (89 y.o. male)  Date: 11/13/2019  Diagnosis: Acute on chronic renal failure (HCC) [N17.9, N18.9] <principal problem not specified>       Precautions: Fall, Other (comment)(no lifting with RUE due to fx)  Chart, occupational therapy assessment, plan of care, and goals were reviewed. ASSESSMENT  Patient continues with skilled OT services and is progressing towards goals. He is demonstrating excellent functional progress, with improving strength, balance and activity tolerance noted for ADL and functional mobility performance. Cueing was needed for safety during transfers and RW management. Overall he was supervision to SBA for ADLs and functional mobility today. At this point he will benefit from continued acute OT, and will need SNF rehab after discharge to maximize his functional independence. Current Level of Function Impacting Discharge (ADLs): Patient was supervision to SBA for ADLs and functional mobility. PLAN :  Goals have been updated based on progression since last assessment. Patient continues to benefit from skilled intervention to address the above impairments. Continue to follow patient 3 times a week to address goals. Recommendation for discharge: (in order for the patient to meet his/her long term goals)  Therapy up to 5 days/week in SNF setting    This discharge recommendation:  Has been made in collaboration with the attending provider and/or case management         OBJECTIVE DATA SUMMARY:   Cognitive/Behavioral Status:  Neurologic State: Alert  Orientation Level: Oriented X4  Cognition: Follows commands  Perception: Appears intact     Safety/Judgement: Awareness of environment; Insight into deficits    Functional Mobility and Transfers for ADLs:  Bed Mobility:  Supine to Sit: Supervision  Sit to Supine: Supervision  Scooting: Supervision    Transfers:  Sit to Stand: Supervision  Functional Transfers  Bathroom Mobility: Stand-by assistance(ambulating with a RW)  Toilet Transfer : Stand-by assistance;Supervision  Cues: Verbal cues provided  Bed to Chair: Stand-by assistance(ambulating with RW)    Balance:  Sitting: Intact  Standing: Impaired(but good with support or RW )  Standing - Static: Good  Standing - Dynamic : Good    ADL Intervention:     Grooming  Washing Hands: Supervision(standing at sink)       Lower Body Dressing Assistance  Protective Undergarmet: Stand-by assistance  Socks: Set-up; Supervision  Leg Crossed Method Used: Yes  Position Performed: Seated edge of bed;Bending forward method;Standing  Cues: Verbal cues provided    Toileting  Toileting Assistance: Supervision  Clothing Management: Supervision  Cues: Verbal cues provided    Cognitive Retraining  Safety/Judgement: Awareness of environment; Insight into deficits      Activity Tolerance:   Good  Please refer to the flowsheet for vital signs taken during this treatment.     After treatment patient left in no apparent distress:   Sitting in chair and Call bell within reach    COMMUNICATION/COLLABORATION:   The patients plan of care was discussed with: Registered Nurse    RITA Hyde/L  Time Calculation: 23 mins

## 2019-11-13 NOTE — DIALYSIS
[facility]  [phone]                             Vitals   Pre   Post   Assessment   Pre   Post     Temp  Temp: 98.4  98.6 LOC  Alert and oriented x 4 Alert and oriented   HR   Pulse (Heart Rate): 79 78 Lungs   Unlabored, even  unlabored, even, pulse oximetry 100% on room air   B/P   BP: 169/101 163/94 Cardiac   Regular   RRR   Resp   Resp Rate: 16 16 Skin   Warm and dry   warm and dry    Pain level  Pain Intensity 1: 0  No verbal complaints Edema     +1 BLE    +1 BLE   Orders:      Duration:   Start:   4272 End:   1505 Total:   2.5     Dialyzer:   Dialyzer/Set Up Inspection: Revaclear      K Bath:   Dialysate K (mEq/L): 3.5      Ca Bath:   Dialysate CA (mEq/L): 2.5      Na/Bicarb:   Dialysate NA (mEq/L): 140      Target Fluid Removal:   Goal/Amount of Fluid to Remove (mL): 1000 mL (11/11/19 1553)     Access       Type & Location:   Right tunneled cvc,, dressing dated 11/11/19 clean, dry and intact, Each catheter limb disinfected for 60 seconds per limb with alcohol swabs. Caps removed, dialysis CVC hub scrubbed with Prevantics for 5 seconds, followed by a 5 second dry time per Hospital P&P, aspirated 5 ml both lumens, and flushed with normal saline.        '       Medications/ Blood Products Given     Name   Dose   Route and Time     heparin 1:1000 1.9 arterial and 1.9 venous post HD dwell CVC                   Blood Volume Processed (BVP):    43 Net Fluid   Removed:  2000    Comments   Time Out Done: 0368  Primary Nurse Rpt Pre: Juan A Wade RN   Primary Nurse Rpt Post:  Juan A Wade RN   Pt Education: cvc infection control  Care Plan: continue current HD plan of care   Tx Summary: Pt tolerated tx well. At end all blood in circuit returned with 300ml NS. Ports flushed, heparin dwells instilled. Lines clamped and sterile caps applied.  Report given to floor RN.     Admiting Diagnosis: ASHVIN on CKD  Pt's previous clinic-n/a  Consent signed - Informed Consent Verified: Yes   [facility] Consent - obtained  Hepatitis Status- 11/10/19 Antigen positive   Machine #- Machine Number: [id number]   Telemetry status-n/a  Pre-dialysis wt. -  n/a

## 2019-11-13 NOTE — PROGRESS NOTES
Problem: Falls - Risk of  Goal: *Absence of Falls  Description  Document Flor Ruts Fall Risk and appropriate interventions in the flowsheet. Outcome: Progressing Towards Goal  Note:   Fall Risk Interventions:  Mobility Interventions: Communicate number of staff needed for ambulation/transfer, Patient to call before getting OOB, PT Consult for mobility concerns, PT Consult for assist device competence         Medication Interventions: Assess postural VS orthostatic hypotension, Evaluate medications/consider consulting pharmacy, Patient to call before getting OOB    Elimination Interventions: Call light in reach, Patient to call for help with toileting needs    History of Falls Interventions: Vital signs minimum Q4HRs X 24 hrs (comment for end date)         Problem: Patient Education: Go to Patient Education Activity  Goal: Patient/Family Education  Outcome: Progressing Towards Goal     Problem: Chronic Renal Failure  Goal: *Fluid and electrolytes stabilized  Outcome: Progressing Towards Goal     Problem: Patient Education: Go to Patient Education Activity  Goal: Patient/Family Education  Outcome: Progressing Towards Goal     Problem: Patient Education: Go to Patient Education Activity  Goal: Patient/Family Education  Outcome: Progressing Towards Goal     Problem: Patient Education: Go to Patient Education Activity  Goal: Patient/Family Education  Outcome: Progressing Towards Goal     Problem: Pressure Injury - Risk of  Goal: *Prevention of pressure injury  Description  Document Zaki Scale and appropriate interventions in the flowsheet.   Outcome: Progressing Towards Goal  Note:   Pressure Injury Interventions:  Sensory Interventions: Assess changes in LOC, Avoid rigorous massage over bony prominences, Keep linens dry and wrinkle-free, Maintain/enhance activity level, Minimize linen layers    Moisture Interventions: Absorbent underpads, Maintain skin hydration (lotion/cream), Minimize layers    Activity Interventions: Increase time out of bed, Pressure redistribution bed/mattress(bed type), PT/OT evaluation    Mobility Interventions: HOB 30 degrees or less, Pressure redistribution bed/mattress (bed type), PT/OT evaluation    Nutrition Interventions: Document food/fluid/supplement intake    Friction and Shear Interventions: Minimize layers                Problem: Patient Education: Go to Patient Education Activity  Goal: Patient/Family Education  Outcome: Progressing Towards Goal

## 2019-11-13 NOTE — PROGRESS NOTES
Hospitalist Progress Note    NAME: Dede Henry   :  1950   MRN:  [id number]       Assessment / Plan:    Acute on chronic kidney disease Stage V   Metabolic acidosis   New HD patient , HD started on   - Cr continued to trend up in spite of Brice placement.  Creatinine baseline is around 5   -pt is now agreeable to start HD , had been refusing all this time. Bicarb stopped   -R  permacath  placed today and HD started on   - OP HD to be set up by CM for TTS       Positive hepC and Hep B  No indication for acute treatment , can FU as OP with Dr arana      Hypertension  controlled  -Continue amlodipine, labetalol and added Cardura  - Hold Lisinopril due to ASHVIN/CKD  - Hydralazine as needed   - clonidine discontinued due to compliance issues and may cause rebound htn      Gross Hematuria   Resolved now.  Outpatient follow-up with urology.  Ultrasound  showed no acute process other than renal cysts        Urinary  Retention s/p brice   - likely 2/2 BPH vs blood clots   -brice out   - pt voiding     Generalized weakness likely multifactorial  continue PT/OT    Anemia most likely secondary to chronic kidney disease  -Hemoglobin 9.1, c/w epoetin     DM type II  continue sliding scale insulin with blood sugar checks    Chronic diastolic heart failure  start lasix 40mg daily per renal -      Hx Right humeral neck fracture.  -pain med PRN      25.0 - 29.9 Overweight / Body mass index is 26.8 kg/m². Code status: Full  Prophylaxis: SCD's  Recommended Disposition: Home w/Family     Subjective:     Chief Complaint / Reason for Physician Visit  FU ESRD on  HD the patient . No acute complaints . Discussed with RN events overnight.      Review of Systems:  Symptom Y/N Comments  Symptom Y/N Comments   Fever/Chills n   Chest Pain n    Poor Appetite    Edema     Cough n   Abdominal Pain n    Sputum n   Joint Pain     SOB/POWELL n   Pruritis/Rash     Nausea/vomit n   Tolerating PT/OT     Diarrhea    Tolerating Diet y    Constipation    Other       Could NOT obtain due to:      Objective:     VITALS:   Last 24hrs VS reviewed since prior progress note. Most recent are:  Patient Vitals for the past 24 hrs:   Temp Pulse Resp BP SpO2   11/13/19 0749 98.4 °F (36.9 °C) 76 16 (!) 170/95 99 %   11/13/19 0350 97.6 °F (36.4 °C) 87 17 130/64 99 %   11/12/19 2352 98.5 °F (36.9 °C) 75 17 147/86 100 %   11/12/19 1930 98.7 °F (37.1 °C) 77  161/89 96 %   11/12/19 1713  79  167/90    11/12/19 1505 98.3 °F (36.8 °C) 75 16 (!) 164/97 97 %   11/12/19 1500  74  (!) 167/94    11/12/19 1445  95  (!) 172/101    11/12/19 1430  75  (!) 169/103    11/12/19 1415  74  (!) 170/97    11/12/19 1400  75  (!) 171/98    11/12/19 1345  74  (!) 168/98    11/12/19 1330  76  (!) 164/94    11/12/19 1315  73  159/90    11/12/19 1300  74  (!) 168/96    11/12/19 1245  76  (!) 173/101    11/12/19 1240  79  (!) 173/102    11/12/19 1235 98.4 °F (36.9 °C) 79 16 (!) 169/101    11/12/19 0922  78  174/89        Intake/Output Summary (Last 24 hours) at 11/13/2019 0806  Last data filed at 11/12/2019 1505  Gross per 24 hour   Intake    Output 2000 ml   Net -2000 ml        PHYSICAL EXAM:  General: WD, WN. Alert, cooperative, no acute distress    EENT:  EOMI. Anicteric sclerae. MMM  Resp:  CTA bilaterally, no wheezing or rales. No accessory muscle use  CV:  Regular  rhythm,  No edema  GI:  Soft, Non distended, Non tender.  +Bowel sounds  Neurologic:  Alert and oriented X 3, normal speech,   Psych:   Good insight. Not anxious nor agitated  Skin:  No rashes.   No jaundice    Reviewed most current lab test results and cultures  YES  Reviewed most current radiology test results   YES  Review and summation of old records today    NO  Reviewed patient's current orders and MAR    YES  PMH/SH reviewed - no change compared to H&P  ________________________________________________________________________  Care Plan discussed with:    Comments Patient x    Family      RN x    Care Manager     Consultant                       x Multidiciplinary team rounds were held today with , nursing, pharmacist and clinical coordinator. Patient's plan of care was discussed; medications were reviewed and discharge planning was addressed. ________________________________________________________________________  Total NON critical care TIME:  25   Minutes    Total CRITICAL CARE TIME Spent:   Minutes non procedure based      Comments   >50% of visit spent in counseling and coordination of care x    ________________________________________________________________________  Colleen Rodriguez MD     Procedures: see electronic medical records for all procedures/Xrays and details which were not copied into this note but were reviewed prior to creation of Plan. LABS:  I reviewed today's most current labs and imaging studies.   Pertinent labs include:  Recent Labs     11/12/19 0447   WBC 6.3   HGB 9.1*   HCT 28.8*        Recent Labs     11/13/19  0352 11/12/19 0447 11/11/19  0353    139 140   K 4.0 4.0 4.4    106 109*   CO2 28 26 21   GLU 81 79 71   BUN 29* 46* 77*   CREA 3.85* 4.82* 7.22*   CA 7.8* 8.4* 8.3*   PHOS 3.7 4.3 5.8*   ALB 2.4* 2.5* 2.4*       Signed: Colleen Rodriguez MD

## 2019-11-13 NOTE — PROGRESS NOTES
JADA     SNF vs IPR  Orval Lob is needed  HD chair with Eva Dalal ([facility] or [facility])      2pm: After discussion details with Pt. Pt would be willing to consider IPR. CM sent referral to [facility]. 11:30am CM learned that Pt would not have transportation for dialysis 3 days a week. Could potentially be difficult to arrange in a SNF setting. 10:45am: CM contacted [facility] admissions to follow-up on information that was sent on yesterday. CM had to leave a message for admissions representative.

## 2019-11-14 LAB
ALBUMIN SERPL-MCNC: 2.6 G/DL (ref 3.5–5)
ANION GAP SERPL CALC-SCNC: 5 MMOL/L (ref 5–15)
BUN SERPL-MCNC: 29 MG/DL (ref 6–20)
BUN/CREAT SERPL: 6 (ref 12–20)
CALCIUM SERPL-MCNC: 8 MG/DL (ref 8.5–10.1)
CHLORIDE SERPL-SCNC: 103 MMOL/L (ref 97–108)
CO2 SERPL-SCNC: 28 MMOL/L (ref 21–32)
CREAT SERPL-MCNC: 4.86 MG/DL (ref 0.7–1.3)
ERYTHROCYTE [DISTWIDTH] IN BLOOD BY AUTOMATED COUNT: 16.8 % (ref 11.5–14.5)
GLUCOSE BLD STRIP.AUTO-MCNC: 104 MG/DL (ref 65–100)
GLUCOSE BLD STRIP.AUTO-MCNC: 132 MG/DL (ref 65–100)
GLUCOSE BLD STRIP.AUTO-MCNC: 178 MG/DL (ref 65–100)
GLUCOSE BLD STRIP.AUTO-MCNC: 75 MG/DL (ref 65–100)
GLUCOSE BLD STRIP.AUTO-MCNC: 79 MG/DL (ref 65–100)
GLUCOSE BLD STRIP.AUTO-MCNC: 82 MG/DL (ref 65–100)
GLUCOSE SERPL-MCNC: 147 MG/DL (ref 65–100)
HBV E AB SERPL QL IA: POSITIVE
HCT VFR BLD AUTO: 31 % (ref 36.6–50.3)
HGB BLD-MCNC: 9.7 G/DL (ref 12.1–17)
MCH RBC QN AUTO: 30.3 PG (ref 26–34)
MCHC RBC AUTO-ENTMCNC: 31.3 G/DL (ref 30–36.5)
MCV RBC AUTO: 96.9 FL (ref 80–99)
NRBC # BLD: 0 K/UL (ref 0–0.01)
NRBC BLD-RTO: 0 PER 100 WBC
PHOSPHATE SERPL-MCNC: 3.7 MG/DL (ref 2.6–4.7)
PLATELET # BLD AUTO: 296 K/UL (ref 150–400)
PMV BLD AUTO: 9.7 FL (ref 8.9–12.9)
POTASSIUM SERPL-SCNC: 4.1 MMOL/L (ref 3.5–5.1)
RBC # BLD AUTO: 3.2 M/UL (ref 4.1–5.7)
SERVICE CMNT-IMP: ABNORMAL
SERVICE CMNT-IMP: NORMAL
SODIUM SERPL-SCNC: 136 MMOL/L (ref 136–145)
WBC # BLD AUTO: 5.1 K/UL (ref 4.1–11.1)

## 2019-11-14 PROCEDURE — 74011250636 HC RX REV CODE- 250/636: Performed by: FAMILY MEDICINE

## 2019-11-14 PROCEDURE — 90935 HEMODIALYSIS ONE EVALUATION: CPT

## 2019-11-14 PROCEDURE — 36415 COLL VENOUS BLD VENIPUNCTURE: CPT

## 2019-11-14 PROCEDURE — 74011250637 HC RX REV CODE- 250/637: Performed by: UROLOGY

## 2019-11-14 PROCEDURE — 82962 GLUCOSE BLOOD TEST: CPT

## 2019-11-14 PROCEDURE — 74011250636 HC RX REV CODE- 250/636: Performed by: INTERNAL MEDICINE

## 2019-11-14 PROCEDURE — 65660000000 HC RM CCU STEPDOWN

## 2019-11-14 PROCEDURE — 85027 COMPLETE CBC AUTOMATED: CPT

## 2019-11-14 PROCEDURE — 94760 N-INVAS EAR/PLS OXIMETRY 1: CPT

## 2019-11-14 PROCEDURE — 97110 THERAPEUTIC EXERCISES: CPT

## 2019-11-14 PROCEDURE — 74011250637 HC RX REV CODE- 250/637: Performed by: INTERNAL MEDICINE

## 2019-11-14 PROCEDURE — 97116 GAIT TRAINING THERAPY: CPT

## 2019-11-14 PROCEDURE — 80069 RENAL FUNCTION PANEL: CPT

## 2019-11-14 RX ADMIN — FUROSEMIDE 40 MG: 40 TABLET ORAL at 12:46

## 2019-11-14 RX ADMIN — Medication 10 ML: at 15:55

## 2019-11-14 RX ADMIN — LABETALOL HYDROCHLORIDE 300 MG: 200 TABLET, FILM COATED ORAL at 12:46

## 2019-11-14 RX ADMIN — FINASTERIDE 5 MG: 5 TABLET, FILM COATED ORAL at 12:45

## 2019-11-14 RX ADMIN — LABETALOL HYDROCHLORIDE 300 MG: 200 TABLET, FILM COATED ORAL at 20:34

## 2019-11-14 RX ADMIN — Medication 10 ML: at 05:32

## 2019-11-14 RX ADMIN — SPIRONOLACTONE 25 MG: 25 TABLET ORAL at 12:46

## 2019-11-14 RX ADMIN — AMLODIPINE BESYLATE 10 MG: 5 TABLET ORAL at 12:46

## 2019-11-14 RX ADMIN — HEPARIN SODIUM 5000 UNITS: 5000 INJECTION INTRAVENOUS; SUBCUTANEOUS at 00:54

## 2019-11-14 RX ADMIN — EPOETIN ALFA-EPBX 12000 UNITS: 10000 INJECTION, SOLUTION INTRAVENOUS; SUBCUTANEOUS at 20:35

## 2019-11-14 RX ADMIN — Medication 10 ML: at 22:25

## 2019-11-14 RX ADMIN — HEPARIN SODIUM 5000 UNITS: 5000 INJECTION INTRAVENOUS; SUBCUTANEOUS at 15:56

## 2019-11-14 RX ADMIN — HEPARIN SODIUM 3800 UNITS: 1000 INJECTION INTRAVENOUS; SUBCUTANEOUS at 11:44

## 2019-11-14 RX ADMIN — HEPARIN SODIUM 5000 UNITS: 5000 INJECTION INTRAVENOUS; SUBCUTANEOUS at 23:33

## 2019-11-14 NOTE — PROGRESS NOTES
JADA    SNF vs IPR  [employer] Auth is needed  HD chair with [facility] ([facility] MWF 7:45am)    1:48pm: ENIO notified by [facility] that Pt does not meet criteria at this time for IPR. CM contacted admissions at [facility] to provide update. Facility is able to accept, however they are unable to provide transportation to Pt's HD appointments. CM was given a resource that may possibly assist with Pt's transportation needs [facility]). Form was completed and faxed. 9:30am: CM contacted [facility]. CM was informed that Pt has been accepted at the [address] MWF at 7:45am. CM was requested to send additional clinicals. CM faxed additional clinicals. 9:00am CM was informed that [facility] does not have any HD beds at this time. CM was encouraged to send referral to [place] location. Referral was sent to [place] location.          Andrea Bhandari, [address] [place] [address]

## 2019-11-14 NOTE — PROGRESS NOTES
Problem: Mobility Impaired (Adult and Pediatric)  Goal: *Acute Goals and Plan of Care (Insert Text)  Description  FUNCTIONAL STATUS PRIOR TO ADMISSION: Patient was modified independent using a single point cane for functional mobility. HOME SUPPORT PRIOR TO ADMISSION: The patient lived with daughter who works during day. [facility] nursing 3x/wk. Physical Therapy Goals  Initiated 11/6/2019; reviewed goals 11/12/19 - still appropriate  1. Patient will move from supine to sit and sit to supine , scoot up and down and roll side to side in bed with independence within 7 day(s). 2.  Patient will transfer from bed to chair and chair to bed with modified independence using the least restrictive device within 7 day(s). 3.  Patient will perform sit to stand with modified independence within 7 day(s). 4.  Patient will ambulate with modified independence for 200 feet with the least restrictive device within 7 day(s). 5.  Patient will ascend/descend 15 stairs with 1 handrail(s) with supervision/set-up within 7 day(s). Note:   PHYSICAL THERAPY TREATMENT  Patient: Sharonda Steen (89 y.o. male)  Date: 11/14/2019  Diagnosis: Acute on chronic renal failure (HCC) [N17.9, N18.9]        Precautions: Fall, DNR(No lifting R UE)  Chart, physical therapy assessment, plan of care and goals were reviewed. ASSESSMENT  Patient continues with skilled PT services and is progressing towards goals, pt progressing towards goals, no LOB or SOB, does well with transfers and ther-ex, good motivation, vc's for safety and proper RW use. Current Level of Function Impacting Discharge (mobility/balance): CGA x1         PLAN :  Patient continues to benefit from skilled intervention to address the above impairments. Continue treatment per established plan of care. to address goals.     Recommendation for discharge: (in order for the patient to meet his/her long term goals)  Therapy up to 5 days/week in SNF setting    This discharge recommendation:  Has been made in collaboration with the attending provider and/or case management    IF patient discharges home will need the following DME: rolling walker       OBJECTIVE DATA SUMMARY:     Critical Behavior:  Neurologic State: Alert, Appropriate for age  Orientation Level: Oriented X4  Cognition: Appropriate decision making, Follows commands  Safety/Judgement: Awareness of environment, Insight into deficits    Functional Mobility Training:  Bed Mobility: Pt sitting in chair on arrival.    Transfers:  Sit to Stand: Supervision  Stand to Sit: Supervision  Bed to Chair: Contact guard assistance  Interventions: Tactile cues; Verbal cues  Level of Assistance: Contact guard assistance    Balance:  Sitting: Intact; Without support  Standing: Intact; With support  Standing - Static: Good;Constant support  Standing - Dynamic : Good;Constant support    Ambulation/Gait Training:  Distance (ft): 100 Feet (ft)  Assistive Device: Walker, rolling;Gait belt  Ambulation - Level of Assistance: Contact guard assistance;Minimal assistance  Gait Abnormalities: Decreased step clearance  Right Side Weight Bearing: Full  Left Side Weight Bearing: Full  Base of Support: Narrowed  Speed/Sarah: Pace decreased (<100 feet/min)  Step Length: Left shortened;Right shortened    Therapeutic Exercises:   sitting  EXERCISE   Sets   Reps   Active Active Assist   Passive   Comments   Ankle pumps 1 10 ? ? ? bilat   Heel raises 1 10 ? ? ? \"   Toe tap 1 10 ? ? ? \"   Knee ext 1 10 ? ? ? \"   Hip flex 1 10 ? ? ? \"     Pain Rating: see flow sheet    Activity Tolerance: Fair    After treatment patient left in no apparent distress: Sitting in chair    COMMUNICATION/COLLABORATION:   The patients plan of care was discussed with: Registered Nurse    Ilda Vasquez PTA   Time Calculation: 25 mins

## 2019-11-14 NOTE — PROGRESS NOTES
Nephrology Progress Note     Jarvis Carrera     [email or web address]                  Phone - [phone]   Patient: Madhu Dash   Date- 11/14/2019        Admit Date: 11/2/2019  YOB: 1950             CC: Follow up for  New onset ESRD        Subjective: Interval History:   - SEEN ON HD TODAY  bp high  No c/o sob,   No c/o chest pain,   No c/o nausea or vomiting  No c/o  fever. ROS:- as above   Assessment & Plan:     NEW ONSET ESRD  - SEEN ON HD TODAY  - remove 3 kg  - change back to mwf.[email or web address]. He is accepted at [facility]  - he will need out pt hd unit set up. He will go back to DR. Shaun Vu'      ASHVIN on ckd-ATN  - LIKELY DUE TO URINARY RETENTION +/- normalisation of bp causing renal hypoperfusion -       hypertension  - continue norvasc,  Labetalol , aldactone    H/o hep B AND HEP C  - GI INPUT NOTED AND APPRECIATED. -+ve hep b RNA, +ve hep b E antigen. - he has active hep b. He will follow up with GI as out pt.     Anemia of ckd  - continue epogen and venofer    Hematuria- urinary retention  - urology input noted     metabolic acidosis  - improved    Edema  - should improve with fluid removal on hd  -  lasix 40 mg daily     CKD - f/b   - ckd likely due to dm + HTN          TYPE 2 DM  PROTEINURIA , Microscopic hematuria  - +ve hep b and hep c -      Right humeral neck fracture       OKAY TO D/C WHEN OUT PT HD UNIT IS SET UP         Physical exam:   GENERAL ASSESSMENT: NAD  CHEST: CTA b/l, no wheezing  HEART: S1,S2,RRR  ABDOMEN: Soft,Non tender  NEURO: Non focal, normal speech  EXTREMITY: + EDEMA          Right ij permacath +      Care Plan discussed with: patient, HD NURSE  Objective:   Visit Vitals  BP (!) 166/98   Pulse 78   Temp 97.4 °F (36.3 °C) (Oral)   Resp 19   Wt 82.3 kg (181 lb 8 oz)   SpO2 100%   BMI 26.80 kg/m²     Last 3 Recorded Weights in this Encounter    11/10/19 2311 11/11/19 2031 11/13/19 0350   Weight: 86 kg (189 lb 8 oz) 86.3 kg (190 lb 4.8 oz) 82.3 kg (181 lb 8 oz)     11/12 1901 - 11/14 0700  In: 480 [P.O.:480]  Out: -     Intake/Output Summary (Last 24 hours) at 11/14/2019 1020  Last data filed at 11/13/2019 2211  Gross per 24 hour   Intake 480 ml   Output    Net 480 ml      Chart reviewed. Pertinent Notes reviewed. Medication list  reviewed   Current Facility-Administered Medications   Medication    heparin (porcine) injection 5,000 Units    spironolactone (ALDACTONE) tablet 25 mg    heparin (porcine) 1,000 unit/mL injection 3,800 Units    furosemide (LASIX) tablet 40 mg    labetalol (NORMODYNE) tablet 300 mg    finasteride (PROSCAR) tablet 5 mg    epoetin charisse-epbx (RETACRIT) 12,000 Units combo injection    sodium chloride (NS) flush 5-40 mL    sodium chloride (NS) flush 5-40 mL    acetaminophen (TYLENOL) tablet 650 mg    ondansetron (ZOFRAN) injection 4 mg    bisacodyl (DULCOLAX) tablet 5 mg    glucose chewable tablet 16 g    dextrose (D50W) injection syrg 12.5-25 g    glucagon (GLUCAGEN) injection 1 mg    insulin lispro (HUMALOG) injection    amLODIPine (NORVASC) tablet 10 mg    aspirin delayed-release tablet 81 mg    hydrALAZINE (APRESOLINE) 20 mg/mL injection 20 mg           Data Review :  Recent Labs     11/14/19  0907 11/13/19 0352 11/12/19 0447    138 139   K 4.1 4.0 4.0    105 106   CO2 28 28 26   BUN 29* 29* 46*   CREA 4.86* 3.85* 4.82*   * 81 79   CA 8.0* 7.8* 8.4*   PHOS 3.7 3.7 4.3     Recent Labs     11/14/19  0907 11/12/19 0447   WBC 5.1 6.3   HGB 9.7* 9.1*   HCT 31.0* 28.8*    318     No results for input(s): FE, TIBC, PSAT, FERR in the last 72 hours. No results for input(s): CPK, CKNDX, TROIQ in the last 72 hours.     No lab exists for component: CPKMB  Lab Results   Component Value Date/Time    Color ABRAHAM 11/01/2019 09:05 PM    Appearance CLOUDY (A) 11/01/2019 09:05 PM    Specific gravity 1.015 11/01/2019 09:05 PM    pH (UA) 5.5 11/01/2019 09:05 PM Protein >300 (A) 11/01/2019 09:05 PM    Glucose NEGATIVE  11/01/2019 09:05 PM    Ketone NEGATIVE  11/01/2019 09:05 PM    Bilirubin NEGATIVE  11/01/2019 09:05 PM    Urobilinogen 0.2 11/01/2019 09:05 PM    Nitrites NEGATIVE  11/01/2019 09:05 PM    Leukocyte Esterase TRACE (A) 11/01/2019 09:05 PM    Epithelial cells FEW 11/01/2019 09:05 PM    Bacteria NEGATIVE  11/01/2019 09:05 PM    WBC 0-4 11/01/2019 09:05 PM    RBC  11/01/2019 09:05 PM     Lab Results   Component Value Date/Time    Culture result: NO GROWTH 5 DAYS 09/04/2019 06:29 PM     No results found for: SDES  Lab Results   Component Value Date/Time    Sodium,urine random 65 01/24/2019 12:52 AM    Creatinine, urine 94.80 01/24/2019 12:52 AM       Results from Hospital Encounter encounter on 11/01/19   XR CHEST PA LAT    Narrative EXAM: XR CHEST PA LAT    INDICATION: pneumonia    COMPARISON: Chest x-ray 9/2/2019. FINDINGS: PA and lateral radiographs of the chest demonstrate hyperinflated but  otherwise clear lungs. The cardiac and mediastinal contours and pulmonary  vascularity are normal. Atherosclerotic calcifications affect the aortic arch  and the thoracic aorta is tortuous. The chest wall structures and visualized  upper abdomen show no acute findings with incidental note of degenerative spine,  right humeral neck fracture, and diffuse osteopenia. Impression IMPRESSION: No acute cardiopulmonary findings. COPD. Right humeral neck  fracture. Sweta Baker MD  [facility]   [email or web address] [facility]  Myron [place], [place], [address]  Phone - [phone]         Fax - [phone]

## 2019-11-14 NOTE — PROGRESS NOTES
Occupational Therapy  Chart reviewed; patient currently not in room; will retry later as able.  Imtiaz Valverde OTR/L

## 2019-11-14 NOTE — PROGRESS NOTES
Palliative Medicine Consult  Kranthi: [phone] ([phone])    Patient Name: Sumi Porras  YOB: 1950    Date of Initial Consult: 11/13/2019  Reason for Consult: End stage disease  Requesting Provider: Radhika Alfonso MD  Primary Care Physician: Froylan Gutierrez MD     SUMMARY:   Sumi Porras is a 77 y.o. with a past history of DM, HTN, Kidney disease, who was admitted on 11/2/2019 from home through [facility] with a diagnosis of acute on chronic renal failure. Current medical issues leading to Palliative Medicine involvement include: goals of care discussion in setting of new HD and ESRD    Social:  Mr Nixon Hudsno is no longer , but still close with his ex-wife Froylan Ortiz, He has one daughter and 4 grandchildren with whom he is close with     PALLIATIVE DIAGNOSES:   1. DNR Discussion  2. Goals of Care  3. Advanced Care Planning  4. Debility       PLAN:   1. Met with Mr Nixon Hudson and his daughter at the bedside,  2. He was just sharing with his daughter, the discussion we had yesterday, so I was able to piggy back off of his discussion with his daughter. 3. We completed an AMD together, naming his daughter as primary, and his wife as secondary  4. We went through the POST form together, and initially, Mr Nixon Hudson wanted me to check \"comfort only measures\"- we talked through this, and in the end he agreed that limited interventions was in line with his current decisions, but I do think that he will make the transition to comfort care soon. I educated him that this is a fluid document, and he can change it any time based on his current health situation. 5. He is not keen on coming back to the hospital, and would like to just spend his days at home with family, but as his family is so close to him, and advocating for him, he has agreed to dialysis, and hospitalization for \"easy fixable\" issues, but anything other than that, he would not want treatment for.    6. He is clear that he would never want a feeding tube  7. His daughter (primary mpOA) is very supportive of his dads wishes  6. Initial consult note routed to primary continuity provider and/or primary health care team members  9. Communicated plan of care with: Palliative [facility] Team     GOALS OF CARE / TREATMENT PREFERENCES:     GOALS OF CARE:  Patient/Health Care Proxy Stated Goals: Other (comment)    TREATMENT PREFERENCES:   Code Status: DNR    Advance Care Planning:  [x] The [facility] Interdisciplinary Team has updated the ACP Navigator with Health Care Decision Maker and Patient Capacity      Primary Decision Maker: Areli Talamantes - Daughter - [phone]    Secondary Decision Maker: Jay Morton - Spouse - [phone]    Medical Interventions: Limited additional interventions     Other Instructions:   Artificially Administered Nutrition: No feeding tube     Other:    As far as possible, the palliative care team has discussed with patient / health care proxy about goals of care / treatment preferences for patient.      HISTORY:     History obtained from: patient, chart, attending    CHIEF COMPLAINT: none- he is just ready to get out of the hospital    HPI/SUBJECTIVE:    The patient is:   [x] Verbal and participatory  [] Non-participatory due to:     Alert, oriented, sweet and thoughtful gentleman    Clinical Pain Assessment (nonverbal scale for severity on nonverbal patients):   Clinical Pain Assessment  Severity: 0     Activity (Movement): Lying quietly, normal position    Duration: for how long has pt been experiencing pain (e.g., 2 days, 1 month, years)  Frequency: how often pain is an issue (e.g., several times per day, once every few days, constant)     FUNCTIONAL ASSESSMENT:     Palliative Performance Scale (PPS):  PPS: 60       PSYCHOSOCIAL/SPIRITUAL SCREENING:     Palliative IDT has assessed this patient for cultural preferences / practices and a referral made as appropriate to needs (Cultural Services, Patient Advocacy, Ethics, etc.)    Any spiritual / Evangelical concerns:  [] Yes /  [x] No    Caregiver Burnout:  [] Yes /  [x] No /  [] No Caregiver Present      Anticipatory grief assessment:   [x] Normal  / [] Maladaptive       ESAS Anxiety: Anxiety: 0    ESAS Depression: Depression: 0        REVIEW OF SYSTEMS:     Positive and pertinent negative findings in ROS are noted above in HPI. The following systems were [x] reviewed / [] unable to be reviewed as noted in HPI  Other findings are noted below. Systems: constitutional, ears/nose/mouth/throat, respiratory, gastrointestinal, genitourinary, musculoskeletal, integumentary, neurologic, psychiatric, endocrine. Positive findings noted below. Modified ESAS Completed by: provider   Fatigue: 0     Depression: 0 Pain: 0   Anxiety: 0     Anorexia: 0 Dyspnea: 0     Constipation: No     Stool Occurrence(s): 1        PHYSICAL EXAM:     From RN flowsheet:  Wt Readings from Last 3 Encounters:   11/14/19 181 lb 8.2 oz (82.3 kg)   11/01/19 174 lb 8 oz (79.2 kg)   11/01/19 180 lb (81.6 kg)     Blood pressure (!) 146/95, pulse 77, temperature 98.5 °F (36.9 °C), resp. rate 16, weight 181 lb 8.2 oz (82.3 kg), SpO2 100 %.     Pain Scale 1: Numeric (0 - 10)  Pain Intensity 1: 0  Pain Onset 1: 11/14/19  Pain Location 1: Arm, Shoulder  Pain Orientation 1: Right, Upper  Pain Description 1: Aching  Pain Intervention(s) 1: Declines  Last bowel movement, if known:     Constitutional: alert, oriented,   Eyes: pupils equal, anicteric  ENMT: no nasal discharge, moist mucous membranes  Cardiovascular: regular rhythm, distal pulses intact  Respiratory: breathing not labored, symmetric  Gastrointestinal: soft non-tender, +bowel sounds  Musculoskeletal: no deformity, no tenderness to palpation  Skin: warm, dry  Neurologic: following commands, moving all extremities  Psychiatric: full affect, no hallucinations  Other:       HISTORY:     Active Problems:    Acute on chronic renal failure ([facility] [address] (11/2/2019) Hematuria (11/2/2019)      Past Medical History:   Diagnosis Date    Diabetes ([address]     Hypertension     Kidney disease       Past Surgical History:   Procedure Laterality Date    HX GI      colonoscopy 6-7 yrs ago    IR INSERT TUNL CVC W/O PORT OVER 5 YR  11/11/2019    NV COLON CA SCRN NOT HI RSK IND  10/21/2015           Family History   Problem Relation Age of Onset    Hypertension Mother     Hypertension Father       History reviewed, no pertinent family history.   Social History     Tobacco Use    Smoking status: Never Smoker    Smokeless tobacco: Never Used   Substance Use Topics    Alcohol use: No     No Known Allergies   Current Facility-Administered Medications   Medication Dose Route Frequency    epoetin charisse-epbx (RETACRIT) 12,000 Units combo injection  12,000 Units SubCUTAneous Q MON, WED & FRI    heparin (porcine) injection 5,000 Units  5,000 Units SubCUTAneous Q8H    spironolactone (ALDACTONE) tablet 25 mg  25 mg Oral DAILY    heparin (porcine) 1,000 unit/mL injection 3,800 Units  3,800 Units Hemodialysis DIALYSIS PRN    furosemide (LASIX) tablet 40 mg  40 mg Oral DAILY    labetalol (NORMODYNE) tablet 300 mg  300 mg Oral BID    finasteride (PROSCAR) tablet 5 mg  5 mg Oral DAILY    sodium chloride (NS) flush 5-40 mL  5-40 mL IntraVENous Q8H    sodium chloride (NS) flush 5-40 mL  5-40 mL IntraVENous PRN    acetaminophen (TYLENOL) tablet 650 mg  650 mg Oral Q6H PRN    ondansetron (ZOFRAN) injection 4 mg  4 mg IntraVENous Q6H PRN    bisacodyl (DULCOLAX) tablet 5 mg  5 mg Oral DAILY PRN    glucose chewable tablet 16 g  4 Tab Oral PRN    dextrose (D50W) injection syrg 12.5-25 g  25-50 mL IntraVENous PRN    glucagon (GLUCAGEN) injection 1 mg  1 mg IntraMUSCular PRN    insulin lispro (HUMALOG) injection   SubCUTAneous AC&HS    amLODIPine (NORVASC) tablet 10 mg  10 mg Oral DAILY    aspirin delayed-release tablet 81 mg  81 mg Oral PRN    hydrALAZINE (APRESOLINE) 20 mg/mL injection 20 mg  20 mg IntraVENous Q6H PRN          LAB AND IMAGING FINDINGS:     Lab Results   Component Value Date/Time    WBC 5.1 11/14/2019 09:07 AM    HGB 9.7 (L) 11/14/2019 09:07 AM    PLATELET 244 44/71/5154 09:07 AM     Lab Results   Component Value Date/Time    Sodium 136 11/15/2019 04:56 AM    Potassium 3.9 11/15/2019 04:56 AM    Chloride 103 11/15/2019 04:56 AM    CO2 28 11/15/2019 04:56 AM    BUN 19 11/15/2019 04:56 AM    Creatinine 3.83 (H) 11/15/2019 04:56 AM    Calcium 8.0 (L) 11/15/2019 04:56 AM    Magnesium 2.1 09/03/2019 03:23 AM    Phosphorus 3.2 11/15/2019 04:56 AM      Lab Results   Component Value Date/Time    AST (SGOT) 35 11/01/2019 07:41 PM    Alk. phosphatase 83 11/01/2019 07:41 PM    Protein, total 7.5 11/01/2019 07:41 PM    Albumin 2.6 (L) 11/15/2019 04:56 AM    Globulin 4.5 (H) 11/01/2019 07:41 PM     No results found for: INR, PTMR, PTP, PT1, PT2, APTT, INREXT, INREXT   Lab Results   Component Value Date/Time    Iron 58 09/03/2019 11:30 AM    TIBC 200 (L) 09/03/2019 11:30 AM    Iron % saturation 29 09/03/2019 11:30 AM      No results found for: PH, PCO2, PO2  No components found for: [place]   Lab Results   Component Value Date/Time     11/01/2019 07:41 PM    CK - MB 3.0 11/01/2019 07:41 PM                Total time:   Counseling / coordination time, spent as noted above:   > 50% counseling / coordination?:     Prolonged service was provided for  []30 min   []75 min in face to face time in the presence of the patient, spent as noted above. Time Start:   Time End:   Note: this can only be billed with 22098 (initial) or 00830 (follow up). If multiple start / stop times, list each separately.

## 2019-11-14 NOTE — PROGRESS NOTES
Occupational TherapyChart reviewed; patient currently on dialysis; will retry later as able.  Leyla Jose OTR/L

## 2019-11-14 NOTE — PROGRESS NOTES
Hospitalist Progress Note    NAME: Bradley Burris   :  1950   MRN:  [id number]       Assessment / Plan:  Clinically stable to be DC , awaiting SNF   Acute on chronic kidney disease Stage V   Metabolic acidosis   New HD patient , HD started on   - Cr continued to trend up in spite of Brice placement.  Creatinine baseline is around 5   -pt is now agreeable to start HD , had been refusing all this time. Bicarb stopped   -R  permacath  placed today and HD started on   - OP HD to be set up by CM for TTS ( CM found a chaiR      Positive hepC and Hep B  No indication for acute treatment , can FU as OP with Dr arana      Hypertension  controlled  -Continue amlodipine, labetalol and added Cardura  - Hold Lisinopril due to ASHVIN/CKD  - Hydralazine as needed   - clonidine discontinued due to compliance issues and may cause rebound htn      Gross Hematuria   Resolved now.  Outpatient follow-up with urology.  Ultrasound  showed no acute process other than renal cysts        Urinary  Retention s/p brice   - likely 2/2 BPH vs blood clots   -brice out   - pt voiding     Generalized weakness likely multifactorial  continue PT/OT    Anemia most likely secondary to chronic kidney disease  -Hemoglobin 9.1, c/w epoetin     DM type II  continue sliding scale insulin with blood sugar checks    Chronic diastolic heart failure  start lasix 40mg daily per renal -      Hx Right humeral neck fracture.  -pain med PRN      25.0 - 29.9 Overweight / Body mass index is 26.8 kg/m². Code status: Full  Prophylaxis: SCD's  Recommended Disposition: Home w/Family     Subjective:     Chief Complaint / Reason for Physician Visit  FU ESRD on  HD the patient . No acute complaints . Discussed with RN events overnight.      Review of Systems:  Symptom Y/N Comments  Symptom Y/N Comments   Fever/Chills n   Chest Pain n    Poor Appetite    Edema     Cough n   Abdominal Pain n    Sputum n   Joint Pain     SOB/POWELL n   Pruritis/Rash Nausea/vomit n   Tolerating PT/OT     Diarrhea    Tolerating Diet y    Constipation    Other       Could NOT obtain due to:      Objective:     VITALS:   Last 24hrs VS reviewed since prior progress note. Most recent are:  Patient Vitals for the past 24 hrs:   Temp Pulse Resp BP SpO2   11/14/19 0700 97.3 °F (36.3 °C) 80 16 (!) 182/105    11/14/19 0248 98.1 °F (36.7 °C) 76 16 150/90 100 %   11/14/19 0052 98.1 °F (36.7 °C) 77 16 (!) 160/91 100 %   11/13/19 2000 98.7 °F (37.1 °C) 78 18 156/84 99 %   11/13/19 1600 98.3 °F (36.8 °C) 73 16 (!) 160/94 99 %   11/13/19 1206 98.2 °F (36.8 °C) 75 17 (!) 149/91 98 %       Intake/Output Summary (Last 24 hours) at 11/14/2019 0756  Last data filed at 11/13/2019 2211  Gross per 24 hour   Intake 480 ml   Output    Net 480 ml        PHYSICAL EXAM:  General: WD, WN. Alert, cooperative, no acute distress    EENT:  EOMI. Anicteric sclerae. MMM  Resp:  CTA bilaterally, no wheezing or rales. No accessory muscle use  CV:  Regular  rhythm,  No edema  GI:  Soft, Non distended, Non tender.  +Bowel sounds  Neurologic:  Alert and oriented X 3, normal speech,   Psych:   Good insight. Not anxious nor agitated  Skin:  No rashes. No jaundice    Reviewed most current lab test results and cultures  YES  Reviewed most current radiology test results   YES  Review and summation of old records today    NO  Reviewed patient's current orders and MAR    YES  PMH/ reviewed - no change compared to H&P  ________________________________________________________________________  Care Plan discussed with:    Comments   Patient x    Family      RN x    Care Manager     Consultant                       x Multidiciplinary team rounds were held today with , nursing, pharmacist and clinical coordinator. Patient's plan of care was discussed; medications were reviewed and discharge planning was addressed.      ________________________________________________________________________  Total NON critical care TIME:  25   Minutes    Total CRITICAL CARE TIME Spent:   Minutes non procedure based      Comments   >50% of visit spent in counseling and coordination of care x    ________________________________________________________________________  Alanna Betancur MD     Procedures: see electronic medical records for all procedures/Xrays and details which were not copied into this note but were reviewed prior to creation of Plan. LABS:  I reviewed today's most current labs and imaging studies.   Pertinent labs include:  Recent Labs     11/12/19 0447   WBC 6.3   HGB 9.1*   HCT 28.8*        Recent Labs     11/13/19 0352 11/12/19 0447    139   K 4.0 4.0    106   CO2 28 26   GLU 81 79   BUN 29* 46*   CREA 3.85* 4.82*   CA 7.8* 8.4*   PHOS 3.7 4.3   ALB 2.4* 2.5*       Signed: Alanna Betancur MD

## 2019-11-14 NOTE — PROGRESS NOTES
Visit attempted with Mr. Johnston Back. Pt was receiving dialysis with nurse at bedside; did not disturb at this time. Pt was visited previously by Valencia Cao. Chart reviewed prior to visit. Chaplains are available for support as needed.     Lucina Davis, Palliative

## 2019-11-14 NOTE — PROGRESS NOTES
Bedside and Verbal shift change report given to Henrry Maldonado (oncoming nurse) by Kevin Vargas RN (offgoing nurse). Report included the following information SBAR, Kardex, OR Summary, Procedure Summary, Intake/Output, MAR and Recent Results.

## 2019-11-14 NOTE — PROCEDURES
[facility]  [phone]    Vitals   Pre   Post   Assessment   Pre   Post     Temp  Temp: 97.4 °F (36.3 °C) (11/14/19 0845)  98.6 LOC  AXOX4 AXOX4   HR   Pulse (Heart Rate): 80 (11/14/19 0845) 76 Lungs   Diminished    Diminished     B/P   BP: (!) 170/95 (11/14/19 0845) 170/103 Cardiac   S1S2  S1S2   Resp   Resp Rate: 17 (11/14/19 0845) 19 Skin   Dry and warm   Dry and warm   Pain level  Pain Intensity 1: 0 (11/14/19 0052) 0/10 Edema  BLE      BLE    Orders:    Duration:   Start:   0845 End:   1200 Total:   3.25 hrs   Dialyzer:   Dialyzer/Set Up Inspection: Zulma Montero (11/14/19 0845)   K Bath:   Dialysate K (mEq/L): 3 (11/14/19 0845)   Ca Bath:   Dialysate CA (mEq/L): 2.5 (11/14/19 0845)   Na/Bicarb:   Dialysate NA (mEq/L): 140 (11/14/19 0845)   Target Fluid Removal:   Goal/Amount of Fluid to Remove (mL): 2000 mL (11/14/19 0845)   Access     Type & Location:   RIJ CVC: Dressing CDI and changed. No s/s of infection. Both lumens aspirate & flush well. Running well at .     Labs     Obtained/Reviewed   Critical Results Called   Date when labs were drawn-  Hgb-    HGB   Date Value Ref Range Status   11/12/2019 9.1 (L) 12.1 - 17.0 g/dL Final     K-    Potassium   Date Value Ref Range Status   11/13/2019 4.0 3.5 - 5.1 mmol/L Final     Ca-   Calcium   Date Value Ref Range Status   11/13/2019 7.8 (L) 8.5 - 10.1 MG/DL Final     Bun-   BUN   Date Value Ref Range Status   11/13/2019 29 (H) 6 - 20 MG/DL Final     Comment:     INVESTIGATED PER DELTA CHECK PROTOCOL     Creat-   Creatinine   Date Value Ref Range Status   11/13/2019 3.85 (H) 0.70 - 1.30 MG/DL Final     Comment:     INVESTIGATED PER DELTA CHECK PROTOCOL        Medications/ Blood Products Given     Name   Dose   Route and Time     Heparin 1:1000 units 1.9 ml A   1.9 ml V HD CVC 1200             Blood Volume Processed (BVP):   76.3 L Net Fluid   Removed:  3000 ml   Comments   Time Out Done: 0830  Primary Nurse Rpt Pre: Justine Mendieta Sneha Gonzales RN  Primary Nurse Rpt Post: Den Stokes RN  Pt Education: site care  Care Plan: on going  Tx Summary:   HD RN arrived to the suite. Pt A&Ox4. Consent signed & on file. SBAR received from Primary RN. 0845: Both lumens of tunneled CVC disinfected with Alcohol per policy. Each lumen aspirated for blood return and flushed with Normal Saline per policy. Labs drawn per request/ order. VSS. Dialysis Tx initiated. 0900: Pt resting quietly. 1015 Verbal read back order: , UF increased to 3000 cc per Md. Mitchell PETTIT  1200: Tx ended. VSS. All possible blood returned to patient. Central line catheter flushed with normal saline per policy. Ports disinfected with Alcohol per policy and lines disconnected and capped using aseptic technique. Bed locked and in the lowest position, call bell and belongings in reach. Machine disinfected with bleach, at the end was tested for residue. SBAR given to Primary, RN. Patient is stable at time of my departure. All Dialysis related medications have been reviewed. Admiting Diagnosis: ASHVIN  Pt's previous clinic- N/A  Consent signed - Informed Consent Verified: Yes (11/14/19 0845)  [facility] Consent - on file  Hepatitis Status- positive 10/11/2019  Machine #- Machine Number: [id number] (11/14/19 0845)  Telemetry status- N/A  Pre-dialysis wt. - Pre-Dialysis Weight: 83.9 kg (185 lb) (11/12/19 6875)

## 2019-11-15 LAB
ALBUMIN SERPL-MCNC: 2.6 G/DL (ref 3.5–5)
ANION GAP SERPL CALC-SCNC: 5 MMOL/L (ref 5–15)
BUN SERPL-MCNC: 19 MG/DL (ref 6–20)
BUN/CREAT SERPL: 5 (ref 12–20)
CALCIUM SERPL-MCNC: 8 MG/DL (ref 8.5–10.1)
CHLORIDE SERPL-SCNC: 103 MMOL/L (ref 97–108)
CO2 SERPL-SCNC: 28 MMOL/L (ref 21–32)
CREAT SERPL-MCNC: 3.83 MG/DL (ref 0.7–1.3)
ERYTHROCYTE [DISTWIDTH] IN BLOOD BY AUTOMATED COUNT: 16.2 % (ref 11.5–14.5)
GLUCOSE BLD STRIP.AUTO-MCNC: 106 MG/DL (ref 65–100)
GLUCOSE BLD STRIP.AUTO-MCNC: 116 MG/DL (ref 65–100)
GLUCOSE BLD STRIP.AUTO-MCNC: 123 MG/DL (ref 65–100)
GLUCOSE BLD STRIP.AUTO-MCNC: 86 MG/DL (ref 65–100)
GLUCOSE SERPL-MCNC: 84 MG/DL (ref 65–100)
HCT VFR BLD AUTO: 34 % (ref 36.6–50.3)
HGB BLD-MCNC: 10.4 G/DL (ref 12.1–17)
MCH RBC QN AUTO: 29.3 PG (ref 26–34)
MCHC RBC AUTO-ENTMCNC: 30.6 G/DL (ref 30–36.5)
MCV RBC AUTO: 95.8 FL (ref 80–99)
NRBC # BLD: 0 K/UL (ref 0–0.01)
NRBC BLD-RTO: 0 PER 100 WBC
PHOSPHATE SERPL-MCNC: 3.2 MG/DL (ref 2.6–4.7)
PLATELET # BLD AUTO: 276 K/UL (ref 150–400)
PMV BLD AUTO: 9.8 FL (ref 8.9–12.9)
POTASSIUM SERPL-SCNC: 3.9 MMOL/L (ref 3.5–5.1)
RBC # BLD AUTO: 3.55 M/UL (ref 4.1–5.7)
SERVICE CMNT-IMP: ABNORMAL
SERVICE CMNT-IMP: NORMAL
SODIUM SERPL-SCNC: 136 MMOL/L (ref 136–145)
WBC # BLD AUTO: 6.3 K/UL (ref 4.1–11.1)

## 2019-11-15 PROCEDURE — 74011250636 HC RX REV CODE- 250/636: Performed by: INTERNAL MEDICINE

## 2019-11-15 PROCEDURE — 90935 HEMODIALYSIS ONE EVALUATION: CPT

## 2019-11-15 PROCEDURE — 36415 COLL VENOUS BLD VENIPUNCTURE: CPT

## 2019-11-15 PROCEDURE — 82962 GLUCOSE BLOOD TEST: CPT

## 2019-11-15 PROCEDURE — 94760 N-INVAS EAR/PLS OXIMETRY 1: CPT

## 2019-11-15 PROCEDURE — 65660000000 HC RM CCU STEPDOWN

## 2019-11-15 PROCEDURE — 80069 RENAL FUNCTION PANEL: CPT

## 2019-11-15 PROCEDURE — 85027 COMPLETE CBC AUTOMATED: CPT

## 2019-11-15 PROCEDURE — 74011250637 HC RX REV CODE- 250/637: Performed by: INTERNAL MEDICINE

## 2019-11-15 PROCEDURE — 74011250637 HC RX REV CODE- 250/637: Performed by: UROLOGY

## 2019-11-15 RX ADMIN — HEPARIN SODIUM 3800 UNITS: 1000 INJECTION INTRAVENOUS; SUBCUTANEOUS at 12:04

## 2019-11-15 RX ADMIN — Medication 10 ML: at 21:16

## 2019-11-15 RX ADMIN — HEPARIN SODIUM 5000 UNITS: 5000 INJECTION INTRAVENOUS; SUBCUTANEOUS at 18:06

## 2019-11-15 RX ADMIN — SPIRONOLACTONE 25 MG: 25 TABLET ORAL at 13:35

## 2019-11-15 RX ADMIN — FUROSEMIDE 40 MG: 40 TABLET ORAL at 13:34

## 2019-11-15 RX ADMIN — Medication 10 ML: at 14:06

## 2019-11-15 RX ADMIN — LABETALOL HYDROCHLORIDE 300 MG: 200 TABLET, FILM COATED ORAL at 14:06

## 2019-11-15 RX ADMIN — LABETALOL HYDROCHLORIDE 300 MG: 200 TABLET, FILM COATED ORAL at 21:15

## 2019-11-15 RX ADMIN — HEPARIN SODIUM 5000 UNITS: 5000 INJECTION INTRAVENOUS; SUBCUTANEOUS at 23:23

## 2019-11-15 RX ADMIN — Medication 10 ML: at 05:58

## 2019-11-15 RX ADMIN — FINASTERIDE 5 MG: 5 TABLET, FILM COATED ORAL at 13:35

## 2019-11-15 RX ADMIN — AMLODIPINE BESYLATE 10 MG: 5 TABLET ORAL at 13:35

## 2019-11-15 NOTE — PROGRESS NOTES
Orthopedic End of Shift Note    Bedside shift change report given to Aye Chavez RN (oncoming nurse) by Liliana Juarez RN (offgoing nurse). Report included the following information SBAR, Kardex, ED Summary, Procedure Summary, Intake/Output, MAR, Accordion, Recent Results, Med Rec Status and Cardiac Rhythm NSR.      POD#     Significant issues during shift: none    Issues for Physician to address: none    Activity This Shift  (check all that apply) [x] chair  [] dangle   [x] bathroom  [] bedside commode [x] hallway  [] bedrest   Nausea/Vomiting [] yes [x] no     Voiding Status [] void [] Stone [] I&O Cath   Bowel Movements [] yes [x] no     Foot Pumps or SCD [x] yes [] no    Ice Pack [] yes    [x] no    Incentive Spirometer [] yes [x] no Volume:      Telemetry Monitoring   [x] yes [] no Rhythm:NSR   Supplemental O2 [] yes [x] no Sat off O2:   99%

## 2019-11-15 NOTE — DIALYSIS
[facility]  [phone]    Vitals   Pre   Post   Assessment   Pre   Post     Temp  Temp: 98.5 °F (36.9 °C) (11/15/19 0850)  98.3 LOC  A&OX4 A&OX4   HR   Pulse (Heart Rate): 79 (11/15/19 0850) 76 Lungs   diminished  diminished   B/P   BP: (!) 165/96 (11/15/19 0850) 147/93 Cardiac   HRR  HRR   Resp   Resp Rate: 16 (11/15/19 0850) 16 Skin   intact  intact   Pain level  Pain Intensity 1: 0 (11/15/19 0351) 1 Edema    generalized 1/2+   same   Orders:    Duration:   Start:   6312 End:   1205 Total:   3.25 hours   Dialyzer:   Dialyzer/Set Up Inspection: Julito Reardon (11/15/19 0850)   K Bath:   Dialysate K (mEq/L): 3 (11/15/19 0850)   Ca Bath:   Dialysate CA (mEq/L): 2.5 (11/15/19 0850)   Na/Bicarb:   Dialysate NA (mEq/L): 140 (11/15/19 0850)   Target Fluid Removal:   Goal/Amount of Fluid to Remove (mL): 3000 mL (11/15/19 0850)   Access     Type & Location:   RIJ permacath. Each catheter limb disinfected for 60 seconds per limb with alcohol swabs. Caps removed, dialysis CVC hub scrubbed with Prevantics for 5 seconds, followed by a 5 second dry time per Hospital P&P. Each catheter limb disinfected for 60 seconds per limb with alcohol swabs. Dialysis CVC hubs scrubbed with Prevantics for 5 seconds, followed by a 5 second dry time per Hospital P&P, red and blue dialysis caps applied to ports.      Labs     Obtained/Reviewed   Critical Results Called   Date when labs were drawn-  Hgb-    HGB   Date Value Ref Range Status   11/14/2019 9.7 (L) 12.1 - 17.0 g/dL Final     K-    Potassium   Date Value Ref Range Status   11/15/2019 3.9 3.5 - 5.1 mmol/L Final     Ca-   Calcium   Date Value Ref Range Status   11/15/2019 8.0 (L) 8.5 - 10.1 MG/DL Final     Bun-   BUN   Date Value Ref Range Status   11/15/2019 19 6 - 20 MG/DL Final     Creat-   Creatinine   Date Value Ref Range Status   11/15/2019 3.83 (H) 0.70 - 1.30 MG/DL Final     Comment:     INVESTIGATED PER DELTA CHECK PROTOCOL        Medications/ Blood Products Given     Name   Dose   Route and Time     heparin 3800 units  intracath/1205             Blood Volume Processed (BVP):    67.6 liters Net Fluid   Removed:  3000 ml   Comments Patient tolerated tx well, no complaints during or after. Report given to unit nurse, Jasper Kim RN, using SBAR. Time Out Done: yes, 1205  Primary Nurse Rpt Pre: Jasper Kim RN  Primary Nurse Rpt Post: Jasper Kim RN  Pt Education: Procedural  Care Plan: HD with volume removal  Tx Summary: 3.25 hours on 3 K 2.5 Ca 140/35 removed 3000 ml net  Admiting Diagnosis:  Pt's previous clinic-n/a  Consent signed - Informed Consent Verified: Yes (11/15/19 2993)  [facility] Consent - yes  Hepatitis Status- Hep B + 11/10/19  Machine #- Machine Number: [id number] (11/15/19 7137)  Telemetry status-yes  Pre-dialysis wt. - Pre-Dialysis Weight: 83.9 kg (185 lb) (11/12/19 1235)

## 2019-11-15 NOTE — PROGRESS NOTES
Hospitalist Progress Note    NAME: Macario Brittle   :  1950   MRN:  [id number]       Assessment / Plan:  Clinically stable to be DC , awaiting SNF /insurance auth   Acute on chronic kidney disease Stage V   Metabolic acidosis   New HD patient , HD started on   - Cr continued to trend up in spite of Brice placement.  Creatinine baseline is around 5   -pt is now agreeable to start HD , had been refusing all this time. Bicarb stopped   -R  permacath  placed today and HD started on   - OP HD to be set up by CM for MWF ( CM found a [facility])       Positive hepC and Hep B  No indication for acute treatment , can FU as OP with Dr arana      Hypertension  controlled  -Continue amlodipine, labetalol and added Cardura  - Hold Lisinopril due to ASHVIN/CKD  - Hydralazine as needed   - clonidine discontinued due to compliance issues and may cause rebound htn      Gross Hematuria   Resolved now.  Outpatient follow-up with urology.  Ultrasound  showed no acute process other than renal cysts        Urinary  Retention s/p brice   - likely 2/2 BPH vs blood clots   -brice out   - pt voiding     Generalized weakness likely multifactorial  continue PT/OT    Anemia most likely secondary to chronic kidney disease  -Hemoglobin 9.1, c/w epoetin     DM type II  continue sliding scale insulin with blood sugar checks    Chronic diastolic heart failure  start lasix 40mg daily per renal -      Hx Right humeral neck fracture.  -pain med PRN      25.0 - 29.9 Overweight / Body mass index is 26.8 kg/m². Code status: Full  Prophylaxis: SCD's  Recommended Disposition: Home w/Family     Subjective:     Chief Complaint / Reason for Physician Visit  FU ESRD on  HD the patient . No acute complaints . Discussed with RN events overnight.      Review of Systems:  Symptom Y/N Comments  Symptom Y/N Comments   Fever/Chills n   Chest Pain n    Poor Appetite    Edema     Cough n   Abdominal Pain n    Sputum n   Joint Pain     SOB/POWELL n Pruritis/Rash     Nausea/vomit n   Tolerating PT/OT     Diarrhea    Tolerating Diet y    Constipation    Other       Could NOT obtain due to:      Objective:     VITALS:   Last 24hrs VS reviewed since prior progress note. Most recent are:  Patient Vitals for the past 24 hrs:   Temp Pulse Resp BP SpO2   11/15/19 0351 98.4 °F (36.9 °C) 74 16 136/84 98 %   11/14/19 2331 98 °F (36.7 °C) 75 16 (!) 143/92 100 %   11/14/19 2033 98.3 °F (36.8 °C) 78 16 (!) 163/91 98 %   11/14/19 1600 98 °F (36.7 °C) 72 16 152/77 100 %   11/14/19 1210 98.6 °F (37 °C) 76 19 (!) 170/103    11/14/19 1200  76 18 (!) 175/100    11/14/19 1145  76 17 (!) 168/93    11/14/19 1130  76 18 (!) 169/103    11/14/19 1115  77 17 (!) 166/100    11/14/19 1100  78 19 (!) 166/99    11/14/19 1045  78 18 (!) 169/99    11/14/19 1030  77 17 (!) 168/100    11/14/19 1015  77 19 (!) 167/96    11/14/19 1000  78 19 (!) 166/98    11/14/19 0945  77 17 158/90    11/14/19 0930  75 16 130/80    11/14/19 0915  77 18 (!) 163/92    11/14/19 0900  78 19 (!) 162/91    11/14/19 0845 97.4 °F (36.3 °C) 80 17 (!) 170/95        Intake/Output Summary (Last 24 hours) at 11/15/2019 0806  Last data filed at 11/14/2019 1200  Gross per 24 hour   Intake    Output 3000 ml   Net -3000 ml        PHYSICAL EXAM:  General: WD, WN. Alert, cooperative, no acute distress    EENT:  EOMI. Anicteric sclerae. MMM  Resp:  CTA bilaterally, no wheezing or rales. No accessory muscle use  CV:  Regular  rhythm,  No edema  GI:  Soft, Non distended, Non tender.  +Bowel sounds  Neurologic:  Alert and oriented X 3, normal speech,   Psych:   Good insight. Not anxious nor agitated  Skin:  No rashes.   No jaundice    Reviewed most current lab test results and cultures  YES  Reviewed most current radiology test results   YES  Review and summation of old records today    NO  Reviewed patient's current orders and MAR    YES  PMH/SH reviewed - no change compared to H&P  ________________________________________________________________________  Care Plan discussed with:    Comments   Patient x    Family      RN x    Care Manager     Consultant                       x Multidiciplinary team rounds were held today with , nursing, pharmacist and clinical coordinator. Patient's plan of care was discussed; medications were reviewed and discharge planning was addressed. ________________________________________________________________________  Total NON critical care TIME:  25   Minutes    Total CRITICAL CARE TIME Spent:   Minutes non procedure based      Comments   >50% of visit spent in counseling and coordination of care x    ________________________________________________________________________  Jeanine Dinh MD     Procedures: see electronic medical records for all procedures/Xrays and details which were not copied into this note but were reviewed prior to creation of Plan. LABS:  I reviewed today's most current labs and imaging studies.   Pertinent labs include:  Recent Labs     11/14/19  0907   WBC 5.1   HGB 9.7*   HCT 31.0*        Recent Labs     11/15/19  0456 11/14/19  0907 11/13/19  0352    136 138   K 3.9 4.1 4.0    103 105   CO2 28 28 28   GLU 84 147* 81   BUN 19 29* 29*   CREA 3.83* 4.86* 3.85*   CA 8.0* 8.0* 7.8*   PHOS 3.2 3.7 3.7   ALB 2.6* 2.6* 2.4*       Signed: Jeanine Dinh MD

## 2019-11-15 NOTE — PROGRESS NOTES
Nephrology Progress Note     Jarvis Carrera     [email or web address]                  Phone - [phone]   Patient: Kaitlynn Tsang   Date- 11/15/2019        Admit Date: 11/2/2019  YOB: 1950             CC: Follow up for  New onset  ESRD        Subjective: Interval History:   -seen on dialysis today  Hypertension/ bp improved  No c/o sob,   No c/o chest pain,   No c/o nausea or vomiting  No c/o  fever. ROS:- as above   Assessment & Plan:     NEW ONSET ESRD  - seen on hd today  - remove 2.5 kg - 3 kg as tolerated  - continue hd MWF. He is accepted at [facility]  -  He will go back to DR. RIVERA - HE IS HEPATITIS B POSITIVE. ASHVIN on ckd-ATN  - LIKELY DUE TO URINARY RETENTION +/- normalisation of bp causing renal hypoperfusion -       hypertension  - continue norvasc,  Labetalol , aldactone    H/o hep B AND HEP C  - GI INPUT NOTED AND APPRECIATED. -+ve hep b RNA, +ve hep b E antigen. - he has active hep b. He will follow up with GI as out pt.     anemia of ckd  - continue epogen and venofer    Hematuria- urinary retention  - urology input noted     metabolic acidosis  - improved    Edema  - should improve with fluid removal on hd  -  lasix 40 mg daily     CKD - f/b   - ckd likely due to dm + HTN          TYPE 2 DM  PROTEINURIA , Microscopic hematuria  - +ve hep b and hep c -      Right humeral neck fracture       OKAY TO D/C - renal stand point         Physical exam:   GEN:  NAD  NECK:  Supple, no thyromegaly  RESP: CTA  b/l, no  wheezing,   CVS: RRR,S1,S2   ABDO:  soft , non tender, No mass  NEURO: non focal, normal speech  EXT: Edema +nt      Right ij permacath +      Care Plan discussed with: patient, HD NURSE  Objective:   Visit Vitals  BP (!) 156/91   Pulse 76   Temp 98.5 °F (36.9 °C)   Resp 16   Wt 82.3 kg (181 lb 8.2 oz)   SpO2 100%   BMI 26.80 kg/m²     Last 3 Recorded Weights in this Encounter    11/11/19 2031 11/13/19 0350 11/14/19 1919 Weight: 86.3 kg (190 lb 4.8 oz) 82.3 kg (181 lb 8 oz) 82.3 kg (181 lb 8.2 oz)     11/13 1901 - 11/15 0700  In: 480 [P.O.:480]  Out: 3000     Intake/Output Summary (Last 24 hours) at 11/15/2019 1017  Last data filed at 11/14/2019 1200  Gross per 24 hour   Intake    Output 3000 ml   Net -3000 ml      Chart reviewed. Pertinent Notes reviewed. Medication list  reviewed   Current Facility-Administered Medications   Medication    heparin (porcine) injection 5,000 Units    spironolactone (ALDACTONE) tablet 25 mg    heparin (porcine) 1,000 unit/mL injection 3,800 Units    furosemide (LASIX) tablet 40 mg    labetalol (NORMODYNE) tablet 300 mg    finasteride (PROSCAR) tablet 5 mg    epoetin charisse-epbx (RETACRIT) 12,000 Units combo injection    sodium chloride (NS) flush 5-40 mL    sodium chloride (NS) flush 5-40 mL    acetaminophen (TYLENOL) tablet 650 mg    ondansetron (ZOFRAN) injection 4 mg    bisacodyl (DULCOLAX) tablet 5 mg    glucose chewable tablet 16 g    dextrose (D50W) injection syrg 12.5-25 g    glucagon (GLUCAGEN) injection 1 mg    insulin lispro (HUMALOG) injection    amLODIPine (NORVASC) tablet 10 mg    aspirin delayed-release tablet 81 mg    hydrALAZINE (APRESOLINE) 20 mg/mL injection 20 mg           Data Review :  Recent Labs     11/15/19  0456 11/14/19  0907 11/13/19  0352    136 138   K 3.9 4.1 4.0    103 105   CO2 28 28 28   BUN 19 29* 29*   CREA 3.83* 4.86* 3.85*   GLU 84 147* 81   CA 8.0* 8.0* 7.8*   PHOS 3.2 3.7 3.7     Recent Labs     11/14/19  0907   WBC 5.1   HGB 9.7*   HCT 31.0*        No results for input(s): FE, TIBC, PSAT, FERR in the last 72 hours. No results for input(s): CPK, CKNDX, TROIQ in the last 72 hours.     No lab exists for component: CPKMB  Lab Results   Component Value Date/Time    Color ABRAHAM 11/01/2019 09:05 PM    Appearance CLOUDY (A) 11/01/2019 09:05 PM    Specific gravity 1.015 11/01/2019 09:05 PM    pH (UA) 5.5 11/01/2019 09:05 PM    Protein >300 (A) 11/01/2019 09:05 PM    Glucose NEGATIVE  11/01/2019 09:05 PM    Ketone NEGATIVE  11/01/2019 09:05 PM    Bilirubin NEGATIVE  11/01/2019 09:05 PM    Urobilinogen 0.2 11/01/2019 09:05 PM    Nitrites NEGATIVE  11/01/2019 09:05 PM    Leukocyte Esterase TRACE (A) 11/01/2019 09:05 PM    Epithelial cells FEW 11/01/2019 09:05 PM    Bacteria NEGATIVE  11/01/2019 09:05 PM    WBC 0-4 11/01/2019 09:05 PM    RBC  11/01/2019 09:05 PM     Lab Results   Component Value Date/Time    Culture result: NO GROWTH 5 DAYS 09/04/2019 06:29 PM     No results found for: SDES  Lab Results   Component Value Date/Time    Sodium,urine random 65 01/24/2019 12:52 AM    Creatinine, urine 94.80 01/24/2019 12:52 AM       Results from Hospital Encounter encounter on 11/01/19   XR CHEST PA LAT    Narrative EXAM: XR CHEST PA LAT    INDICATION: pneumonia    COMPARISON: Chest x-ray 9/2/2019. FINDINGS: PA and lateral radiographs of the chest demonstrate hyperinflated but  otherwise clear lungs. The cardiac and mediastinal contours and pulmonary  vascularity are normal. Atherosclerotic calcifications affect the aortic arch  and the thoracic aorta is tortuous. The chest wall structures and visualized  upper abdomen show no acute findings with incidental note of degenerative spine,  right humeral neck fracture, and diffuse osteopenia. Impression IMPRESSION: No acute cardiopulmonary findings. COPD. Right humeral neck  fracture. Tasneem Velez MD  [facility]   [email or web address]  [facility]  [address], [address]  [place], [address]  Phone - [phone]         Fax - [phone]

## 2019-11-15 NOTE — PROGRESS NOTES
Orthopedic End of Shift Note    Bedside and Verbal shift change report given to Tory Hatfield (oncoming nurse) by Puja Billy (offgoing nurse). Report included the following information SBAR, Kardex, Intake/Output, MAR and Recent Results. POD#     Significant issues during shift: No c/o pain. Patient up ad sneha to bathroom for BM. Skin intact. On room air. Issues for Physician to address: No c/o pain. Patient up ad sneha to bathroom for BM. Skin intact. On room air.     Activity This Shift  (check all that apply) [] chair  [] dangle   [x] bathroom  [] bedside commode [] hallway  [] bedrest   Nausea/Vomiting [] yes [x] no     Voiding Status [] void [] Stone [] I&O Cath   Bowel Movements [x] yes [] no     Foot Pumps or SCD [] yes [x] no    Ice Pack [] yes    [x] no    Incentive Spirometer [] yes [] no Volume:      Telemetry Monitoring   [x] yes [] no Rhythm: sinus rhythm    Supplemental O2 [] yes [x] no Sat off O2:  100%

## 2019-11-15 NOTE — PROGRESS NOTES
JADA    SNF: [facility] [address] pending  HD chair with Mnajinder ([employer] MWF 7:45am)  [employer]: needing additional documents     3:30pm: CM met with Pt. Pt provided CM with utility bill with his name and current address. CM sent document to [employer].        11:20am: CM contacted [facility] to check progress of referral. CM was informed that intake would need an copy of Pt's voters card or a utility bill with his name and current address for proof of residence in [place]. CM met with Pt. Pt currently receiving HD at bedside. Pt reports that he does not know where he has placed his voters card. CM inquired about utility bill, Pt asked if CM could return at a later time.        Andrea Roque, [address] [place] [address]

## 2019-11-16 LAB
ALBUMIN SERPL-MCNC: 2.6 G/DL (ref 3.5–5)
ANION GAP SERPL CALC-SCNC: 7 MMOL/L (ref 5–15)
BUN SERPL-MCNC: 17 MG/DL (ref 6–20)
BUN/CREAT SERPL: 5 (ref 12–20)
CALCIUM SERPL-MCNC: 8.3 MG/DL (ref 8.5–10.1)
CHLORIDE SERPL-SCNC: 103 MMOL/L (ref 97–108)
CO2 SERPL-SCNC: 28 MMOL/L (ref 21–32)
CREAT SERPL-MCNC: 3.52 MG/DL (ref 0.7–1.3)
GLUCOSE BLD STRIP.AUTO-MCNC: 102 MG/DL (ref 65–100)
GLUCOSE BLD STRIP.AUTO-MCNC: 103 MG/DL (ref 65–100)
GLUCOSE BLD STRIP.AUTO-MCNC: 124 MG/DL (ref 65–100)
GLUCOSE BLD STRIP.AUTO-MCNC: 73 MG/DL (ref 65–100)
GLUCOSE BLD STRIP.AUTO-MCNC: 85 MG/DL (ref 65–100)
GLUCOSE SERPL-MCNC: 80 MG/DL (ref 65–100)
PHOSPHATE SERPL-MCNC: 3.3 MG/DL (ref 2.6–4.7)
POTASSIUM SERPL-SCNC: 4.2 MMOL/L (ref 3.5–5.1)
SERVICE CMNT-IMP: ABNORMAL
SERVICE CMNT-IMP: NORMAL
SERVICE CMNT-IMP: NORMAL
SODIUM SERPL-SCNC: 138 MMOL/L (ref 136–145)

## 2019-11-16 PROCEDURE — 36415 COLL VENOUS BLD VENIPUNCTURE: CPT

## 2019-11-16 PROCEDURE — 74011250637 HC RX REV CODE- 250/637: Performed by: INTERNAL MEDICINE

## 2019-11-16 PROCEDURE — 74011250636 HC RX REV CODE- 250/636: Performed by: INTERNAL MEDICINE

## 2019-11-16 PROCEDURE — 80069 RENAL FUNCTION PANEL: CPT

## 2019-11-16 PROCEDURE — 74011250637 HC RX REV CODE- 250/637: Performed by: UROLOGY

## 2019-11-16 PROCEDURE — 82962 GLUCOSE BLOOD TEST: CPT

## 2019-11-16 PROCEDURE — 94760 N-INVAS EAR/PLS OXIMETRY 1: CPT

## 2019-11-16 PROCEDURE — 65660000000 HC RM CCU STEPDOWN

## 2019-11-16 RX ADMIN — Medication 10 ML: at 06:01

## 2019-11-16 RX ADMIN — HEPARIN SODIUM 5000 UNITS: 5000 INJECTION INTRAVENOUS; SUBCUTANEOUS at 09:07

## 2019-11-16 RX ADMIN — AMLODIPINE BESYLATE 10 MG: 5 TABLET ORAL at 09:06

## 2019-11-16 RX ADMIN — FUROSEMIDE 40 MG: 40 TABLET ORAL at 09:06

## 2019-11-16 RX ADMIN — LABETALOL HYDROCHLORIDE 300 MG: 200 TABLET, FILM COATED ORAL at 21:22

## 2019-11-16 RX ADMIN — HEPARIN SODIUM 5000 UNITS: 5000 INJECTION INTRAVENOUS; SUBCUTANEOUS at 23:28

## 2019-11-16 RX ADMIN — Medication 10 ML: at 21:23

## 2019-11-16 RX ADMIN — HEPARIN SODIUM 5000 UNITS: 5000 INJECTION INTRAVENOUS; SUBCUTANEOUS at 16:08

## 2019-11-16 RX ADMIN — Medication 10 ML: at 16:10

## 2019-11-16 RX ADMIN — FINASTERIDE 5 MG: 5 TABLET, FILM COATED ORAL at 09:07

## 2019-11-16 RX ADMIN — SPIRONOLACTONE 25 MG: 25 TABLET ORAL at 09:06

## 2019-11-16 RX ADMIN — LABETALOL HYDROCHLORIDE 300 MG: 200 TABLET, FILM COATED ORAL at 09:06

## 2019-11-16 NOTE — PROGRESS NOTES
Problem: Falls - Risk of  Goal: *Absence of Falls  Description  Document Waldemar Arguelles Fall Risk and appropriate interventions in the flowsheet. Outcome: Progressing Towards Goal  Note:   Fall Risk Interventions:  Mobility Interventions: Patient to call before getting OOB         Medication Interventions: Patient to call before getting OOB    Elimination Interventions: Call light in reach    History of Falls Interventions: Consult care management for discharge planning, Door open when patient unattended, Investigate reason for fall, Room close to nurse's station         Problem: Patient Education: Go to Patient Education Activity  Goal: Patient/Family Education  Outcome: Progressing Towards Goal     Problem: Chronic Renal Failure  Goal: *Fluid and electrolytes stabilized  Outcome: Progressing Towards Goal     Problem: Pressure Injury - Risk of  Goal: *Prevention of pressure injury  Description  Document Zaki Scale and appropriate interventions in the flowsheet.   Outcome: Progressing Towards Goal  Note:   Pressure Injury Interventions:  Sensory Interventions: Chair cushion    Moisture Interventions: Absorbent underpads    Activity Interventions: Increase time out of bed    Mobility Interventions: Float heels    Nutrition Interventions: Document food/fluid/supplement intake    Friction and Shear Interventions: Apply protective barrier, creams and emollients                Problem: Patient Education: Go to Patient Education Activity  Goal: Patient/Family Education  Outcome: Progressing Towards Goal

## 2019-11-16 NOTE — PROGRESS NOTES
Bedside and Verbal shift change report given to Ania Kline (oncoming nurse) by Talya Holder (offgoing nurse). Report included the following information SBAR, Kardex, Intake/Output, MAR, Accordion and Recent Results.

## 2019-11-16 NOTE — PROGRESS NOTES
Bedside shift change report given to Jennifer Riggs RN (oncoming nurse) by Pam Winn RN (offgoing nurse). Report included the following information SBAR, Kardex, ED Summary, Procedure Summary, Intake/Output, MAR, Accordion, Recent Results, Med Rec Status and Cardiac Rhythm NSR.

## 2019-11-16 NOTE — PROGRESS NOTES
Hospitalist Progress Note    NAME: Sumi Setting   :  1950   MRN:  [id number]       Assessment / Plan:  Clinically stable to be DC , awaiting SNF /insurance auth   Acute on chronic kidney disease Stage V   Metabolic acidosis   New HD patient , HD started on   - Cr continued to trend up in spite of Brice placement.  Creatinine baseline is around 5   -pt is now agreeable to start HD , had been refusing all this time. Bicarb stopped   -R  permacath  placed today and HD started on   - OP HD to be set up by CM for MWF ( CM found a chaiR)       Positive hepC and Hep B  No indication for acute treatment , can FU as OP with Dr arana      Hypertension  controlled  -Continue amlodipine, labetalol and added Cardura  - Hold Lisinopril due to ASHVIN/CKD  - Hydralazine as needed   - clonidine discontinued due to compliance issues and may cause rebound htn      Gross Hematuria   Resolved now.  Outpatient follow-up with urology.  Ultrasound  showed no acute process other than renal cysts        Urinary  Retention s/p brice   - likely 2/2 BPH vs blood clots   -brice out   - pt voiding     Generalized weakness likely multifactorial  continue PT/OT    Anemia most likely secondary to chronic kidney disease  -Hemoglobin 10.4, c/w epoetin     DM type II  continue sliding scale insulin with blood sugar checks    Chronic diastolic heart failure  c/w lasix 40mg daily per renal -      Hx Right humeral neck fracture.  -pain med PRN      25.0 - 29.9 Overweight / Body mass index is 26.8 kg/m². Code status: Full  Prophylaxis: SCD's  Recommended Disposition: Home w/Family   : no change   Subjective:     Chief Complaint / Reason for Physician Visit  FU ESRD on  HD the patient . No acute complaints . Discussed with RN events overnight.      Review of Systems:  Symptom Y/N Comments  Symptom Y/N Comments   Fever/Chills n   Chest Pain n    Poor Appetite    Edema     Cough n   Abdominal Pain n    Sputum n   Joint Pain SOB/POWELL n   Pruritis/Rash     Nausea/vomit n   Tolerating PT/OT     Diarrhea    Tolerating Diet y    Constipation    Other       Could NOT obtain due to:      Objective:     VITALS:   Last 24hrs VS reviewed since prior progress note. Most recent are:  Patient Vitals for the past 24 hrs:   Temp Pulse Resp BP SpO2   11/16/19 0837 98.1 °F (36.7 °C) 80 16 (!) 165/93 98 %   11/16/19 0409 98.1 °F (36.7 °C) 78 18 143/80 99 %   11/15/19 2319 98.2 °F (36.8 °C) 80 16 (!) 158/91 98 %   11/15/19 2015 98.7 °F (37.1 °C) 79 16 145/84 97 %   11/15/19 1205 98.3 °F (36.8 °C) 76 16 (!) 147/93    11/15/19 1150  75 16 (!) 161/99    11/15/19 1135  74 16 (!) 159/98    11/15/19 1120  75 16 (!) 164/94    11/15/19 1105  76 16 (!) 161/93    11/15/19 1052  76 16 (!) 159/95    11/15/19 1046  76 16 149/89    11/15/19 1025  77 16 (!) 146/95    11/15/19 1004  76 16 (!) 156/91    11/15/19 0952  75 16 138/81    11/15/19 0936  75 16 143/80    11/15/19 0920  76 16 (!) 152/91    11/15/19 0905  77 16 138/89    11/15/19 0850 98.5 °F (36.9 °C) 79 16 (!) 165/96        Intake/Output Summary (Last 24 hours) at 11/16/2019 0843  Last data filed at 11/15/2019 1406  Gross per 24 hour   Intake 240 ml   Output 3000 ml   Net -2760 ml        PHYSICAL EXAM:  General: WD, WN. Alert, cooperative, no acute distress    EENT:  EOMI. Anicteric sclerae. MMM  Resp:  CTA bilaterally, no wheezing or rales. No accessory muscle use  CV:  Regular  rhythm,  No edema  GI:  Soft, Non distended, Non tender.  +Bowel sounds  Neurologic:  Alert and oriented X 3, normal speech,   Psych:   Good insight. Not anxious nor agitated  Skin:  No rashes.   No jaundice    Reviewed most current lab test results and cultures  YES  Reviewed most current radiology test results   YES  Review and summation of old records today    NO  Reviewed patient's current orders and MAR    YES  PMH/SH reviewed - no change compared to H&P  ________________________________________________________________________  Care Plan discussed with:    Comments   Patient x    Family      RN x    Care Manager     Consultant                       x Multidiciplinary team rounds were held today with , nursing, pharmacist and clinical coordinator. Patient's plan of care was discussed; medications were reviewed and discharge planning was addressed. ________________________________________________________________________  Total NON critical care TIME:  25   Minutes    Total CRITICAL CARE TIME Spent:   Minutes non procedure based      Comments   >50% of visit spent in counseling and coordination of care x    ________________________________________________________________________  Kyara Aguilar MD     Procedures: see electronic medical records for all procedures/Xrays and details which were not copied into this note but were reviewed prior to creation of Plan. LABS:  I reviewed today's most current labs and imaging studies.   Pertinent labs include:  Recent Labs     11/15/19  1104 11/14/19  0907   WBC 6.3 5.1   HGB 10.4* 9.7*   HCT 34.0* 31.0*    296     Recent Labs     11/16/19  0343 11/15/19  0456 11/14/19  0907    136 136   K 4.2 3.9 4.1    103 103   CO2 28 28 28   GLU 80 84 147*   BUN 17 19 29*   CREA 3.52* 3.83* 4.86*   CA 8.3* 8.0* 8.0*   PHOS 3.3 3.2 3.7   ALB 2.6* 2.6* 2.6*       Signed: Kyara Aguilar MD

## 2019-11-17 LAB
ALBUMIN SERPL-MCNC: 2.6 G/DL (ref 3.5–5)
ANION GAP SERPL CALC-SCNC: 8 MMOL/L (ref 5–15)
BUN SERPL-MCNC: 25 MG/DL (ref 6–20)
BUN/CREAT SERPL: 5 (ref 12–20)
CALCIUM SERPL-MCNC: 8.1 MG/DL (ref 8.5–10.1)
CHLORIDE SERPL-SCNC: 102 MMOL/L (ref 97–108)
CO2 SERPL-SCNC: 27 MMOL/L (ref 21–32)
CREAT SERPL-MCNC: 4.59 MG/DL (ref 0.7–1.3)
GLUCOSE BLD STRIP.AUTO-MCNC: 106 MG/DL (ref 65–100)
GLUCOSE BLD STRIP.AUTO-MCNC: 82 MG/DL (ref 65–100)
GLUCOSE BLD STRIP.AUTO-MCNC: 91 MG/DL (ref 65–100)
GLUCOSE BLD STRIP.AUTO-MCNC: 97 MG/DL (ref 65–100)
GLUCOSE SERPL-MCNC: 89 MG/DL (ref 65–100)
PHOSPHATE SERPL-MCNC: 3.9 MG/DL (ref 2.6–4.7)
POTASSIUM SERPL-SCNC: 4.2 MMOL/L (ref 3.5–5.1)
SERVICE CMNT-IMP: ABNORMAL
SERVICE CMNT-IMP: NORMAL
SODIUM SERPL-SCNC: 137 MMOL/L (ref 136–145)

## 2019-11-17 PROCEDURE — 74011250637 HC RX REV CODE- 250/637: Performed by: INTERNAL MEDICINE

## 2019-11-17 PROCEDURE — 94760 N-INVAS EAR/PLS OXIMETRY 1: CPT

## 2019-11-17 PROCEDURE — 74011250637 HC RX REV CODE- 250/637: Performed by: UROLOGY

## 2019-11-17 PROCEDURE — 80069 RENAL FUNCTION PANEL: CPT

## 2019-11-17 PROCEDURE — 74011250636 HC RX REV CODE- 250/636: Performed by: INTERNAL MEDICINE

## 2019-11-17 PROCEDURE — 82962 GLUCOSE BLOOD TEST: CPT

## 2019-11-17 PROCEDURE — 36415 COLL VENOUS BLD VENIPUNCTURE: CPT

## 2019-11-17 PROCEDURE — 65660000000 HC RM CCU STEPDOWN

## 2019-11-17 RX ADMIN — HEPARIN SODIUM 5000 UNITS: 5000 INJECTION INTRAVENOUS; SUBCUTANEOUS at 23:12

## 2019-11-17 RX ADMIN — HEPARIN SODIUM 5000 UNITS: 5000 INJECTION INTRAVENOUS; SUBCUTANEOUS at 17:45

## 2019-11-17 RX ADMIN — Medication 10 ML: at 21:47

## 2019-11-17 RX ADMIN — Medication 10 ML: at 14:00

## 2019-11-17 RX ADMIN — FINASTERIDE 5 MG: 5 TABLET, FILM COATED ORAL at 09:04

## 2019-11-17 RX ADMIN — LABETALOL HYDROCHLORIDE 300 MG: 200 TABLET, FILM COATED ORAL at 09:04

## 2019-11-17 RX ADMIN — AMLODIPINE BESYLATE 10 MG: 5 TABLET ORAL at 09:04

## 2019-11-17 RX ADMIN — SPIRONOLACTONE 25 MG: 25 TABLET ORAL at 09:04

## 2019-11-17 RX ADMIN — Medication 10 ML: at 05:30

## 2019-11-17 RX ADMIN — FUROSEMIDE 40 MG: 40 TABLET ORAL at 09:04

## 2019-11-17 RX ADMIN — LABETALOL HYDROCHLORIDE 300 MG: 200 TABLET, FILM COATED ORAL at 21:47

## 2019-11-17 RX ADMIN — HEPARIN SODIUM 5000 UNITS: 5000 INJECTION INTRAVENOUS; SUBCUTANEOUS at 09:03

## 2019-11-17 NOTE — PROGRESS NOTES
Bedside and Verbal shift change report given to Vamshi Ordonez RN (oncoming nurse) by Ahsan Gomez (offgoing nurse). Report included the following information SBAR, Intake/Output, MAR, Accordion and Recent Results.

## 2019-11-17 NOTE — PROGRESS NOTES
Hospitalist Progress Note    NAME: Sumi Setting   :  1950   MRN:  [id number]       Assessment / Plan:  Clinically stable to be DC , awaiting SNF /insurance auth   Acute on chronic kidney disease Stage V   Metabolic acidosis   New HD patient , HD started on   - Cr continued to trend up in spite of Brice placement.  Creatinine baseline is around 5   -pt is now agreeable to start HD , had been refusing all this time. Bicarb stopped   -R  permacath  placed today and HD started on   - OP HD to be set up by CM for MWF ( CM found a chaiR)       Positive hepC and Hep B  No indication for acute treatment , can FU as OP with Dr arana      Hypertension  controlled  -Continue amlodipine, labetalol and added Cardura  - Hold Lisinopril due to ASHVIN/CKD  - Hydralazine as needed   - clonidine discontinued due to compliance issues and may cause rebound htn      Gross Hematuria   Resolved now.  Outpatient follow-up with urology.  Ultrasound  showed no acute process other than renal cysts        Urinary  Retention s/p brice   - likely 2/2 BPH vs blood clots   -brice out   - pt voiding     Generalized weakness likely multifactorial  continue PT/OT    Anemia most likely secondary to chronic kidney disease  -Hemoglobin 10.4, c/w epoetin     DM type II  continue sliding scale insulin with blood sugar checks    Chronic diastolic heart failure  c/w lasix 40mg daily per renal -      Hx Right humeral neck fracture.  -pain med PRN      25.0 - 29.9 Overweight / Body mass index is 25.55 kg/m². Code status: Full  Prophylaxis: SCD's  Recommended Disposition: Home w/Family   : no change   : no change   Subjective:     Chief Complaint / Reason for Physician Visit  FU ESRD on  HD the patient . No acute complaints . Discussed with RN events overnight.      Review of Systems:  Symptom Y/N Comments  Symptom Y/N Comments   Fever/Chills n   Chest Pain n    Poor Appetite    Edema     Cough n   Abdominal Pain n Sputum n   Joint Pain     SOB/POWELL n   Pruritis/Rash     Nausea/vomit n   Tolerating PT/OT     Diarrhea    Tolerating Diet y    Constipation    Other       Could NOT obtain due to:      Objective:     VITALS:   Last 24hrs VS reviewed since prior progress note. Most recent are:  Patient Vitals for the past 24 hrs:   Temp Pulse Resp BP SpO2   11/17/19 0731 98.2 °F (36.8 °C) 75 16 (!) 168/100 98 %   11/17/19 0309 98.4 °F (36.9 °C) 76 18 141/82 96 %   11/16/19 2328 98.3 °F (36.8 °C) 76 16 156/86 98 %   11/16/19 2024 98.3 °F (36.8 °C) 75 18 159/88 98 %   11/16/19 1530 98 °F (36.7 °C) 73 16 (!) 165/99 100 %   11/16/19 1100 98.2 °F (36.8 °C) 74 17 141/84 98 %   11/16/19 0837 98.1 °F (36.7 °C) 80 16 (!) 165/93 98 %       Intake/Output Summary (Last 24 hours) at 11/17/2019 0810  Last data filed at 11/16/2019 1934  Gross per 24 hour   Intake 720 ml   Output    Net 720 ml        PHYSICAL EXAM:  General: WD, WN. Alert, cooperative, no acute distress    EENT:  EOMI. Anicteric sclerae. MMM  Resp:  CTA bilaterally, no wheezing or rales. No accessory muscle use  CV:  Regular  rhythm,  No edema  GI:  Soft, Non distended, Non tender.  +Bowel sounds  Neurologic:  Alert and oriented X 3, normal speech,   Psych:   Good insight. Not anxious nor agitated  Skin:  No rashes. No jaundice    Reviewed most current lab test results and cultures  YES  Reviewed most current radiology test results   YES  Review and summation of old records today    NO  Reviewed patient's current orders and MAR    YES  PMH/SH reviewed - no change compared to H&P  ________________________________________________________________________  Care Plan discussed with:    Comments   Patient x    Family      RN x    Care Manager     Consultant                       x Multidiciplinary team rounds were held today with , nursing, pharmacist and clinical coordinator.   Patient's plan of care was discussed; medications were reviewed and discharge planning was addressed. ________________________________________________________________________  Total NON critical care TIME:  25   Minutes    Total CRITICAL CARE TIME Spent:   Minutes non procedure based      Comments   >50% of visit spent in counseling and coordination of care x    ________________________________________________________________________  Carlitos Hawkins MD     Procedures: see electronic medical records for all procedures/Xrays and details which were not copied into this note but were reviewed prior to creation of Plan. LABS:  I reviewed today's most current labs and imaging studies.   Pertinent labs include:  Recent Labs     11/15/19  1104 11/14/19  0907   WBC 6.3 5.1   HGB 10.4* 9.7*   HCT 34.0* 31.0*    296     Recent Labs     11/17/19  0355 11/16/19  0343 11/15/19  0456    138 136   K 4.2 4.2 3.9    103 103   CO2 27 28 28   GLU 89 80 84   BUN 25* 17 19   CREA 4.59* 3.52* 3.83*   CA 8.1* 8.3* 8.0*   PHOS 3.9 3.3 3.2   ALB 2.6* 2.6* 2.6*       Signed: Carlitos Hawkins MD

## 2019-11-17 NOTE — PROGRESS NOTES
Problem: Falls - Risk of  Goal: *Absence of Falls  Description  Document Vickie Wheatley Fall Risk and appropriate interventions in the flowsheet. Outcome: Progressing Towards Goal  Note:   Fall Risk Interventions:  Mobility Interventions: Patient to call before getting OOB         Medication Interventions: Teach patient to arise slowly    Elimination Interventions: Call light in reach    History of Falls Interventions: Consult care management for discharge planning, Door open when patient unattended, Investigate reason for fall, Room close to nurse's station         Problem: Patient Education: Go to Patient Education Activity  Goal: Patient/Family Education  Outcome: Progressing Towards Goal     Problem: Chronic Renal Failure  Goal: *Fluid and electrolytes stabilized  Outcome: Progressing Towards Goal     Problem: Patient Education: Go to Patient Education Activity  Goal: Patient/Family Education  Outcome: Progressing Towards Goal     Problem: Patient Education: Go to Patient Education Activity  Goal: Patient/Family Education  Outcome: Progressing Towards Goal     Problem: Patient Education: Go to Patient Education Activity  Goal: Patient/Family Education  Outcome: Progressing Towards Goal     Problem: Pressure Injury - Risk of  Goal: *Prevention of pressure injury  Description  Document Zaki Scale and appropriate interventions in the flowsheet.   Outcome: Progressing Towards Goal  Note:   Pressure Injury Interventions:  Sensory Interventions: Keep linens dry and wrinkle-free    Moisture Interventions: Minimize layers    Activity Interventions: Increase time out of bed    Mobility Interventions: HOB 30 degrees or less    Nutrition Interventions: Document food/fluid/supplement intake    Friction and Shear Interventions: Apply protective barrier, creams and emollients                Problem: Patient Education: Go to Patient Education Activity  Goal: Patient/Family Education  Outcome: Progressing Towards Goal

## 2019-11-18 ENCOUNTER — HOME HEALTH ADMISSION (OUTPATIENT)
Dept: HOME HEALTH SERVICES | Facility: HOME HEALTH | Age: 69
End: 2019-11-18
Payer: MEDICARE

## 2019-11-18 VITALS
RESPIRATION RATE: 16 BRPM | SYSTOLIC BLOOD PRESSURE: 136 MMHG | HEART RATE: 75 BPM | OXYGEN SATURATION: 100 % | TEMPERATURE: 98 F | BODY MASS INDEX: 26.97 KG/M2 | DIASTOLIC BLOOD PRESSURE: 90 MMHG | WEIGHT: 182.6 LBS

## 2019-11-18 LAB
ALBUMIN SERPL-MCNC: 2.7 G/DL (ref 3.5–5)
ANION GAP SERPL CALC-SCNC: 7 MMOL/L (ref 5–15)
BUN SERPL-MCNC: 38 MG/DL (ref 6–20)
BUN/CREAT SERPL: 7 (ref 12–20)
CALCIUM SERPL-MCNC: 8.3 MG/DL (ref 8.5–10.1)
CHLORIDE SERPL-SCNC: 103 MMOL/L (ref 97–108)
CO2 SERPL-SCNC: 26 MMOL/L (ref 21–32)
CREAT SERPL-MCNC: 5.37 MG/DL (ref 0.7–1.3)
ERYTHROCYTE [DISTWIDTH] IN BLOOD BY AUTOMATED COUNT: 16.2 % (ref 11.5–14.5)
GLUCOSE BLD STRIP.AUTO-MCNC: 133 MG/DL (ref 65–100)
GLUCOSE BLD STRIP.AUTO-MCNC: 153 MG/DL (ref 65–100)
GLUCOSE BLD STRIP.AUTO-MCNC: 182 MG/DL (ref 65–100)
GLUCOSE BLD STRIP.AUTO-MCNC: 62 MG/DL (ref 65–100)
GLUCOSE BLD STRIP.AUTO-MCNC: 74 MG/DL (ref 65–100)
GLUCOSE SERPL-MCNC: 73 MG/DL (ref 65–100)
HCT VFR BLD AUTO: 33.5 % (ref 36.6–50.3)
HGB BLD-MCNC: 10.6 G/DL (ref 12.1–17)
MCH RBC QN AUTO: 30.5 PG (ref 26–34)
MCHC RBC AUTO-ENTMCNC: 31.6 G/DL (ref 30–36.5)
MCV RBC AUTO: 96.3 FL (ref 80–99)
NRBC # BLD: 0 K/UL (ref 0–0.01)
NRBC BLD-RTO: 0 PER 100 WBC
PHOSPHATE SERPL-MCNC: 4.8 MG/DL (ref 2.6–4.7)
PLATELET # BLD AUTO: 254 K/UL (ref 150–400)
PMV BLD AUTO: 9.9 FL (ref 8.9–12.9)
POTASSIUM SERPL-SCNC: 4.1 MMOL/L (ref 3.5–5.1)
RBC # BLD AUTO: 3.48 M/UL (ref 4.1–5.7)
SERVICE CMNT-IMP: ABNORMAL
SERVICE CMNT-IMP: NORMAL
SODIUM SERPL-SCNC: 136 MMOL/L (ref 136–145)
WBC # BLD AUTO: 5.9 K/UL (ref 4.1–11.1)

## 2019-11-18 PROCEDURE — 90935 HEMODIALYSIS ONE EVALUATION: CPT

## 2019-11-18 PROCEDURE — 74011250636 HC RX REV CODE- 250/636: Performed by: INTERNAL MEDICINE

## 2019-11-18 PROCEDURE — 97535 SELF CARE MNGMENT TRAINING: CPT | Performed by: OCCUPATIONAL THERAPIST

## 2019-11-18 PROCEDURE — 80069 RENAL FUNCTION PANEL: CPT

## 2019-11-18 PROCEDURE — 82962 GLUCOSE BLOOD TEST: CPT

## 2019-11-18 PROCEDURE — 97116 GAIT TRAINING THERAPY: CPT

## 2019-11-18 PROCEDURE — 85027 COMPLETE CBC AUTOMATED: CPT

## 2019-11-18 PROCEDURE — 74011000258 HC RX REV CODE- 258: Performed by: INTERNAL MEDICINE

## 2019-11-18 PROCEDURE — 36415 COLL VENOUS BLD VENIPUNCTURE: CPT

## 2019-11-18 RX ORDER — FINASTERIDE 5 MG/1
5 TABLET, FILM COATED ORAL DAILY
Qty: 30 TAB | Refills: 1 | Status: SHIPPED | OUTPATIENT
Start: 2019-11-18 | End: 2020-01-17

## 2019-11-18 RX ORDER — LABETALOL 300 MG/1
300 TABLET, FILM COATED ORAL 2 TIMES DAILY
Qty: 60 TAB | Refills: 1 | Status: SHIPPED | OUTPATIENT
Start: 2019-11-18 | End: 2020-01-17

## 2019-11-18 RX ORDER — AMLODIPINE BESYLATE 10 MG/1
10 TABLET ORAL DAILY
Qty: 60 TAB | Refills: 1 | Status: SHIPPED | OUTPATIENT
Start: 2019-11-18 | End: 2020-03-17

## 2019-11-18 RX ADMIN — HEPARIN SODIUM 5000 UNITS: 5000 INJECTION INTRAVENOUS; SUBCUTANEOUS at 18:34

## 2019-11-18 RX ADMIN — DEXTROSE MONOHYDRATE 25 G: 25 INJECTION, SOLUTION INTRAVENOUS at 16:38

## 2019-11-18 RX ADMIN — Medication 10 ML: at 05:48

## 2019-11-18 RX ADMIN — Medication 10 ML: at 16:37

## 2019-11-18 NOTE — PROGRESS NOTES
Pharmacy - EPO Dosing & Monitoring    Dose and schedule: 12,000 units 3x/wk [facility]    Labs:  Recent Labs     11/18/19  0412   HGB 10.6*       Impression/Plan:   - EPO changed to MWF  - Hgb >10 today  - Do not dispense   - recheck CBC [facility]       Kali,  XENIA Drake

## 2019-11-18 NOTE — PROGRESS NOTES
JADA   SNF: [facility] [address] pending  HD chair with Manjinder ([facility] MWF 7:45am)  [employer]: needing additional documents    9:30am CM contacted [employer] to follow-up on referral for transportation. CM was informed that Pt was approved and would be receiving a membership packet in the mail soon. CM was informed that Pt would need a membership card before he can purchase a voucher booklet. CM attempted to contact Pt's daughter Jordan Polanco) to notify of the arrival of membership information. Daughter unavailable.      Andrea Aguero, [address] [facility]

## 2019-11-18 NOTE — PROGRESS NOTES
Bedside and Verbal shift change report given to [facility] (oncoming nurse) by Joe Burgos (offgoing nurse). Report included the following information SBAR, Kardex, Intake/Output, MAR and Recent Results.

## 2019-11-18 NOTE — DISCHARGE SUMMARY
Hospitalist Discharge Summary    NAME: Laurel Cotter   :  1950   MRN:  [id number]     DISCHARGE DIAGNOSIS:  Acute on chronic kidney disease Stage V   Metabolic acidosis   New HD patient , HD started on   Positive hepC and Hep B  Hypertension  controlled  Gross Hematuria   Urinary  Retention s/p brice   Generalized weakness likely multifactorial  Anemia most likely secondary to chronic kidney disease  DM type II  Chronic diastolic heart failure  Hx Right humeral neck fracture. CONSULTATIONS:  Nephrology    Follow Up: Follow-up Information     Follow up With Specialties Details Why Contact [address]  [address] [facility]    Emily Bennett, [address] [place] [address]  [phone]            Procedures: see electronic medical records for all procedures/Xrays and details which were not copied into this note but were reviewed prior to creation of Plan. Please follow-up tests/labs that are still pendin. None     PMH/SH reviewed - no change compared to H&P    DISCHARGE SUMMARY/HOSPITAL COURSE: for full details see H&P, daily progress notes, labs, consult notes. Briefly As Per HPI from Dr Desi Rosales H&P:  77years old male with past medical history significant for hypertension, DM, chronic kidney disease was transferred from [facility] ED for evaluation of generalized weakness associated with worsening kidney function, according to the patient patient stated he feel weak and had multiple fall yesterday, yesterday patient was seen at [facility] ED for high blood pressure and was discharged home patient denies any dizziness any chest pain any shortness of breath, blood work was significant for worsening kidney function creatinine 5.4 and BUN 52.      The patient's hospital course was complicated by:  Acute on chronic kidney disease Stage V   Metabolic acidosis   New HD patient , HD started on 11/11  - Cr continued to trend up in spite of Brice placement.  Creatinine baseline is around 5   -pt started on HD on 11/11 , had been refusing all this time. S/p bicarb drip   - OP HD to be set up by CM for MWF ( CM found a chaiR)       Positive hepC and Hep B  No indication for acute treatment , can FU as OP with Dr arana      Hypertension  controlled  -Continue amlodipine, labetalol and spironolactone   - Hold Lisinopril due to ASHVIN/CKD  - Hydralazine as needed   - clonidine discontinued due to compliance issues and may cause rebound htn      Gross Hematuria   Resolved now.  Outpatient follow-up with urology.  Ultrasound  showed no acute process other than renal cysts        Urinary  Retention s/p brice resolved   - likely 2/2 BPH vs blood clots   -brice out   - pt voiding     Generalized weakness likely multifactorial  continue PT/OT    Anemia most likely secondary to chronic kidney disease  -Hemoglobin 10.6, c/w epoetin during HD days     DM type II  continue sliding scale insulin with blood sugar checks    Chronic diastolic heart failure  c/w lasix 40mg daily per renal -      Hx Right humeral neck fracture.  -pain med PRN      Patient with complex inpatient course. Please see all notes, labs, vitals, testing and procedures for details, briefly the patient was admitted following presentation to ED with ashvin on CKD stage 5 , requiring HD . Course complicated by above problem list . Pt is stable for DC and need HD MWF .  _______________________________________________________________________   Patient seen and examined by me on day of discharge. Pertinent findings are:  Gen:NAD   HEENT:NC/AT  Chest:CTAB  Cv:RRR s1s2 wnl , no RMG   Abd:NT ND  Neuro:AAOx4    See Discharge Instructions for further details.   _______________________________________________________________________    Medications Reviewed:  Current Discharge Medication List      START taking these medications    Details   finasteride (PROSCAR) 5 mg tablet Take 1 Tab by mouth daily for 60 days. Qty: 30 Tab, Refills: 1         CONTINUE these medications which have CHANGED    Details   amLODIPine (NORVASC) 10 mg tablet Take 1 Tab by mouth daily for 120 days. Qty: 60 Tab, Refills: 1      labetalol (NORMODYNE) 300 mg tablet Take 1 Tab by mouth two (2) times a day for 60 days. Qty: 60 Tab, Refills: 1         CONTINUE these medications which have NOT CHANGED    Details   furosemide (LASIX) 40 mg tablet Take 40 mg by mouth daily. ferrous sulfate 325 mg (65 mg iron) tablet Take 1 Tab by mouth two (2) times a day. spironolactone (ALDACTONE) 25 mg tablet Take 25 mg by mouth daily. aspirin delayed-release 81 mg tablet Take  by mouth as needed for Pain. STOP taking these medications       cloNIDine HCl (CATAPRES) 0.2 mg tablet Comments:   Reason for Stopping:         lisinopril (PRINIVIL, ZESTRIL) 40 mg tablet Comments:   Reason for Stopping:             _______________________________________________________________________    Risk of deterioration: Moderate  ________________________________________________________________________    Disposition  Home with family and home health services  ________________________________________________________________________    Care Plan discussed with: patient , CM and RN     ________________________________________________________________________    Code Status: DNR/DNI  ________________________________________________________________________    Total time spent in discharge (min): 40   ________________________________________________________________________    CDMP Checked: Yes    Signed: Giovanni Dee MD    This note will not be viewable in [address]

## 2019-11-18 NOTE — PROGRESS NOTES
Problem: Self Care Deficits Care Plan (Adult)  Goal: *Acute Goals and Plan of Care (Insert Text)  Description  FUNCTIONAL STATUS PRIOR TO ADMISSION: Ambulated with SPC. Could manage stairs to second floor of home without assist.  Sling had been discontinued per pt. Performed all ADLS on his own including showering. Pt was seen on 9/19/19 by Dr. Rony Bennett for RUE fracture. Per Dr. Rony Bennett note on 9/19/19 \"Radiographs show evidence of healing so Mr. Estevan Squires fracture should heal over time. I have elected to place him in physical therapy for gentle range of motion. He should be careful to avoid motions that involve any lifting. The patient may continue activities as tolerated and will follow up in 4 weeks. \"  Pt reports that he was to start PT today and assisted pt with trying to get in touch with the OP clinic. HOME SUPPORT PRIOR TO ADMISSION: The patient lived alone, but recently moved into his daughters house. Daughter works during the day and pts grandson provides transportation but also works. Occupational Therapy Goals:  Initiated 11/5/2019, Goals revised as per 11/13 weekly Reevaluation. 1. Patient will perform grooming standing with supervision/set-up within 7 days. Goal met, change to independent. Met 11/18/2019  2. Patient will perform toileting with supervision/set-up within 7 days. Goal met, change to independent. Met 11/18/2019  3. Patient will perform upper body dressing and lower body dressing with supervision/set-up within 7 days. Goal met, change to independent. Met 11/18/2019  4. Patient will transfer from toilet with supervision/set-up using the least restrictive device and appropriate durable medical equipment within 7 days. Goal met, change to modified independent.   Met 11/18/2019         Outcome: Progressing Towards Goal   OCCUPATIONAL THERAPY TREATMENT/DISCHARGE  Patient: Holly Peck (89 y.o. male)  Date: 11/18/2019  Diagnosis: Acute on chronic renal failure (HCC) [N17.9, N18.9] <principal problem not specified>       Precautions: Fall, DNR(No lifting R UE)  Chart, occupational therapy assessment, plan of care, and goals were reviewed. ASSESSMENT  Patient continues with skilled OT services and has progressed towards goals. Pt is now performing mobility at a modified independent level and ADLs at a modified independent to independent level. Pt reported that he had performed his own bath this morning and set up his grooming supplies. Recommend return to home with family assist and pt is in agreement. Current Level of Function (ADLs/self-care): modified independent to independent for ADLS    Other factors to consider for discharge: needs transport to dialysis, would benefit from a shower chair         PLAN :  Rationale for discharge: Goals achieved  Recommend with staff: continued supervision for mobility and ADLS  Recommendation for discharge: (in order for the patient to meet his/her long term goals)  No skilled occupational therapy/ follow up rehabilitation needs identified at this time. , recommend family assist with showering    This discharge recommendation:  Has been made in collaboration with the attending provider and/or case management    IF patient discharges home will need the following DME: shower chair        SUBJECTIVE:   Patient stated I have been doing all of that on my own. I am ready to go home.    Referring to ADLS  OBJECTIVE DATA SUMMARY:   Cognitive/Behavioral Status:  Neurologic State: Alert  Orientation Level: Oriented X4  Cognition: Appropriate decision making; Appropriate for age attention/concentration; Appropriate safety awareness  Perception: Appears intact  Perseveration: No perseveration noted  Safety/Judgement: Awareness of environment; Fall prevention;Home safety; Insight into deficits    Functional Mobility and Transfers for ADLs:  Bed Mobility:       Transfers:  Sit to Stand: Modified independent  Functional Transfers  Bathroom Mobility: Modified independent  Toilet Transfer : Modified independent  Bed to Chair: Modified independent    Balance:  Sitting: Intact  Standing - Static: Good  Standing - Dynamic : Good(on hand on surface obtaining items from floor)    ADL Intervention:  Donned button up shirt with independence  Donned pants with independence  Donned hospital socks seated with crossed leg technique with independence    Obtained objects from floor in standing with modified independence (left hand on surface for balance (picked up towel from floor)        Toileting  Bladder Hygiene: Independent  Bowel Hygiene: Independent  Clothing Management: Independent    Cognitive Retraining  Safety/Judgement: Awareness of environment; Fall prevention;Home safety; Insight into deficits      Pain:  0/10    Activity Tolerance:   Good  Please refer to the flowsheet for vital signs taken during this treatment.     After treatment patient left in no apparent distress:   Sitting in chair and Call bell within reach    COMMUNICATION/COLLABORATION:   The patients plan of care was discussed with: Physical Therapist and patient     Gilmar Farrell, OTR/L  Time Calculation: 27 mins

## 2019-11-18 NOTE — PROGRESS NOTES
Patient doing a lot better funcitonally. He was able to go down/up 12 steps with 1 rail and cg assistance as well as ambulate 250 feet using RW. Recommend HH PT rather than SNF at this time. Full note to follow. Please order a RW for home use which he does not have at home.     Kaila Meneses, PT

## 2019-11-18 NOTE — DISCHARGE INSTRUCTIONS
DISCHARGE DIAGNOSIS:  Acute on chronic kidney disease Stage V   Metabolic acidosis   New HD patient , HD started on 11/11  Positive hepC and Hep B  Hypertension  controlled  Gross Hematuria   Urinary  Retention s/p brice   Generalized weakness likely multifactorial  Anemia most likely secondary to chronic kidney disease  DM type II  Chronic diastolic heart failure  Hx Right humeral neck fracture. MEDICATIONS:  · It is important that you take the medication exactly as they are prescribed. · Keep your medication in the bottles provided by the pharmacist and keep a list of the medication names, dosages, and times to be taken in your wallet. · Do not take other medications without consulting your doctor. Pain Management: per above medications    What to do at Home    Recommended diet:  Renal Diet    Recommended activity: Activity as tolerated    If you have questions regarding the hospital related prescriptions or hospital related issues please call [address] [place] [address] at . You can always direct your questions to your primary care doctor if you are unable to reach your hospital physician; your PCP works as an extension of your hospital doctor just like your hospital doctor is an extension of your PCP for your time at the hospital [facility]).     If you experience any of the following symptoms then please call your primary care physician or return to the emergency room if you cannot get hold of your doctor:  Fever, chills, nausea, vomiting, diarrhea, change in mentation, falling, bleeding, shortness of breath

## 2019-11-18 NOTE — PROGRESS NOTES
Problem: Mobility Impaired (Adult and Pediatric)  Goal: *Acute Goals and Plan of Care (Insert Text)  Description  FUNCTIONAL STATUS PRIOR TO ADMISSION: Patient was modified independent using a single point cane for functional mobility. HOME SUPPORT PRIOR TO ADMISSION: The patient lived with daughter who works during day. [facility] 3x/wk. Physical Therapy Goals  Initiated 11/6/2019; reviewed goals 11/12/19 - still appropriate  1. Patient will move from supine to sit and sit to supine , scoot up and down and roll side to side in bed with independence within 7 day(s). 2.  Patient will transfer from bed to chair and chair to bed with modified independence using the least restrictive device within 7 day(s). 3.  Patient will perform sit to stand with modified independence within 7 day(s). 4.  Patient will ambulate with modified independence for 200 feet with the least restrictive device within 7 day(s). 5.  Patient will ascend/descend 15 stairs with 1 handrail(s) with supervision/set-up within 7 day(s). Outcome: Progressing Towards Goal   PHYSICAL THERAPY TREATMENT  Patient: Lexx Farmer (25 y.o. male)  Date: 11/18/2019  Diagnosis: Acute on chronic renal failure (HCC) [N17.9, N18.9] <principal problem not specified>       Precautions: Fall, DNR(No lifting R UE)  Chart, physical therapy assessment, plan of care and goals were reviewed. ASSESSMENT  Patient continues with skilled PT services and is progressing towards goals. Patient now independent with bed mobility, supine to sit and bed to chair transfers. Gait improved to supervision with RW for upto 300 feet - gait was mildly decreased speed and intermittent path deviations but no lob. He cleared down/up 12 steps with 1 rail and cg assistance. Safe to return home with intermittent family assistance. Will need a RW.      Current Level of Function Impacting Discharge (mobility/balance): supervision to independent    Other factors to consider for discharge: new to HD - needs arrangements and transportation. PLAN :  Patient continues to benefit from skilled intervention to address the above impairments. Continue treatment per established plan of care. to address goals. Recommendation for discharge: (in order for the patient to meet his/her long term goals)  Physical therapy at least 2 days/week in the home     This discharge recommendation:  Has been made in collaboration with the attending provider and/or case management    IF patient discharges home will need the following DME: rolling walker       SUBJECTIVE:   Patient stated I'm going home today.     OBJECTIVE DATA SUMMARY:   Critical Behavior:  Neurologic State: Alert  Orientation Level: Oriented X4  Cognition: Appropriate decision making, Appropriate for age attention/concentration, Appropriate safety awareness  Safety/Judgement: Awareness of environment, Fall prevention, Home safety, Insight into deficits  Functional Mobility Training:  Bed Mobility:  Rolling: Independent  Supine to Sit: Modified independent     Scooting: Independent        Transfers:  Sit to Stand: Modified independent  Stand to Sit: Modified independent        Bed to Chair: Modified independent                    Balance:  Sitting: Intact  Standing - Static: Good  Standing - Dynamic : Good(on hand on surface obtaining items from floor)  Ambulation/Gait Training:  Distance (ft): 300 Feet (ft)  Assistive Device: Gait belt;Walker, rolling  Ambulation - Level of Assistance: Supervision        Gait Abnormalities: Path deviations  Right Side Weight Bearing: Full  Left Side Weight Bearing: Full        Speed/Sarah: Pace decreased (<100 feet/min)                       Stairs:  Number of Stairs Trained: 24(down/up 12 steps)  Stairs - Level of Assistance: Contact guard assistance   Rail Use: Right     Pain Rating:  None reported    Activity Tolerance:   Good and SpO2 stable on RA  Please refer to the flowsheet for vital signs taken during this treatment. After treatment patient left in no apparent distress:   Left with OT in standing.      COMMUNICATION/COLLABORATION:   The patients plan of care was discussed with: Occupational Therapist, Registered Nurse, Physician and     Onur Cruz PT   Time Calculation: 19 mins

## 2019-11-18 NOTE — PROGRESS NOTES
Bedside and Verbal shift change report given to [facility], RN (oncoming nurse) by Charlotte Pandey (offgoing nurse). Report included the following information SBAR, Kardex, Procedure Summary, Intake/Output, MAR, Accordion and Recent Results.

## 2019-11-18 NOTE — ROUTINE PROCESS
Attempted to schedule PCP JADA apt with Dr. Arnav Jacobson. Left message for practice nurse. Nurse will contact patient with appointment information.

## 2019-11-18 NOTE — DIALYSIS
[facility] Acutes  [phone]    Vitals   Pre   Post   Assessment   Pre   Post     Temp  Temp: 98.2 °F (36.8 °C) (11/18/19 1455)  98 LOC  A/O x3 A/O x3   HR   Pulse (Heart Rate): 76 (11/18/19 1455) 75 Lungs   clear  clear   B/P   BP: (!) 176/106 (11/18/19 1455) 136/90 Cardiac   Reg. rate  reg. rate   Resp   Resp Rate: 16 (11/18/19 1455) 16 Skin   Warm, intact  warm, intact   Pain level  Pain Intensity 1: 0 (11/17/19 2311) 0/10 denies no s/s Edema  none     none   Orders:    Duration:   Start:   6007  Procedure Start Time: 0527 End:    0368 Procedure End Time: 2809 Total:  3hrs    Dialyzer:   Dialyzer/Set Up Inspection: Revaclear (11/18/19 1455)   K Bath:   Dialysate K (mEq/L): 3 (11/18/19 1455)   Ca Bath:   Dialysate CA (mEq/L): 2.5 (11/18/19 1455)   Na/Bicarb:   Dialysate NA (mEq/L): 140 (11/18/19 1455)   Target Fluid Removal:   Goal/Amount of Fluid to Remove (mL): 3000 mL (11/15/19 0850)   Access     Type & Location:   R subclavian hemodialysis catheter   Labs     Obtained/Reviewed   Critical Results Called   Date when labs were drawn-  Hgb-    HGB   Date Value Ref Range Status   11/18/2019 10.6 (L) 12.1 - 17.0 g/dL Final     K-    Potassium   Date Value Ref Range Status   11/18/2019 4.1 3.5 - 5.1 mmol/L Final     Ca-   Calcium   Date Value Ref Range Status   11/18/2019 8.3 (L) 8.5 - 10.1 MG/DL Final     Bun-   BUN   Date Value Ref Range Status   11/18/2019 38 (H) 6 - 20 MG/DL Final     Creat-   Creatinine   Date Value Ref Range Status   11/18/2019 5.37 (H) 0.70 - 1.30 MG/DL Final        Medications/ Blood Products Given     Name   Dose   Route and Time                     Blood Volume Processed (BVP):    47.6 Net Fluid   Removed:  1969ml   Comments   Time Out Done:yes   Primary Nurse Rpt Pre:Evette RN  Primary Nurse Rpt Post:Evette RN  Pt Education:yes  Care Plan:HD today, dc after HD  Tx Summary:Pt tolerated HD tx well, A/O x3, v/s stable, 15min short due to venous clotting. Endorsed to RN without complications. Admiting Diagnosis:ASHVIN  Pt's previous clinic-N/a  Consent signed - Informed Consent Verified: Yes (11/18/19 1455)  [facility] Consent - yes  Hepatitis Status- positive  Machine #- Machine Number: [id number] (11/18/19 1455)  Telemetry status-n/a  Pre-dialysis wt. - Pre-Dialysis Weight: 75.2 kg (165 lb 12.6 oz) (11/18/19 1455)

## 2019-11-18 NOTE — PROGRESS NOTES
Nephrology Progress Note     Jarvis Carrera     [email or web address]                  Phone - [phone]   Patient: Leanna Cali   Date- 11/18/2019        Admit Date: 11/2/2019  YOB: 1950             CC: Follow up for  New onset  ESRD        Subjective: Interval History:   -bp stable  No c/o sob,   No c/o chest pain,   No c/o nausea or vomiting  No c/o  fever. ROS:- as above   Assessment & Plan:     NEW ONSET esrd  - HD TODAY  - REMOVE 2 KG   - continue hd MWF. He is accepted at [facility]  -  He will go back to DR. RIVERA - HE IS HEPATITIS B POSITIVE. ASHVIN on ckd-ATN  - LIKELY DUE TO URINARY RETENTION +/- normalisation of bp causing renal hypoperfusion -       HYPERTENSION  - continue norvasc,  Labetalol , aldactone    H/o hep B AND HEP C  - GI INPUT NOTED AND APPRECIATED. -+ve hep b RNA, +ve hep b E antigen. - he has active hep b. He will follow up with GI as out pt.     anemia of ckd  -hold epogen    Hematuria- urinary retention  - urology input noted     metabolic acidosis  - improved    Edema  - should improve with fluid removal on hd  -  lasix 40 mg daily     CKD - f/b   - ckd likely due to dm + HTN          TYPE 2 DM  PROTEINURIA , Microscopic hematuria  - +ve hep b and hep c -      Right humeral neck fracture       OKAY TO D/C - renal stand point after dialysis TODAY         Physical exam:   GEN:  NAD  NECK:  Supple, no thyromegaly  RESP: CTA  b/l, no  wheezing,   CVS: RRR,S1,S2   ABDO:  soft , non tender, No mass  NEURO: non focal, normal speech  EXT: Edema +nt      Right ij permacath +      Care Plan discussed with: patient,   Objective:   Visit Vitals  /89 (BP 1 Location: Left arm, BP Patient Position: At rest)   Pulse 78   Temp 98.3 °F (36.8 °C)   Resp 18   Wt 82.8 kg (182 lb 9.6 oz)   SpO2 99%   BMI 26.97 kg/m²     Last 3 Recorded Weights in this Encounter    11/14/19 1919 11/16/19 0844 11/17/19 1137   Weight: 82.3 kg (181 lb 8.2 oz) 78.5 kg (173 lb) 82.8 kg (182 lb 9.6 oz)     11/16 1901 - 11/18 0700  In: 120 [P.O.:120]  Out: -   No intake or output data in the 24 hours ending 11/18/19 1004   Chart reviewed. Pertinent Notes reviewed. Medication list  reviewed   Current Facility-Administered Medications   Medication    epoetin charisse-epbx (RETACRIT) 12,000 Units combo injection    heparin (porcine) injection 5,000 Units    spironolactone (ALDACTONE) tablet 25 mg    heparin (porcine) 1,000 unit/mL injection 3,800 Units    furosemide (LASIX) tablet 40 mg    labetalol (NORMODYNE) tablet 300 mg    finasteride (PROSCAR) tablet 5 mg    sodium chloride (NS) flush 5-40 mL    sodium chloride (NS) flush 5-40 mL    acetaminophen (TYLENOL) tablet 650 mg    ondansetron (ZOFRAN) injection 4 mg    bisacodyl (DULCOLAX) tablet 5 mg    glucose chewable tablet 16 g    dextrose (D50W) injection syrg 12.5-25 g    glucagon (GLUCAGEN) injection 1 mg    insulin lispro (HUMALOG) injection    amLODIPine (NORVASC) tablet 10 mg    aspirin delayed-release tablet 81 mg    hydrALAZINE (APRESOLINE) 20 mg/mL injection 20 mg           Data Review :  Recent Labs     11/18/19  0412 11/17/19  0355 11/16/19  0343    137 138   K 4.1 4.2 4.2    102 103   CO2 26 27 28   BUN 38* 25* 17   CREA 5.37* 4.59* 3.52*   GLU 73 89 80   CA 8.3* 8.1* 8.3*   PHOS 4.8* 3.9 3.3     Recent Labs     11/18/19  0412 11/15/19  1104   WBC 5.9 6.3   HGB 10.6* 10.4*   HCT 33.5* 34.0*    276     No results for input(s): FE, TIBC, PSAT, FERR in the last 72 hours. No results for input(s): CPK, CKNDX, TROIQ in the last 72 hours.     No lab exists for component: CPKMB  Lab Results   Component Value Date/Time    Color ABRAHAM 11/01/2019 09:05 PM    Appearance CLOUDY (A) 11/01/2019 09:05 PM    Specific gravity 1.015 11/01/2019 09:05 PM    pH (UA) 5.5 11/01/2019 09:05 PM    Protein >300 (A) 11/01/2019 09:05 PM    Glucose NEGATIVE  11/01/2019 09:05 PM Ketone NEGATIVE  11/01/2019 09:05 PM    Bilirubin NEGATIVE  11/01/2019 09:05 PM    Urobilinogen 0.2 11/01/2019 09:05 PM    Nitrites NEGATIVE  11/01/2019 09:05 PM    Leukocyte Esterase TRACE (A) 11/01/2019 09:05 PM    Epithelial cells FEW 11/01/2019 09:05 PM    Bacteria NEGATIVE  11/01/2019 09:05 PM    WBC 0-4 11/01/2019 09:05 PM    RBC  11/01/2019 09:05 PM     Lab Results   Component Value Date/Time    Culture result: NO GROWTH 5 DAYS 09/04/2019 06:29 PM     No results found for: SDES  Lab Results   Component Value Date/Time    Sodium,urine random 65 01/24/2019 12:52 AM    Creatinine, urine 94.80 01/24/2019 12:52 AM       Results from Hospital Encounter encounter on 11/01/19   XR CHEST PA LAT    Narrative EXAM: XR CHEST PA LAT    INDICATION: pneumonia    COMPARISON: Chest x-ray 9/2/2019. FINDINGS: PA and lateral radiographs of the chest demonstrate hyperinflated but  otherwise clear lungs. The cardiac and mediastinal contours and pulmonary  vascularity are normal. Atherosclerotic calcifications affect the aortic arch  and the thoracic aorta is tortuous. The chest wall structures and visualized  upper abdomen show no acute findings with incidental note of degenerative spine,  right humeral neck fracture, and diffuse osteopenia. Impression IMPRESSION: No acute cardiopulmonary findings. COPD. Right humeral neck  fracture. Alba Francois MD  [facility]   [email or web address]  [facility]  [address], [address]  [place], [address]  Phone - [phone]         Fax - [phone]

## 2019-11-19 ENCOUNTER — HOME CARE VISIT (OUTPATIENT)
Dept: HOME HEALTH SERVICES | Facility: HOME HEALTH | Age: 69
End: 2019-11-19

## 2019-11-19 ENCOUNTER — HOME CARE VISIT (OUTPATIENT)
Dept: SCHEDULING | Facility: HOME HEALTH | Age: 69
End: 2019-11-19
Payer: MEDICARE

## 2019-11-19 VITALS
DIASTOLIC BLOOD PRESSURE: 80 MMHG | OXYGEN SATURATION: 99 % | HEART RATE: 64 BPM | BODY MASS INDEX: 27.4 KG/M2 | SYSTOLIC BLOOD PRESSURE: 150 MMHG | RESPIRATION RATE: 17 BRPM | HEIGHT: 69 IN | WEIGHT: 185 LBS | TEMPERATURE: 99 F

## 2019-11-19 PROCEDURE — 400013 HH SOC

## 2019-11-19 PROCEDURE — 3331090002 HH PPS REVENUE DEBIT

## 2019-11-19 PROCEDURE — 3331090001 HH PPS REVENUE CREDIT

## 2019-11-19 PROCEDURE — G0299 HHS/HOSPICE OF RN EA 15 MIN: HCPCS

## 2019-11-19 NOTE — PROGRESS NOTES
Patient discharged home in stable condition by wheelchair with his wife. Discharged instruction & prescription given.

## 2019-11-20 ENCOUNTER — HOME CARE VISIT (OUTPATIENT)
Dept: HOME HEALTH SERVICES | Facility: HOME HEALTH | Age: 69
End: 2019-11-20
Payer: MEDICARE

## 2019-11-20 PROCEDURE — 3331090002 HH PPS REVENUE DEBIT

## 2019-11-20 PROCEDURE — 3331090001 HH PPS REVENUE CREDIT

## 2019-11-21 ENCOUNTER — HOME CARE VISIT (OUTPATIENT)
Dept: SCHEDULING | Facility: HOME HEALTH | Age: 69
End: 2019-11-21
Payer: MEDICARE

## 2019-11-21 PROCEDURE — 3331090001 HH PPS REVENUE CREDIT

## 2019-11-21 PROCEDURE — G0300 HHS/HOSPICE OF LPN EA 15 MIN: HCPCS

## 2019-11-21 PROCEDURE — 3331090002 HH PPS REVENUE DEBIT

## 2019-11-21 PROCEDURE — G0151 HHCP-SERV OF PT,EA 15 MIN: HCPCS

## 2019-11-22 VITALS
SYSTOLIC BLOOD PRESSURE: 150 MMHG | TEMPERATURE: 98.3 F | OXYGEN SATURATION: 99 % | DIASTOLIC BLOOD PRESSURE: 80 MMHG | RESPIRATION RATE: 18 BRPM | HEART RATE: 89 BPM

## 2019-11-22 VITALS
HEART RATE: 95 BPM | RESPIRATION RATE: 14 BRPM | DIASTOLIC BLOOD PRESSURE: 90 MMHG | OXYGEN SATURATION: 100 % | TEMPERATURE: 98.2 F | SYSTOLIC BLOOD PRESSURE: 160 MMHG

## 2019-11-22 PROCEDURE — 3331090001 HH PPS REVENUE CREDIT

## 2019-11-22 PROCEDURE — 3331090002 HH PPS REVENUE DEBIT

## 2019-11-23 PROCEDURE — 3331090001 HH PPS REVENUE CREDIT

## 2019-11-23 PROCEDURE — 3331090002 HH PPS REVENUE DEBIT

## 2019-11-24 ENCOUNTER — HOME CARE VISIT (OUTPATIENT)
Dept: SCHEDULING | Facility: HOME HEALTH | Age: 69
End: 2019-11-24
Payer: MEDICARE

## 2019-11-24 VITALS
SYSTOLIC BLOOD PRESSURE: 125 MMHG | OXYGEN SATURATION: 98 % | HEART RATE: 80 BPM | DIASTOLIC BLOOD PRESSURE: 85 MMHG | TEMPERATURE: 97.7 F

## 2019-11-24 PROCEDURE — 3331090001 HH PPS REVENUE CREDIT

## 2019-11-24 PROCEDURE — G0152 HHCP-SERV OF OT,EA 15 MIN: HCPCS

## 2019-11-24 PROCEDURE — 3331090002 HH PPS REVENUE DEBIT

## 2019-11-25 PROCEDURE — 3331090001 HH PPS REVENUE CREDIT

## 2019-11-25 PROCEDURE — 3331090002 HH PPS REVENUE DEBIT

## 2019-11-26 ENCOUNTER — HOME CARE VISIT (OUTPATIENT)
Dept: SCHEDULING | Facility: HOME HEALTH | Age: 69
End: 2019-11-26
Payer: MEDICARE

## 2019-11-26 VITALS
HEART RATE: 78 BPM | OXYGEN SATURATION: 99 % | DIASTOLIC BLOOD PRESSURE: 70 MMHG | TEMPERATURE: 97.1 F | RESPIRATION RATE: 14 BRPM | SYSTOLIC BLOOD PRESSURE: 110 MMHG

## 2019-11-26 PROCEDURE — G0151 HHCP-SERV OF PT,EA 15 MIN: HCPCS

## 2019-11-26 PROCEDURE — G0300 HHS/HOSPICE OF LPN EA 15 MIN: HCPCS

## 2019-11-26 PROCEDURE — 3331090002 HH PPS REVENUE DEBIT

## 2019-11-26 PROCEDURE — 3331090001 HH PPS REVENUE CREDIT

## 2019-11-27 PROCEDURE — 3331090002 HH PPS REVENUE DEBIT

## 2019-11-27 PROCEDURE — 3331090001 HH PPS REVENUE CREDIT

## 2019-11-28 PROCEDURE — 3331090002 HH PPS REVENUE DEBIT

## 2019-11-28 PROCEDURE — 3331090001 HH PPS REVENUE CREDIT

## 2019-11-29 PROCEDURE — 3331090001 HH PPS REVENUE CREDIT

## 2019-11-29 PROCEDURE — 3331090002 HH PPS REVENUE DEBIT

## 2019-11-30 ENCOUNTER — HOME CARE VISIT (OUTPATIENT)
Dept: SCHEDULING | Facility: HOME HEALTH | Age: 69
End: 2019-11-30
Payer: MEDICARE

## 2019-11-30 VITALS
RESPIRATION RATE: 18 BRPM | HEART RATE: 71 BPM | DIASTOLIC BLOOD PRESSURE: 92 MMHG | SYSTOLIC BLOOD PRESSURE: 162 MMHG | OXYGEN SATURATION: 100 %

## 2019-11-30 PROCEDURE — 3331090001 HH PPS REVENUE CREDIT

## 2019-11-30 PROCEDURE — G0300 HHS/HOSPICE OF LPN EA 15 MIN: HCPCS

## 2019-11-30 PROCEDURE — 3331090002 HH PPS REVENUE DEBIT

## 2019-12-01 VITALS
SYSTOLIC BLOOD PRESSURE: 112 MMHG | HEART RATE: 70 BPM | DIASTOLIC BLOOD PRESSURE: 70 MMHG | OXYGEN SATURATION: 97 % | RESPIRATION RATE: 17 BRPM | TEMPERATURE: 98 F

## 2019-12-01 PROCEDURE — 3331090002 HH PPS REVENUE DEBIT

## 2019-12-01 PROCEDURE — 3331090001 HH PPS REVENUE CREDIT

## 2019-12-02 PROCEDURE — 3331090001 HH PPS REVENUE CREDIT

## 2019-12-02 PROCEDURE — 3331090002 HH PPS REVENUE DEBIT

## 2019-12-03 ENCOUNTER — HOME CARE VISIT (OUTPATIENT)
Dept: SCHEDULING | Facility: HOME HEALTH | Age: 69
End: 2019-12-03
Payer: MEDICARE

## 2019-12-03 PROCEDURE — 3331090002 HH PPS REVENUE DEBIT

## 2019-12-03 PROCEDURE — 3331090001 HH PPS REVENUE CREDIT

## 2019-12-03 PROCEDURE — G0299 HHS/HOSPICE OF RN EA 15 MIN: HCPCS

## 2019-12-04 VITALS
DIASTOLIC BLOOD PRESSURE: 92 MMHG | TEMPERATURE: 98.2 F | HEART RATE: 80 BPM | SYSTOLIC BLOOD PRESSURE: 152 MMHG | OXYGEN SATURATION: 99 % | RESPIRATION RATE: 18 BRPM

## 2019-12-04 PROCEDURE — 3331090002 HH PPS REVENUE DEBIT

## 2019-12-04 PROCEDURE — 3331090001 HH PPS REVENUE CREDIT

## 2019-12-05 ENCOUNTER — HOME CARE VISIT (OUTPATIENT)
Dept: SCHEDULING | Facility: HOME HEALTH | Age: 69
End: 2019-12-05
Payer: MEDICARE

## 2019-12-05 ENCOUNTER — HOME CARE VISIT (OUTPATIENT)
Dept: HOME HEALTH SERVICES | Facility: HOME HEALTH | Age: 69
End: 2019-12-05
Payer: MEDICARE

## 2019-12-05 VITALS
DIASTOLIC BLOOD PRESSURE: 80 MMHG | SYSTOLIC BLOOD PRESSURE: 158 MMHG | TEMPERATURE: 98.7 F | HEART RATE: 77 BPM | OXYGEN SATURATION: 99 %

## 2019-12-05 PROCEDURE — 3331090001 HH PPS REVENUE CREDIT

## 2019-12-05 PROCEDURE — G0157 HHC PT ASSISTANT EA 15: HCPCS

## 2019-12-05 PROCEDURE — G0300 HHS/HOSPICE OF LPN EA 15 MIN: HCPCS

## 2019-12-05 PROCEDURE — 3331090002 HH PPS REVENUE DEBIT

## 2019-12-06 PROCEDURE — 3331090002 HH PPS REVENUE DEBIT

## 2019-12-06 PROCEDURE — 3331090001 HH PPS REVENUE CREDIT

## 2019-12-07 PROCEDURE — 3331090001 HH PPS REVENUE CREDIT

## 2019-12-07 PROCEDURE — 3331090002 HH PPS REVENUE DEBIT

## 2019-12-08 PROCEDURE — 3331090001 HH PPS REVENUE CREDIT

## 2019-12-08 PROCEDURE — 3331090002 HH PPS REVENUE DEBIT

## 2019-12-09 ENCOUNTER — HOSPITAL ENCOUNTER (EMERGENCY)
Age: 69
Discharge: HOME | End: 2019-12-10
Attending: EMERGENCY MEDICINE
Payer: MEDICARE

## 2019-12-09 VITALS
TEMPERATURE: 98.2 F | DIASTOLIC BLOOD PRESSURE: 78 MMHG | RESPIRATION RATE: 17 BRPM | HEART RATE: 77 BPM | SYSTOLIC BLOOD PRESSURE: 144 MMHG | OXYGEN SATURATION: 97 %

## 2019-12-09 DIAGNOSIS — Z99.2 ESRD ON DIALYSIS (HCC): Primary | ICD-10-CM
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
## 2019-12-09 DIAGNOSIS — N18.6 ESRD ON DIALYSIS (HCC): Primary | ICD-10-CM

## 2019-12-09 DIAGNOSIS — R29.898 SENSATION OF KNEE INSTABILITY: ICD-10-CM

## 2019-12-09 LAB
BASOPHILS # BLD: 0 K/UL (ref 0–0.1)
BASOPHILS NFR BLD: 0 % (ref 0–1)
DIFFERENTIAL METHOD BLD: ABNORMAL
EOSINOPHIL # BLD: 0.1 K/UL (ref 0–0.4)
EOSINOPHIL NFR BLD: 2 % (ref 0–7)
ERYTHROCYTE [DISTWIDTH] IN BLOOD BY AUTOMATED COUNT: 13.5 % (ref 11.5–14.5)
HCT VFR BLD AUTO: 35.9 % (ref 36.6–50.3)
HGB BLD-MCNC: 11.6 G/DL (ref 12.1–17)
IMM GRANULOCYTES # BLD AUTO: 0 K/UL (ref 0–0.04)
IMM GRANULOCYTES NFR BLD AUTO: 0 % (ref 0–0.5)
LYMPHOCYTES # BLD: 0.8 K/UL (ref 0.8–3.5)
LYMPHOCYTES NFR BLD: 14 % (ref 12–49)
MCH RBC QN AUTO: 29.8 PG (ref 26–34)
MCHC RBC AUTO-ENTMCNC: 32.3 G/DL (ref 30–36.5)
MCV RBC AUTO: 92.3 FL (ref 80–99)
MONOCYTES # BLD: 0.6 K/UL (ref 0–1)
MONOCYTES NFR BLD: 10 % (ref 5–13)
NEUTS SEG # BLD: 4.4 K/UL (ref 1.8–8)
NEUTS SEG NFR BLD: 74 % (ref 32–75)
NRBC # BLD: 0 K/UL (ref 0–0.01)
NRBC BLD-RTO: 0 PER 100 WBC
PLATELET # BLD AUTO: 245 K/UL (ref 150–400)
PMV BLD AUTO: 10.2 FL (ref 8.9–12.9)
RBC # BLD AUTO: 3.89 M/UL (ref 4.1–5.7)
WBC # BLD AUTO: 6 K/UL (ref 4.1–11.1)

## 2019-12-09 PROCEDURE — 82550 ASSAY OF CK (CPK): CPT

## 2019-12-09 PROCEDURE — 84484 ASSAY OF TROPONIN QUANT: CPT

## 2019-12-09 PROCEDURE — 85025 COMPLETE CBC W/AUTO DIFF WBC: CPT

## 2019-12-09 PROCEDURE — 3331090002 HH PPS REVENUE DEBIT

## 2019-12-09 PROCEDURE — 3331090001 HH PPS REVENUE CREDIT

## 2019-12-09 PROCEDURE — 80053 COMPREHEN METABOLIC PANEL: CPT

## 2019-12-09 PROCEDURE — 93005 ELECTROCARDIOGRAM TRACING: CPT

## 2019-12-09 PROCEDURE — 36415 COLL VENOUS BLD VENIPUNCTURE: CPT

## 2019-12-09 PROCEDURE — 99285 EMERGENCY DEPT VISIT HI MDM: CPT

## 2019-12-10 ENCOUNTER — APPOINTMENT (OUTPATIENT)
Dept: GENERAL RADIOLOGY | Age: 69
End: 2019-12-10
Attending: EMERGENCY MEDICINE
Payer: MEDICARE

## 2019-12-10 ENCOUNTER — HOME CARE VISIT (OUTPATIENT)
Dept: SCHEDULING | Facility: HOME HEALTH | Age: 69
End: 2019-12-10
Payer: MEDICARE

## 2019-12-10 ENCOUNTER — APPOINTMENT (OUTPATIENT)
Dept: CT IMAGING | Age: 69
End: 2019-12-10
Attending: EMERGENCY MEDICINE
Payer: MEDICARE

## 2019-12-10 VITALS
OXYGEN SATURATION: 100 % | BODY MASS INDEX: 26.07 KG/M2 | HEART RATE: 78 BPM | RESPIRATION RATE: 18 BRPM | SYSTOLIC BLOOD PRESSURE: 158 MMHG | WEIGHT: 176 LBS | DIASTOLIC BLOOD PRESSURE: 92 MMHG | HEIGHT: 69 IN | TEMPERATURE: 98.3 F

## 2019-12-10 VITALS
TEMPERATURE: 98.4 F | DIASTOLIC BLOOD PRESSURE: 85 MMHG | OXYGEN SATURATION: 98 % | HEART RATE: 87 BPM | SYSTOLIC BLOOD PRESSURE: 150 MMHG

## 2019-12-10 LAB
ALBUMIN SERPL-MCNC: 3.2 G/DL (ref 3.5–5)
ALBUMIN/GLOB SERPL: 0.7 {RATIO} (ref 1.1–2.2)
ALP SERPL-CCNC: 82 U/L (ref 45–117)
ALT SERPL-CCNC: 56 U/L (ref 12–78)
ANION GAP SERPL CALC-SCNC: 8 MMOL/L (ref 5–15)
AST SERPL-CCNC: 43 U/L (ref 15–37)
ATRIAL RATE: 78 BPM
BILIRUB SERPL-MCNC: 0.5 MG/DL (ref 0.2–1)
BUN SERPL-MCNC: 27 MG/DL (ref 6–20)
BUN/CREAT SERPL: 7 (ref 12–20)
CALCIUM SERPL-MCNC: 8.8 MG/DL (ref 8.5–10.1)
CALCULATED P AXIS, ECG09: 55 DEGREES
CALCULATED R AXIS, ECG10: -120 DEGREES
CALCULATED T AXIS, ECG11: 36 DEGREES
CHLORIDE SERPL-SCNC: 101 MMOL/L (ref 97–108)
CK SERPL-CCNC: 103 U/L (ref 39–308)
CO2 SERPL-SCNC: 29 MMOL/L (ref 21–32)
CREAT SERPL-MCNC: 3.68 MG/DL (ref 0.7–1.3)
DIAGNOSIS, 93000: NORMAL
GLOBULIN SER CALC-MCNC: 4.7 G/DL (ref 2–4)
GLUCOSE BLD STRIP.AUTO-MCNC: 88 MG/DL (ref 65–100)
GLUCOSE SERPL-MCNC: 89 MG/DL (ref 65–100)
P-R INTERVAL, ECG05: 174 MS
POTASSIUM SERPL-SCNC: 4.1 MMOL/L (ref 3.5–5.1)
PROT SERPL-MCNC: 7.9 G/DL (ref 6.4–8.2)
Q-T INTERVAL, ECG07: 418 MS
QRS DURATION, ECG06: 140 MS
QTC CALCULATION (BEZET), ECG08: 476 MS
SERVICE CMNT-IMP: NORMAL
SODIUM SERPL-SCNC: 138 MMOL/L (ref 136–145)
TROPONIN I SERPL-MCNC: <0.05 NG/ML
VENTRICULAR RATE, ECG03: 78 BPM

## 2019-12-10 PROCEDURE — 3331090001 HH PPS REVENUE CREDIT

## 2019-12-10 PROCEDURE — 82962 GLUCOSE BLOOD TEST: CPT

## 2019-12-10 PROCEDURE — G0157 HHC PT ASSISTANT EA 15: HCPCS

## 2019-12-10 PROCEDURE — 73560 X-RAY EXAM OF KNEE 1 OR 2: CPT

## 2019-12-10 PROCEDURE — G0152 HHCP-SERV OF OT,EA 15 MIN: HCPCS

## 2019-12-10 PROCEDURE — 70450 CT HEAD/BRAIN W/O DYE: CPT

## 2019-12-10 PROCEDURE — 3331090002 HH PPS REVENUE DEBIT

## 2019-12-10 NOTE — ED NOTES
Assumed care of the patient - pt resting in bed with no acute distress noted at this time - pending for reeval from MD;;     A/Ox4 at this time;;

## 2019-12-10 NOTE — ED PROVIDER NOTES
EMERGENCY DEPARTMENT HISTORY AND PHYSICAL EXAM      Date: 12/9/2019  Patient Name: Sharonda Steen    History of Presenting Illness     Chief Complaint   Patient presents with    Syncope       History Provided By: Patient    HPI: Sharonda Steen, 77 y.o. male with PMHx significant for end-stage renal disease who is been on dialysis for the past 3 weeks, diabetes, and hypertension presents to the emergency room after near syncope and weakness in his legs. Patient had dialysis this morning and when he got up from the chair he felt like he was going to fall in his legs were weak. His granddaughter brought him his walker and he was able to get home. He decided to cook some food and then felt weak and laid down in the floor to sleep in the kitchen. His family came home and as they were helping him to bed,  he had another near fall due to leg weakness (his daughter's boyfriend caught him as he was falling). He denies dizziness, chest pain, shortness of breath, nausea, vomiting, diarrhea, urinary symptoms, fever, chills, cough. He denies ever losing consciousness. Patient is a poor historian. PCP: Donna Tristan MD    No current facility-administered medications on file prior to encounter. Current Outpatient Medications on File Prior to Encounter   Medication Sig Dispense Refill    amLODIPine (NORVASC) 10 mg tablet Take 1 Tab by mouth daily for 120 days. 60 Tab 1    labetalol (NORMODYNE) 300 mg tablet Take 1 Tab by mouth two (2) times a day for 60 days. 60 Tab 1    finasteride (PROSCAR) 5 mg tablet Take 1 Tab by mouth daily for 60 days. 30 Tab 1    furosemide (LASIX) 40 mg tablet Take 40 mg by mouth daily.  ferrous sulfate 325 mg (65 mg iron) tablet Take 1 Tab by mouth two (2) times a day.  spironolactone (ALDACTONE) 25 mg tablet Take 25 mg by mouth daily.  aspirin delayed-release 81 mg tablet Take  by mouth as needed for Pain.          Past History     Past Medical History:  Past Medical History:   Diagnosis Date    Diabetes ([facility]     Hypertension     Kidney disease        Past Surgical History:  Past Surgical History:   Procedure Laterality Date    HX GI      colonoscopy 6-7 yrs ago    IR INSERT TUNL CVC W/O PORT OVER 5 YR  11/11/2019    LA COLON CA SCRN NOT HI RSK IND  10/21/2015            Family History:  Family History   Problem Relation Age of Onset    Hypertension Mother     Hypertension Father        Social History:  Social History     Tobacco Use    Smoking status: Never Smoker    Smokeless tobacco: Never Used   Substance Use Topics    Alcohol use: No    Drug use: No       Allergies:  No Known Allergies      Review of Systems   Review of Systems   Constitutional: Negative for chills and fever. HENT: Negative for congestion, ear pain, rhinorrhea and sore throat. Eyes: Negative. Respiratory: Negative for cough, chest tightness, shortness of breath and wheezing. Cardiovascular: Negative for chest pain, palpitations and leg swelling. Gastrointestinal: Negative for abdominal pain, constipation, diarrhea, nausea and vomiting. Genitourinary: Negative for dysuria and flank pain. Musculoskeletal: Negative for back pain and myalgias. Skin: Negative for rash and wound. Neurological: Positive for weakness. Negative for dizziness, syncope, facial asymmetry, light-headedness, numbness and headaches. Psychiatric/Behavioral: Negative for confusion. The patient is not nervous/anxious.           Physical Exam   General appearance - well nourished, well appearing, and in no distress  Eyes - pupils equal and reactive, extraocular eye movements intact  ENT - mucous membranes moist, pharynx normal without lesions  Neck - supple, no significant adenopathy; non-tender to palpation  Chest - clear to auscultation, no wheezes, rales or rhonchi; non-tender to palpation, right upper chest with dialysis catheter in place  Heart - normal rate and regular rhythm, S1 and S2 normal, no murmurs noted  Abdomen - soft, nontender, nondistended, no masses or organomegaly  Musculoskeletal - no joint tenderness, deformity or swelling; normal ROM  Extremities - peripheral pulses normal, no pedal edema  Skin - normal coloration and turgor, no rashes  Neurological - alert, oriented x3, normal speech, no focal findings or movement disorder noted, 4 out of 5 strength bilateral lower extremities (leg weakness appears equal bilaterally)    Diagnostic Study Results     Labs -     Recent Results (from the past 12 hour(s))   METABOLIC PANEL, COMPREHENSIVE    Collection Time: 12/09/19 11:23 PM   Result Value Ref Range    Sodium 138 136 - 145 mmol/L    Potassium 4.1 3.5 - 5.1 mmol/L    Chloride 101 97 - 108 mmol/L    CO2 29 21 - 32 mmol/L    Anion gap 8 5 - 15 mmol/L    Glucose 89 65 - 100 mg/dL    BUN 27 (H) 6 - 20 MG/DL    Creatinine 3.68 (H) 0.70 - 1.30 MG/DL    BUN/Creatinine ratio 7 (L) 12 - 20      GFR est AA 20 (L) >60 ml/min/1.73m2    GFR est non-AA 16 (L) >60 ml/min/1.73m2    Calcium 8.8 8.5 - 10.1 MG/DL    Bilirubin, total 0.5 0.2 - 1.0 MG/DL    ALT (SGPT) 56 12 - 78 U/L    AST (SGOT) 43 (H) 15 - 37 U/L    Alk. phosphatase 82 45 - 117 U/L    Protein, total 7.9 6.4 - 8.2 g/dL    Albumin 3.2 (L) 3.5 - 5.0 g/dL    Globulin 4.7 (H) 2.0 - 4.0 g/dL    A-G Ratio 0.7 (L) 1.1 - 2.2     CBC WITH AUTOMATED DIFF    Collection Time: 12/09/19 11:23 PM   Result Value Ref Range    WBC 6.0 4.1 - 11.1 K/uL    RBC 3.89 (L) 4.10 - 5.70 M/uL    HGB 11.6 (L) 12.1 - 17.0 g/dL    HCT 35.9 (L) 36.6 - 50.3 %    MCV 92.3 80.0 - 99.0 FL    MCH 29.8 26.0 - 34.0 PG    MCHC 32.3 30.0 - 36.5 g/dL    RDW 13.5 11.5 - 14.5 %    PLATELET 221 478 - 020 K/uL    MPV 10.2 8.9 - 12.9 FL    NRBC 0.0 0  WBC    ABSOLUTE NRBC 0.00 0.00 - 0.01 K/uL    NEUTROPHILS 74 32 - 75 %    LYMPHOCYTES 14 12 - 49 %    MONOCYTES 10 5 - 13 %    EOSINOPHILS 2 0 - 7 %    BASOPHILS 0 0 - 1 %    IMMATURE GRANULOCYTES 0 0.0 - 0.5 %    ABS.  NEUTROPHILS 4.4 1.8 - 8.0 K/UL    ABS. LYMPHOCYTES 0.8 0.8 - 3.5 K/UL    ABS. MONOCYTES 0.6 0.0 - 1.0 K/UL    ABS. EOSINOPHILS 0.1 0.0 - 0.4 K/UL    ABS. BASOPHILS 0.0 0.0 - 0.1 K/UL    ABS. IMM. GRANS. 0.0 0.00 - 0.04 K/UL    DF AUTOMATED     EKG, 12 LEAD, INITIAL    Collection Time: 12/09/19 11:49 PM   Result Value Ref Range    Ventricular Rate 78 BPM    Atrial Rate 78 BPM    P-R Interval 174 ms    QRS Duration 140 ms    Q-T Interval 418 ms    QTC Calculation (Bezet) 476 ms    Calculated P Axis 55 degrees    Calculated R Axis -120 degrees    Calculated T Axis 36 degrees    Diagnosis       Normal sinus rhythm  Right bundle branch block  Inferior infarct , age undetermined  T wave abnormality, consider lateral ischemia  When compared with ECG of 01-NOV-2019 19:43,  Right bundle branch block has replaced Incomplete right bundle branch block         Radiologic Studies -   CT HEAD WO CONT    (Results Pending)     CT Results  (Last 48 hours)    None        CXR Results  (Last 48 hours)    None            Medical Decision Making   I am the first provider for this patient. I reviewed the vital signs, available nursing notes, past medical history, past surgical history, family history and social history. Vital Signs-Reviewed the patient's vital signs. Patient Vitals for the past 12 hrs:   Temp Pulse Resp BP SpO2   12/09/19 2326    (!) 168/101 99 %   12/09/19 2323     100 %   12/09/19 2227 97.6 °F (36.4 °C) 76 16 (!) 149/99 99 %       EKG: Normal sinus rhythm, 78 bpm, right bundle branch block, normal MD interval and QTc interval, T wave inversion lateral leads    Records Reviewed: Nursing Notes and Old Medical Records    Provider Notes (Medical Decision Making):   Differential diagnosis: Dehydration, orthostatic hypotension, electrolyte abnormality, UTI, TIA, CVA    ED Course:   Initial assessment performed.  The patients presenting problems have been discussed, and they are in agreement with the care plan formulated and outlined with them. I have encouraged them to ask questions as they arise throughout their visit. Progress Notes:  ED Course as of Dec 15 2302   Tue Dec 10, 2019   0508 Patient without any appreciable weakness in his right leg, although his right knee seemed to keep giving out when he tried to walk. X-ray was ordered which does not show anything acute. His right knee was placed in a knee immobilizer, and then he was able to walk with his walker. Will encourage orthopedic follow-up. [AO]      ED Course User Index  [AO] Radha Robison MD       Disposition:  Discharge home    PLAN:  1. Discharge Medication List as of 12/10/2019  5:10 AM        2. Follow-up Information     Follow up With Specialties Details Why Contact Info    [facility] EMERGENCY DEPT Emergency Medicine  If symptoms worsen [address] [place] [address]  [phone]    Madalyn Muller MD [facility] Call today  [address]  [phone]      Violeta Atkins MD Orthopedic Surgery Schedule an appointment as soon as possible for a visit  [address]  [address] [place] [address]  [phone]          Return to ED if worse     Diagnosis     Clinical Impression:   1. ESRD on dialysis ([address]    2. Sensation of knee instability    3.  Fall, initial encounter

## 2019-12-10 NOTE — DISCHARGE INSTRUCTIONS
Patient Education        Preventing Falls: Care Instructions  Your Care Instructions    Getting around your home safely can be a challenge if you have injuries or health problems that make it easy for you to fall. Loose rugs and furniture in walkways are among the dangers for many older people who have problems walking or who have poor eyesight. People who have conditions such as arthritis, osteoporosis, or dementia also have to be careful not to fall. You can make your home safer with a few simple measures. Follow-up care is a key part of your treatment and safety. Be sure to make and go to all appointments, and call your doctor if you are having problems. It's also a good idea to know your test results and keep a list of the medicines you take. How can you care for yourself at home? Taking care of yourself  · You may get dizzy if you do not drink enough water. To prevent dehydration, drink plenty of fluids, enough so that your urine is light yellow or clear like water. Choose water and other caffeine-free clear liquids. If you have kidney, heart, or liver disease and have to limit fluids, talk with your doctor before you increase the amount of fluids you drink. · Exercise regularly to improve your strength, muscle tone, and balance. Walk if you can. Swimming may be a good choice if you cannot walk easily. · Have your vision and hearing checked each year or any time you notice a change. If you have trouble seeing and hearing, you might not be able to avoid objects and could lose your balance. · Know the side effects of the medicines you take. Ask your doctor or pharmacist whether the medicines you take can affect your balance. Sleeping pills or sedatives can affect your balance. · Limit the amount of alcohol you drink. Alcohol can impair your balance and other senses. · Ask your doctor whether calluses or corns on your feet need to be removed.  If you wear loose-fitting shoes because of calluses or corns, you can lose your balance and fall. · Talk to your doctor if you have numbness in your feet. Preventing falls at home  · Remove raised doorway thresholds, throw rugs, and clutter. Repair loose carpet or raised areas in the floor. · Move furniture and electrical cords to keep them out of walking paths. · Use nonskid floor wax, and wipe up spills right away, especially on ceramic tile floors. · If you use a walker or cane, put rubber tips on it. If you use crutches, clean the bottoms of them regularly with an abrasive pad, such as steel wool. · Keep your house well lit, especially [place] Dally, and outside walkways. Use night-lights in areas such as hallways and bathrooms. Add extra light switches or use remote switches (such as switches that go on or off when you clap your hands) to make it easier to turn lights on if you have to get up during the night. · Install sturdy handrails on stairways. · Move items in your cabinets so that the things you use a lot are on the lower shelves (about waist level). · Keep a cordless phone and a flashlight with new batteries by your bed. If possible, put a phone in each of the main rooms of your house, or carry a cell phone in case you fall and cannot reach a phone. Or, you can wear a device around your neck or wrist. You push a button that sends a signal for help. · Wear low-heeled shoes that fit well and give your feet good support. Use footwear with nonskid soles. Check the heels and soles of your shoes for wear. Repair or replace worn heels or soles. · Do not wear socks without shoes on wood floors. · Walk on the grass when the sidewalks are slippery. If you live in an area that gets snow and ice in the winter, sprinkle salt on slippery steps and sidewalks. Preventing falls in the bath  · Install grab bars and nonskid mats inside and outside your shower or tub and near the toilet and sinks. · Use shower chairs and bath benches.   · Use a hand-held shower head that will allow you to sit while showering. · Get into a tub or shower by putting the weaker leg in first. Get out of a tub or shower with your strong side first.  · Repair loose toilet seats and consider installing a raised toilet seat to make getting on and off the toilet easier. · Keep your bathroom door unlocked while you are in the shower. Where can you learn more? Go to http://michael-andrea.info/. Enter [phone] [id number] [phone] in the search box to learn more about \"Preventing Falls: Care Instructions. \"  Current as of: March 16, 2018  Content Version: 11.8  © 2128-2899 [employer]wise, [employer]. Care instructions adapted under license by [employer] (which disclaims liability or warranty for this information). If you have questions about a medical condition or this instruction, always ask your healthcare professional. [address] any warranty or liability for your use of this information.

## 2019-12-10 NOTE — ED NOTES
Patient discharge by David Abad MD - pt sent to the front lobby, with strong and steady gait -  Discharge information / home RX / and reasons to return to the ED were reviewed by the doctor.

## 2019-12-10 NOTE — ED NOTES
PT ambulated approx. 10feet. Pt R leg unstable. PT reports Alexis Samples is giving out. \"  PT denies weakness, full ROM in affected extremity. PT denies dizziness or weakness. PT knee \"buckled,\" two times prior to getting pt back in bed. MD notified.

## 2019-12-10 NOTE — ED NOTES
reeval completed by Guilherme Arrington MD - pt with improved ambulation with knee immobilizer in place;;

## 2019-12-11 PROCEDURE — 3331090001 HH PPS REVENUE CREDIT

## 2019-12-11 PROCEDURE — 3331090002 HH PPS REVENUE DEBIT

## 2019-12-12 ENCOUNTER — HOME CARE VISIT (OUTPATIENT)
Dept: SCHEDULING | Facility: HOME HEALTH | Age: 69
End: 2019-12-12
Payer: MEDICARE

## 2019-12-12 VITALS
DIASTOLIC BLOOD PRESSURE: 78 MMHG | HEART RATE: 78 BPM | RESPIRATION RATE: 18 BRPM | SYSTOLIC BLOOD PRESSURE: 132 MMHG | OXYGEN SATURATION: 96 % | TEMPERATURE: 97.7 F

## 2019-12-12 PROCEDURE — 3331090001 HH PPS REVENUE CREDIT

## 2019-12-12 PROCEDURE — G0157 HHC PT ASSISTANT EA 15: HCPCS

## 2019-12-12 PROCEDURE — 3331090002 HH PPS REVENUE DEBIT

## 2019-12-12 PROCEDURE — G0299 HHS/HOSPICE OF RN EA 15 MIN: HCPCS

## 2019-12-13 VITALS
OXYGEN SATURATION: 99 % | TEMPERATURE: 98.4 F | HEART RATE: 75 BPM | SYSTOLIC BLOOD PRESSURE: 130 MMHG | DIASTOLIC BLOOD PRESSURE: 80 MMHG

## 2019-12-13 PROCEDURE — 3331090002 HH PPS REVENUE DEBIT

## 2019-12-13 PROCEDURE — 3331090001 HH PPS REVENUE CREDIT

## 2019-12-14 PROCEDURE — 3331090002 HH PPS REVENUE DEBIT

## 2019-12-14 PROCEDURE — 3331090001 HH PPS REVENUE CREDIT

## 2019-12-15 PROCEDURE — 3331090002 HH PPS REVENUE DEBIT

## 2019-12-15 PROCEDURE — 3331090001 HH PPS REVENUE CREDIT

## 2019-12-16 PROCEDURE — 3331090002 HH PPS REVENUE DEBIT

## 2019-12-16 PROCEDURE — 3331090001 HH PPS REVENUE CREDIT

## 2019-12-17 ENCOUNTER — HOME CARE VISIT (OUTPATIENT)
Dept: SCHEDULING | Facility: HOME HEALTH | Age: 69
End: 2019-12-17
Payer: MEDICARE

## 2019-12-17 VITALS
RESPIRATION RATE: 14 BRPM | HEART RATE: 78 BPM | SYSTOLIC BLOOD PRESSURE: 130 MMHG | OXYGEN SATURATION: 97 % | DIASTOLIC BLOOD PRESSURE: 90 MMHG | TEMPERATURE: 97.5 F

## 2019-12-17 PROCEDURE — 3331090001 HH PPS REVENUE CREDIT

## 2019-12-17 PROCEDURE — 3331090003 HH PPS REVENUE ADJ

## 2019-12-17 PROCEDURE — 3331090002 HH PPS REVENUE DEBIT

## 2019-12-17 PROCEDURE — G0151 HHCP-SERV OF PT,EA 15 MIN: HCPCS

## 2020-01-03 ENCOUNTER — OFFICE VISIT (OUTPATIENT)
Dept: HEMATOLOGY | Age: 70
End: 2020-01-03

## 2020-01-03 VITALS
HEART RATE: 86 BPM | TEMPERATURE: 96.4 F | OXYGEN SATURATION: 100 % | DIASTOLIC BLOOD PRESSURE: 95 MMHG | HEIGHT: 69 IN | SYSTOLIC BLOOD PRESSURE: 149 MMHG | WEIGHT: 168.2 LBS | BODY MASS INDEX: 24.91 KG/M2

## 2020-01-03 DIAGNOSIS — B18.2 CHRONIC HEPATITIS C WITHOUT HEPATIC COMA (HCC): Primary | ICD-10-CM

## 2020-01-03 DIAGNOSIS — B18.1 CHRONIC HEPATITIS B (HCC): ICD-10-CM

## 2020-01-03 PROBLEM — N17.9 ARF (ACUTE RENAL FAILURE) (HCC): Status: RESOLVED | Noted: 2019-09-02 | Resolved: 2020-01-03

## 2020-01-03 PROBLEM — E16.2 HYPOGLYCEMIA: Status: RESOLVED | Noted: 2019-01-23 | Resolved: 2020-01-03

## 2020-01-03 PROBLEM — I10 HYPERTENSION: Status: ACTIVE | Noted: 2019-09-02

## 2020-01-03 NOTE — PROGRESS NOTES
Zhao Jordan is a 68 y.o. male  Chief Complaint   Patient presents with    New Patient     hep b and c         Visit Vitals  BP (!) 149/95 (BP 1 Location: Left arm, BP Patient Position: Sitting)   Pulse 86   Temp 96.4 °F (35.8 °C) (Tympanic)   Ht 5' 9\" (1.753 m)   Wt 168 lb 3.2 oz (76.3 kg)   SpO2 100%   BMI 24.84 kg/m²     3 most recent PHQ Screens 1/3/2020   Little interest or pleasure in doing things Not at all   Feeling down, depressed, irritable, or hopeless Not at all   Total Score PHQ 2 0     Learning Assessment 1/3/2020   PRIMARY LEARNER Patient   HIGHEST LEVEL OF EDUCATION - PRIMARY LEARNER  DID NOT GRADUATE HIGH SCHOOL   BARRIERS PRIMARY LEARNER NONE   CO-LEARNER CAREGIVER No   PRIMARY LANGUAGE ENGLISH   LEARNER PREFERENCE PRIMARY LISTENING   ANSWERED BY self   RELATIONSHIP SELF     Abuse Screening Questionnaire 1/3/2020   Do you ever feel afraid of your partner? N   Are you in a relationship with someone who physically or mentally threatens you? N   Is it safe for you to go home?

## 2020-01-03 NOTE — PROGRESS NOTES
[address], MD, MD Yumiko Miller PA-C Francoise Milian, [facility]     Radha VIVAS [facility]   Tanisha Burgos [facility]       [address]    at [address], [address] [place] [address]  22.    [phone]    FAX: [address] [place] [address], [address]    [phone]    FAX: [phone]       Patient Care Team:  Nixon Arellano MD as PCP - General (Family Practice)      Problem List  Date Reviewed: 1/3/2020          Codes Class Noted    Chronic hepatitis C ([facility] ICD-10-CM: B18.2  ICD-9-CM: 070.54  1/3/2020        Chronic hepatitis B ([facility] ICD-10-CM: B18.1  ICD-9-CM: 070.32  1/3/2020        Chronic renal failure ICD-10-CM: N18.9  ICD-9-CM: 585.9  11/2/2019        Hematuria ICD-10-CM: R31.9  ICD-9-CM: 599.70  11/2/2019        Hypertension ICD-10-CM: I10  ICD-9-CM: 401.9  9/2/2019        DM (diabetes mellitus) ([facility] ICD-10-CM: E11.9  ICD-9-CM: 250.00  9/2/2019        Humerus fracture ICD-10-CM: S42.309A  ICD-9-CM: 812.20  9/2/2019              The clinicians listed above have asked me to see Duncan Feliz in consultation regarding chronic HCV and HBV and its management. All medical records sent by the referring physicians were reviewed including imaging studies     The patient is a 68 y.o. Black male who was found to have abnormalities in liver chemistries and subsequently tested positive for chronic HCV and HBV in 11/2019. Risk factors for acquiring HCV are not apparent       There was no history of acute icteric hepatitis at the time of these risk factors. Ultrasound and CT scan of the liver were  performed in 9 and 11/2019.   The results of the imaging demonstrated a normal appearing liver. An assessment of liver fibrosis with biopsy or elastography has not been performed. The patient has never received treatment for chronic HCV or HBV. The patient has no symptoms which can be attributed to the liver disorder. The patient is not currently experiencing the following symptoms of liver disease:  fatigue, pain in the right side over the liver,     The patient completes all daily activities without any functional limitations. ASSESSMENT AND PLAN:  Chronic HCV   Chronic HCV of unclear severity. Have performed laboratory testing to monitor liver function and degree of liver injury. This included BMP, hepatic panel, CBC with platelet count, Liver transaminases are elevated. ALP is normal.  Liver function is normal.  The platelet count is normal.      Will perform and/or review results of HCV viral load, HCV genotype to define the specific treatment and duration of treatment that will be required. Will perform serologic and virologic studies to assess for other causes of chronic liver disease. Will perform imaging of the liver with ultrasound. The need to perform an assessment of liver fibrosis was discussed with the patient. The Fibroscan can assess liver fibrosis and determine if a patient has advanced fibrosis or cirrhosis without the need for liver biopsy. This will be performed at the next office visit. Chronic HCV Treatment  The patient has not been treated for HCV. The patient has HCV genotype 1B. The patient should be treated with Harvoni (sofasbuvir and ledipasvir), Mavyret (glecaprevir and piprentasvir) or   Epclusa (sofosbuvir and valpitasvir). The SVR/cure rate for is over 95%. Chronic HBV  Chronic HBV that is E-antigen positive active   HBV DNA is 67,000 IU/ml. The patient is not currently receiving treatment for HBV.     The patient will need to be started on treatmnt for chronic active HBV.  Will start Vemlidy 25 every day    He is both HBEantigen and anti-HBE positive. This suggests he has been infected with 2 stains of HBV one that is wild type and one that may be a pre-core mutation. This means that he may need anti-viral suppression long term even if seroconversion of and loss of E occurs. Screening for Hepatocellular Carcinoma  HCC screening has recently been performed and does not suggest [address] The next liver imaging study will be performed in 5/2020. Treatment of other medical problems in patients with chronic liver disease  There are no contraindications for the patient to take most medications that are necessary for treatment of other medical issues. Counseling for alcohol in patients with chronic liver disease  The patient was counseled regarding alcohol consumption and the effect of alcohol on chronic liver disease. The patient does not consume any significant amount of alcohol. Vaccinations   Vaccination for viral hepatitis A is recommended since the patient has no serologic evidence of previous exposure or vaccination with immunity. Routine vaccinations against other bacterial and viral agents can be performed as indicated. Annual flu vaccination should be administered if indicated. ALLERGIES  No Known Allergies    MEDICATIONS  Current Outpatient Medications   Medication Sig    amLODIPine (NORVASC) 10 mg tablet Take 1 Tab by mouth daily for 120 days.  labetalol (NORMODYNE) 300 mg tablet Take 1 Tab by mouth two (2) times a day for 60 days.  finasteride (PROSCAR) 5 mg tablet Take 1 Tab by mouth daily for 60 days.  spironolactone (ALDACTONE) 25 mg tablet Take 25 mg by mouth daily.  aspirin delayed-release 81 mg tablet Take  by mouth as needed for Pain.  furosemide (LASIX) 40 mg tablet Take 40 mg by mouth daily.  ferrous sulfate 325 mg (65 mg iron) tablet Take 1 Tab by mouth two (2) times a day.      No current facility-administered medications for this visit. SYSTEM REVIEW NOT RELATED TO LIVER DISEASE OR REVIEWED ABOVE:  Constitution systems: Negative for fever, chills, weight gain, weight loss. Eyes: Negative for visual changes. ENT: Negative for sore throat, painful swallowing. Respiratory: Negative for cough, hemoptysis, SOB. Cardiology: Negative for chest pain, palpitations. GI:  Negative for constipation or diarrhea. : Negative for urinary frequency, dysuria, hematuria, nocturia. Skin: Negative for rash. Hematology: Negative for easy bruising, blood clots. Musculo-skelatal: Negative for back pain, muscle pain, weakness. Neurologic: Negative for headaches, dizziness, vertigo, memory problems not related to HE. Psychology: Negative for anxiety, depression. FAMILY HISTORY:  The patient has no knowledge of the father's medical condition. The patient has no knowledge of the mother's medical condition. SOCIAL HISTORY:  The patient is . The patient has 1 child and 3 grandchildren. The patient has never used tobacco products. The patient has never consumed significant amounts of alcohol. 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 PHYSICAL EXAMINATION:  Visit Vitals  BP (!) 149/95 (BP 1 Location: Left arm, BP Patient Position: Sitting)   Pulse 86   Temp 96.4 °F (35.8 °C) (Tympanic)   Ht 5' 9\" (1.753 m)   Wt 168 lb 3.2 oz (76.3 kg)   SpO2 100%   BMI 24.84 kg/m²     General: No acute distress. Eyes: Sclera anicteric. ENT: No oral lesions. Thyroid normal.  Nodes: No adenopathy. Skin: No spider angiomata. No jaundice. No palmar erythema. Respiratory: Lungs clear to auscultation. Cardiovascular: Regular heart rate. No murmurs. No JVD. Abdomen: Soft non-tender. Liver size normal to percussion/palpation. Spleen not palpable. No obvious ascites. Extremities: No edema. No muscle wasting. No gross arthritic changes.   Neurologic: Alert and oriented. Cranial nerves grossly intact. No asterixis. LABORATORY STUDIES:  Liver [place] of [address] & Units 1/3/2020   WBC 3.4 - 10.8 x10E3/uL 3.3 (L)   ANC 1.4 - 7.0 x10E3/uL 2.0   HGB 13.0 - 17.7 g/dL 10.3 (L)    - 450 x10E3/uL 272   AST 0 - 40 IU/L 47 (H)   ALT 0 - 44 IU/L 57 (H)   Alk Phos 39 - 117 IU/L 94   Bili, Total 0.0 - 1.2 mg/dL 0.4   Bili, Direct 0.00 - 0.40 mg/dL 0.14   Albumin 3.5 - 4.8 g/dL 3.8   BUN 8 - 27 mg/dL 19   Creat 0.76 - 1.27 mg/dL 2.74 (H)   Na 134 - 144 mmol/L 141   K 3.5 - 5.2 mmol/L 3.6   Cl 96 - 106 mmol/L 96   CO2 20 - 29 mmol/L 25   Glucose 65 - 99 mg/dL 140 (H)     SEROLOGIES:  Serologies Latest Ref Rng & Units 9/5/2019 9/3/2019   Hep B Surface Ag Index  >1,000.00   Hep B Surface Ag Interp NEG    POSITIVE (A)   Hep C Ab NR    REACTIVE (A)   Hep C Genotype  1b    HCV RT-PCR, Quant IU/mL 2,550,000    HCV Log10 log10 IU/mL 6.407    Hep D AB Negative     Iron % Saturation 20 - 50 %  29   C-ANCA Neg:<1:20 titer  <1:20   P-ANCA Neg:<1:20 titer  <1:20   ANCA Neg:<1:20 titer  <1:20     Serologies Latest Ref Rng & Units 11/10/2019   Hep B Surface Ag Index >1,000.00   Hep B Surface Ag Interp NEG   POSITIVE (A)   Hep B Core Ab, Total NEGATIVE   Positive (A)     Serologies Latest Ref Rng & Units 1/3/2020   Hep A Ab, Total Negative Negative   HCV RT-PCR, Quant IU/mL 3,870,000   Hep D AB Negative Negative     LIVER HISTOLOGY:  Not available or performed    ENDOSCOPIC PROCEDURES:  Not available or performed    RADIOLOGY:  11/2019. CT scan abdomen without IV contrast.  Normal appearing liver. No liver mass lesions. Normal spleen. No ascites. OTHER TESTING:  Not available or performed    FOLLOW-UP:  All of the issues listed above in the Assessment and Plan were discussed with the patient. All questions were answered. The patient expressed a clear understanding of the above.     [address] [address] in 4 weeks for Fibroscan to review all data and determine the treatment plan.       MD Marisol Bruce [address], [address] [place] [address] 22.  [phone]  [address] [place] [address]

## 2020-01-03 NOTE — Clinical Note
1/19/20 Patient: Donna Blood YOB: 1950 Date of Visit: 1/3/2020 Jeannine Estevez MD 
[address] [facility] [address] VIA Facsimile: [phone] Dear Jeannine Estevez MD, Thank you for referring Mr. Donna Blood to [address] [place] [address],[address] for evaluation. My notes for this consultation are attached. If you have questions, please do not hesitate to call me. I look forward to following your patient along with you. Sincerely, Palmer Harp MD

## 2020-01-04 LAB
ALBUMIN SERPL-MCNC: 3.8 G/DL (ref 3.5–4.8)
ALP SERPL-CCNC: 94 IU/L (ref 39–117)
ALT SERPL-CCNC: 57 IU/L (ref 0–44)
AST SERPL-CCNC: 47 IU/L (ref 0–40)
BASOPHILS # BLD AUTO: 0 X10E3/UL (ref 0–0.2)
BASOPHILS NFR BLD AUTO: 0 %
BILIRUB DIRECT SERPL-MCNC: 0.14 MG/DL (ref 0–0.4)
BILIRUB SERPL-MCNC: 0.4 MG/DL (ref 0–1.2)
BUN SERPL-MCNC: 19 MG/DL (ref 8–27)
BUN/CREAT SERPL: 7 (ref 10–24)
CALCIUM SERPL-MCNC: 8.7 MG/DL (ref 8.6–10.2)
CHLORIDE SERPL-SCNC: 96 MMOL/L (ref 96–106)
CO2 SERPL-SCNC: 25 MMOL/L (ref 20–29)
CREAT SERPL-MCNC: 2.74 MG/DL (ref 0.76–1.27)
EOSINOPHIL # BLD AUTO: 0.2 X10E3/UL (ref 0–0.4)
EOSINOPHIL NFR BLD AUTO: 5 %
ERYTHROCYTE [DISTWIDTH] IN BLOOD BY AUTOMATED COUNT: 14 % (ref 12.3–15.4)
GLUCOSE SERPL-MCNC: 140 MG/DL (ref 65–99)
HAV AB SER QL IA: NEGATIVE
HCT VFR BLD AUTO: 31.7 % (ref 37.5–51)
HGB BLD-MCNC: 10.3 G/DL (ref 13–17.7)
IMM GRANULOCYTES # BLD AUTO: 0 X10E3/UL (ref 0–0.1)
IMM GRANULOCYTES NFR BLD AUTO: 0 %
LYMPHOCYTES # BLD AUTO: 0.8 X10E3/UL (ref 0.7–3.1)
LYMPHOCYTES NFR BLD AUTO: 25 %
MCH RBC QN AUTO: 29.5 PG (ref 26.6–33)
MCHC RBC AUTO-ENTMCNC: 32.5 G/DL (ref 31.5–35.7)
MCV RBC AUTO: 91 FL (ref 79–97)
MONOCYTES # BLD AUTO: 0.3 X10E3/UL (ref 0.1–0.9)
MONOCYTES NFR BLD AUTO: 9 %
NEUTROPHILS # BLD AUTO: 2 X10E3/UL (ref 1.4–7)
NEUTROPHILS NFR BLD AUTO: 61 %
PLATELET # BLD AUTO: 272 X10E3/UL (ref 150–450)
POTASSIUM SERPL-SCNC: 3.6 MMOL/L (ref 3.5–5.2)
PROT SERPL-MCNC: 7.4 G/DL (ref 6–8.5)
RBC # BLD AUTO: 3.49 X10E6/UL (ref 4.14–5.8)
SODIUM SERPL-SCNC: 141 MMOL/L (ref 134–144)
WBC # BLD AUTO: 3.3 X10E3/UL (ref 3.4–10.8)

## 2020-01-06 LAB
AFP L3 MFR SERPL: NORMAL % (ref 0–9.9)
AFP SERPL-MCNC: 2.6 NG/ML (ref 0–8)
HCV RNA SERPL NAA+PROBE-ACNC: NORMAL IU/ML
HCV RNA SERPL NAA+PROBE-LOG IU: 6.59 LOG10 IU/ML
HCV RNA SERPL QL NAA+PROBE: POSITIVE
TEST INFORMATION: NORMAL

## 2020-01-09 LAB — HDV AB SER QL IA: NEGATIVE

## 2020-02-13 ENCOUNTER — OFFICE VISIT (OUTPATIENT)
Dept: HEMATOLOGY | Age: 70
End: 2020-02-13

## 2020-02-13 VITALS
SYSTOLIC BLOOD PRESSURE: 186 MMHG | TEMPERATURE: 97.9 F | DIASTOLIC BLOOD PRESSURE: 105 MMHG | OXYGEN SATURATION: 98 % | HEIGHT: 69 IN | BODY MASS INDEX: 26.22 KG/M2 | HEART RATE: 76 BPM | WEIGHT: 177 LBS

## 2020-02-13 DIAGNOSIS — B18.2 CHRONIC HEPATITIS C WITHOUT HEPATIC COMA (HCC): Primary | ICD-10-CM

## 2020-02-13 RX ORDER — LABETALOL 300 MG/1
300 TABLET, FILM COATED ORAL 2 TIMES DAILY
COMMUNITY

## 2020-02-13 RX ORDER — SEVELAMER CARBONATE 800 MG/1
TABLET, FILM COATED ORAL 3 TIMES DAILY
COMMUNITY

## 2020-02-13 RX ORDER — FINASTERIDE 5 MG/1
5 TABLET, FILM COATED ORAL DAILY
COMMUNITY

## 2020-02-13 RX ORDER — HYDRALAZINE HYDROCHLORIDE 25 MG/1
25 TABLET, FILM COATED ORAL 3 TIMES DAILY
COMMUNITY

## 2020-02-13 NOTE — LETTER
2/16/20 Patient: Duc Ballard YOB: 1950 Date of Visit: 2/13/2020 Jimbo Lake MD 
[address] [facility] [address] VIA Facsimile: [phone] Dear Jimbo Lake MD, Thank you for referring Mr. Duc Ballard to [address] for evaluation. My notes for this consultation are attached. If you have questions, please do not hesitate to call me. I look forward to following your patient along with you. Sincerely, Madeline Medley, NP

## 2020-02-13 NOTE — PROGRESS NOTES
Kelvin Hand is a 68 y.o. male  Chief Complaint   Patient presents with    Follow-up     fibroscan         Visit Vitals  BP (!) 186/105 (BP 1 Location: Left arm, BP Patient Position: Sitting)   Pulse 76   Temp 97.9 °F (36.6 °C) (Tympanic)   Ht 5' 9\" (1.753 m)   Wt 177 lb (80.3 kg)   SpO2 98%   BMI 26.14 kg/m²     3 most recent PHQ Screens 2/13/2020   Little interest or pleasure in doing things Not at all   Feeling down, depressed, irritable, or hopeless Not at all   Total Score PHQ 2 0     Learning Assessment 2/13/2020   PRIMARY LEARNER Patient   HIGHEST LEVEL OF EDUCATION - PRIMARY LEARNER  -   BARRIERS PRIMARY LEARNER NONE   CO-LEARNER CAREGIVER No   PRIMARY LANGUAGE ENGLISH   LEARNER PREFERENCE PRIMARY DEMONSTRATION   ANSWERED BY patient   RELATIONSHIP SELF     Abuse Screening Questionnaire 2/13/2020   Do you ever feel afraid of your partner? N   Are you in a relationship with someone who physically or mentally threatens you? N   Is it safe for you to go home? Y         1. Have you been to the ER, urgent care clinic since your last visit? Hospitalized since your last visit? No    2. Have you seen or consulted any other health care providers outside of the [address] [place] [address] since your last visit? Include any pap smears or colon screening.  No

## 2020-02-13 NOTE — PROGRESS NOTES
[address] [place] [address] MD Elen, MD Liudmila Coto PA-C Bennet Colace, [facility]     Radha VIVAS Eyad, [facility]   TONO Vital-ERLINDA Edwards [facility]       Myron Xavier [facility] 136    at [place] [address], [address] [address]  22. [phone]    FAX: [address] [place] [address] Drive, [address], [address]    [phone]    FAX: [phone]     Patient Care Team:  Ansley Cueto MD as PCP - General (Family Practice)    Problem List  Date Reviewed: 1/19/2020          Codes Class Noted    Chronic hepatitis C ([facility] ICD-10-CM: B18.2  ICD-9-CM: 070.54  1/3/2020        Chronic hepatitis B ([facility] ICD-10-CM: B18.1  ICD-9-CM: 070.32  1/3/2020        Chronic renal failure ICD-10-CM: N18.9  ICD-9-CM: 585.9  11/2/2019        Hematuria ICD-10-CM: R31.9  ICD-9-CM: 599.70  11/2/2019        Hypertension ICD-10-CM: I10  ICD-9-CM: 401.9  9/2/2019        DM (diabetes mellitus) ([facility] ICD-10-CM: E11.9  ICD-9-CM: 250.00  9/2/2019        Humerus fracture ICD-10-CM: S42.309A  ICD-9-CM: 812.20  9/2/2019            Jahaira Ashton [facility] the [facility] for management of chronic HCV and chronic HBV. The active problem list, all pertinent past medical history, medications, liver histology, radiologic findings and laboratory findings related to the liver disorder were reviewed with the patient. The patient is a 68 y.o. Black male who was found to have abnormalities in liver chemistries and subsequently tested positive for chronic HCV and HBV in 11/2019. Ultrasound and CT scan of the liver were performed in 9 and 11/2019. The results of the imaging demonstrated a normal appearing liver.       An assessment of liver fibrosis with elastography will be performed this visit. The patient has never received treatment for chronic HCV or HBV. The patient has no symptoms which can be attributed to the liver disorder. The patient is not currently experiencing the following symptoms of liver disease: fatigue or pain in the right side over the liver. The patient completes all daily activities without any functional limitations. ASSESSMENT AND PLAN:  Chronic HCV   Chronic HCV with F1-F2 fibrosis. Liver transaminases are elevated. ALP is normal. Liver function is normal. The platelet count is normal.      Chronic HCV treatment  The patient has not been treated for HCV. The patient has HCV genotype 1B. The SVR/cure rate for is over 95%. I will treat him with 8 weeks of Mavyret. Chronic HBV  Chronic HBV that is E-antigen positive active. HBV DNA is 67,000 IU/ml. The patient is not currently receiving treatment for HBV. The patient will need to be started on treatment for chronic active HBV. Will start Vemlidy 25 every day. He is both HBE antigen and HBE antibody positive. This suggests he has been infected with 2 strains of HBV, one that is wild type and one that may be a pre-core mutation. This means he may need anti-viral suppression long term even if seroconversion and loss of E occurs. ESRD  He is on HD M/W/F. Currently, he has a right IJ dialysis catheter. Likely will get a graft in right arm. Hypertension  His blood pressure is elevated today. He is on dialysis and he follows up regularly with his nephrologist.    Screening for hepatocellular carcinoma  HCC screening has recently been performed and does not suggest [facility].. The next liver imaging study will be performed in 5/2020. In chronic HBV patients after 48years old, it is recommended every 6 months.      Treatment of other medical problems in patients with chronic liver disease  There are no contraindications for the patient to take most medications necessary for treatment of other medical issues. Counseling for alcohol in patients with chronic liver disease  The patient was counseled regarding alcohol consumption and the effect of alcohol on chronic liver disease. The patient does not consume any significant amount of alcohol. Vaccinations   Vaccination for viral hepatitis A is recommended since the patient has no serologic evidence of previous exposure or vaccination with immunity. Routine vaccinations against other bacterial and viral agents can be performed as indicated. Annual flu vaccination should be administered if indicated. ALLERGIES  No Known Allergies    MEDICATIONS  Current Outpatient Medications   Medication Sig    sevelamer carbonate (RENVELA) 800 mg tab tab Take  by mouth three (3) times daily.  hydrALAZINE (APRESOLINE) 25 mg tablet Take 25 mg by mouth three (3) times daily.  labetaloL (NORMODYNE) 300 mg tablet Take 300 mg by mouth two (2) times a day.  finasteride (PROSCAR) 5 mg tablet Take 5 mg by mouth daily.  amLODIPine (NORVASC) 10 mg tablet Take 1 Tab by mouth daily for 120 days.  aspirin delayed-release 81 mg tablet Take  by mouth as needed for Pain.  furosemide (LASIX) 40 mg tablet Take 40 mg by mouth daily.  ferrous sulfate 325 mg (65 mg iron) tablet Take 1 Tab by mouth two (2) times a day.  spironolactone (ALDACTONE) 25 mg tablet Take 25 mg by mouth daily. No current facility-administered medications for this visit. SYSTEM REVIEW NOT RELATED TO LIVER DISEASE OR REVIEWED ABOVE:  Constitution systems: Negative for fever, chills, weight gain, weight loss. Eyes: Negative for visual changes. ENT: Negative for sore throat, painful swallowing. Respiratory: Negative for cough, hemoptysis, SOB. Cardiology: Negative for chest pain, palpitations. GI:  Negative for constipation or diarrhea. : Negative for urinary frequency, dysuria, hematuria, nocturia. Skin: Negative for rash. Hematology: Negative for easy bruising, blood clots. Musculo-skeletal: Negative for back pain, muscle pain, weakness. Neurologic: Negative for headaches, dizziness, vertigo, memory problems not related to HE. Psychology: Negative for anxiety, depression. FAMILY HISTORY:  The patient has no knowledge of the father's medical condition. The patient has no knowledge of the mother's medical condition. SOCIAL HISTORY:  The patient is . The patient has 1 child and 3 grandchildren. The patient has never used tobacco products. The patient has never consumed significant amounts of alcohol. 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 PHYSICAL EXAMINATION:  Visit Vitals  BP (!) 186/105 (BP 1 Location: Left arm, BP Patient Position: Sitting)   Pulse 76   Temp 97.9 °F (36.6 °C) (Tympanic)   Ht 5' 9\" (1.753 m)   Wt 177 lb (80.3 kg)   SpO2 98%   BMI 26.14 kg/m²     General: No acute distress. Eyes: Sclera anicteric. ENT: No oral lesions. Nodes: No adenopathy. Skin: No spider angiomata. No jaundice. No palmar erythema. Respiratory: Lungs clear to auscultation. Cardiovascular: Regular heart rate. No murmurs. No JVD. Abdomen: Soft non-tender. Liver size normal to percussion/palpation. Spleen not palpable. No obvious ascites. Extremities: No edema. No muscle wasting. No gross arthritic changes. Neurologic: Alert and oriented. Cranial nerves grossly intact. No asterixis.     LABORATORY STUDIES:  Liver [place] of [address] & Units 1/3/2020   WBC 3.4 - 10.8 x10E3/uL 3.3 (L)   ANC 1.4 - 7.0 x10E3/uL 2.0   HGB 13.0 - 17.7 g/dL 10.3 (L)    - 450 x10E3/uL 272   AST 0 - 40 IU/L 47 (H)   ALT 0 - 44 IU/L 57 (H)   Alk Phos 39 - 117 IU/L 94   Bili, Total 0.0 - 1.2 mg/dL 0.4   Bili, Direct 0.00 - 0.40 mg/dL 0.14   Albumin 3.5 - 4.8 g/dL 3.8   BUN 8 - 27 mg/dL 19   Creat 0.76 - 1.27 mg/dL 2.74 (H)   Na 134 - 144 mmol/L 141   K 3.5 - 5.2 mmol/L 3.6   Cl 96 - 106 mmol/L 96   CO2 20 - 29 mmol/L 25   Glucose 65 - 99 mg/dL 140 (H)     SEROLOGIES:  Serologies Latest Ref Rng & Units 9/5/2019 9/3/2019   Hep B Surface Ag Index  >1,000.00   Hep B Surface Ag Interp NEG    POSITIVE (A)   Hep C Ab NR    REACTIVE (A)   Hep C Genotype  1b    HCV RT-PCR, Quant IU/mL 2,550,000    HCV Log10 log10 IU/mL 6.407    Hep D AB Negative     Iron % Saturation 20 - 50 %  29   C-ANCA Neg:<1:20 titer  <1:20   P-ANCA Neg:<1:20 titer  <1:20   ANCA Neg:<1:20 titer  <1:20     Serologies Latest Ref Rng & Units 11/10/2019   Hep B Surface Ag Index >1,000.00   Hep B Surface Ag Interp NEG   POSITIVE (A)   Hep B Core Ab, Total NEGATIVE   Positive (A)     Serologies Latest Ref Rng & Units 1/3/2020   Hep A Ab, Total Negative Negative   HCV RT-PCR, Quant IU/mL 3,870,000   Hep D AB Negative Negative     LIVER HISTOLOGY:  2/2020. FibroScan performed at [address]. EkPa was 7.7. IQR/med 8%. . The results suggested a fibrosis level of F1-F2. The CAP score suggests no evidence of fatty liver. ENDOSCOPIC PROCEDURES:  Not available or performed    RADIOLOGY:  11/2019. CT scan abdomen without IV contrast. Normal appearing liver. No liver mass lesions. Normal spleen. No ascites. OTHER TESTING:  Not available or performed    FOLLOW-UP:  All of the issues listed above in the assessment and plan were discussed with the patient. All questions were answered. The patient expressed a clear understanding of the above. [address] [facility] 87 4 weeks after initiation of medical therapy. The patient was advised to call the office when he receives his medication to provide us with his start date and to schedule his 4 week treatment follow up appointment.      Umm Oliveros, [facility]-BC  Liver [place] [address] [facility] [employer] [facility], [address] [place] [address]  22.  [phone]

## 2020-02-24 RX ORDER — VELPATASVIR AND SOFOSBUVIR 100; 400 MG/1; MG/1
1 TABLET, FILM COATED ORAL DAILY
Qty: 28 TAB | Refills: 2 | Status: SHIPPED | OUTPATIENT
Start: 2020-02-24 | End: 2020-12-15 | Stop reason: ALTCHOICE

## 2020-04-30 ENCOUNTER — VIRTUAL VISIT (OUTPATIENT)
Dept: HEMATOLOGY | Age: 70
End: 2020-04-30

## 2020-04-30 DIAGNOSIS — B18.2 CHRONIC HEPATITIS C WITHOUT HEPATIC COMA (HCC): Primary | ICD-10-CM

## 2020-04-30 DIAGNOSIS — B18.1 CHRONIC HEPATITIS B (HCC): ICD-10-CM

## 2020-04-30 NOTE — PROGRESS NOTES
[address] [facility], MD, MD Siobhan Mehta PA-C Patrici Done, ACNP-BC     Radha Castano, [facility]   EDWARD DrakeP-ERLINDA    Elton Almanzatead, [employer]       Myron Mccormick De Cervantes 136    at [facility]    [address], [address] [address].    [phone]    FAX: [address] [place] [address], [address]    [phone]    FAX: [phone]     Patient Care Team:  Airam Freed MD as PCP - General (Family Practice)    Problem List  Date Reviewed: 1/19/2020          Codes Class Noted    Chronic hepatitis C ([facility] 75.) ICD-10-CM: B18.2  ICD-9-CM: 070.54  1/3/2020        Chronic hepatitis B ([facility] ICD-10-CM: B18.1  ICD-9-CM: 070.32  1/3/2020        Chronic renal failure ICD-10-CM: N18.9  ICD-9-CM: 585.9  11/2/2019        Hematuria ICD-10-CM: R31.9  ICD-9-CM: 599.70  11/2/2019        Hypertension ICD-10-CM: I10  ICD-9-CM: 401.9  9/2/2019        DM (diabetes mellitus) ([facility] ICD-10-CM: E11.9  ICD-9-CM: 250.00  9/2/2019        Humerus fracture ICD-10-CM: S42.309A  ICD-9-CM: 812.20  9/2/2019              VIRTUAL TELEHEALTH VISIT PERFORMED DUE TO COVID-19 EPIDEMIC    CONSENT:  Mikayla Oviedo, who was seen by synchronous, real-time, audio-video technology, and/or his healthcare decision maker, is aware this patient-initiated, TeleHealth encounter on 4/30/2020 is a billable service, with coverage as determined by his insurance carrier. he is aware he may receive a bill and has provided verbal consent to proceed. This patient was evaluated during a Virtual TeleHealth visit. A caregiver was present if appropriate.  Due to this being a TeleHealth encounter performed during the [facility]- public health emergency, the physical examination was limited to that listed in the [address] returns to the [address] for management of chronic HCV and chronic HBV. The active problem list, all pertinent past medical history, medications, liver histology, radiologic findings and laboratory findings related to the liver disorder were reviewed with the patient. The patient is a 68 y.o. Black male who was found to have abnormalities in liver chemistries and subsequently tested positive for chronic HCV and HBV in 11/2019. Ultrasound and CT scan of the liver were performed in 9 and 11/2019. The results of the imaging demonstrated a normal appearing liver. An assessment of liver fibrosis with elastography suggested F1-F2. The patient is currently on [facility] for HCV and HBV, respectively. He started the [place] 1 week prior to Rigo [place] and has not missed a dose of either. He is tolerating them well with no side effects. He has received refills. The patient has no symptoms which can be attributed to the liver disorder. The patient is not currently experiencing the following symptoms of liver disease: fatigue or pain in the right side over the liver. The patient completes all daily activities without any functional limitations. ASSESSMENT AND PLAN:  Chronic HCV   Chronic HCV with F1-F2 fibrosis. Liver transaminases are elevated. ALP is normal. Liver function is normal. The platelet count is normal.      Chronic HCV treatment  The patient has not been treated for HCV. The patient has HCV genotype 1B. The SVR/cure rate for is over 95%. He is on 12 weeks of Epclusa. Chronic HBV  Chronic HBV that is E-antigen positive active. HBV DNA is 67,000 IU/ml. He is currently on 25 mg Vemlidy. He is both HBE antigen and HBE antibody positive. This suggests he has been infected with 2 strains of HBV, one that is wild type and one that may be a pre-core mutation.     This means he may need anti-viral suppression long term even if seroconversion and loss of E occurs. ESRD  He is on HD M/W/F. Screening for hepatocellular carcinoma  HCC screening has recently been performed and does not suggest [facility] The next liver imaging study will be performed in 5/2020. In chronic HBV patients after 48years old, it is recommended every 6 months. This will be deferred until next visit due to COVID-19. Treatment of other medical problems in patients with chronic liver disease  There are no contraindications for the patient to take most medications necessary for treatment of other medical issues. Counseling for alcohol in patients with chronic liver disease  The patient was counseled regarding alcohol consumption and the effect of alcohol on chronic liver disease. The patient does not consume any significant amount of alcohol. Vaccinations   Vaccination for viral hepatitis A is recommended since the patient has no serologic evidence of previous exposure or vaccination with immunity. Routine vaccinations against other bacterial and viral agents can be performed as indicated. Annual flu vaccination should be administered if indicated. ALLERGIES  No Known Allergies    MEDICATIONS  Current Outpatient Medications   Medication Sig    sofosbuvir-velpatasvir (EPCLUSA) 400-100 mg tablet Take 1 Tab by mouth daily.  tenofovir ALAFENAMIDE FUMARATE (VEMLIDY) 25 mg tab Take 1 Tab by mouth daily. Indications: chronic hepatitis B    sevelamer carbonate (RENVELA) 800 mg tab tab Take  by mouth three (3) times daily.  hydrALAZINE (APRESOLINE) 25 mg tablet Take 25 mg by mouth three (3) times daily.  labetaloL (NORMODYNE) 300 mg tablet Take 300 mg by mouth two (2) times a day.  finasteride (PROSCAR) 5 mg tablet Take 5 mg by mouth daily.  aspirin delayed-release 81 mg tablet Take  by mouth as needed for Pain. No current facility-administered medications for this visit.       SYSTEM REVIEW NOT RELATED TO LIVER DISEASE OR REVIEWED ABOVE:  Constitution systems: Negative for fever, chills, weight gain, weight loss. Eyes: Negative for visual changes. ENT: Negative for sore throat, painful swallowing. Respiratory: Negative for cough, hemoptysis, SOB. Cardiology: Negative for chest pain, palpitations. GI:  Negative for constipation or diarrhea. : Negative for urinary frequency, dysuria, hematuria, nocturia. Skin: Negative for rash. Hematology: Negative for easy bruising, blood clots. Musculo-skeletal: Negative for back pain, muscle pain, weakness. Neurologic: Negative for headaches, dizziness, vertigo, memory problems not related to HE. Psychology: Negative for anxiety, depression. FAMILY HISTORY:  The patient has no knowledge of the father's medical condition. The patient has no knowledge of the mother's medical condition. SOCIAL HISTORY:  The patient is . The patient has 1 child and 3 grandchildren. The patient has never used tobacco products. The patient has never consumed significant amounts of alcohol. 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 PHYSICAL EXAMINATION:  There were no vitals taken for this visit. General: No acute distress. Eyes: Sclera anicteric. ENT: No oral lesions. Nodes: No adenopathy. Skin: No spider angiomata. No jaundice. No palmar erythema. Respiratory: No wheezing, respiratory distress, cyanosis. Cardiovascular: No JVD. Abdomen: Appears soft with no obvious ascites. Extremities: No edema. No muscle wasting. No gross arthritic changes. Neurologic: Alert and oriented. Cranial nerves grossly intact. No asterixis.     LABORATORY STUDIES:  Liver [place] of [address] [place] [address] & Units 1/3/2020   WBC 3.4 - 10.8 x10E3/uL 3.3 (L)   ANC 1.4 - 7.0 x10E3/uL 2.0   HGB 13.0 - 17.7 g/dL 10.3 (L)    - 450 x10E3/uL 272   AST 0 - 40 IU/L 47 (H)   ALT 0 - 44 IU/L 57 (H)   Alk Phos 39 - 117 IU/L 94   Bili, Total 0.0 - 1.2 mg/dL 0.4   Bili, Direct 0.00 - 0.40 mg/dL 0.14   Albumin 3.5 - 4.8 g/dL 3.8   BUN 8 - 27 mg/dL 19   Creat 0.76 - 1.27 mg/dL 2.74 (H)   Na 134 - 144 mmol/L 141   K 3.5 - 5.2 mmol/L 3.6   Cl 96 - 106 mmol/L 96   CO2 20 - 29 mmol/L 25   Glucose 65 - 99 mg/dL 140 (H)     SEROLOGIES:  Serologies Latest Ref Rng & Units 9/5/2019 9/3/2019   Hep B Surface Ag Index  >1,000.00   Hep B Surface Ag Interp NEG    POSITIVE (A)   Hep C Ab NR    REACTIVE (A)   Hep C Genotype  1b    HCV RT-PCR, Quant IU/mL 2,550,000    HCV Log10 log10 IU/mL 6.407    Hep D AB Negative     Iron % Saturation 20 - 50 %  29   C-ANCA Neg:<1:20 titer  <1:20   P-ANCA Neg:<1:20 titer  <1:20   ANCA Neg:<1:20 titer  <1:20     Serologies Latest Ref Rng & Units 11/10/2019   Hep B Surface Ag Index >1,000.00   Hep B Surface Ag Interp NEG   POSITIVE (A)   Hep B Core Ab, Total NEGATIVE   Positive (A)     Serologies Latest Ref Rng & Units 1/3/2020   Hep A Ab, Total Negative Negative   HCV RT-PCR, Quant IU/mL 3,870,000   Hep D AB Negative Negative     LIVER HISTOLOGY:  2/2020. FibroScan performed at [address]. EkPa was 7.7. IQR/med 8%. . The results suggested a fibrosis level of F1-F2. The CAP score suggests no evidence of fatty liver. ENDOSCOPIC PROCEDURES:  Not available or performed    RADIOLOGY:  11/2019. CT scan abdomen without IV contrast. Normal appearing liver. No liver mass lesions. Normal spleen. No ascites. OTHER TESTING:  Not available or performed    FOLLOW-UP:  All of the issues listed above in the assessment and plan were discussed with the patient. All questions were answered. The patient expressed a clear understanding of the above.     Pursuant to the emergency declaration under the [address], [facility] waiver authority and the [employer] Act, this Virtual Visit was conducted, with the patient's (and/or their legal guardian's) consent, to reduce the patient's risk of exposure to COVID-19 and provide necessary medical care. Services were provided through a video synchronous discussion virtually to substitute for an in-person clinic visit. The patient was located in his home. The provider was located in the [facility] office. Because of the COVID-19 epidemic, all non-emergent diagnostic testing will be delayed for 3-4 months to reduce the risk of patient exposure to and potential infection from the novel corona virus. Orders to obtain laboratory testing will be mailed to the patient. An in-person follow-up visit will be scheduled in 2 months.      Asaf Ariza, [facility]-BC  Liver [place] [address], [address] [facility] 22.  [phone]

## 2020-07-09 ENCOUNTER — HOSPITAL ENCOUNTER (EMERGENCY)
Age: 70
Discharge: HOME | End: 2020-07-09
Attending: EMERGENCY MEDICINE
Payer: MEDICARE

## 2020-07-09 VITALS
SYSTOLIC BLOOD PRESSURE: 154 MMHG | HEART RATE: 86 BPM | OXYGEN SATURATION: 97 % | DIASTOLIC BLOOD PRESSURE: 87 MMHG | WEIGHT: 185 LBS | BODY MASS INDEX: 27.4 KG/M2 | TEMPERATURE: 98 F | HEIGHT: 69 IN | RESPIRATION RATE: 18 BRPM

## 2020-07-09 DIAGNOSIS — R19.5 ABNORMAL STOOL COLOR: Primary | ICD-10-CM

## 2020-07-09 LAB
ABO + RH BLD: NORMAL
ALBUMIN SERPL-MCNC: 3.9 G/DL (ref 3.5–5)
ALBUMIN/GLOB SERPL: 0.8 {RATIO} (ref 1.1–2.2)
ALP SERPL-CCNC: 97 U/L (ref 45–117)
ALT SERPL-CCNC: 15 U/L (ref 12–78)
ANION GAP SERPL CALC-SCNC: 9 MMOL/L (ref 5–15)
APTT PPP: 25.9 SEC (ref 22.1–32)
AST SERPL-CCNC: 19 U/L (ref 15–37)
BASOPHILS # BLD: 0 K/UL (ref 0–0.1)
BASOPHILS NFR BLD: 1 % (ref 0–1)
BILIRUB SERPL-MCNC: 0.4 MG/DL (ref 0.2–1)
BLOOD GROUP ANTIBODIES SERPL: NORMAL
BUN SERPL-MCNC: 36 MG/DL (ref 6–20)
BUN/CREAT SERPL: 5 (ref 12–20)
CALCIUM SERPL-MCNC: 9.1 MG/DL (ref 8.5–10.1)
CHLORIDE SERPL-SCNC: 100 MMOL/L (ref 97–108)
CO2 SERPL-SCNC: 32 MMOL/L (ref 21–32)
CREAT SERPL-MCNC: 7.46 MG/DL (ref 0.7–1.3)
DIFFERENTIAL METHOD BLD: ABNORMAL
EOSINOPHIL # BLD: 0.2 K/UL (ref 0–0.4)
EOSINOPHIL NFR BLD: 4 % (ref 0–7)
ERYTHROCYTE [DISTWIDTH] IN BLOOD BY AUTOMATED COUNT: 14.4 % (ref 11.5–14.5)
GLOBULIN SER CALC-MCNC: 4.9 G/DL (ref 2–4)
GLUCOSE SERPL-MCNC: 172 MG/DL (ref 65–100)
HCT VFR BLD AUTO: 37.1 % (ref 36.6–50.3)
HEMOCCULT STL QL: NEGATIVE
HGB BLD-MCNC: 12 G/DL (ref 12.1–17)
IMM GRANULOCYTES # BLD AUTO: 0 K/UL (ref 0–0.04)
IMM GRANULOCYTES NFR BLD AUTO: 0 % (ref 0–0.5)
INR PPP: 1 (ref 0.9–1.1)
LYMPHOCYTES # BLD: 1.3 K/UL (ref 0.8–3.5)
LYMPHOCYTES NFR BLD: 27 % (ref 12–49)
MCH RBC QN AUTO: 32.3 PG (ref 26–34)
MCHC RBC AUTO-ENTMCNC: 32.3 G/DL (ref 30–36.5)
MCV RBC AUTO: 100 FL (ref 80–99)
MONOCYTES # BLD: 0.5 K/UL (ref 0–1)
MONOCYTES NFR BLD: 11 % (ref 5–13)
NEUTS SEG # BLD: 2.7 K/UL (ref 1.8–8)
NEUTS SEG NFR BLD: 57 % (ref 32–75)
NRBC # BLD: 0 K/UL (ref 0–0.01)
NRBC BLD-RTO: 0 PER 100 WBC
PLATELET # BLD AUTO: 284 K/UL (ref 150–400)
PMV BLD AUTO: 9.2 FL (ref 8.9–12.9)
POTASSIUM SERPL-SCNC: 4.2 MMOL/L (ref 3.5–5.1)
PROT SERPL-MCNC: 8.8 G/DL (ref 6.4–8.2)
PROTHROMBIN TIME: 10.1 SEC (ref 9–11.1)
RBC # BLD AUTO: 3.71 M/UL (ref 4.1–5.7)
SODIUM SERPL-SCNC: 141 MMOL/L (ref 136–145)
SPECIMEN EXP DATE BLD: NORMAL
THERAPEUTIC RANGE,PTTT: NORMAL SECS (ref 58–77)
WBC # BLD AUTO: 4.8 K/UL (ref 4.1–11.1)

## 2020-07-09 PROCEDURE — 85730 THROMBOPLASTIN TIME PARTIAL: CPT

## 2020-07-09 PROCEDURE — 86900 BLOOD TYPING SEROLOGIC ABO: CPT

## 2020-07-09 PROCEDURE — 82272 OCCULT BLD FECES 1-3 TESTS: CPT

## 2020-07-09 PROCEDURE — 80053 COMPREHEN METABOLIC PANEL: CPT

## 2020-07-09 PROCEDURE — 85610 PROTHROMBIN TIME: CPT

## 2020-07-09 PROCEDURE — 85025 COMPLETE CBC W/AUTO DIFF WBC: CPT

## 2020-07-09 PROCEDURE — 36415 COLL VENOUS BLD VENIPUNCTURE: CPT

## 2020-07-09 PROCEDURE — 99283 EMERGENCY DEPT VISIT LOW MDM: CPT

## 2020-07-09 NOTE — ED NOTES
Pt presents to ED ambulatory complaining of passing a black stool this morning. Pt reports normal bowel movements, denies pain and denies having noticed any blood in stool. Pt is alert and oriented x 4, RR even and unlabored, skin is warm and dry. Assessment completed and pt updated on plan of care. Call bell in reach. Emergency Department Nursing Plan of Care       The Nursing Plan of Care is developed from the Nursing assessment and Emergency Department Attending provider initial evaluation. The plan of care may be reviewed in the ED Provider note.     The Plan of Care was developed with the following considerations:   Patient / Family readiness to learn indicated by:verbalized understanding  Persons(s) to be included in education: patient  Barriers to Learning/Limitations:No    Signed     Hannah Olvera RN    7/9/2020   11:10 AM

## 2020-07-09 NOTE — ED NOTES
Discharge instructions were given to the patient by Ree English RN. The patient left the Emergency Department ambulatory, alert and oriented and in no acute distress with no prescriptions. The patient was encouraged to call or return to the ED for worsening issues or problems and was encouraged to schedule a follow up appointment for continuing care. The patient verbalized understanding of discharge instructions and prescriptions, all questions were answered. The patient has no further concerns at this time.

## 2020-07-09 NOTE — ED TRIAGE NOTES
Patient presents to the ED with c/o black stool this morning. Pt reports having a normal bowel movement last night. Pt denies any red blood. Pt denies any pain.

## 2020-07-09 NOTE — DISCHARGE INSTRUCTIONS
Patient Education        Lower Gastrointestinal Bleeding: Care Instructions  Your Care Instructions     The digestive or gastrointestinal tract goes from the mouth to the anus. It is often called the GI tract. Bleeding in the lower GI tract can happen anywhere in your small or large intestine. It can also happen in your rectum or anus. In some cases, it is caused by an infection, cancer, or inflammatory bowel disease. Or it may be caused by hemorrhoids, diverticulitis, or clotting problems. Light bleeding may not cause any symptoms at first. But if you continue to bleed for a while, you may feel very weak or tired. Sudden, heavy bleeding means you need to see a doctor right away. This kind of bleeding can be very dangerous. But it can usually be cured or controlled. The doctor may do some tests to find the cause of your bleeding. Follow-up care is a key part of your treatment and safety. Be sure to make and go to all appointments, and call your doctor if you are having problems. It's also a good idea to know your test results and keep a list of the medicines you take. How can you care for yourself at home? · Be safe with medicines. Take your medicines exactly as prescribed. Call your doctor if you think you are having a problem with your medicine. You will get more details on the specific medicines your doctor prescribes. · Do not take aspirin or other anti-inflammatory medicines, such as naproxen (Aleve) or ibuprofen (Advil, Motrin), without talking to your doctor first. Ask your doctor if it is okay to use acetaminophen (Tylenol). · Do not drink alcohol. · The bleeding may make you lose iron. So it's important to eat foods that have a lot of iron. These include red meat, shellfish, poultry, and eggs. They also include beans, raisins, whole-grain breads, and leafy green vegetables. If you want help planning meals, you can meet with a dietitian. When should you call for help?    [id number] anytime you think you may need emergency care. For example, call if:  · You have sudden, severe belly pain. · You vomit blood or what looks like coffee grounds. · You passed out (lost consciousness). · Your stools are maroon or very bloody. Call your doctor now or seek immediate medical care if:  · You are dizzy or lightheaded, or you feel like you may faint. · Your stools are black and look like tar, or they have streaks of blood. · You have belly pain. · You vomit or have nausea. Watch closely for changes in your health, and be sure to contact your doctor if you do not get better as expected. Where can you learn more? Go to http://www.gray.com/  Enter [id number] in the search box to learn more about \"Lower Gastrointestinal Bleeding: Care Instructions. \"  Current as of: June 26, 2019               Content Version: 12.5  © 0898-2967 Healthwise, Incorporated. Care instructions adapted under license by [employer] (which disclaims liability or warranty for this information). If you have questions about a medical condition or this instruction, always ask your healthcare professional. [place] [address] any warranty or liability for your use of this information.

## 2020-07-09 NOTE — ED PROVIDER NOTES
EMERGENCY DEPARTMENT HISTORY AND PHYSICAL EXAM      Date: 7/9/2020  Patient Name: Azul Gonsalez    History of Presenting Illness     Chief Complaint   Patient presents with    Anal Bleeding     History Provided By: Patient    HPI: Azul Gonsalez, 68 y.o. male with past medical history significant for diabetes, hypertension, and end-stage renal disease (Monday Wednesday Friday dialysis) who presents via private vehicle to the ED with cc of dark stools that he noticed first thing this morning when he woke up. He denies any abdominal pain, lightheadedness, dizziness, shortness of breath, or bright red blood from his rectum. When he woke up this morning, he noticed a dark, gray substance coming from his rectum. He denies eating any new food yesterday or taking any new medications including Pepto-Bismol or any over-the-counter medications. PMHx: Diabetes, hypertension, end-stage renal disease  Social Hx: Denies alcohol, tobacco, or illegal drug use    PCP: Harmony Thomas MD    There are no other complaints, changes, or physical findings at this time. No current facility-administered medications on file prior to encounter. Current Outpatient Medications on File Prior to Encounter   Medication Sig Dispense Refill    tenofovir ALAFENAMIDE FUMARATE (VEMLIDY) 25 mg tab Take 1 Tab by mouth daily. Indications: chronic hepatitis B 30 Tab 11    hydrALAZINE (APRESOLINE) 25 mg tablet Take 25 mg by mouth three (3) times daily.  aspirin delayed-release 81 mg tablet Take  by mouth as needed for Pain.  sofosbuvir-velpatasvir (EPCLUSA) 400-100 mg tablet Take 1 Tab by mouth daily. 28 Tab 2    sevelamer carbonate (RENVELA) 800 mg tab tab Take  by mouth three (3) times daily.  labetaloL (NORMODYNE) 300 mg tablet Take 300 mg by mouth two (2) times a day.  finasteride (PROSCAR) 5 mg tablet Take 5 mg by mouth daily.        Past History     Past Medical History:  Past Medical History:   Diagnosis Date  Diabetes ([facility]     Hypertension     Kidney disease      Past Surgical History:  Past Surgical History:   Procedure Laterality Date    HX GI      colonoscopy 6-7 yrs ago    IR INSERT TUNL CVC W/O PORT OVER 5 YR  11/11/2019    MA COLON CA SCRN NOT HI RSK IND  10/21/2015          Family History:  Family History   Problem Relation Age of Onset    Hypertension Mother     Hypertension Father      Social History:  Social History     Tobacco Use    Smoking status: Never Smoker    Smokeless tobacco: Never Used   Substance Use Topics    Alcohol use: No    Drug use: No     Allergies:  No Known Allergies  Review of Systems   Review of Systems   Constitutional: Negative for chills and fever. HENT: Negative for congestion, rhinorrhea, sneezing and sore throat. Eyes: Negative for redness and visual disturbance. Respiratory: Negative for shortness of breath. Cardiovascular: Negative for chest pain and leg swelling. Gastrointestinal: Negative for abdominal pain, nausea and vomiting. Genitourinary: Negative for difficulty urinating and frequency. Musculoskeletal: Negative for back pain, myalgias and neck stiffness. Skin: Negative for rash. Neurological: Negative for dizziness, syncope, weakness and headaches. Hematological: Negative for adenopathy. All other systems reviewed and are negative. Physical Exam   Physical Exam  Vitals signs and nursing note reviewed. Constitutional:       Appearance: Normal appearance. He is well-developed. HENT:      Head: Normocephalic and atraumatic. Neck:      Musculoskeletal: Full passive range of motion without pain, normal range of motion and neck supple. Cardiovascular:      Rate and Rhythm: Normal rate and regular rhythm. Pulses: Normal pulses. Heart sounds: Normal heart sounds. No murmur. Pulmonary:      Effort: Pulmonary effort is normal. No respiratory distress. Breath sounds: Normal breath sounds.    Chest:      Chest wall: No tenderness. Abdominal:      General: Bowel sounds are normal.      Palpations: Abdomen is soft. Tenderness: There is no abdominal tenderness. There is no guarding or rebound. Genitourinary:     Comments: Dark, gray liquid draining from the rectum, no palpable hemorrhoid or fissure  Skin:     General: Skin is warm and dry. Findings: No erythema or rash. Neurological:      Mental Status: He is alert and oriented to person, place, and time. Psychiatric:         Speech: Speech normal.         Behavior: Behavior normal.         Thought Content: Thought content normal.         Judgment: Judgment normal.       Diagnostic Study Results   Labs -     Recent Results (from the past 12 hour(s))   CBC WITH AUTOMATED DIFF    Collection Time: 07/09/20 11:14 AM   Result Value Ref Range    WBC 4.8 4.1 - 11.1 K/uL    RBC 3.71 (L) 4.10 - 5.70 M/uL    HGB 12.0 (L) 12.1 - 17.0 g/dL    HCT 37.1 36.6 - 50.3 %    .0 (H) 80.0 - 99.0 FL    MCH 32.3 26.0 - 34.0 PG    MCHC 32.3 30.0 - 36.5 g/dL    RDW 14.4 11.5 - 14.5 %    PLATELET 626 445 - 263 K/uL    MPV 9.2 8.9 - 12.9 FL    NRBC 0.0 0  WBC    ABSOLUTE NRBC 0.00 0.00 - 0.01 K/uL    NEUTROPHILS 57 32 - 75 %    LYMPHOCYTES 27 12 - 49 %    MONOCYTES 11 5 - 13 %    EOSINOPHILS 4 0 - 7 %    BASOPHILS 1 0 - 1 %    IMMATURE GRANULOCYTES 0 0.0 - 0.5 %    ABS. NEUTROPHILS 2.7 1.8 - 8.0 K/UL    ABS. LYMPHOCYTES 1.3 0.8 - 3.5 K/UL    ABS. MONOCYTES 0.5 0.0 - 1.0 K/UL    ABS. EOSINOPHILS 0.2 0.0 - 0.4 K/UL    ABS. BASOPHILS 0.0 0.0 - 0.1 K/UL    ABS. IMM.  GRANS. 0.0 0.00 - 0.04 K/UL    DF AUTOMATED     METABOLIC PANEL, COMPREHENSIVE    Collection Time: 07/09/20 11:14 AM   Result Value Ref Range    Sodium 141 136 - 145 mmol/L    Potassium 4.2 3.5 - 5.1 mmol/L    Chloride 100 97 - 108 mmol/L    CO2 32 21 - 32 mmol/L    Anion gap 9 5 - 15 mmol/L    Glucose 172 (H) 65 - 100 mg/dL    BUN 36 (H) 6 - 20 MG/DL    Creatinine 7.46 (H) 0.70 - 1.30 MG/DL    BUN/Creatinine ratio 5 (L) 12 - 20      GFR est AA 9 (L) >60 ml/min/1.73m2    GFR est non-AA 7 (L) >60 ml/min/1.73m2    Calcium 9.1 8.5 - 10.1 MG/DL    Bilirubin, total 0.4 0.2 - 1.0 MG/DL    ALT (SGPT) 15 12 - 78 U/L    AST (SGOT) 19 15 - 37 U/L    Alk. phosphatase 97 45 - 117 U/L    Protein, total 8.8 (H) 6.4 - 8.2 g/dL    Albumin 3.9 3.5 - 5.0 g/dL    Globulin 4.9 (H) 2.0 - 4.0 g/dL    A-G Ratio 0.8 (L) 1.1 - 2.2     PROTHROMBIN TIME + INR    Collection Time: 07/09/20 11:14 AM   Result Value Ref Range    INR 1.0 0.9 - 1.1      Prothrombin time 10.1 9.0 - 11.1 sec   PTT    Collection Time: 07/09/20 11:14 AM   Result Value Ref Range    aPTT 25.9 22.1 - 32.0 sec    aPTT, therapeutic range     58.0 - 77.0 SECS   OCCULT BLOOD, STOOL    Collection Time: 07/09/20 11:16 AM   Result Value Ref Range    Occult blood, stool Negative NEG         Radiologic Studies -   No orders to display     No results found. Medical Decision Making   I am the first provider for this patient. I reviewed the vital signs, available nursing notes, past medical history, past surgical history, family history and social history. Vital Signs-Reviewed the patient's vital signs. Patient Vitals for the past 24 hrs:   Temp Pulse Resp BP SpO2   07/09/20 1045 98 °F (36.7 °C) 86 18 145/87 98 %     Pulse Oximetry Analysis - 98% on RA    Records Reviewed: Nursing Notes    Provider Notes (Medical Decision Making):   22-year-old male presents with dark gray liquid from the rectum that he noticed this morning when he woke up. Will rule out an upper GI bleed with a fecal occult stool test and check basic labs to assess for anemia. This could be due to Pepto-Bismol or other medications that caused change in stool color. There are also multiple foods that can change the stool color as well. ED Course:   Initial assessment performed. The patients presenting problems have been discussed, and they are in agreement with the care plan formulated and outlined with them. I have encouraged them to ask questions as they arise throughout their visit. His occult stool test is negative. His blood work is completely normal.  Will discuss with his primary care doctor for close follow-up. 11:55 AM  Radha Espinoza MD spoke with Kehinde Porter, Consult for primary care. Discussed HPI and PE, available diagnostic tests and clinical findings. He is in agreement with care plans as outlined. He agrees to follow-up with the patient as an outpatient. He will see patient in the office early next week and have him follow up with GI. Progress Note:   Updated pt on all returned results and findings. Discussed the importance of proper follow up as referred below along with return precautions. Pt in agreement with the care plan and expresses agreement with and understanding of all items discussed. Disposition:  Discharge Note:  The pt is ready for discharge. The pt's signs, symptoms, diagnosis, and discharge instructions have been discussed and pt has conveyed their understanding. The pt is to follow up as recommended or return to ER should their symptoms worsen. Plan has been discussed and pt is in agreement. PLAN:  1. Current Discharge Medication List        2. Follow-up Information     Follow up With Specialties Details Why [address] [facility], [address] Prince Miller MD [facility] Call  [address]      Nelli Cottrell MD Gastroenterology Schedule an appointment as soon as possible for a visit  [address]  [address] [facility] [address] [phone]      [facility] EMERGENCY DEPT Emergency Medicine  As needed, If symptoms worsen [facility]  [phone]        Return to ED if worse     Diagnosis     Clinical Impression:   1. Abnormal stool color            Please note that this dictation was completed with Dragon, computer voice recognition software.   Quite often unanticipated grammatical, syntax, homophones, and other interpretive errors are inadvertently transcribed by the computer software. Please disregard these errors. Additionally, please excuse any errors that have escaped final proofreading.

## 2020-07-13 ENCOUNTER — OFFICE VISIT (OUTPATIENT)
Dept: HEMATOLOGY | Age: 70
End: 2020-07-13

## 2020-07-13 VITALS
HEIGHT: 69 IN | DIASTOLIC BLOOD PRESSURE: 72 MMHG | HEART RATE: 81 BPM | SYSTOLIC BLOOD PRESSURE: 124 MMHG | TEMPERATURE: 97.2 F | RESPIRATION RATE: 16 BRPM | WEIGHT: 177.8 LBS | OXYGEN SATURATION: 99 % | BODY MASS INDEX: 26.33 KG/M2

## 2020-07-13 DIAGNOSIS — B18.2 CHRONIC HEPATITIS C WITHOUT HEPATIC COMA (HCC): Primary | ICD-10-CM

## 2020-07-13 DIAGNOSIS — B18.1 CHRONIC HEPATITIS B (HCC): ICD-10-CM

## 2020-07-13 NOTE — PROGRESS NOTES
Chief Complaint   Patient presents with    Follow-up     Visit Vitals  /72 (BP 1 Location: Right arm, BP Patient Position: Sitting)   Pulse 81   Temp 97.2 °F (36.2 °C) (Tympanic)   Resp 16   Ht 5' 9\" (1.753 m)   Wt 177 lb 12.8 oz (80.6 kg)   SpO2 99%   BMI 26.26 kg/m²     1. Have you been to the ER, urgent care clinic since your last visit? Hospitalized since your last visit? No    2. Have you seen or consulted any other health care providers outside of the [address] [place] [address] since your last visit? Include any pap smears or colon screening. No        3 most recent PHQ Screens 2/13/2020   Little interest or pleasure in doing things Not at all   Feeling down, depressed, irritable, or hopeless Not at all   Total Score PHQ 2 0     Abuse Screening Questionnaire 2/13/2020   Do you ever feel afraid of your partner? N   Are you in a relationship with someone who physically or mentally threatens you? N   Is it safe for you to go home?  Y     Learning Assessment 2/13/2020   PRIMARY LEARNER Patient   HIGHEST LEVEL OF EDUCATION - PRIMARY LEARNER  -   BARRIERS PRIMARY LEARNER NONE   CO-LEARNER CAREGIVER No   PRIMARY LANGUAGE ENGLISH   LEARNER PREFERENCE PRIMARY DEMONSTRATION   ANSWERED BY patient   RELATIONSHIP SELF

## 2020-07-13 NOTE — PROGRESS NOTES
[address] [facility], MD, MD Valery Finnegan, SHILA Greco, [facility]     Radha Castano, [facility] HospitalNP-BC   Sarita Joseph, FNP-ERLINDA    Karie Marcelino [facility]       Myron Deputado [facility]    at [address], [facility] [place] Mon  22.    [phone]    FAX: [address] [place] [address], [address]    [phone]    FAX: [phone]     Patient Care Team:  Dinah Martinez MD as PCP - General (Family Practice)    Problem List  Date Reviewed: 1/19/2020          Codes Class Noted    Chronic hepatitis C ([facility] ICD-10-CM: B18.2  ICD-9-CM: 070.54  1/3/2020        Chronic hepatitis B ([facility] ICD-10-CM: B18.1  ICD-9-CM: 070.32  1/3/2020        Chronic renal failure ICD-10-CM: N18.9  ICD-9-CM: 585.9  11/2/2019        Hematuria ICD-10-CM: R31.9  ICD-9-CM: 599.70  11/2/2019        Hypertension ICD-10-CM: I10  ICD-9-CM: 401.9  9/2/2019        DM (diabetes mellitus) ([facility] ICD-10-CM: E11.9  ICD-9-CM: 250.00  9/2/2019        Humerus fracture ICD-10-CM: S42.309A  ICD-9-CM: 812.20  9/2/2019            Domenic Messina returns to the [address] for management of chronic HCV and chronic HBV. The active problem list, all pertinent past medical history, medications, liver histology, radiologic findings and laboratory findings related to the liver disorder were reviewed with the patient. The patient is a 68 y.o. Black male who was found to have abnormalities in liver chemistries and subsequently tested positive for chronic HCV and HBV in 11/2019. Ultrasound and CT scan of the liver were performed in 9 and 11/2019. The results of the imaging demonstrated a normal appearing liver.       An assessment of liver fibrosis with elastography suggested F1-F2. The patient is currently on [facility] for HCV and HBV, respectively. He started the [place] Φ[place] 1 week prior to Rigo Pryor and has not missed a dose of either. He is tolerating them well with no side effects. He has received refills. He has just received his 3rd bottle of Epclusa. The patient has no symptoms which can be attributed to the liver disorder. The patient is not currently experiencing the following symptoms of liver disease: fatigue or pain in the right side over the liver. The patient completes all daily activities without any functional limitations. ASSESSMENT AND PLAN:  Chronic HCV   Chronic HCV with F1-F2 fibrosis. Liver transaminases are elevated. ALP is normal. Liver function is normal. The platelet count is normal.      Chronic HCV treatment  The patient is currently on 12 weeks of Epclusa. The patient has HCV genotype 1B. The SVR/cure rate for is over 95%. He is on 12 weeks of Epclusa. Chronic HBV  Chronic HBV that is E-antigen positive active. HBV DNA is 67,000 IU/ml. He is currently on 25 mg Vemlidy. He is both HBE antigen and HBE antibody positive. This suggests he has been infected with 2 strains of HBV, one that is wild type and one that may be a pre-core mutation. This means he may need anti-viral suppression long term even if seroconversion and loss of E occurs. ESRD  He is on HD M/W/F. Screening for hepatocellular carcinoma  HCC screening has recently been performed and does not suggest [address] Imaging was deferred due to COVID. In chronic HBV patients after 48years old, it is recommended every 6 months. This was ordered today. Treatment of other medical problems in patients with chronic liver disease  There are no contraindications for the patient to take most medications necessary for treatment of other medical issues.     Counseling for alcohol in patients with chronic liver disease  The patient was counseled regarding alcohol consumption and the effect of alcohol on chronic liver disease. The patient does not consume any significant amount of alcohol. Vaccinations   Vaccination for viral hepatitis A is recommended since the patient has no serologic evidence of previous exposure or vaccination with immunity. Routine vaccinations against other bacterial and viral agents can be performed as indicated. Annual flu vaccination should be administered if indicated. ALLERGIES  No Known Allergies    MEDICATIONS  Current Outpatient Medications   Medication Sig    sofosbuvir-velpatasvir (EPCLUSA) 400-100 mg tablet Take 1 Tab by mouth daily.  tenofovir ALAFENAMIDE FUMARATE (VEMLIDY) 25 mg tab Take 1 Tab by mouth daily. Indications: chronic hepatitis B    sevelamer carbonate (RENVELA) 800 mg tab tab Take  by mouth three (3) times daily.  hydrALAZINE (APRESOLINE) 25 mg tablet Take 25 mg by mouth three (3) times daily.  labetaloL (NORMODYNE) 300 mg tablet Take 300 mg by mouth two (2) times a day.  finasteride (PROSCAR) 5 mg tablet Take 5 mg by mouth daily.  aspirin delayed-release 81 mg tablet Take  by mouth as needed for Pain. No current facility-administered medications for this visit. SYSTEM REVIEW NOT RELATED TO LIVER DISEASE OR REVIEWED ABOVE:  Constitution systems: Negative for fever, chills, weight gain, weight loss. Eyes: Negative for visual changes. ENT: Negative for sore throat, painful swallowing. Respiratory: Negative for cough, hemoptysis, SOB. Cardiology: Negative for chest pain, palpitations. GI:  Negative for constipation or diarrhea. : Negative for urinary frequency, dysuria, hematuria, nocturia. Skin: Negative for rash. Hematology: Negative for easy bruising, blood clots. Musculo-skeletal: Negative for back pain, muscle pain, weakness.   Neurologic: Negative for headaches, dizziness, vertigo, memory problems not related to HE.  Psychology: Negative for anxiety, depression. FAMILY HISTORY:  The patient has no knowledge of the father's medical condition. The patient has no knowledge of the mother's medical condition. SOCIAL HISTORY:  The patient is . The patient has 1 child and 3 grandchildren. The patient has never used tobacco products. The patient has never consumed significant amounts of alcohol. 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 PHYSICAL EXAMINATION:  Visit Vitals  /72 (BP 1 Location: Right arm, BP Patient Position: Sitting)   Pulse 81   Temp 97.2 °F (36.2 °C) (Tympanic)   Resp 16   Ht 5' 9\" (1.753 m)   Wt 177 lb 12.8 oz (80.6 kg)   SpO2 99%   BMI 26.26 kg/m²     General: No acute distress. Eyes: Sclera anicteric. ENT: No oral lesions. Nodes: No adenopathy. Skin: No spider angiomata. No jaundice. No palmar erythema. Respiratory: No wheezing, respiratory distress, cyanosis. Cardiovascular: No JVD. Abdomen: Appears soft with no obvious ascites. Extremities: No edema. No muscle wasting. No gross arthritic changes. Neurologic: Alert and oriented. Cranial nerves grossly intact. No asterixis.     LABORATORY STUDIES:  Liver [place] of [address] Units 1/3/2020   WBC 3.4 - 10.8 x10E3/uL 3.3 (L)   ANC 1.4 - 7.0 x10E3/uL 2.0   HGB 13.0 - 17.7 g/dL 10.3 (L)    - 450 x10E3/uL 272   AST 0 - 40 IU/L 47 (H)   ALT 0 - 44 IU/L 57 (H)   Alk Phos 39 - 117 IU/L 94   Bili, Total 0.0 - 1.2 mg/dL 0.4   Bili, Direct 0.00 - 0.40 mg/dL 0.14   Albumin 3.5 - 4.8 g/dL 3.8   BUN 8 - 27 mg/dL 19   Creat 0.76 - 1.27 mg/dL 2.74 (H)   Na 134 - 144 mmol/L 141   K 3.5 - 5.2 mmol/L 3.6   Cl 96 - 106 mmol/L 96   CO2 20 - 29 mmol/L 25   Glucose 65 - 99 mg/dL 140 (H)     SEROLOGIES:  Serologies Latest Ref Rng & Units 9/5/2019 9/3/2019   Hep B Surface Ag Index  >1,000.00   Hep B Surface Ag Interp NEG    POSITIVE (A)   Hep C Ab NR REACTIVE (A)   Hep C Genotype  1b    HCV RT-PCR, Quant IU/mL 2,550,000    HCV Log10 log10 IU/mL 6.407    Hep D AB Negative     Iron % Saturation 20 - 50 %  29   C-ANCA Neg:<1:20 titer  <1:20   P-ANCA Neg:<1:20 titer  <1:20   ANCA Neg:<1:20 titer  <1:20     Serologies Latest Ref Rng & Units 11/10/2019   Hep B Surface Ag Index >1,000.00   Hep B Surface Ag Interp NEG   POSITIVE (A)   Hep B Core Ab, Total NEGATIVE   Positive (A)     Serologies Latest Ref Rng & Units 1/3/2020   Hep A Ab, Total Negative Negative   HCV RT-PCR, Quant IU/mL 3,870,000   Hep D AB Negative Negative     LIVER HISTOLOGY:  2/2020. FibroScan performed at [address]. EkPa was 7.7. IQR/med 8%. . The results suggested a fibrosis level of F1-F2. The CAP score suggests no evidence of fatty liver. ENDOSCOPIC PROCEDURES:  Not available or performed    RADIOLOGY:  11/2019. CT scan abdomen without IV contrast. Normal appearing liver. No liver mass lesions. Normal spleen. No ascites. OTHER TESTING:  Not available or performed    FOLLOW-UP:  All of the issues listed above in the assessment and plan were discussed with the patient. All questions were answered. The patient expressed a clear understanding of the above. Follow up in the [place] [address] in 4 months for VV or follow up.      REI Overtno-BC  Liver [place] [address] [facility] StreetHarvard University [facility], [address] [place]  22. [phone]

## 2020-07-15 LAB
AFP L3 MFR SERPL: NORMAL % (ref 0–9.9)
AFP SERPL-MCNC: 2.6 NG/ML (ref 0–8)

## 2020-07-16 LAB
HBV DNA SERPL NAA+PROBE-ACNC: NORMAL IU/ML
HBV DNA SERPL NAA+PROBE-LOG IU: NORMAL LOG10 IU/ML
HCV RNA SERPL QL NAA+PROBE: NEGATIVE
REF LAB TEST REF RANGE: NORMAL

## 2020-08-07 ENCOUNTER — HOSPITAL ENCOUNTER (OUTPATIENT)
Dept: PREADMISSION TESTING | Age: 70
Discharge: HOME | End: 2020-08-07

## 2020-08-08 ENCOUNTER — HOSPITAL ENCOUNTER (OUTPATIENT)
Dept: PREADMISSION TESTING | Age: 70
Discharge: HOME | End: 2020-08-08
Attending: INTERNAL MEDICINE
Payer: MEDICARE

## 2020-08-08 PROCEDURE — 87635 SARS-COV-2 COVID-19 AMP PRB: CPT

## 2020-08-10 LAB
HEALTH STATUS, XMCV2T: NORMAL
SARS-COV-2, COV2NT: NOT DETECTED
SOURCE, COVRS: NORMAL
SPECIMEN SOURCE, FCOV2M: NORMAL
SPECIMEN TYPE, XMCV1T: NORMAL

## 2020-08-11 ENCOUNTER — ANESTHESIA (OUTPATIENT)
Dept: ENDOSCOPY | Age: 70
End: 2020-08-11
Payer: MEDICARE

## 2020-08-11 ENCOUNTER — HOSPITAL ENCOUNTER (OUTPATIENT)
Age: 70
Setting detail: OUTPATIENT SURGERY
Discharge: HOME | End: 2020-08-11
Attending: INTERNAL MEDICINE | Admitting: INTERNAL MEDICINE
Payer: MEDICARE

## 2020-08-11 ENCOUNTER — ANESTHESIA EVENT (OUTPATIENT)
Dept: ENDOSCOPY | Age: 70
End: 2020-08-11
Payer: MEDICARE

## 2020-08-11 VITALS
OXYGEN SATURATION: 94 % | HEART RATE: 76 BPM | SYSTOLIC BLOOD PRESSURE: 133 MMHG | TEMPERATURE: 98 F | RESPIRATION RATE: 14 BRPM | DIASTOLIC BLOOD PRESSURE: 80 MMHG | WEIGHT: 181 LBS | HEIGHT: 69 IN | BODY MASS INDEX: 26.81 KG/M2

## 2020-08-11 PROCEDURE — 76060000031 HC ANESTHESIA FIRST 0.5 HR: Performed by: INTERNAL MEDICINE

## 2020-08-11 PROCEDURE — 80047 BASIC METABLC PNL IONIZED CA: CPT

## 2020-08-11 PROCEDURE — 88305 TISSUE EXAM BY PATHOLOGIST: CPT

## 2020-08-11 PROCEDURE — 74011250636 HC RX REV CODE- 250/636: Performed by: NURSE ANESTHETIST, CERTIFIED REGISTERED

## 2020-08-11 PROCEDURE — 74011000250 HC RX REV CODE- 250: Performed by: NURSE ANESTHETIST, CERTIFIED REGISTERED

## 2020-08-11 PROCEDURE — 74011250636 HC RX REV CODE- 250/636: Performed by: INTERNAL MEDICINE

## 2020-08-11 PROCEDURE — 76040000019: Performed by: INTERNAL MEDICINE

## 2020-08-11 PROCEDURE — 77030019988 HC FCPS ENDOSC DISP BSC -B: Performed by: INTERNAL MEDICINE

## 2020-08-11 RX ORDER — NALOXONE HYDROCHLORIDE 0.4 MG/ML
0.4 INJECTION, SOLUTION INTRAMUSCULAR; INTRAVENOUS; SUBCUTANEOUS
Status: DISCONTINUED | OUTPATIENT
Start: 2020-08-11 | End: 2020-08-11 | Stop reason: HOSPADM

## 2020-08-11 RX ORDER — DEXTROMETHORPHAN/PSEUDOEPHED 2.5-7.5/.8
1.2 DROPS ORAL
Status: DISCONTINUED | OUTPATIENT
Start: 2020-08-11 | End: 2020-08-11 | Stop reason: HOSPADM

## 2020-08-11 RX ORDER — SODIUM CHLORIDE 0.9 % (FLUSH) 0.9 %
5-40 SYRINGE (ML) INJECTION EVERY 8 HOURS
Status: DISCONTINUED | OUTPATIENT
Start: 2020-08-11 | End: 2020-08-11 | Stop reason: HOSPADM

## 2020-08-11 RX ORDER — ATROPINE SULFATE 0.1 MG/ML
0.5 INJECTION INTRAVENOUS
Status: DISCONTINUED | OUTPATIENT
Start: 2020-08-11 | End: 2020-08-11 | Stop reason: HOSPADM

## 2020-08-11 RX ORDER — FLUMAZENIL 0.1 MG/ML
0.2 INJECTION INTRAVENOUS
Status: DISCONTINUED | OUTPATIENT
Start: 2020-08-11 | End: 2020-08-11 | Stop reason: HOSPADM

## 2020-08-11 RX ORDER — SODIUM CHLORIDE 0.9 % (FLUSH) 0.9 %
5-40 SYRINGE (ML) INJECTION AS NEEDED
Status: DISCONTINUED | OUTPATIENT
Start: 2020-08-11 | End: 2020-08-11 | Stop reason: HOSPADM

## 2020-08-11 RX ORDER — EPINEPHRINE 0.1 MG/ML
1 INJECTION INTRACARDIAC; INTRAVENOUS
Status: DISCONTINUED | OUTPATIENT
Start: 2020-08-11 | End: 2020-08-11 | Stop reason: HOSPADM

## 2020-08-11 RX ORDER — LIDOCAINE HYDROCHLORIDE 20 MG/ML
INJECTION, SOLUTION EPIDURAL; INFILTRATION; INTRACAUDAL; PERINEURAL AS NEEDED
Status: DISCONTINUED | OUTPATIENT
Start: 2020-08-11 | End: 2020-08-11 | Stop reason: HOSPADM

## 2020-08-11 RX ORDER — PROPOFOL 10 MG/ML
INJECTION, EMULSION INTRAVENOUS AS NEEDED
Status: DISCONTINUED | OUTPATIENT
Start: 2020-08-11 | End: 2020-08-11 | Stop reason: HOSPADM

## 2020-08-11 RX ORDER — SODIUM CHLORIDE 9 MG/ML
25 INJECTION, SOLUTION INTRAVENOUS CONTINUOUS
Status: DISCONTINUED | OUTPATIENT
Start: 2020-08-11 | End: 2020-08-11 | Stop reason: HOSPADM

## 2020-08-11 RX ADMIN — SODIUM CHLORIDE 25 ML/HR: 900 INJECTION, SOLUTION INTRAVENOUS at 13:26

## 2020-08-11 RX ADMIN — PROPOFOL 50 MG: 10 INJECTION, EMULSION INTRAVENOUS at 13:28

## 2020-08-11 RX ADMIN — LIDOCAINE HYDROCHLORIDE 60 MG: 20 INJECTION, SOLUTION EPIDURAL; INFILTRATION; INTRACAUDAL; PERINEURAL at 13:24

## 2020-08-11 RX ADMIN — PROPOFOL 50 MG: 10 INJECTION, EMULSION INTRAVENOUS at 13:23

## 2020-08-11 RX ADMIN — PROPOFOL 50 MG: 10 INJECTION, EMULSION INTRAVENOUS at 13:22

## 2020-08-11 RX ADMIN — LIDOCAINE HYDROCHLORIDE 20 MG: 20 INJECTION, SOLUTION EPIDURAL; INFILTRATION; INTRACAUDAL; PERINEURAL at 13:20

## 2020-08-11 NOTE — PROCEDURES
NAME:  Leanna Cali   :   1950   MRN:   [id number]     Date/Time:  2020 1:13 PM    Esophagogastroduodenoscopy (EGD) Procedure Note    Procedure: Esophagogastroduodenoscopy with biopsy    Indication: melena  Pre-operative Diagnosis: see indication above  Post-operative Diagnosis: see findings below  :  Yuriy Castellanos MD  Referring Provider:   Myah Shook MD    Exam:  Airway: clear, no airway problems anticipated  Heart: RRR, without gallops or rubs  Lungs: clear bilaterally without wheezes, crackles, or rhonchi  Abdomen: soft, nontender, nondistended, bowel sounds present  Mental Status: awake, alert and oriented to person, place and time     Anethesia/Sedation:  MAC anesthesia Propofol  Procedure Details   After informed consent was obtained for the procedure, with all risks and benefits of procedure explained the patient was taken to the endoscopy suite and placed in the left lateral decubitus position. Following sequential administration of sedation as per above, the TRHT320 gastroscope was inserted into the mouth and advanced under direct vision to second portion of the duodenum. A careful inspection was made as the gastroscope was withdrawn, including a retroflexed view of the proximal stomach; findings and interventions are described below. Findings:   OROPHARYNX: Cords and pyriform recesses normal.   ESOPHAGUS:   -- normal mid and proximal esophagus  -- mild erythema along the Z-Line, which is intact. Biopsied. STOMACH: The fundus on antegrade and retroflex views reveals a small 1-2 cm sliding hiatal hernia. The body, antrum, and pylorus have streaky erythema, biopsied  DUODENUM: The bulb, second and third portions are normal.    Therapies:  Biopsy esophagus and stomach    Specimens: gastric, distal esophagus    EBL:  None. Complications:   None; patient tolerated the procedure well.            Impression:  -- mild chronic and superficial appearing gastritis, biopsied  -- mild reflux esophagitis, biopsied  -- small hiatal hernia  -- no signs of bleeding nor explanation for recently reported possible melena    Recommendations:  -- await pathology  -- follow up in office in a month or two    Discharge disposition:  Home in the company of  when able to ambulate    Ramila Byrnes MD

## 2020-08-11 NOTE — ROUTINE PROCESS
Virgen Albaradolon 1950 
[id number] Situation: 
Verbal report received from: Lauraawais Ward Procedure: Procedure(s): ESOPHAGOGASTRODUODENOSCOPY (EGD) ESOPHAGOGASTRODUODENAL (EGD) BIOPSY Background: 
 
Preoperative diagnosis: MELENA Postoperative diagnosis: Gastritis, esophagitis, hiatal hernia :  Dr. David Duke Assistant(s): Endoscopy Technician-1: Pancho Lyon Endoscopy RN-1: Luh Meza RN Specimens:  
ID Type Source Tests Collected by Time Destination 1 : Gastric bx Preservative Gastric  Bozena Keating MD 8/11/2020 1330 Pathology 2 : GE junction bx Preservative   Bozena Keating MD 8/11/2020 1331 Pathology H. Pylori  no Assessment: 
Intra-procedure medications Anesthesia gave intra-procedure sedation and medications, see anesthesia flow sheet yes Intravenous fluids: NS@ [facility] Vital signs stable Abdominal assessment: round and soft Recommendation: 
Discharge patient per MD order. Return to floor Family or Friend Permission to share finding with family or friend yes

## 2020-08-11 NOTE — ANESTHESIA POSTPROCEDURE EVALUATION
Procedure(s):  ESOPHAGOGASTRODUODENOSCOPY (EGD)  ESOPHAGOGASTRODUODENAL (EGD) BIOPSY.     total IV anesthesia, general    Anesthesia Post Evaluation      Multimodal analgesia: multimodal analgesia used between 6 hours prior to anesthesia start to PACU discharge  Patient location during evaluation: bedside  Patient participation: complete - patient participated  Level of consciousness: awake  Pain management: adequate  Airway patency: patent  Anesthetic complications: no  Cardiovascular status: acceptable  Respiratory status: acceptable  Hydration status: acceptable  Post anesthesia nausea and vomiting:  controlled  Final Post Anesthesia Temperature Assessment:  Normothermia (36.0-37.5 degrees C)      INITIAL Post-op Vital signs:   Vitals Value Taken Time   /61 8/11/2020  1:37 PM   Temp 36.7 °C (98 °F) 8/11/2020  1:32 PM   Pulse 78 8/11/2020  1:37 PM   Resp 13 8/11/2020  1:37 PM   SpO2 97 % 8/11/2020  1:37 PM

## 2020-08-11 NOTE — H&P
Date of Surgery Update:  Yosef Louise was seen and examined. History and physical has been reviewed. The patient has been examined.  There have been no significant clinical changes since the completion of the originally dated History and Physical.    Signed By: Chun Randall MD     August 11, 2020 1:13 PM

## 2020-08-11 NOTE — PROGRESS NOTES
Anesthesia reports 150mg Propofol, 80mg Lidocaine and 100mL NS given during procedure. Received report from anesthesia staff on vital signs and status of patient.

## 2020-08-11 NOTE — PROGRESS NOTES
Endoscope was pre-cleaned at the bedside immediately following procedure by [facility], ET.

## 2020-08-11 NOTE — DISCHARGE INSTRUCTIONS
Lexx Farmer  [id number]  1950    EGD DISCHARGE INSTRUCTIONS  Discomfort:  Sore throat- throat lozenges or warm salt water gargle  redness at IV site- apply warm compress to area; if redness or soreness persist- contact your physician  Gaseous discomfort- walking, belching will help relieve any discomfort  You may not operate a vehicle for 12 hours  You may not engage in an occupation involving machinery or appliances for rest of today  You may not drink alcoholic beverages for at least 12 hours  Avoid making any critical decisions for at least 24 hour  DIET  You may have minimal sips at this time-- do not eat or drink for two hours. You may eat and drink after you wake up  You may resume your regular diet - however -  remember your colon is empty and a heavy meal will produce gas. Avoid these foods:  vegetables, fried / greasy foods, carbonated drinks    MEDICATIONS:  See attached    ACTIVITY  You may resume your normal daily activities until tomorrow AM;  Spend the remainder of the day resting -  avoid any strenuous activity. CALL M.D. ANY SIGN OF   Increasing pain, nausea, vomiting  Abdominal distension (swelling)  New increased bleeding (oral or rectal)  Fever (chills)  Pain in chest area  Bloody discharge from nose or mouth  Shortness of breath      IMPRESSION:  -- mild stomach irritation, called gastritis, which we biopsied today  -- no ulcers or major concerns for bleeding  -- also, a small hiatal hernia was seen, which is when the very top of the stomach can slide up an inch or two into the lower chest. This is a common finding in patients with reflux.      Follow-up Instructions:   Call Dr. Valentina Roca for the results of procedure / biopsy in 7-10 days   Telephone # [phone]  Appointment in 4-6 week with Akin Willis in office    Jose Cedeño MD

## 2020-08-11 NOTE — ANESTHESIA PREPROCEDURE EVALUATION
Anesthetic History   No history of anesthetic complications            Review of Systems / Medical History  Patient summary reviewed, nursing notes reviewed and pertinent labs reviewed    Pulmonary  Within defined limits                 Neuro/Psych   Within defined limits           Cardiovascular    Hypertension              Exercise tolerance: >4 METS     GI/Hepatic/Renal       Hepatitis: type B and C  Renal disease: ESRD and dialysis  Liver disease    Comments: MELENA  Endo/Other  Within defined limits  Diabetes         Other Findings              Physical Exam    Airway  Mallampati: II  TM Distance: 4 - 6 cm  Neck ROM: normal range of motion   Mouth opening: Normal     Cardiovascular  Regular rate and rhythm,  S1 and S2 normal,  no murmur, click, rub, or gallop             Dental  No notable dental hx  Dentition: Upper partial plate     Pulmonary  Breath sounds clear to auscultation               Abdominal  GI exam deferred       Other Findings            Anesthetic Plan    ASA: 3  Anesthesia type: total IV anesthesia          Induction: Intravenous  Anesthetic plan and risks discussed with: Patient

## 2020-08-12 LAB
ANION GAP BLD CALC-SCNC: 18 MMOL/L (ref 10–20)
BUN BLD-MCNC: 37 MG/DL (ref 9–20)
CA-I BLD-MCNC: 1.16 MMOL/L (ref 1.12–1.32)
CHLORIDE BLD-SCNC: 96 MMOL/L (ref 98–107)
CO2 BLD-SCNC: 32 MMOL/L (ref 21–32)
CREAT BLD-MCNC: 7.3 MG/DL (ref 0.6–1.3)
GLUCOSE BLD-MCNC: 81 MG/DL (ref 65–100)
HCT VFR BLD CALC: 35 % (ref 36.6–50.3)
POTASSIUM BLD-SCNC: 4.7 MMOL/L (ref 3.5–5.1)
SERVICE CMNT-IMP: ABNORMAL
SODIUM BLD-SCNC: 140 MMOL/L (ref 136–145)

## 2020-11-16 ENCOUNTER — TELEPHONE (OUTPATIENT)
Dept: HEMATOLOGY | Age: 70
End: 2020-11-16

## 2020-11-16 NOTE — TELEPHONE ENCOUNTER
Left message ask g patient to return my call to reschedule his appointment with A. Augustus Lennox on 11/17/2020  As she is out of the office until after Nov. 30, 2020.

## 2020-12-01 RX ORDER — TENOFOVIR ALAFENAMIDE 25 MG/1
TABLET ORAL
Qty: 30 TAB | Refills: 11 | Status: SHIPPED | OUTPATIENT
Start: 2020-12-01 | End: 2021-09-01

## 2020-12-15 ENCOUNTER — OFFICE VISIT (OUTPATIENT)
Dept: HEMATOLOGY | Age: 70
End: 2020-12-15
Payer: MEDICARE

## 2020-12-15 ENCOUNTER — TELEPHONE (OUTPATIENT)
Dept: HEMATOLOGY | Age: 70
End: 2020-12-15

## 2020-12-15 VITALS
HEIGHT: 69 IN | TEMPERATURE: 97.8 F | OXYGEN SATURATION: 100 % | SYSTOLIC BLOOD PRESSURE: 152 MMHG | BODY MASS INDEX: 27.08 KG/M2 | RESPIRATION RATE: 16 BRPM | WEIGHT: 182.8 LBS | HEART RATE: 79 BPM | DIASTOLIC BLOOD PRESSURE: 88 MMHG

## 2020-12-15 DIAGNOSIS — B18.1 CHRONIC HEPATITIS B (HCC): ICD-10-CM

## 2020-12-15 DIAGNOSIS — B18.2 CHRONIC HEPATITIS C WITHOUT HEPATIC COMA (HCC): Primary | ICD-10-CM

## 2020-12-15 PROCEDURE — G8432 DEP SCR NOT DOC, RNG: HCPCS | Performed by: NURSE PRACTITIONER

## 2020-12-15 PROCEDURE — 1101F PT FALLS ASSESS-DOCD LE1/YR: CPT | Performed by: NURSE PRACTITIONER

## 2020-12-15 PROCEDURE — G8536 NO DOC ELDER MAL SCRN: HCPCS | Performed by: NURSE PRACTITIONER

## 2020-12-15 PROCEDURE — G9231 DOC ESRD DIA TRANS PREG: HCPCS | Performed by: NURSE PRACTITIONER

## 2020-12-15 PROCEDURE — 99214 OFFICE O/P EST MOD 30 MIN: CPT | Performed by: NURSE PRACTITIONER

## 2020-12-15 PROCEDURE — G8427 DOCREV CUR MEDS BY ELIG CLIN: HCPCS | Performed by: NURSE PRACTITIONER

## 2020-12-15 PROCEDURE — 3017F COLORECTAL CA SCREEN DOC REV: CPT | Performed by: NURSE PRACTITIONER

## 2020-12-15 PROCEDURE — G8419 CALC BMI OUT NRM PARAM NOF/U: HCPCS | Performed by: NURSE PRACTITIONER

## 2020-12-15 NOTE — PROGRESS NOTES
[facility], Silvia WHITNEY MD Angelo Bar, PA-C Johna Last, [facility]     April S Eyad [facility]   Rolan Lim GIULIANO-ERLINDA Zendejas [facility]       [facility] [address]    at [address]    [address] [place] [address], [facility] Tristin Mon  22.    [phone]    FAX: [address] [place] [address], [address]    [phone]    FAX: [phone]     Patient Care Team:  Toshia Sharp MD as PCP - General (Family Medicine)    Problem List  Date Reviewed: 8/11/2020          Codes Class Noted    Chronic hepatitis C ([facility] ICD-10-CM: B18.2  ICD-9-CM: 070.54  1/3/2020        Chronic hepatitis B ([facility] ICD-10-CM: B18.1  ICD-9-CM: 070.32  1/3/2020        Chronic renal failure ICD-10-CM: N18.9  ICD-9-CM: 585.9  11/2/2019        Hematuria ICD-10-CM: R31.9  ICD-9-CM: 599.70  11/2/2019        Hypertension ICD-10-CM: I10  ICD-9-CM: 401.9  9/2/2019        DM (diabetes mellitus) ([facility] ICD-10-CM: E11.9  ICD-9-CM: 250.00  9/2/2019        Humerus fracture ICD-10-CM: S42.309A  ICD-9-CM: 812.20  9/2/2019            Tamra Simth returns to the [address] for management of chronic HCV and chronic HBV. The active problem list, all pertinent past medical history, medications, liver histology, radiologic findings and laboratory findings related to the liver disorder were reviewed with the patient. The patient is a 68 y.o. Black male who was found to have abnormalities in liver chemistries and subsequently tested positive for chronic HCV and HBV in 11/2019. Ultrasound and CT scan of the liver were performed in 9 and 11/2019. The results of the imaging demonstrated a normal appearing liver.       An assessment of liver fibrosis with elastography suggested F1-F2. The patient has completed 12 weeks of Epclusa for HCV. He started the [place] [place] week prior to Rigo Pryor and remains on Vemlidy at this time. The patient has no symptoms which can be attributed to the liver disorder. The patient is not currently experiencing the following symptoms of liver disease: fatigue or pain in the right side over the liver. The patient completes all daily activities without any functional limitations. ASSESSMENT AND PLAN:  Chronic HCV   Chronic HCV with F1-F2 fibrosis. Liver transaminases are elevated. ALP is normal. Liver function is normal. The platelet count is normal.      Chronic HCV treatment  The patient has completed 12 weeks of Epclusa (6/2020 -9/2020). This is his SVR visit. Chronic HBV  Chronic HBV that is E-antigen positive active. Initial HBV DNA was 67,000 IU/ml. He is currently on 25 mg Vemlidy. Since starting, his viral load has been suppressed. He is both HBE antigen and HBE antibody positive. This suggests he has been infected with 2 strains of HBV, one that is wild type and one that may be a pre-core mutation. This means he may need anti-viral suppression long term even if seroconversion and loss of E occurs. He will remain on Vemlidy until 6 months status post end of treatment, which will be next visit. ESRD  He is on HD M/W/F. Screening for hepatocellular carcinoma  HCC screening has recently been performed and does not suggest [address] Imaging was deferred due to COVID. In chronic HBV patients after 48years old, it is recommended every 6 months. I will order these and mail to the patient. Treatment of other medical problems in patients with chronic liver disease  There are no contraindications for the patient to take most medications necessary for treatment of other medical issues.     Counseling for alcohol in patients with chronic liver disease  The patient was counseled regarding alcohol consumption and the effect of alcohol on chronic liver disease. The patient does not consume any significant amount of alcohol. Vaccinations   Vaccination for viral hepatitis A is recommended since the patient has no serologic evidence of previous exposure or vaccination with immunity. Routine vaccinations against other bacterial and viral agents can be performed as indicated. Annual flu vaccination should be administered if indicated. ALLERGIES  No Known Allergies    MEDICATIONS  Current Outpatient Medications   Medication Sig    Vemlidy 25 mg tablet HEPATITIS B. MEDICATION: TAKE 1 TABLET BY MOUTH ONE TIME A DAY WITH FOOD (START 1 WEEK PRIOR TO ADDING EPCLUSA)    sevelamer carbonate (RENVELA) 800 mg tab tab Take  by mouth three (3) times daily.  hydrALAZINE (APRESOLINE) 25 mg tablet Take 25 mg by mouth three (3) times daily.  labetaloL (NORMODYNE) 300 mg tablet Take 300 mg by mouth two (2) times a day.  finasteride (PROSCAR) 5 mg tablet Take 5 mg by mouth daily.  aspirin delayed-release 81 mg tablet Take  by mouth as needed for Pain. No current facility-administered medications for this visit. SYSTEM REVIEW NOT RELATED TO LIVER DISEASE OR REVIEWED ABOVE:  Constitution systems: Negative for fever, chills, weight gain, weight loss. Eyes: Negative for visual changes. ENT: Negative for sore throat, painful swallowing. Respiratory: Negative for cough, hemoptysis, SOB. Cardiology: Negative for chest pain, palpitations. GI:  Negative for constipation or diarrhea. : Negative for urinary frequency, dysuria, hematuria, nocturia. Skin: Negative for rash. Hematology: Negative for easy bruising, blood clots. Musculo-skeletal: Negative for back pain, muscle pain, weakness. Neurologic: Negative for headaches, dizziness, vertigo, memory problems not related to HE. Psychology: Negative for anxiety, depression.      FAMILY HISTORY:  The patient has no knowledge of the father's medical condition. The patient has no knowledge of the mother's medical condition. SOCIAL HISTORY:  The patient is . The patient has 1 child and 3 grandchildren. The patient has never used tobacco products. The patient has never consumed significant amounts of alcohol. 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 PHYSICAL EXAMINATION:  Visit Vitals  BP (!) 152/88   Pulse 79   Temp 97.8 °F (36.6 °C) (Temporal)   Resp 16   Ht 5' 9\" (1.753 m)   Wt 182 lb 12.8 oz (82.9 kg)   SpO2 100%   BMI 26.99 kg/m²     General: No acute distress. Eyes: Sclera anicteric. ENT: No oral lesions. Nodes: No adenopathy. Skin: No spider angiomata. No jaundice. No palmar erythema. Respiratory: Lungs clear to auscultation. Cardiovascular: Regular heart rate. No murmurs. No JVD. Abdomen: Soft non-tender, liver size normal to percussion/palpation. Spleen not palpable. No obvious ascites. Extremities: No edema. No muscle wasting. No gross arthritic changes. Neurologic: Alert and oriented. Cranial nerves grossly intact. No asterixis.     LABORATORY STUDIES:  Liver [place] of [address] [place] [address] & Units 7/9/2020 1/3/2020 12/9/2019   WBC 4.1 - 11.1 K/uL 4.8 3.3 (L) 6.0   ANC 1.8 - 8.0 K/UL 2.7 2.0 4.4   HGB 12.1 - 17.0 g/dL 12.0 (L) 10.3 (L) 11.6 (L)    - 400 K/uL 284 272 245   INR 0.9 - 1.1   1.0     AST 15 - 37 U/L 19 47 (H) 43 (H)   ALT 12 - 78 U/L 15 57 (H) 56   Alk Phos 45 - 117 U/L 97 94 82   Bili, Total 0.2 - 1.0 MG/DL 0.4 0.4 0.5   Bili, Direct 0.00 - 0.40 mg/dL  0.14    Albumin 3.5 - 5.0 g/dL 3.9 3.8 3.2 (L)   BUN 6 - 20 MG/DL 36 (H) 19 27 (H)   BUN (iSTAT) 9 - 20 mg/dL      Creat 0.70 - 1.30 MG/DL 7.46 (H) 2.74 (H) 3.68 (H)   Creat (iSTAT) 0.6 - 1.3 mg/dL      Na 136 - 145 mmol/L 141 141 138   K 3.5 - 5.1 mmol/L 4.2 3.6 4.1   Cl 97 - 108 mmol/L 100 96 101   CO2 21 - 32 mmol/L 32 25 29   Glucose 65 - 100 mg/dL 172 (H) 140 (H) 89   Magnesium 1.6 - 2.4 mg/dL        Liver [place] of [address] [place] [address] & Units 11/18/2019   WBC 4.1 - 11.1 K/uL 5.9   ANC 1.8 - 8.0 K/UL    HGB 12.1 - 17.0 g/dL 10.6 (L)    - 400 K/uL 254   INR 0.9 - 1.1      AST 15 - 37 U/L    ALT 12 - 78 U/L    Alk Phos 45 - 117 U/L    Bili, Total 0.2 - 1.0 MG/DL    Bili, Direct 0.00 - 0.40 mg/dL    Albumin 3.5 - 5.0 g/dL 2.7 (L)   BUN 6 - 20 MG/DL 38 (H)   BUN (iSTAT) 9 - 20 mg/dL    Creat 0.70 - 1.30 MG/DL 5.37 (H)   Creat (iSTAT) 0.6 - 1.3 mg/dL    Na 136 - 145 mmol/L 136   K 3.5 - 5.1 mmol/L 4.1   Cl 97 - 108 mmol/L 103   CO2 21 - 32 mmol/L 26   Glucose 65 - 100 mg/dL 73   Magnesium 1.6 - 2.4 mg/dL      SEROLOGIES:  Virology Latest Ref Rng & Units 7/13/2020 1/3/2020   HBV DNA IU/mL HBV DNA not detected    HCV RNA, HOSSEIN, QL Negative Negative Positive (A)     Serologies Latest Ref Rng & Units 9/5/2019 9/3/2019   Hep B Surface Ag Index  >1,000.00   Hep B Surface Ag Interp NEG    POSITIVE (A)   Hep C Ab NR    REACTIVE (A)   Hep C Genotype  1b    HCV RT-PCR, Quant IU/mL 2,550,000    HCV Log10 log10 IU/mL 6.407    Hep D AB Negative     Iron % Saturation 20 - 50 %  29   C-ANCA Neg:<1:20 titer  <1:20   P-ANCA Neg:<1:20 titer  <1:20   ANCA Neg:<1:20 titer  <1:20     Serologies Latest Ref Rng & Units 11/10/2019   Hep B Surface Ag Index >1,000.00   Hep B Surface Ag Interp NEG   POSITIVE (A)   Hep B Core Ab, Total NEGATIVE   Positive (A)     Serologies Latest Ref Rng & Units 1/3/2020   Hep A Ab, Total Negative Negative   HCV RT-PCR, Quant IU/mL 3,870,000   Hep D AB Negative Negative     LIVER HISTOLOGY:  2/2020. Fib[facility]Scan [facility] at [facility]. EkPa was 7.7. IQR/med 8%. . The results suggested a fibrosis level of F1-F2. The CAP score suggests no evidence of fatty liver. ENDOSCOPIC PROCEDURES:  Not available or performed    RADIOLOGY:  Screening during [facility] was deferred. 11/2019.  CT scan abdomen without IV contrast. Normal appearing liver. No liver mass lesions. Normal spleen. No ascites. OTHER TESTING:  Not available or performed    FOLLOW-UP:  All of the issues listed above in the assessment and plan were discussed with the patient. All questions were answered. The patient expressed a clear understanding of the above. [facility] in 3 months for VV or follow up and will stop Vemlidy. He will need long term follow up due to [facility] screening for chronic HBV.      Celina Díaz, REI-BC  Liver [place] [facility] [address] [facility], [address] Tr[place] Mon  22.  [phone]

## 2020-12-15 NOTE — TELEPHONE ENCOUNTER
Two pt identifiers confirmed. Called patient to notify him that lab orders for blood and imaging are being mailed and that someone would be contacting him to schedule the ultra sound. Patient confirmed mailing samantha as [address] Miller. [facility], [address]    Patient verbalized understanding of information discussed w/ no further questions at this time. ----- Message from [address]. Juan Carlos Brownlee NP sent at 12/15/2020  4:02 PM EST -----  Regarding: lab and US order  Please call pt and tell him I forgot to order these - then mail them to the patient to get completed. US and blood test for cancer screening because of hepatitis B. THANKS.

## 2020-12-15 NOTE — PROGRESS NOTES
Identified pt with two pt identifiers(name and ). Reviewed record in preparation for visit and have obtained necessary documentation. Chief Complaint   Patient presents with    Hepatitis C     F/U    Hepatitis B      Vitals:    12/15/20 1248 12/15/20 1258   BP: (!) 152/86 (!) 152/88   Pulse: 79    Resp: 16    Temp: 97.8 °F (36.6 °C)    TempSrc: Temporal    SpO2: 100%    Weight: 182 lb 12.8 oz (82.9 kg)    Height: 5' 9\" (1.753 m)    PainSc:   0 - No pain    Pt advised to address elevated BP readings with PCP. Pt verbalized understanding. Health Maintenance Review: Patient reminded of \"due or due soon\" health maintenance. I have asked the patient to contact his/her primary care provider (PCP) for follow-up on his/her health maintenance. Coordination of Care Questionnaire:  :   1) Have you been to an emergency room, urgent care, or hospitalized since your last visit? If yes, where when, and reason for visit? no       2. Have seen or consulted any other health care provider since your last visit? If yes, where when, and reason for visit? NO      Patient is accompanied by self I have received verbal consent from Bernice Villarreal to discuss any/all medical information while they are present in the room.

## 2020-12-16 LAB
HBV DNA SERPL NAA+PROBE-ACNC: NORMAL IU/ML
HBV DNA SERPL NAA+PROBE-LOG IU: NORMAL LOG10 IU/ML
TEST INFORMATION: NORMAL

## 2020-12-17 LAB
ALBUMIN SERPL-MCNC: 4.3 G/DL (ref 3.5–5)
ALBUMIN/GLOB SERPL: 0.9 {RATIO} (ref 1.1–2.2)
ALP SERPL-CCNC: 97 U/L (ref 45–117)
ALT SERPL-CCNC: 32 U/L (ref 12–78)
ANION GAP SERPL CALC-SCNC: 10 MMOL/L (ref 5–15)
AST SERPL-CCNC: 21 U/L (ref 15–37)
BILIRUB DIRECT SERPL-MCNC: 0.1 MG/DL (ref 0–0.2)
BILIRUB SERPL-MCNC: 0.5 MG/DL (ref 0.2–1)
BUN SERPL-MCNC: 46 MG/DL (ref 6–20)
BUN/CREAT SERPL: 7 (ref 12–20)
CALCIUM SERPL-MCNC: 9.6 MG/DL (ref 8.5–10.1)
CHLORIDE SERPL-SCNC: 95 MMOL/L (ref 97–108)
CO2 SERPL-SCNC: 30 MMOL/L (ref 21–32)
CREAT SERPL-MCNC: 7 MG/DL (ref 0.7–1.3)
ERYTHROCYTE [DISTWIDTH] IN BLOOD BY AUTOMATED COUNT: 12.2 % (ref 11.5–14.5)
GLOBULIN SER CALC-MCNC: 4.9 G/DL (ref 2–4)
GLUCOSE SERPL-MCNC: 83 MG/DL (ref 65–100)
HCT VFR BLD AUTO: 35.3 % (ref 36.6–50.3)
HCV RNA SERPL QL NAA+PROBE: NEGATIVE
HGB BLD-MCNC: 11.7 G/DL (ref 12.1–17)
MCH RBC QN AUTO: 32.3 PG (ref 26–34)
MCHC RBC AUTO-ENTMCNC: 33.1 G/DL (ref 30–36.5)
MCV RBC AUTO: 97.5 FL (ref 80–99)
NRBC # BLD: 0 K/UL (ref 0–0.01)
NRBC BLD-RTO: 0 PER 100 WBC
PLATELET # BLD AUTO: 348 K/UL (ref 150–400)
PMV BLD AUTO: 10 FL (ref 8.9–12.9)
POTASSIUM SERPL-SCNC: 4.1 MMOL/L (ref 3.5–5.1)
PROT SERPL-MCNC: 9.2 G/DL (ref 6.4–8.2)
RBC # BLD AUTO: 3.62 M/UL (ref 4.1–5.7)
SODIUM SERPL-SCNC: 135 MMOL/L (ref 136–145)
WBC # BLD AUTO: 4.6 K/UL (ref 4.1–11.1)

## 2020-12-24 NOTE — DISCHARGE INSTRUCTIONS
Patient Education        Broken Arm: Care Instructions  Your Care Instructions  Fractures can range from a small, hairline crack, to a bone or bones broken into two or more pieces. Your treatment depends on how bad the break is. Your doctor may have put your arm in a splint or cast to allow it to heal or to keep it stable until you see another doctor. It may take weeks or months for your arm to heal. You can help your arm heal with some care at home. You heal best when you take good care of yourself. Eat a variety of healthy foods, and don't smoke. You may have had a sedative to help you relax. You may be unsteady after having sedation. It can take a few hours for the medicine's effects to wear off. Common side effects of sedation include nausea, vomiting, and feeling sleepy or tired. The doctor has checked you carefully, but problems can develop later. If you notice any problems or new symptoms, get medical treatment right away. Follow-up care is a key part of your treatment and safety. Be sure to make and go to all appointments, and call your doctor if you are having problems. It's also a good idea to know your test results and keep a list of the medicines you take. How can you care for yourself at home? · If the doctor gave you a sedative:  ? For 24 hours, don't do anything that requires attention to detail, such as going to work, making important decisions, or signing any legal documents. It takes time for the medicine's effects to completely wear off.  ? For your safety, do not drive or operate any machinery that could be dangerous. Wait until the medicine wears off and you can think clearly and react easily. · Put ice or a cold pack on your arm for 10 to 20 minutes at a time. Try to do this every 1 to 2 hours for the next 3 days (when you are awake). Put a thin cloth between the ice and your cast or splint. Keep the cast or splint dry. · Follow the cast care instructions your doctor gives you.  If you have a splint, do not take it off unless your doctor tells you to. · Be safe with medicines. Take pain medicines exactly as directed. ? If the doctor gave you a prescription medicine for pain, take it as prescribed. ? If you are not taking a prescription pain medicine, ask your doctor if you can take an over-the-counter medicine. · Prop up your arm on pillows when you sit or lie down in the first few days after the injury. Keep the arm higher than the level of your heart. This will help reduce swelling. · Follow instructions for exercises to keep your arm strong. · Wiggle your fingers and wrist often to reduce swelling and stiffness. When should you call for help? Call 911 anytime you think you may need emergency care. For example, call if:    · You are very sleepy and you have trouble waking up.    Call your doctor now or seek immediate medical care if:    · You have new or worse nausea or vomiting.     · You have new or worse pain.     · Your hand or fingers are cool or pale or change color.     · Your cast or splint feels too tight.     · You have tingling, weakness, or numbness in your hand or fingers.    Watch closely for changes in your health, and be sure to contact your doctor if:    · You do not get better as expected.     · You have problems with your cast or splint. Where can you learn more? Go to http://michael-andrea.info/. Enter D127 in the search box to learn more about \"Broken Arm: Care Instructions. \"  Current as of: September 20, 2018  Content Version: 12.1  © 4928-7046 Healthwise, Incorporated. Care instructions adapted under license by [employer] (which disclaims liability or warranty for this information). If you have questions about a medical condition or this instruction, always ask your healthcare professional. [place] [address] any warranty or liability for your use of this information. 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 · Keep your medication in the bottles provided by the pharmacist and keep a list of the medication names, dosages, and times to be taken in your wallet. · Do not take other medications without consulting your doctor. Pain Management: per above medications    What to do at Home    Recommended diet:  Renal Diet    Recommended activity: Activity as tolerated    If you have questions regarding the hospital related prescriptions or hospital related issues please call [facility] [place] [address] at . You can always direct your questions to your primary care doctor if you are unable to reach your hospital physician; your PCP works as an extension of your hospital doctor just like your hospital doctor is an extension of your PCP for your time at the hospital [facility], [facility]). If you experience any of the following symptoms then please call your primary care physician or return to the emergency room if you cannot get hold of your doctor:  Fever, chills, nausea, vomiting, diarrhea, change in mentation, falling, bleeding, shortness of breath  Patient Education        Diet for Chronic Kidney Disease (Before Dialysis): Care Instructions  Your Care Instructions  When you have chronic kidney disease, you need to change your diet to avoid foods that make your kidneys worse. You may need to limit salt, fluids, and protein. You also may need to limit minerals such as potassium and phosphorus. A diet for chronic kidney disease takes planning. A dietitian who specializes in kidney disease can help you plan meals that meet your needs. These guidelines are for people who are not on dialysis.  Talk with your doctor or dietitian to make sure your diet is right for your condition. Do not change your diet without talking to your doctor or dietitian. Follow-up care is a key part of your treatment and safety. Be sure to make and go to all appointments, and call your doctor if you are having problems. It's also a good idea to know your test results and keep a list of the medicines you take. How can you care for yourself at home? · Work with your doctor or dietitian to create a food plan. · Do not skip meals or go for many hours without eating. If you do not feel very hungry, try to eat 4 or 5 small meals instead of 1 or 2 big meals. · If you have a hard time eating enough, talk to your doctor or dietitian about ways you can add calories to your diet. · Do not take any vitamins or minerals, supplements, or herbal products without talking to your doctor first.  · Check with your doctor about whether it is safe for you to drink alcohol. To get the right amount of protein  · Ask your doctor or dietitian how much protein you can have each day. Most people with chronic kidney disease need to limit the amount of protein they eat. But you still need some protein to stay healthy. · Include all sources of protein in your daily protein count. Besides meat, poultry, fish, and eggs, protein is found in milk and milk products, beans and nuts, breads, cereals, and vegetables. To limit salt  · Read food labels on cans and food packages. The labels tell you how much sodium is in each serving. Make sure that you look at the serving size. If you eat more than the serving size, you will get more sodium than what is listed on the label. · Do not add salt to your food. Avoid foods that list salt, sodium, or monosodium glutamate (MSG) as an ingredient. · Buy foods that are labeled \"no salt added,\" \"sodium-free,\" or \"low-sodium. \" Foods labeled \"reduced-sodium\" and \"light sodium\" may still have too much sodium.   · Limit processed foods, fast food, and restaurant foods. These types of food are very high in sodium. · Avoid salted pretzels, chips, popcorn, and other salted snacks. · Avoid smoked, cured, salted, and canned meat, fish, and poultry. This includes ham, martinez, hot dogs, and luncheon meats. · You may use lemon, herbs, and spices to flavor your meals. To limit potassium  · Choose low-potassium fruits such as applesauce, pineapple, grapes, blueberries, and raspberries. · Choose low-potassium vegetables such as lettuce, green beans, cucumber, and radishes. · Choose low-potassium foods such as hummus, spaghetti, macaroni, rice, tortillas, and bagels. · Limit or avoid high-potassium foods such as milk, bananas, oranges, cantaloupe, potatoes, spinach, tomatoes, broccoli, and sweet potatoes. · Do not use a salt substitute or lite salt unless your doctor says it is okay. Most salt substitutes and lite salts are high in potassium. To limit phosphorus  · Follow your food plan to know how much milk and milk products you can have. · Limit nuts, peanut butter, seeds, lentils, beans, organ meats, and sardines. · Avoid cola drinks. · Avoid bran breads or bran cereals. If you need to limit fluids  · Know how much fluid you can drink. Every day fill a pitcher with that amount of water. If you drink another fluid (such as coffee) that day, pour an equal amount of water out of the pitcher. · Count foods that are liquid at room temperature, such as gelatin dessert and ice cream, as fluids. Where can you learn more? Go to http://michael-andrea.info/. Enter Y719 in the search box to learn more about \"Diet for Chronic Kidney Disease (Before Dialysis): Care Instructions. \"  Current as of: November 7, 2018  Content Version: 12.1  © 5200-0702 [employer]. Care instructions adapted under license by [employer] (which disclaims liability or warranty for this information).  If you have questions about a medical condition or this instruction, always ask your healthcare professional. [place] [address] any warranty or liability for your use of this information. no

## 2021-09-01 RX ORDER — TENOFOVIR ALAFENAMIDE 25 MG/1
TABLET ORAL
Qty: 30 TABLET | Refills: 11 | Status: SHIPPED | OUTPATIENT
Start: 2021-09-01 | End: 2022-09-20 | Stop reason: SDUPTHER

## 2022-01-01 NOTE — PROGRESS NOTES
Reason for Admission:   Anemia                  RRAT Score:    21              Do you (patient/family) have any concerns for transition/discharge? Most time pt is alone at home due to his daughter's work schedule. We talked about obtain personal care during those times vs using family and friend for assistance. Plan for utilizing home health:  Yes after rehab      Current Advanced Directive/Advance Care Plan: Full Code            Transition of Care Plan:      SNF    Care Management Interventions  PCP Verified by CM: Yes(3 weeks ago. an other appointment is schedueld for today 11/5/2019)  Mode of Transport at Discharge: BLS  Transition of Care Consult (CM Consult): SNF(Nver had any previous SNF, HH or rehab experiances.)  Discharge Durable Medical Equipment: No(Uses cane)  Physical Therapy Consult: Yes  Occupational Therapy Consult: Yes  Speech Therapy Consult: No  Current Support Network: Lives with Caregiver(Living with his daughter Obie Olvera [phone] who is a [employer])  Confirm Follow Up Transport: Family  Plan discussed with Pt/Family/Caregiver: Yes  Discharge Location  Discharge Placement: [address] [place] [address] talked to pt regarding his transition plan, as per PT recommendation for SNF. Pt is on insuline therapy. Floor CM will provide SNF list to pt and he would like to tell his daughter or ex wife to do a tour before finalize  SNF provider. Pt would like to use SNF closer to his home.      Rachel Boggs MSW  ED Case Manager   Ext [phone] Parent

## 2022-02-09 ENCOUNTER — DOCUMENTATION ONLY (OUTPATIENT)
Dept: HEMATOLOGY | Age: 72
End: 2022-02-09

## 2022-02-09 NOTE — PROGRESS NOTES
rec'd formulary change for Vemlidy to entecavir. Pt has not been seen recently. He was on suppressive therapy while being treated for HCV. He needs labs done. I checked with pharmacy and they said they have been filling his Vemlidy with no problem for $0 co-pay. I will have  reach out to get an appointment scheduled.

## 2022-02-11 ENCOUNTER — TELEPHONE (OUTPATIENT)
Dept: HEMATOLOGY | Age: 72
End: 2022-02-11

## 2022-02-11 NOTE — TELEPHONE ENCOUNTER
----- Message from [facility]. Yulia Kellogg NP sent at 2/10/2022  6:39 PM EST -----  Regarding: needs appt with labs  Please schedule for follow up with me. Thanks.

## 2022-02-15 ENCOUNTER — OFFICE VISIT (OUTPATIENT)
Dept: HEMATOLOGY | Age: 72
End: 2022-02-15
Payer: MEDICARE

## 2022-02-15 VITALS
SYSTOLIC BLOOD PRESSURE: 137 MMHG | DIASTOLIC BLOOD PRESSURE: 80 MMHG | WEIGHT: 176 LBS | HEIGHT: 69 IN | BODY MASS INDEX: 26.07 KG/M2 | HEART RATE: 72 BPM

## 2022-02-15 DIAGNOSIS — B18.2 CHRONIC HEPATITIS C WITHOUT HEPATIC COMA (HCC): ICD-10-CM

## 2022-02-15 DIAGNOSIS — B18.1 CHRONIC HEPATITIS B WITHOUT DELTA AGENT WITH HEPATIC COMA (HCC): Primary | ICD-10-CM

## 2022-02-15 PROCEDURE — 99215 OFFICE O/P EST HI 40 MIN: CPT | Performed by: NURSE PRACTITIONER

## 2022-02-15 PROCEDURE — G8427 DOCREV CUR MEDS BY ELIG CLIN: HCPCS | Performed by: NURSE PRACTITIONER

## 2022-02-15 PROCEDURE — G8432 DEP SCR NOT DOC, RNG: HCPCS | Performed by: NURSE PRACTITIONER

## 2022-02-15 PROCEDURE — G9231 DOC ESRD DIA TRANS PREG: HCPCS | Performed by: NURSE PRACTITIONER

## 2022-02-15 PROCEDURE — G8536 NO DOC ELDER MAL SCRN: HCPCS | Performed by: NURSE PRACTITIONER

## 2022-02-15 PROCEDURE — G8419 CALC BMI OUT NRM PARAM NOF/U: HCPCS | Performed by: NURSE PRACTITIONER

## 2022-02-15 PROCEDURE — 3017F COLORECTAL CA SCREEN DOC REV: CPT | Performed by: NURSE PRACTITIONER

## 2022-02-15 PROCEDURE — 1101F PT FALLS ASSESS-DOCD LE1/YR: CPT | Performed by: NURSE PRACTITIONER

## 2022-02-15 RX ORDER — AMLODIPINE BESYLATE 10 MG/1
10 TABLET ORAL DAILY
COMMUNITY
Start: 2020-05-28

## 2022-02-15 NOTE — PROGRESS NOTES
Identified pt with two pt identifiers(name and ). Reviewed record in preparation for visit and have obtained necessary documentation. Chief Complaint   Patient presents with    Hepatitis C     f/u with Edward Screws:    02/15/22 1342   BP: 137/80   Pulse: 72   Weight: 176 lb (79.8 kg)   Height: 5' 9\" (1.753 m)   PainSc:   0 - No pain       Health Maintenance Review: Patient reminded of \"due or due soon\" health maintenance. I have asked the patient to contact his/her primary care provider (PCP) for follow-up on his/her health maintenance. Coordination of Care Questionnaire:  :   1) Have you been to an emergency room, urgent care, or hospitalized since your last visit? If yes, where when, and reason for visit? no       2. Have seen or consulted any other health care provider since your last visit? If yes, where when, and reason for visit? NO      Patient is accompanied by nursing attendant and partner I have received verbal consent from Mackenzie Schuster to discuss any/all medical information while they are present in the room.

## 2022-02-15 NOTE — PROGRESS NOTES
Paula Ngo MD, [address], [facility], [place]      Payal Mascorro, SHILA Dinero, [facility]     Radha Castano, [facility]   TONO Fall-ERLINDA Ricci, [facility]       Myron Deputado Jace De [address]    at [address], [facility] [place]  [address].    [phone]    FAX: [address] [place] [address]    at [address], [address]    [phone]    FAX: [phone]     Patient Care Team:  Kelly Renee MD as PCP - General (Family Medicine)    Problem List  Date Reviewed: 8/11/2020          Codes Class Noted    Chronic hepatitis C ([facility] ICD-10-CM: B18.2  ICD-9-CM: 070.54  1/3/2020        Chronic hepatitis B ([facility] ICD-10-CM: B18.1  ICD-9-CM: 070.32  1/3/2020        Chronic renal failure ICD-10-CM: N18.9  ICD-9-CM: 585.9  11/2/2019        Hematuria ICD-10-CM: R31.9  ICD-9-CM: 599.70  11/2/2019        Hypertension ICD-10-CM: I10  ICD-9-CM: 401.9  9/2/2019        DM (diabetes mellitus) ([facility] ICD-10-CM: E11.9  ICD-9-CM: 250.00  9/2/2019        Humerus fracture ICD-10-CM: S42.309A  ICD-9-CM: 812.20  9/2/2019            [facility] of chronic HCV and chronic HBV. The active problem list, all pertinent past medical history, medications, liver histology, radiologic findings and laboratory findings related to the liver disorder were reviewed with the patient. The patient is a 66 y.o. Black male who was found to have abnormalities in liver chemistries and subsequently tested positive for chronic HCV and HBV in 11/2019. The patient has completed 12 weeks of Epclusa for HCV (9/2020). He started the [place] [place] week prior to Rigo Pryor and remains on Vemlidy at this time.      The patient has no symptoms which can be attributed to the liver disorder. The patient is not currently experiencing the following symptoms of liver disease: fatigue or pain in the right side over the liver. The patient completes all daily activities without any functional limitations. He states he has started work up for kidney transplant but is not sure how that is going. From [facility] chart review, it appears he is a good candidate at this time, but are concerned he will be too debilitated by the time a  donor kidney is available for him. Discussion of HCV positive kidney and live donor. Patient was sent for transjugular biopsy at [facility] on 12/3/2021. His pressures only showed a 2 mm gradient. A day or so later, he was having severe vomiting, nausea and inability to eat. He started showing hematemesis and presented back to [facility] for evaluation on 2021. He was found to have elevated enzymes, high lipase and dilated bile ducts along with anemia. He was given PRBCs and underwent an ERCP which found hemobilia and a temporary stent was placed in the CBD. He is doing well now. ASSESSMENT AND PLAN:  Chronic HCV   Chronic HCV with F1-F2 fibrosis. Liver transaminases are elevated. ALP is normal. Liver function is normal. The platelet count is normal.      Chronic HCV treatment  The patient has completed 12 weeks of Epclusa (2020 -2020). He achieved SVR 2020. Chronic HBV  Chronic HBV that is E-antigen positive active. Initial HBV DNA was 67,000 IU/ml. He is currently on 25 mg Vemlidy. Since starting, his viral load has been suppressed. He is both HBE antigen and HBE antibody positive. This suggests he has been infected with 2 strains of HBV, one that is wild type and one that may be a pre-core mutation. This means he may need anti-viral suppression long term even if seroconversion and loss of E occurs. He will remain on Vemlidy.  His initial viral load warrants treatment and so he will stay on this likely indefinitely. ESRD  He is on HD M/W/F. Being worked up for a kidney transplant. Screening for hepatocellular carcinoma  7/2021. AFP normal.    Needs repeat imaging. AFP ordered today. Treatment of other medical problems in patients with chronic liver disease  There are no contraindications for the patient to take most medications necessary for treatment of other medical issues. Counseling for alcohol in patients with chronic liver disease  The patient was counseled regarding alcohol consumption and the effect of alcohol on chronic liver disease. The patient does not consume any significant amount of alcohol. Vaccinations   Vaccination for viral hepatitis A is recommended since the patient has no serologic evidence of previous exposure or vaccination with immunity. Routine vaccinations against other bacterial and viral agents can be performed as indicated. Annual flu vaccination should be administered if indicated. ALLERGIES  No Known Allergies    MEDICATIONS  Current Outpatient Medications   Medication Sig    amLODIPine (NORVASC) 10 mg tablet Take 10 mg by mouth daily.  Vemlidy 25 mg tablet TAKE 1 TABLET BY MOUTH ONE TIME A DAY WITH FOOD    sevelamer carbonate (RENVELA) 800 mg tab tab Take  by mouth three (3) times daily.  hydrALAZINE (APRESOLINE) 25 mg tablet Take 25 mg by mouth three (3) times daily.  labetaloL (NORMODYNE) 300 mg tablet Take 300 mg by mouth two (2) times a day.  finasteride (PROSCAR) 5 mg tablet Take 5 mg by mouth daily. (Patient not taking: Reported on 2/15/2022)    aspirin delayed-release 81 mg tablet Take  by mouth as needed for Pain. (Patient not taking: Reported on 2/15/2022)     No current facility-administered medications for this visit. FAMILY HISTORY:  The patient has no knowledge of the father's medical condition. The patient has no knowledge of the mother's medical condition.       SOCIAL HISTORY:  The patient is . The patient has 1 child and 3 grandchildren. The patient has never used tobacco products. The patient has never consumed significant amounts of alcohol. 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 PHYSICAL EXAMINATION:  Visit Vitals  /80 (BP 1 Location: Right upper arm, BP Patient Position: Sitting, BP Cuff Size: Adult)   Pulse 72   Ht 5' 9\" (1.753 m)   Wt 176 lb (79.8 kg)   BMI 25.99 kg/m²     General: No acute distress. Yuli Lynn is with him today. Eyes: Sclera anicteric. ENT: No oral lesions. Nodes: No adenopathy. Skin: No spider angiomata. No jaundice. No palmar erythema. Respiratory: Lungs clear to auscultation. Cardiovascular: Regular heart rate. No murmurs. No JVD. Abdomen: Soft non-tender, liver size normal to percussion/palpation. Spleen not palpable. No obvious ascites. Extremities: No edema. No muscle wasting. No gross arthritic changes. Neurologic: Alert and oriented. Cranial nerves grossly intact. No asterixis. LABORATORY STUDIES:    12/12/2021. [facility].    AST: 388  ALT: 675  Tbili: 6.7  Lipase: 4418  Hgb: 6.5    Liver [place] of [address] [place] [address] Ref Rng & Units 7/9/2020 1/3/2020 12/9/2019   WBC 4.1 - 11.1 K/uL 4.8 3.3 (L) 6.0   ANC 1.8 - 8.0 K/UL 2.7 2.0 4.4   HGB 12.1 - 17.0 g/dL 12.0 (L) 10.3 (L) 11.6 (L)    - 400 K/uL 284 272 245   INR 0.9 - 1.1   1.0     AST 15 - 37 U/L 19 47 (H) 43 (H)   ALT 12 - 78 U/L 15 57 (H) 56   Alk Phos 45 - 117 U/L 97 94 82   Bili, Total 0.2 - 1.0 MG/DL 0.4 0.4 0.5   Bili, Direct 0.00 - 0.40 mg/dL  0.14    Albumin 3.5 - 5.0 g/dL 3.9 3.8 3.2 (L)   BUN 6 - 20 MG/DL 36 (H) 19 27 (H)   BUN (iSTAT) 9 - 20 mg/dL      Creat 0.70 - 1.30 MG/DL 7.46 (H) 2.74 (H) 3.68 (H)   Creat (iSTAT) 0.6 - 1.3 mg/dL      Na 136 - 145 mmol/L 141 141 138   K 3.5 - 5.1 mmol/L 4.2 3.6 4.1   Cl 97 - 108 mmol/L 100 96 101   CO2 21 - 32 mmol/L 32 25 29   Glucose 65 - 100 mg/dL 172 (H) 140 (H) 89   Magnesium 1.6 - 2.4 mg/dL        Liver [place] of [address] & Units 11/18/2019   WBC 4.1 - 11.1 K/uL 5.9   ANC 1.8 - 8.0 K/UL    HGB 12.1 - 17.0 g/dL 10.6 (L)    - 400 K/uL 254   INR 0.9 - 1.1      AST 15 - 37 U/L    ALT 12 - 78 U/L    Alk Phos 45 - 117 U/L    Bili, Total 0.2 - 1.0 MG/DL    Bili, Direct 0.00 - 0.40 mg/dL    Albumin 3.5 - 5.0 g/dL 2.7 (L)   BUN 6 - 20 MG/DL 38 (H)   BUN (iSTAT) 9 - 20 mg/dL    Creat 0.70 - 1.30 MG/DL 5.37 (H)   Creat (iSTAT) 0.6 - 1.3 mg/dL    Na 136 - 145 mmol/L 136   K 3.5 - 5.1 mmol/L 4.1   Cl 97 - 108 mmol/L 103   CO2 21 - 32 mmol/L 26   Glucose 65 - 100 mg/dL 73   Magnesium 1.6 - 2.4 mg/dL      SEROLOGIES:  Virology Latest Ref Rng & Units 7/13/2020 1/3/2020   HBV DNA IU/mL HBV DNA not detected    HCV RNA, HOSSEIN, QL Negative Negative Positive (A)     Serologies Latest Ref Rng & Units 9/5/2019 9/3/2019   Hep B Surface Ag Index  >1,000.00   Hep B Surface Ag Interp NEG    POSITIVE (A)   Hep C Ab NR    REACTIVE (A)   Hep C Genotype  1b    HCV RT-PCR, Quant IU/mL 2,550,000    HCV Log10 log10 IU/mL 6.407    Hep D AB Negative     Iron % Saturation 20 - 50 %  29   C-ANCA Neg:<1:20 titer  <1:20   P-ANCA Neg:<1:20 titer  <1:20   ANCA Neg:<1:20 titer  <1:20     Serologies Latest Ref Rng & Units 11/10/2019   Hep B Surface Ag Index >1,000.00   Hep B Surface Ag Interp NEG   POSITIVE (A)   Hep B Core Ab, Total NEGATIVE   Positive (A)     Serologies Latest Ref Rng & Units 1/3/2020   Hep A Ab, Total Negative Negative   HCV RT-PCR, Quant IU/mL 3,870,000   Hep D AB Negative Negative     LIVER HISTOLOGY:  2/2020. FibroScan performed at [address]. EkPa was 7.7. IQR/med 8%. . The results suggested a fibrosis level of F1-F2. The CAP score suggests no evidence of fatty liver. ENDOSCOPIC PROCEDURES:  12/14/2021. [facility]CP at [facility]. Stent placed in CBD. RADIOLOGY:  11/2019. CT scan abdomen without IV contrast. Normal appearing liver. No liver mass lesions. Normal spleen.  No ascites. OTHER TESTIN/3/2021. Transjugular biopsy. Wedge hepatic: 7 mmHg; free hepatic: 5 mmHg, IVC: 5 mmHg, RA: 2 mmHg. FOLLOW-UP:  All of the issues listed above in the assessment and plan were discussed with the patient. All questions were answered. The patient expressed a clear understanding of the above. Follow up in the [facility] in 6 months for lab check and [address] screening. This visit totaled 42 minutes with time used to review recent lab results, direct patient interaction and assessment, review medications, answer patient questions and documentation of the findings in my note.       Gia Eller, WANGP-BC  Liver [place] [facility] [address] [facility], [address] [place]  22.  [phone]

## 2022-03-18 PROBLEM — B18.2 CHRONIC HEPATITIS C (HCC): Status: ACTIVE | Noted: 2020-01-03

## 2022-03-18 PROBLEM — B18.1 CHRONIC HEPATITIS B (HCC): Status: ACTIVE | Noted: 2020-01-03

## 2022-03-19 PROBLEM — E11.9 DM (DIABETES MELLITUS) (HCC): Status: ACTIVE | Noted: 2019-09-02

## 2022-03-19 PROBLEM — R31.9 HEMATURIA: Status: ACTIVE | Noted: 2019-11-02

## 2022-03-19 PROBLEM — N18.9 CHRONIC RENAL FAILURE: Status: ACTIVE | Noted: 2019-11-02

## 2022-03-19 PROBLEM — I10 HYPERTENSION: Status: ACTIVE | Noted: 2019-09-02

## 2022-03-20 PROBLEM — S42.309A HUMERUS FRACTURE: Status: ACTIVE | Noted: 2019-09-02

## 2022-09-20 DIAGNOSIS — B18.1 CHRONIC HEPATITIS B (HCC): Primary | ICD-10-CM

## 2022-09-20 RX ORDER — TENOFOVIR ALAFENAMIDE 25 MG/1
TABLET ORAL
Qty: 30 TABLET | Refills: 5 | Status: SHIPPED | OUTPATIENT
Start: 2022-09-20

## 2023-01-09 DIAGNOSIS — B18.1 CHRONIC HEPATITIS B (HCC): ICD-10-CM

## 2023-01-10 RX ORDER — TENOFOVIR ALAFENAMIDE 25 MG/1
TABLET ORAL
Qty: 30 TABLET | Refills: 5 | Status: ACTIVE | OUTPATIENT
Start: 2023-01-10

## 2023-01-12 ENCOUNTER — OFFICE VISIT (OUTPATIENT)
Dept: HEMATOLOGY | Age: 73
End: 2023-01-12
Payer: MEDICARE

## 2023-01-12 VITALS
SYSTOLIC BLOOD PRESSURE: 132 MMHG | BODY MASS INDEX: 24.29 KG/M2 | HEIGHT: 69 IN | TEMPERATURE: 97.2 F | DIASTOLIC BLOOD PRESSURE: 70 MMHG | HEART RATE: 80 BPM | WEIGHT: 164 LBS

## 2023-01-12 DIAGNOSIS — B18.1 CHRONIC HEPATITIS B (HCC): Primary | ICD-10-CM

## 2023-01-12 DIAGNOSIS — N18.6 END STAGE RENAL DISEASE (HCC): ICD-10-CM

## 2023-01-12 DIAGNOSIS — Z86.19 HEPATITIS C VIRUS INFECTION CURED AFTER ANTIVIRAL DRUG THERAPY: ICD-10-CM

## 2023-01-12 RX ORDER — ATORVASTATIN CALCIUM 20 MG/1
20 TABLET, FILM COATED ORAL DAILY
COMMUNITY

## 2023-01-12 NOTE — PROGRESS NOTES
[address] [facility], [place], [address], [place], [place]      Marcristian Clark PA-C    Arturo Bocanegra, [facility]-BC     Radha Castano, [facility]   EDWARD RobersonP-ERLINDA Gonzalezjaynejosh Ham, [facility]       Myronmike Xavier [facility] 136    at [address]    [address] [place] [address], [facility] Tristin Mon  22.    [phone]    FAX: [address] [place] [address], [address]    [phone]    FAX: [phone]     Patient Care Team:  Juliana Gresham MD as PCP - General (Family Medicine)    Problem List  Date Reviewed: 8/11/2020            Codes Class Noted    Hepatitis C virus infection cured after antiviral drug therapy ICD-10-CM: Z86.19  ICD-9-CM: V12.09  1/3/2020        Chronic hepatitis B ([facility] ICD-10-CM: B18.1  ICD-9-CM: 070.32  1/3/2020        End stage renal disease ([facility] ICD-10-CM: N18.6  ICD-9-CM: 585.6  11/2/2019        Hematuria ICD-10-CM: R31.9  ICD-9-CM: 599.70  11/2/2019        Hypertension ICD-10-CM: I10  ICD-9-CM: 401.9  9/2/2019        DM (diabetes mellitus) ([facility] ICD-10-CM: E11.9  ICD-9-CM: 250.00  9/2/2019           Tomás Reyes is being seen at [address] for management of Chronic HBV and Chronic HCV. The active problem list, all pertinent past medical history, medications, radiologic findings and laboratory findings related to the liver disorder were reviewed and discussed with the patient. Tomás Reyes returns to the [place] [address] for management of chronic HCV and chronic HBV. The active problem list, all pertinent past medical history, medications, liver histology, radiologic findings and laboratory findings related to the liver disorder were reviewed with the patient. The patient is a 74 y.o.  Black male who was found to have abnormalities in liver chemistries and subsequently tested positive for chronic HCV and HBV in 11/2019. Patient was sent for transjugular biopsy at [facility] on 12/3/2021. HVPG demonstrated a 2 mm gradient. After the procedure he developed severe vomiting, nausea and inability to eat. Hematemesis developed which prompted evaluation at [facility] 12/2021. ERCP demonstrated hemobilia a stent was placed. Assessment of liver fibrosis was performed with Fibroscan 2/2020. The result was 7.7 kPa which correlates with stage 1 portal fibrosis. The patient has completed an intended 12 weeks of hepatitis C treatment with Epclusa 6/2020-9/2020. The HCV RNA has remained undetectable. He is a sustained viral responder and cured. The HBV DNA was 67,700 11/2019. Vemlidy was started ~ 6/2020. The HBV DNA has remained undetectable. This is the first office visit since 2/2022. Since the last office visit the patient has felt well overall with no new complications of liver disease. He remains on dialysis on M,W,F and has made the decision he does not want a kidney transplant. The patient reports no current symptoms of liver disease. He denies fatigue, pain in the right side over the liver, itching, or swelling of the abdomen. There are no functional limitation in completing activities of daily living. ASSESSMENT AND PLAN:  Chronic HBV  Chronic HBV, E-antigen negative inactive disease     HBV DNA was 67,000 IU/ml on 11/2019. Vemlidy was started ~ 6/2020. The HBV DNA has remained undetectable. Will order HBV DNA today to evaluate ongoing response to treatment. Will continue vemlidy 25 mg daily. Chronic HCV cured with antiviral therapyy  Chronic HCV with stage 1-2 fibrosis. Patient was sent for transjugular biopsy at [facility] on 12/3/2021. HVPG demonstrated a 2 mm gradient. After the procedure he developed severe vomiting, nausea and inability to eat.  Hematemesis developed which prompted evaluation at [facility] 12/2021. ERCP demonstrated hemobilia a stent was placed. Assessment of liver fibrosis was performed with Fibroscan 2/2020. The result was 7.7 kPa which correlates with stage 1 portal fibrosis. The patient completed an intended 12 weeks of hepatitis C treatment with Epclusa 6/2020-9/2020. The HCV remained undetectable and he is considered cured. No further testing is needed. ESRD  The patient continues on dialysis M,W,F. He has decided not to pursue having a kidney transplant. Screening for hepatocellular carcinoma  HCC screening was last performed with CT of abdomen 11/2019 and did not suggest [address] The AFP was normal 7/2020. Will order AFP today and ultrasound to be scheduled. Treatment of other medical problems in patients with chronic liver disease  There are no contraindications for the patient to take most medications necessary for treatment of other medical issues. Counseling for alcohol in patients with chronic liver disease  The patient was counseled regarding alcohol consumption and the effect of alcohol on chronic liver disease. The patient does not consume any significant amount of alcohol. Vaccinations   Vaccination for viral hepatitis A is recommended since the patient has no serologic evidence of previous exposure or vaccination with immunity. Routine vaccinations against other bacterial and viral agents can be performed as indicated. Annual flu vaccination should be administered if indicated. ALLERGIES  No Known Allergies    MEDICATIONS  Current Outpatient Medications   Medication Sig    atorvastatin (LIPITOR) 20 mg tablet Take 20 mg by mouth daily. Vemlidy 25 mg tablet TAKE 1 TABLET BY MOUTH ONE TIME A DAY WITH FOOD    amLODIPine (NORVASC) 10 mg tablet Take 10 mg by mouth daily. sevelamer carbonate (RENVELA) 800 mg tab tab Take  by mouth three (3) times daily. hydrALAZINE (APRESOLINE) 25 mg tablet Take 25 mg by mouth three (3) times daily.     labetaloL (NORMODYNE) 300 mg tablet Take 300 mg by mouth two (2) times a day. No current facility-administered medications for this visit. SYSTEM REVIEW NOT RELATED TO LIVER DISEASE OR REVIEWED ABOVE:  Constitution systems: Negative for fever, chills, weight gain, weight loss. Eyes: Negative for visual changes. ENT: Negative for sore throat, painful swallowing. Respiratory: Negative for cough, hemoptysis, SOB. Cardiology: Negative for chest pain, palpitations. GI:  Negative for constipation or diarrhea. : Negative for urinary frequency, dysuria, hematuria, nocturia. Skin: Negative for rash. Hematology: Negative for easy bruising, blood clots. Musculo-skeletal: Negative for back pain, muscle pain, weakness. Neurologic: Negative for headaches, dizziness, vertigo, memory problems not related to HE. Psychology: Negative for anxiety, depression. FAMILY HISTORY:  The patient has no knowledge of the father's medical condition. The patient has no knowledge of the mother's medical condition. SOCIAL HISTORY:  The patient is . The patient has 1 child and 3 grandchildren. The patient has never used tobacco products. The patient has never consumed significant amounts of alcohol. 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 PHYSICAL EXAMINATION:  Visit Vitals  /70 (BP 1 Location: Left upper arm, BP Patient Position: Sitting, BP Cuff Size: Adult)   Pulse 80   Temp 97.2 °F (36.2 °C) (Temporal)   Ht 5' 9\" (1.753 m)   Wt 164 lb (74.4 kg)   BMI 24.22 kg/m²       General: No acute distress. Eyes: Sclera anicteric. ENT: No oral lesions. Thyroid normal.  Nodes: No adenopathy. Skin: No spider angiomata. No jaundice. No palmar erythema. Respiratory: Lungs clear to auscultation. Cardiovascular: Regular heart rate. No murmurs. No JVD. Abdomen: Soft non-tender, liver size normal to percussion/palpation. Spleen not palpable. No obvious ascites. Extremities: No edema. No muscle wasting. No gross arthritic changes. Neurologic: Alert and oriented. Cranial nerves grossly intact. No asterixis. LABORATORY STUDIES:  [place] [id number] Units 1/13/2023   WBC 4.1 - 11.1 K/uL 4.6   ANC 1.8 - 8.0 K/UL 2.5   HGB 12.1 - 17.0 g/dL 11.1 (L)    - 400 K/uL 237   INR 0.9 - 1.1      AST 15 - 37 U/L 17   ALT 12 - 78 U/L 24   Alk Phos 45 - 117 U/L 72   Bili, Total 0.2 - 1.0 MG/DL 0.5   Bili, Direct 0.0 - 0.2 MG/DL 0.2   Albumin 3.5 - 5.0 g/dL 4.1   BUN 6 - 20 MG/DL 39 (H)   BUN (iSTAT) 9 - 20 mg/dL    Creat 0.70 - 1.30 MG/DL 5.35 (H)   Creat (iSTAT) 0.6 - 1.3 mg/dL    Na 136 - 145 mmol/L 136   K 3.5 - 5.1 mmol/L 4.0   Cl 97 - 108 mmol/L 100   CO2 21 - 32 mmol/L 30   Glucose 65 - 100 mg/dL 81     Liver [facility] Latest Ref Rng & Units 12/15/2020   WBC 4.1 - 11.1 K/uL 4.6   ANC 1.8 - 8.0 K/UL    HGB 12.1 - 17.0 g/dL 11.7 (L)    - 400 K/uL 348   INR 0.9 - 1.1      AST 15 - 37 U/L 21   ALT 12 - 78 U/L 32   Alk Phos 45 - 117 U/L 97   Bili, Total 0.2 - 1.0 MG/DL 0.5   Bili, Direct 0.0 - 0.2 MG/DL 0.1   Albumin 3.5 - 5.0 g/dL 4.3   BUN 6 - 20 MG/DL 46 (H)   BUN (iSTAT) 9 - 20 mg/dL    Creat 0.70 - 1.30 MG/DL 7.00 (H)   Creat (iSTAT) 0.6 - 1.3 mg/dL    Na 136 - 145 mmol/L 135 (L)   K 3.5 - 5.1 mmol/L 4.1   Cl 97 - 108 mmol/L 95 (L)   CO2 21 - 32 mmol/L 30   Glucose 65 - 100 mg/dL 83     Cancer Screening Latest Ref Rng & Units 7/13/2020 1/3/2020   AFP, Serum 0.0 - 8.0 ng/mL 2.6 2.6   AFP-L3% 0.0 - 9.9 % Comment Comment     Laboratory testing from 12/15/2020 reviewed in detail. Additional testing included to evaluate progression or regression of disease. Laboratory testing results from today will be communicated by My Chart.        SEROLOGIES:  Virology Latest Ref Rng & Units 7/13/2020 1/3/2020   HBV DNA IU/mL HBV DNA not detected    HCV RNA, HOSSEIN, QL Negative Negative Positive (A)     Serologies Latest Ref Rng & Units 2019 9/3/2019   Hep B Surface Ag Index  >1,000.00   Hep B Surface Ag Interp NEG    POSITIVE (A)   Hep C Ab NR    REACTIVE (A)   Hep C Genotype  1b    HCV RT-PCR, Quant IU/mL 2,550,000    HCV Log10 log10 IU/mL 6.407    Hep D AB Negative     Iron % Saturation 20 - 50 %  29   C-ANCA Neg:<1:20 titer  <1:20   P-ANCA Neg:<1:20 titer  <1:20   ANCA Neg:<1:20 titer  <1:20     Serologies Latest Ref Rng & Units 11/10/2019   Hep B Surface Ag Index >1,000.00   Hep B Surface Ag Interp NEG   POSITIVE (A)   Hep B Core Ab, Total NEGATIVE   Positive (A)     Serologies Latest Ref Rng & Units 1/3/2020   Hep A Ab, Total Negative Negative   HCV RT-PCR, Quant IU/mL 3,870,000   Hep D AB Negative Negative     Virology HCV RNA, HOSSEIN, QL   Latest Ref Rng & Units Negative   12/15/2020 Negative   2020 Negative     Virology HBV DNA   Latest Ref Rng & Units IU/mL   2023 HBV DNA not detected   12/15/2020 HBV DNA not detected   2020 HBV DNA not detected     LIVER HISTOLOGY:  2020. FibroScan [facility] at [facility]. EkPa was 7.7. IQR/med 8%. . The results suggested a fibrosis level of F1-F2. The CAP score suggests no evidence of fatty liver. ENDOSCOPIC PROCEDURES:  2021. ERCP at [facility]. Stent placed in CBD. RADIOLOGY:  2019. CT scan abdomen without IV contrast. Normal appearing liver. No liver mass lesions. Normal spleen. No ascites. OTHER TESTIN/3/2021. Transjugular biopsy. Wedge hepatic: 7 mmHg; free hepatic: 5 mmHg, IVC: 5 mmHg, RA: 2 mmHg. FOLLOW-UP:  All of the issues listed above in the Assessment and Plan were discussed with the patient. All questions were answered. The patient expressed a clear understanding of the above. [address] [place] [address] in 6 months for ongoing monitoring and treatment. ARTURO [facility] of [address] [id number] [address]  [facility],  [facility]

## 2023-01-12 NOTE — PROGRESS NOTES
Identified pt with two pt identifiers(name and ). Reviewed record in preparation for visit and have obtained necessary documentation. Chief Complaint   Patient presents with    Follow-up      Vitals:    23 1313   BP: 132/70   Pulse: 80   Temp: 97.2 °F (36.2 °C)   TempSrc: Temporal   Weight: 164 lb (74.4 kg)   Height: 5' 9\" (1.753 m)   PainSc:   0 - No pain       Health Maintenance Review: Patient reminded of \"due or due soon\" health maintenance. I have asked the patient to contact his/her primary care provider (PCP) for follow-up on his/her health maintenance. Coordination of Care Questionnaire:  :   1) Have you been to an emergency room, urgent care, or hospitalized since your last visit? If yes, where when, and reason for visit? no       2. Have seen or consulted any other health care provider since your last visit? If yes, where when, and reason for visit? NO      Patient is accompanied by self I have received verbal consent from Ignacia Hsu to discuss any/all medical information while they are present in the room.

## 2023-01-13 LAB
ALBUMIN SERPL-MCNC: 4.1 G/DL (ref 3.5–5)
ALBUMIN/GLOB SERPL: 1 (ref 1.1–2.2)
ALP SERPL-CCNC: 72 U/L (ref 45–117)
ALT SERPL-CCNC: 24 U/L (ref 12–78)
ANION GAP SERPL CALC-SCNC: 6 MMOL/L (ref 5–15)
AST SERPL-CCNC: 17 U/L (ref 15–37)
BASOPHILS # BLD: 0 K/UL (ref 0–0.1)
BASOPHILS NFR BLD: 0 % (ref 0–1)
BILIRUB DIRECT SERPL-MCNC: 0.2 MG/DL (ref 0–0.2)
BILIRUB SERPL-MCNC: 0.5 MG/DL (ref 0.2–1)
BUN SERPL-MCNC: 39 MG/DL (ref 6–20)
BUN/CREAT SERPL: 7 (ref 12–20)
CALCIUM SERPL-MCNC: 10.3 MG/DL (ref 8.5–10.1)
CHLORIDE SERPL-SCNC: 100 MMOL/L (ref 97–108)
CO2 SERPL-SCNC: 30 MMOL/L (ref 21–32)
CREAT SERPL-MCNC: 5.35 MG/DL (ref 0.7–1.3)
DIFFERENTIAL METHOD BLD: ABNORMAL
EOSINOPHIL # BLD: 0.2 K/UL (ref 0–0.4)
EOSINOPHIL NFR BLD: 3 % (ref 0–7)
ERYTHROCYTE [DISTWIDTH] IN BLOOD BY AUTOMATED COUNT: 14 % (ref 11.5–14.5)
GLOBULIN SER CALC-MCNC: 4 G/DL (ref 2–4)
GLUCOSE SERPL-MCNC: 81 MG/DL (ref 65–100)
HCT VFR BLD AUTO: 35.9 % (ref 36.6–50.3)
HGB BLD-MCNC: 11.1 G/DL (ref 12.1–17)
IMM GRANULOCYTES # BLD AUTO: 0 K/UL (ref 0–0.04)
IMM GRANULOCYTES NFR BLD AUTO: 0 % (ref 0–0.5)
INR PPP: 1.1 (ref 0.9–1.1)
LYMPHOCYTES # BLD: 1.3 K/UL (ref 0.8–3.5)
LYMPHOCYTES NFR BLD: 27 % (ref 12–49)
MCH RBC QN AUTO: 30.8 PG (ref 26–34)
MCHC RBC AUTO-ENTMCNC: 30.9 G/DL (ref 30–36.5)
MCV RBC AUTO: 99.7 FL (ref 80–99)
MONOCYTES # BLD: 0.7 K/UL (ref 0–1)
MONOCYTES NFR BLD: 15 % (ref 5–13)
NEUTS SEG # BLD: 2.5 K/UL (ref 1.8–8)
NEUTS SEG NFR BLD: 55 % (ref 32–75)
NRBC # BLD: 0 K/UL (ref 0–0.01)
NRBC BLD-RTO: 0 PER 100 WBC
PLATELET # BLD AUTO: 237 K/UL (ref 150–400)
PMV BLD AUTO: 11.3 FL (ref 8.9–12.9)
POTASSIUM SERPL-SCNC: 4 MMOL/L (ref 3.5–5.1)
PROT SERPL-MCNC: 8.1 G/DL (ref 6.4–8.2)
PROTHROMBIN TIME: 11.2 SEC (ref 9–11.1)
RBC # BLD AUTO: 3.6 M/UL (ref 4.1–5.7)
SODIUM SERPL-SCNC: 136 MMOL/L (ref 136–145)
WBC # BLD AUTO: 4.6 K/UL (ref 4.1–11.1)

## 2023-01-14 LAB
HBV DNA SERPL NAA+PROBE-ACNC: NORMAL IU/ML
HBV DNA SERPL NAA+PROBE-LOG IU: NORMAL LOG10 IU/ML
HBV E AG SERPL QL IA: NEGATIVE
TEST INFORMATION: NORMAL

## 2023-01-15 PROBLEM — S42.309A HUMERUS FRACTURE: Status: RESOLVED | Noted: 2019-09-02 | Resolved: 2023-01-15

## 2023-01-15 PROBLEM — Z86.19 HEPATITIS C VIRUS INFECTION CURED AFTER ANTIVIRAL DRUG THERAPY: Status: ACTIVE | Noted: 2020-01-03

## 2023-01-15 PROBLEM — N18.6 END STAGE RENAL DISEASE (HCC): Status: ACTIVE | Noted: 2019-11-02

## 2023-01-19 ENCOUNTER — HOSPITAL ENCOUNTER (OUTPATIENT)
Dept: ULTRASOUND IMAGING | Age: 73
Discharge: HOME | End: 2023-01-19
Attending: NURSE PRACTITIONER
Payer: MEDICARE

## 2023-01-19 DIAGNOSIS — B18.1 CHRONIC HEPATITIS B (HCC): ICD-10-CM

## 2023-01-19 PROCEDURE — 76700 US EXAM ABDOM COMPLETE: CPT

## 2023-09-14 NOTE — PROGRESS NOTES
Problem: Chronic Conditions and Co-morbidities  Goal: Patient's chronic conditions and co-morbidity symptoms are monitored and maintained or improved  Outcome: Progressing Problem: Mobility Impaired (Adult and Pediatric)  Goal: *Acute Goals and Plan of Care (Insert Text)  Description  FUNCTIONAL STATUS PRIOR TO ADMISSION: Patient was modified independent using a single point cane for functional mobility. HOME SUPPORT PRIOR TO ADMISSION: The patient lived with daughter who works during day. [facility] nursing 3x/wk. Physical Therapy Goals  Initiated 11/6/2019; reviewed goals 11/12/19 - still appropriate  1. Patient will move from supine to sit and sit to supine , scoot up and down and roll side to side in bed with independence within 7 day(s). 2.  Patient will transfer from bed to chair and chair to bed with modified independence using the least restrictive device within 7 day(s). 3.  Patient will perform sit to stand with modified independence within 7 day(s). 4.  Patient will ambulate with modified independence for 200 feet with the least restrictive device within 7 day(s). 5.  Patient will ascend/descend 15 stairs with 1 handrail(s) with supervision/set-up within 7 day(s). Outcome: Progressing Towards Goal   PHYSICAL THERAPY TREATMENT: WEEKLY REASSESSMENT  Patient: Bradley Burris (32 y.o. male)  Date: 11/12/2019  Primary Diagnosis: Acute on chronic renal failure ([facility] [N17.9, N18.9]        Precautions: Fall, Other (comment)(no lifting with RUE due to fx)      ASSESSMENT  Patient continues with skilled PT services and is progressing towards goals. Patient still limited by decreased LE strength, decreased standing balance and impaired mobility skills, but activity tolerance was improved with ambulation. Intermittent scissoring during gait training observed with increased trunk sway. Left patient sitting in chair with dinner tray when session ended.      Patient's progression toward goals since last assessment: activity tolerance is improved; bed mobility, transfers and gait skills are minimally improved over the last week due to multiple deferred PT session due to medical status. Significant improvement in Barthel score from 20 to 50. Current Level of Function Impacting Discharge (mobility/balance): min a x 1 supine to sit and sit to stand transfers. Bed to chair transfers with RW needed cg assistance. Gait cg assist with RW for 80 feet. Functional Outcome Measure: The patient scored 50/100 on the Barthel outcome measure which is indicative of moderate functional impairment. Other factors to consider for discharge: new to HD; high fall risk at this time, generalized weakness. PLAN :  Goals have been updated based on progression since last assessment. Patient continues to benefit from skilled intervention to address the above impairments. Recommendations and Planned Interventions: bed mobility training, transfer training, gait training, therapeutic exercises, neuromuscular re-education, patient and family training/education and therapeutic activities      Frequency/Duration: Patient will be followed by physical therapy:  3 times a week to address goals. Recommendation for discharge: (in order for the patient to meet his/her long term goals)  Therapy 3 hours per day 5-7 days per week    This discharge recommendation:  Has not yet been discussed the attending provider and/or case management    IF patient discharges home will need the following DME: rolling walker         SUBJECTIVE:   Patient stated I feel fine.     OBJECTIVE DATA SUMMARY:   HISTORY:    Past Medical History:   Diagnosis Date    Diabetes ([facility]     Hypertension     Kidney disease      Past Surgical History:   Procedure Laterality Date    HX GI      colonoscopy 6-7 yrs ago    IR INSERT TUNL CVC W/O PORT OVER 5 YR  11/11/2019    TX COLON CA SCRN NOT HI RSK IND  10/21/2015            Personal factors and/or comorbidities impacting plan of care: ESRD now in HD; dm2; heart failure, htn    Home Situation  Home Environment: Private residence  # Steps to Enter: 3  Rails to Enter: Yes  [employer] : Bilateral  One/Two Story Residence: Two story  # of Interior Steps: 15  Height of Each Step (in): (not known)  Interior Rails: Right  Lift Chair Available: No  Living Alone: No(daughter, nurse 3x for vitals)  Support Systems: Family member(s)  Patient Expects to be Discharged to[de-identified] Patient room  Current DME Used/Available at Home: Cane, straight  Tub or Shower Type: Tub/Shower combination(1/2 bath on 1st level)    EXAMINATION/PRESENTATION/DECISION MAKING:   Critical Behavior:  Neurologic State: Alert  Orientation Level: Oriented to person, Oriented to place, Oriented to situation, Disoriented to time  Cognition: Appropriate decision making, Appropriate for age attention/concentration, Appropriate safety awareness, Follows commands  Safety/Judgement: Fall prevention, Awareness of environment, Decreased insight into deficits  Hearing:   Auditory  Auditory Impairment: None  Range Of Motion:  AROM: Generally decreased, functional           PROM: Within functional limits(RUE not tested)           Strength:    Strength: Generally decreased, functional(grossly 4-/5 bilateral LEs)                    Tone & Sensation:   Tone: Normal              Sensation: Impaired(decreased light touch bilateral feet)               Coordination:  Coordination: Within functional limits  Vision:      Functional Mobility:  Bed Mobility:  Rolling: Modified independent  Supine to Sit: Minimum assistance;Assist x1     Scooting: Stand-by assistance  Transfers:  Sit to Stand: Contact guard assistance;Minimum assistance;Assist x1  Stand to Sit: Stand-by assistance;Assist x1(cues to reach back to armrests)        Bed to Chair: Contact guard assistance;Assist x1;Adaptive equipment(RW)              Balance:   Sitting: Intact  Standing: Impaired  Standing - Static: Good;Constant support  Standing - Dynamic : Fair;Constant support  Ambulation/Gait Training:  Distance (ft): 80 Feet (ft)  Assistive Device: Walker, rolling;Gait belt  Ambulation - Level of Assistance: Contact guard assistance;Assist x1        Gait Abnormalities: Scissoring;Path deviations;Decreased step clearance  Right Side Weight Bearing: Full  Left Side Weight Bearing: Full  Base of Support: Narrowed     Speed/Sarah: Pace decreased (<100 feet/min)  Step Length: Right shortened;Left shortened                           Functional Measure:  Barthel Index:    Bathin  Bladder: 5  Bowels: 5  Groomin  Dressing: 10  Feeding: 10  Mobility: 0  Stairs: 0  Toilet Use: 5  Transfer (Bed to Chair and Back): 10  Total: 50/100       The Barthel ADL Index: Guidelines  1. The index should be used as a record of what a patient does, not as a record of what a patient could do. 2. The main aim is to establish degree of independence from any help, physical or verbal, however minor and for whatever reason. 3. The need for supervision renders the patient not independent. 4. A patient's performance should be established using the best available evidence. Asking the patient, friends/relatives and nurses are the usual sources, but direct observation and common sense are also important. However direct testing is not needed. 5. Usually the patient's performance over the preceding 24-48 hours is important, but occasionally longer periods will be relevant. 6. Middle categories imply that the patient supplies over 50 per cent of the effort. 7. Use of aids to be independent is allowed. Gia Enamorado., Barthel, D.W. (6889). Functional evaluation: the Barthel Index. [address] [facility] (14)2. NGOZI Reese, Juwan Castro., Radha Rice., [place], [address] [place] [address] (). Measuring the change indisability after inpatient rehabilitation; comparison of the responsiveness of the Barthel Index and Functional Weber Measure. Journal of Neurology, Neurosurgery, and Psychiatry, 66(4), 934-303.   Lisa Ariza, N.J.A, TATO Paredes, & Jorge A Ag MGelacioA. (2004.) Assessment of post-stroke quality of life in cost-effectiveness studies: The usefulness of the Barthel Index and the EuroQoL-5D. Quality of Life Research, 13, 427-22       Pain Rating:  None reported. Activity Tolerance:   Fair, SpO2 stable on RA and requires rest breaks  Please refer to the flowsheet for vital signs taken during this treatment. After treatment patient left in no apparent distress:   Sitting in chair and Call bell within reach    COMMUNICATION/EDUCATION:   The patients plan of care was discussed with: Registered Nurse and Rehabilitation Attendant. Fall prevention education was provided and the patient/caregiver indicated understanding., Patient/family have participated as able in goal setting and plan of care. and Patient/family agree to work toward stated goals and plan of care.     Thank you for this referral.  Daev Horowitz, PT   Time Calculation: 14 mins

## 2023-09-29 NOTE — ED NOTES
Dr Iesha Gonzales md, was notified of pt's high BP and that pt hasn't been taking his HTN and diuretics r/t recent moving into his daughter's house and \"I didn't want to have to pee that much. \" 18

## 2023-10-12 ENCOUNTER — OFFICE VISIT (OUTPATIENT)
Age: 73
End: 2023-10-12

## 2023-10-12 VITALS
HEIGHT: 69 IN | TEMPERATURE: 97.8 F | BODY MASS INDEX: 24.73 KG/M2 | DIASTOLIC BLOOD PRESSURE: 72 MMHG | WEIGHT: 167 LBS | SYSTOLIC BLOOD PRESSURE: 111 MMHG | OXYGEN SATURATION: 98 %

## 2023-10-12 DIAGNOSIS — B18.1 CHRONIC HEPATITIS B (HCC): Primary | ICD-10-CM

## 2023-10-12 DIAGNOSIS — Z86.19 HEPATITIS C VIRUS INFECTION CURED AFTER ANTIVIRAL DRUG THERAPY: ICD-10-CM

## 2023-10-12 DIAGNOSIS — N18.6 END STAGE RENAL DISEASE (HCC): ICD-10-CM

## 2023-10-12 ASSESSMENT — PATIENT HEALTH QUESTIONNAIRE - PHQ9
2. FEELING DOWN, DEPRESSED OR HOPELESS: 0
SUM OF ALL RESPONSES TO PHQ9 QUESTIONS 1 & 2: 0
SUM OF ALL RESPONSES TO PHQ QUESTIONS 1-9: 0
1. LITTLE INTEREST OR PLEASURE IN DOING THINGS: 0
SUM OF ALL RESPONSES TO PHQ QUESTIONS 1-9: 0

## 2023-10-13 LAB
ALBUMIN SERPL-MCNC: 4.2 G/DL (ref 3.5–5)
ALBUMIN/GLOB SERPL: 1 (ref 1.1–2.2)
ALP SERPL-CCNC: 90 U/L (ref 45–117)
ALT SERPL-CCNC: 29 U/L (ref 12–78)
ANION GAP SERPL CALC-SCNC: 5 MMOL/L (ref 5–15)
AST SERPL-CCNC: 17 U/L (ref 15–37)
BASOPHILS # BLD: 0 K/UL (ref 0–0.1)
BASOPHILS NFR BLD: 1 % (ref 0–1)
BILIRUB DIRECT SERPL-MCNC: 0.2 MG/DL (ref 0–0.2)
BILIRUB SERPL-MCNC: 0.6 MG/DL (ref 0.2–1)
BUN SERPL-MCNC: 23 MG/DL (ref 6–20)
BUN/CREAT SERPL: 4 (ref 12–20)
CALCIUM SERPL-MCNC: 9.7 MG/DL (ref 8.5–10.1)
CHLORIDE SERPL-SCNC: 103 MMOL/L (ref 97–108)
CO2 SERPL-SCNC: 31 MMOL/L (ref 21–32)
CREAT SERPL-MCNC: 5.88 MG/DL (ref 0.7–1.3)
DIFFERENTIAL METHOD BLD: ABNORMAL
EOSINOPHIL # BLD: 0.2 K/UL (ref 0–0.4)
EOSINOPHIL NFR BLD: 5 % (ref 0–7)
ERYTHROCYTE [DISTWIDTH] IN BLOOD BY AUTOMATED COUNT: 12.7 % (ref 11.5–14.5)
GLOBULIN SER CALC-MCNC: 4.2 G/DL (ref 2–4)
GLUCOSE SERPL-MCNC: 86 MG/DL (ref 65–100)
HCT VFR BLD AUTO: 37.2 % (ref 36.6–50.3)
HGB BLD-MCNC: 12.3 G/DL (ref 12.1–17)
IMM GRANULOCYTES # BLD AUTO: 0 K/UL (ref 0–0.04)
IMM GRANULOCYTES NFR BLD AUTO: 1 % (ref 0–0.5)
LYMPHOCYTES # BLD: 1.6 K/UL (ref 0.8–3.5)
LYMPHOCYTES NFR BLD: 35 % (ref 12–49)
MCH RBC QN AUTO: 31.6 PG (ref 26–34)
MCHC RBC AUTO-ENTMCNC: 33.1 G/DL (ref 30–36.5)
MCV RBC AUTO: 95.6 FL (ref 80–99)
MONOCYTES # BLD: 0.5 K/UL (ref 0–1)
MONOCYTES NFR BLD: 10 % (ref 5–13)
NEUTS SEG # BLD: 2.3 K/UL (ref 1.8–8)
NEUTS SEG NFR BLD: 48 % (ref 32–75)
NRBC # BLD: 0 K/UL (ref 0–0.01)
NRBC BLD-RTO: 0 PER 100 WBC
PLATELET # BLD AUTO: 170 K/UL (ref 150–400)
PMV BLD AUTO: 10.9 FL (ref 8.9–12.9)
POTASSIUM SERPL-SCNC: 3.5 MMOL/L (ref 3.5–5.1)
PROT SERPL-MCNC: 8.4 G/DL (ref 6.4–8.2)
RBC # BLD AUTO: 3.89 M/UL (ref 4.1–5.7)
SODIUM SERPL-SCNC: 139 MMOL/L (ref 136–145)
WBC # BLD AUTO: 4.7 K/UL (ref 4.1–11.1)

## 2023-10-17 LAB
AFP L3 MFR SERPL: NORMAL % (ref 0–9.9)
AFP SERPL-MCNC: 1.7 NG/ML (ref 0–8.4)

## 2024-02-29 RX ORDER — TENOFOVIR ALAFENAMIDE 25 MG/1
TABLET ORAL
Qty: 30 TABLET | Refills: 5 | Status: SHIPPED | OUTPATIENT
Start: 2024-02-29

## 2024-03-12 ENCOUNTER — HOSPITAL ENCOUNTER (EMERGENCY)
Facility: HOSPITAL | Age: 74
Discharge: HOME | End: 2024-03-12
Payer: MEDICARE

## 2024-03-12 ENCOUNTER — APPOINTMENT (OUTPATIENT)
Facility: HOSPITAL | Age: 74
End: 2024-03-12
Payer: MEDICARE

## 2024-03-12 VITALS
OXYGEN SATURATION: 99 % | HEART RATE: 78 BPM | DIASTOLIC BLOOD PRESSURE: 75 MMHG | TEMPERATURE: 98.7 F | RESPIRATION RATE: 16 BRPM | BODY MASS INDEX: 22.98 KG/M2 | HEIGHT: 70 IN | WEIGHT: 160.5 LBS | SYSTOLIC BLOOD PRESSURE: 133 MMHG

## 2024-03-12 DIAGNOSIS — E87.6 HYPOKALEMIA: ICD-10-CM

## 2024-03-12 DIAGNOSIS — R53.1 GENERALIZED WEAKNESS: ICD-10-CM

## 2024-03-12 DIAGNOSIS — R15.1 FECAL SMEARING: ICD-10-CM

## 2024-03-12 DIAGNOSIS — J10.1 INFLUENZA A: Primary | ICD-10-CM

## 2024-03-12 DIAGNOSIS — R63.0 DECREASED APPETITE: ICD-10-CM

## 2024-03-12 LAB
ALBUMIN SERPL-MCNC: 3.3 G/DL (ref 3.5–5)
ALBUMIN/GLOB SERPL: 0.7 (ref 1.1–2.2)
ALP SERPL-CCNC: 76 U/L (ref 45–117)
ALT SERPL-CCNC: 54 U/L (ref 12–78)
ANION GAP SERPL CALC-SCNC: 10 MMOL/L (ref 5–15)
AST SERPL-CCNC: 57 U/L (ref 15–37)
BASOPHILS # BLD: 0 K/UL (ref 0–0.1)
BASOPHILS NFR BLD: 0 % (ref 0–1)
BILIRUB SERPL-MCNC: 0.5 MG/DL (ref 0.2–1)
BUN SERPL-MCNC: 43 MG/DL (ref 6–20)
BUN/CREAT SERPL: 6 (ref 12–20)
CALCIUM SERPL-MCNC: 8.7 MG/DL (ref 8.5–10.1)
CHLORIDE SERPL-SCNC: 96 MMOL/L (ref 97–108)
CO2 SERPL-SCNC: 32 MMOL/L (ref 21–32)
CREAT SERPL-MCNC: 6.91 MG/DL (ref 0.7–1.3)
DIFFERENTIAL METHOD BLD: ABNORMAL
EOSINOPHIL # BLD: 0 K/UL (ref 0–0.4)
EOSINOPHIL NFR BLD: 0 % (ref 0–7)
ERYTHROCYTE [DISTWIDTH] IN BLOOD BY AUTOMATED COUNT: 12.9 % (ref 11.5–14.5)
FLUAV RNA SPEC QL NAA+PROBE: DETECTED
FLUBV RNA SPEC QL NAA+PROBE: NOT DETECTED
GLOBULIN SER CALC-MCNC: 4.7 G/DL (ref 2–4)
GLUCOSE SERPL-MCNC: 93 MG/DL (ref 65–100)
HCT VFR BLD AUTO: 31.8 % (ref 36.6–50.3)
HGB BLD-MCNC: 10.5 G/DL (ref 12.1–17)
IMM GRANULOCYTES # BLD AUTO: 0 K/UL (ref 0–0.04)
IMM GRANULOCYTES NFR BLD AUTO: 0 % (ref 0–0.5)
LYMPHOCYTES # BLD: 0.9 K/UL (ref 0.8–3.5)
LYMPHOCYTES NFR BLD: 16 % (ref 12–49)
MAGNESIUM SERPL-MCNC: 1.9 MG/DL (ref 1.6–2.4)
MCH RBC QN AUTO: 30.8 PG (ref 26–34)
MCHC RBC AUTO-ENTMCNC: 33 G/DL (ref 30–36.5)
MCV RBC AUTO: 93.3 FL (ref 80–99)
MONOCYTES # BLD: 0.8 K/UL (ref 0–1)
MONOCYTES NFR BLD: 14 % (ref 5–13)
NEUTS SEG # BLD: 4 K/UL (ref 1.8–8)
NEUTS SEG NFR BLD: 70 % (ref 32–75)
NRBC # BLD: 0 K/UL (ref 0–0.01)
NRBC BLD-RTO: 0 PER 100 WBC
PLATELET # BLD AUTO: 179 K/UL (ref 150–400)
PMV BLD AUTO: 10.5 FL (ref 8.9–12.9)
POTASSIUM SERPL-SCNC: 2.8 MMOL/L (ref 3.5–5.1)
PROT SERPL-MCNC: 8 G/DL (ref 6.4–8.2)
RBC # BLD AUTO: 3.41 M/UL (ref 4.1–5.7)
SARS-COV-2 RNA RESP QL NAA+PROBE: NOT DETECTED
SODIUM SERPL-SCNC: 138 MMOL/L (ref 136–145)
TROPONIN I SERPL HS-MCNC: 65 NG/L (ref 0–76)
TROPONIN I SERPL HS-MCNC: 67 NG/L (ref 0–76)
WBC # BLD AUTO: 5.8 K/UL (ref 4.1–11.1)

## 2024-03-12 PROCEDURE — 99284 EMERGENCY DEPT VISIT MOD MDM: CPT

## 2024-03-12 PROCEDURE — 87636 SARSCOV2 & INF A&B AMP PRB: CPT

## 2024-03-12 PROCEDURE — 93005 ELECTROCARDIOGRAM TRACING: CPT

## 2024-03-12 PROCEDURE — 36415 COLL VENOUS BLD VENIPUNCTURE: CPT

## 2024-03-12 PROCEDURE — 6370000000 HC RX 637 (ALT 250 FOR IP)

## 2024-03-12 PROCEDURE — 84484 ASSAY OF TROPONIN QUANT: CPT

## 2024-03-12 PROCEDURE — 71045 X-RAY EXAM CHEST 1 VIEW: CPT

## 2024-03-12 PROCEDURE — 85025 COMPLETE CBC W/AUTO DIFF WBC: CPT

## 2024-03-12 PROCEDURE — 80053 COMPREHEN METABOLIC PANEL: CPT

## 2024-03-12 PROCEDURE — 83735 ASSAY OF MAGNESIUM: CPT

## 2024-03-12 RX ORDER — POTASSIUM CHLORIDE 750 MG/1
40 TABLET, FILM COATED, EXTENDED RELEASE ORAL ONCE
Status: COMPLETED | OUTPATIENT
Start: 2024-03-12 | End: 2024-03-12

## 2024-03-12 RX ADMIN — POTASSIUM CHLORIDE 40 MEQ: 750 TABLET, EXTENDED RELEASE ORAL at 15:33

## 2024-03-12 ASSESSMENT — ENCOUNTER SYMPTOMS
EYE DISCHARGE: 0
SORE THROAT: 0
DIARRHEA: 0
CHEST TIGHTNESS: 0
CONSTIPATION: 0
ABDOMINAL PAIN: 0
EYE REDNESS: 0
NAUSEA: 0
SHORTNESS OF BREATH: 0
VOMITING: 0
COLOR CHANGE: 0
RESPIRATORY NEGATIVE: 1
EYE PAIN: 0
EYES NEGATIVE: 1
GASTROINTESTINAL NEGATIVE: 1
EYE ITCHING: 0
COUGH: 0
RHINORRHEA: 0

## 2024-03-12 ASSESSMENT — LIFESTYLE VARIABLES
HOW MANY STANDARD DRINKS CONTAINING ALCOHOL DO YOU HAVE ON A TYPICAL DAY: PATIENT DOES NOT DRINK
HOW OFTEN DO YOU HAVE A DRINK CONTAINING ALCOHOL: NEVER

## 2024-03-12 ASSESSMENT — PAIN - FUNCTIONAL ASSESSMENT: PAIN_FUNCTIONAL_ASSESSMENT: NONE - DENIES PAIN

## 2024-03-12 ASSESSMENT — HEART SCORE: ECG: 0

## 2024-03-12 NOTE — ED NOTES
Pt given discharge papers.  Pt educated on discharge papers. Pt instructed to follow up with PCP. Pt verbalizes understanding and denies any further questions. Pt ambulated to waiting room with steady gait, alert and oriented x4, PIV removed.

## 2024-03-12 NOTE — ED NOTES
Pt presents ambulatory to ED complaining of loss of appetite and nasal congestion; onset about a week. PT denies N/V/D , chills fever or headache. Pt reports onset of urinary and bowel incontinence, stating \"I have a problem controlling my bowels. I woke up this morning and had pooped on myself.\" Pt reports taking Kimber Aspirin daily. Pt reports being on Dialysis - M/W/F. Thrill palpated on left arm.    Pt is alert and oriented x 4, RR even and unlabored, skin is warm and dry. Assesment completed and pt updated on plan of care.       Emergency Department Nursing Plan of Care       The Nursing Plan of Care is developed from the Nursing assessment and Emergency Department Attending provider initial evaluation.  The plan of care may be reviewed in the “ED Provider note”.    The Plan of Care was developed with the following considerations:   Patient / Family readiness to learn indicated by:verbalized understanding  Persons(s) to be included in education: patient  Barriers to Learning/Limitations:None    Signed     Good Rudolph RN    3/12/2024   10:48 AM

## 2024-03-12 NOTE — ED NOTES
This RN attempted to obtain PIV access and ordered labs from Pt without success. Pt is unable to urinate to provide urine sample. Provider notified.

## 2024-03-12 NOTE — ED PROVIDER NOTES
THIS IS MY GATO SUPERVISORY AND SHARED VISIT NOTE:    I personally saw the patient and made/approved the management plan and take responsibility for the patient management.    History: Generalized weakness, decreased oral intake, and 1 episode of stool incontinence.  Patient states the weakness and decreased oral intake has been going on for the past 5 to 6 days and the episode of incontinence occurred last night.  He tells me he has also lost 10 pounds over the past week or so.  His wife is admitted to the hospital and he believes that he may have what ever she had.  He has a Monday Wednesday Friday dialysis patient and last had dialysis yesterday.  He tells me he has not had any incontinence while awake and it only occurred last night.  He also tells me he believes his potassium is low.  Exam: Appears stated age, ill-appearing but nontoxic anxious, he has a regular rate and a normal rhythm, lungs scattered ronchi, AV fistula left upper extremity with positive thrill  MDM: 73-year-old male presents with 6 days of generalized weakness, decreased oral intake, and 1 episode of bowel incontinence while sleeping.  Given his age and comorbidities, will check basic labs to assess for electrolyte abnormalities as well as anemia.  Will swab for flu and COVID and obtain a chest x-ray.  Patient is positive for influenza A.  Patient's potassium is 2.8, repleted with oral potassium.  Spoke with patient's primary care provider who agrees with the plan of discharge and he can follow-up in the office later this week.    Results for orders placed or performed during the hospital encounter of 03/12/24   COVID-19 & Influenza Combo    Specimen: Nasopharyngeal   Result Value Ref Range    SARS-CoV-2, PCR Not detected NOTD      Rapid Influenza A By PCR Detected (A) NOTD      Rapid Influenza B By PCR Not detected NOTD     CMP   Result Value Ref Range    Sodium 138 136 - 145 mmol/L    Potassium 2.8 (L) 3.5 - 5.1 mmol/L    Chloride 96 (L) 
0804

## 2024-03-12 NOTE — ED TRIAGE NOTES
Pt reports worsening fatigue, decreased PO intake and occ episodes of incon't. Pt reports being a dialysis pt and completing his sessions every M/W/F. Pt reports they removed 0.25L yesterday which is baseline for him, pt also reports being oliguric.

## 2024-03-12 NOTE — DISCHARGE INSTRUCTIONS
PCR Not detected NOTD     Troponin    Collection Time: 03/12/24  3:02 PM   Result Value Ref Range    Troponin, High Sensitivity 65 0 - 76 ng/L     XR CHEST PORTABLE   Final Result      Minimal platelike atelectasis right lung base.          The exam and treatment you received in the Emergency Department were for an urgent problem and are not intended as complete care. It is important that you follow up with a doctor, nurse practitioner, or physician assistant for ongoing care. If your symptoms become worse or you do not improve as expected and you are unable to reach your usual health care provider, you should return to the Emergency Department. We are available 24 hours a day.    Please take your discharge instructions with you when you go to your follow-up appointment.     If a prescription has been provided, please have it filled as soon as possible to prevent a delay in treatment. Read the entire medication instruction sheet provided to you by the pharmacy. If you have any questions or reservations about taking the medication due to side effects or interactions with other medications, please call your primary care physician or contact the ER to speak with the charge nurse.     Please make an appointment with your family doctor or the physician you were referred to for follow-up of this visit as instructed on your discharge paperwork. Return to the ER if you are unable to be seen or if you are unable to be seen in a timely manner.    Should you experience abdominal pain lasting greater than 6 hours, chest pain, difficulty breathing, fever/chills, numbness/tingling, skin changes or other symptoms that concern you, return to the ED sooner. If you feel worse over the next 24 hours, please return to the ED. We are available 24 hours a day. Thank you for trusting us with your care!

## 2024-03-14 LAB
EKG ATRIAL RATE: 81 BPM
EKG DIAGNOSIS: NORMAL
EKG P AXIS: 55 DEGREES
EKG P-R INTERVAL: 186 MS
EKG Q-T INTERVAL: 440 MS
EKG QRS DURATION: 168 MS
EKG QTC CALCULATION (BAZETT): 511 MS
EKG R AXIS: 269 DEGREES
EKG T AXIS: 45 DEGREES
EKG VENTRICULAR RATE: 81 BPM

## 2024-04-16 ENCOUNTER — OFFICE VISIT (OUTPATIENT)
Age: 74
End: 2024-04-16
Payer: MEDICARE

## 2024-04-16 VITALS
HEIGHT: 70 IN | HEART RATE: 70 BPM | BODY MASS INDEX: 23.42 KG/M2 | SYSTOLIC BLOOD PRESSURE: 169 MMHG | TEMPERATURE: 97.7 F | OXYGEN SATURATION: 98 % | WEIGHT: 163.6 LBS | RESPIRATION RATE: 16 BRPM | DIASTOLIC BLOOD PRESSURE: 91 MMHG

## 2024-04-16 DIAGNOSIS — N18.6 END STAGE RENAL DISEASE (HCC): ICD-10-CM

## 2024-04-16 DIAGNOSIS — B18.1 CHRONIC HEPATITIS B (HCC): Primary | ICD-10-CM

## 2024-04-16 LAB
ALBUMIN SERPL-MCNC: 4 G/DL (ref 3.5–5)
ALBUMIN/GLOB SERPL: 0.9 (ref 1.1–2.2)
ALP SERPL-CCNC: 98 U/L (ref 45–117)
ALT SERPL-CCNC: 24 U/L (ref 12–78)
ANION GAP SERPL CALC-SCNC: 5 MMOL/L (ref 5–15)
AST SERPL-CCNC: 18 U/L (ref 15–37)
BASOPHILS # BLD: 0 K/UL (ref 0–0.1)
BASOPHILS NFR BLD: 1 % (ref 0–1)
BILIRUB DIRECT SERPL-MCNC: 0.2 MG/DL (ref 0–0.2)
BILIRUB SERPL-MCNC: 0.6 MG/DL (ref 0.2–1)
BUN SERPL-MCNC: 17 MG/DL (ref 6–20)
BUN/CREAT SERPL: 4 (ref 12–20)
CALCIUM SERPL-MCNC: 9.6 MG/DL (ref 8.5–10.1)
CHLORIDE SERPL-SCNC: 106 MMOL/L (ref 97–108)
CO2 SERPL-SCNC: 30 MMOL/L (ref 21–32)
CREAT SERPL-MCNC: 4.67 MG/DL (ref 0.7–1.3)
DIFFERENTIAL METHOD BLD: ABNORMAL
EOSINOPHIL # BLD: 0.2 K/UL (ref 0–0.4)
EOSINOPHIL NFR BLD: 4 % (ref 0–7)
ERYTHROCYTE [DISTWIDTH] IN BLOOD BY AUTOMATED COUNT: 13.9 % (ref 11.5–14.5)
GLOBULIN SER CALC-MCNC: 4.3 G/DL (ref 2–4)
GLUCOSE SERPL-MCNC: 75 MG/DL (ref 65–100)
HCT VFR BLD AUTO: 32 % (ref 36.6–50.3)
HGB BLD-MCNC: 10.2 G/DL (ref 12.1–17)
IMM GRANULOCYTES # BLD AUTO: 0.1 K/UL (ref 0–0.04)
IMM GRANULOCYTES NFR BLD AUTO: 2 % (ref 0–0.5)
INR PPP: 1.1 (ref 0.9–1.1)
LYMPHOCYTES # BLD: 1.3 K/UL (ref 0.8–3.5)
LYMPHOCYTES NFR BLD: 35 % (ref 12–49)
MCH RBC QN AUTO: 30.5 PG (ref 26–34)
MCHC RBC AUTO-ENTMCNC: 31.9 G/DL (ref 30–36.5)
MCV RBC AUTO: 95.8 FL (ref 80–99)
MONOCYTES # BLD: 0.3 K/UL (ref 0–1)
MONOCYTES NFR BLD: 8 % (ref 5–13)
NEUTS SEG # BLD: 2 K/UL (ref 1.8–8)
NEUTS SEG NFR BLD: 50 % (ref 32–75)
NRBC # BLD: 0 K/UL (ref 0–0.01)
NRBC BLD-RTO: 0 PER 100 WBC
PLATELET # BLD AUTO: 202 K/UL (ref 150–400)
PMV BLD AUTO: 11.4 FL (ref 8.9–12.9)
POTASSIUM SERPL-SCNC: 3.5 MMOL/L (ref 3.5–5.1)
PROT SERPL-MCNC: 8.3 G/DL (ref 6.4–8.2)
PROTHROMBIN TIME: 11.3 SEC (ref 9–11.1)
RBC # BLD AUTO: 3.34 M/UL (ref 4.1–5.7)
SODIUM SERPL-SCNC: 141 MMOL/L (ref 136–145)
WBC # BLD AUTO: 3.9 K/UL (ref 4.1–11.1)

## 2024-04-16 PROCEDURE — 1036F TOBACCO NON-USER: CPT | Performed by: NURSE PRACTITIONER

## 2024-04-16 PROCEDURE — 1123F ACP DISCUSS/DSCN MKR DOCD: CPT | Performed by: NURSE PRACTITIONER

## 2024-04-16 PROCEDURE — 3080F DIAST BP >= 90 MM HG: CPT | Performed by: NURSE PRACTITIONER

## 2024-04-16 PROCEDURE — 3017F COLORECTAL CA SCREEN DOC REV: CPT | Performed by: NURSE PRACTITIONER

## 2024-04-16 PROCEDURE — G8427 DOCREV CUR MEDS BY ELIG CLIN: HCPCS | Performed by: NURSE PRACTITIONER

## 2024-04-16 PROCEDURE — 99214 OFFICE O/P EST MOD 30 MIN: CPT | Performed by: NURSE PRACTITIONER

## 2024-04-16 PROCEDURE — 3077F SYST BP >= 140 MM HG: CPT | Performed by: NURSE PRACTITIONER

## 2024-04-16 PROCEDURE — G8420 CALC BMI NORM PARAMETERS: HCPCS | Performed by: NURSE PRACTITIONER

## 2024-04-16 RX ORDER — TENOFOVIR ALAFENAMIDE 25 MG/1
TABLET ORAL
Qty: 30 TABLET | Refills: 5 | Status: SHIPPED | OUTPATIENT
Start: 2024-04-16 | End: 2024-04-16 | Stop reason: SDUPTHER

## 2024-04-16 RX ORDER — TENOFOVIR ALAFENAMIDE 25 MG/1
TABLET ORAL
Qty: 30 TABLET | Refills: 5 | Status: ACTIVE | OUTPATIENT
Start: 2024-04-16

## 2024-04-16 NOTE — PROGRESS NOTES
Rm    Chief Complaint   Patient presents with    Follow-up        BP (!) 169/91 (Site: Right Upper Arm)   Pulse 70   Temp 97.7 °F (36.5 °C)   Resp 16   Ht 1.778 m (5' 10\")   Wt 74.2 kg (163 lb 9.6 oz)   SpO2 98%   BMI 23.47 kg/m²      1. Have you been to the ER, urgent care clinic since your last visit?  Hospitalized since your last visit? no    2. Have you seen or consulted any other health care providers outside of the [facility] since your last visit?  Include any pap smears or colon screening. no    Health Maintenance Due   Topic Date Due    COVID-19 Vaccine (1) Never done    Pneumococcal 65+ years Vaccine (1 of 2 - PCV) Never done    Diabetic foot exam  Never done    Lipids  Never done    Diabetic retinal exam  Never done    Hepatitis A vaccine (1 of 2 - Risk 2-dose series) Never done    DTaP/Tdap/Td vaccine (1 - Tdap) Never done    Hepatitis B vaccine (1 of 3 - Risk Dialysis 4-dose series) Never done    Shingles vaccine (1 of 2) Never done    Respiratory Syncytial Virus (RSV) Pregnant or age 60 yrs+ (1 - 1-dose 60+ series) Never done    A1C test (Diabetic or Prediabetic)  09/03/2020    Annual Wellness Visit (Medicare Advantage)  Never done             No data to display                 Failed to redirect to the Timeline version of the Integration Management SmartLink.    Failed to redirect to the Timeline version of the Integration Management SmartLink.             
  Bili, Total 0.2 - 1.0 MG/DL 0.6  0.5    Bili, Direct 0.0 - 0.2 MG/DL 0.2     Albumin 3.5 - 5.0 g/dL 4.2  3.3 (L)    BUN 6 - 20 MG/DL 23 (H)  43 (H)    BUN (iSTAT) 9 - 20 mg/dL     Creat 0.70 - 1.30 MG/DL 5.88 (H)  6.91 (H)    Na 136 - 145 mmol/L 139  138    K 3.5 - 5.1 mmol/L 3.5  2.8 (L)    Cl 97 - 108 mmol/L 103  96 (L)    CO2 21 - 32 mmol/L 31  32    Glucose 65 - 100 mg/dL 86  93    Magnesium 1.6 - 2.4 mg/dL  1.9         Latest Ref Rng 1/13/2023 10/12/2023   [facility] - Cancer Screening      AFP 0.0 - 8.4 ng/mL 2.7  1.7    AFP-L3% 0.0 - 9.9 % Comment  Comment      Laboratory testing from 3/12/2024 reviewed in detail. Additional testing included to evaluate progression or regression of disease. Laboratory testing results from today will be communicated by My Chart.     SEROLOGIES:  Virology Latest Ref Rng & Units 7/13/2020 1/3/2020   HBV DNA IU/mL HBV DNA not detected    HCV RNA, LOLY, QL Negative Negative Positive (A)     Serologies Latest Ref Rng & Units 9/5/2019 9/3/2019   Hep B Surface Ag Index  >1,000.00   Hep B Surface Ag Interp NEG    POSITIVE (A)   Hep C Ab NR    REACTIVE (A)   Hep C Genotype  1b    HCV RT-PCR, Quant IU/mL 2,550,000    HCV Log10 log10 IU/mL 6.407    Hep D AB Negative     Iron % Saturation 20 - 50 %  29   C-ANCA Neg:<1:20 titer  <1:20   P-ANCA Neg:<1:20 titer  <1:20   ANCA Neg:<1:20 titer  <1:20     Serologies Latest Ref Rng & Units 11/10/2019   Hep B Surface Ag Index >1,000.00   Hep B Surface Ag Interp NEG   POSITIVE (A)   Hep B Core Ab, Total NEGATIVE   Positive (A)     Serologies Latest Ref Rng & Units 1/3/2020   Hep A Ab, Total Negative Negative   HCV RT-PCR, Quant IU/mL 3,870,000   Hep D AB Negative Negative     Virology HCV RNA, LOLY, QL   Latest Ref Rng & Units Negative   12/15/2020 Negative   7/13/2020 Negative      [facility] - Virology    HBV DNA   Latest Ref Rng IU/mL   7/13/2020 HBV DNA not detected    12/15/2020 HBV DNA not detected    1/12/2023 HBV DNA not detected    10/12/2023 HBV DNA

## 2024-04-19 LAB
AFP L3 MFR SERPL: NORMAL % (ref 0–9.9)
AFP SERPL-MCNC: 2 NG/ML (ref 0–8.4)

## 2024-05-17 ENCOUNTER — HOSPITAL ENCOUNTER (OUTPATIENT)
Facility: HOSPITAL | Age: 74
End: 2024-05-17
Payer: MEDICARE

## 2024-05-17 DIAGNOSIS — B18.1 CHRONIC HEPATITIS B (HCC): ICD-10-CM

## 2024-05-17 PROCEDURE — 76700 US EXAM ABDOM COMPLETE: CPT

## 2024-10-22 ENCOUNTER — OFFICE VISIT (OUTPATIENT)
Age: 74
End: 2024-10-22
Payer: MEDICARE

## 2024-10-22 VITALS
SYSTOLIC BLOOD PRESSURE: 173 MMHG | TEMPERATURE: 99 F | WEIGHT: 159.6 LBS | OXYGEN SATURATION: 98 % | HEIGHT: 70 IN | DIASTOLIC BLOOD PRESSURE: 96 MMHG | BODY MASS INDEX: 22.85 KG/M2 | HEART RATE: 77 BPM

## 2024-10-22 DIAGNOSIS — N18.6 END STAGE RENAL DISEASE (HCC): ICD-10-CM

## 2024-10-22 DIAGNOSIS — B18.1 CHRONIC VIRAL HEPATITIS B WITHOUT DELTA AGENT AND WITHOUT COMA (HCC): Primary | ICD-10-CM

## 2024-10-22 PROCEDURE — 99214 OFFICE O/P EST MOD 30 MIN: CPT | Performed by: NURSE PRACTITIONER

## 2024-10-22 PROCEDURE — 3017F COLORECTAL CA SCREEN DOC REV: CPT | Performed by: NURSE PRACTITIONER

## 2024-10-22 PROCEDURE — 1036F TOBACCO NON-USER: CPT | Performed by: NURSE PRACTITIONER

## 2024-10-22 PROCEDURE — G8420 CALC BMI NORM PARAMETERS: HCPCS | Performed by: NURSE PRACTITIONER

## 2024-10-22 PROCEDURE — G8484 FLU IMMUNIZE NO ADMIN: HCPCS | Performed by: NURSE PRACTITIONER

## 2024-10-22 PROCEDURE — G8427 DOCREV CUR MEDS BY ELIG CLIN: HCPCS | Performed by: NURSE PRACTITIONER

## 2024-10-22 PROCEDURE — 1123F ACP DISCUSS/DSCN MKR DOCD: CPT | Performed by: NURSE PRACTITIONER

## 2024-10-22 PROCEDURE — 3080F DIAST BP >= 90 MM HG: CPT | Performed by: NURSE PRACTITIONER

## 2024-10-22 PROCEDURE — 3077F SYST BP >= 140 MM HG: CPT | Performed by: NURSE PRACTITIONER

## 2024-10-22 ASSESSMENT — ANXIETY QUESTIONNAIRES
6. BECOMING EASILY ANNOYED OR IRRITABLE: NOT AT ALL
1. FEELING NERVOUS, ANXIOUS, OR ON EDGE: NOT AT ALL
5. BEING SO RESTLESS THAT IT IS HARD TO SIT STILL: NOT AT ALL
7. FEELING AFRAID AS IF SOMETHING AWFUL MIGHT HAPPEN: NOT AT ALL
4. TROUBLE RELAXING: NOT AT ALL
3. WORRYING TOO MUCH ABOUT DIFFERENT THINGS: NOT AT ALL
IF YOU CHECKED OFF ANY PROBLEMS ON THIS QUESTIONNAIRE, HOW DIFFICULT HAVE THESE PROBLEMS MADE IT FOR YOU TO DO YOUR WORK, TAKE CARE OF THINGS AT HOME, OR GET ALONG WITH OTHER PEOPLE: NOT DIFFICULT AT ALL
GAD7 TOTAL SCORE: 0
2. NOT BEING ABLE TO STOP OR CONTROL WORRYING: NOT AT ALL

## 2024-10-22 ASSESSMENT — PATIENT HEALTH QUESTIONNAIRE - PHQ9
1. LITTLE INTEREST OR PLEASURE IN DOING THINGS: NOT AT ALL
SUM OF ALL RESPONSES TO PHQ QUESTIONS 1-9: 0
SUM OF ALL RESPONSES TO PHQ9 QUESTIONS 1 & 2: 0
2. FEELING DOWN, DEPRESSED OR HOPELESS: NOT AT ALL
SUM OF ALL RESPONSES TO PHQ QUESTIONS 1-9: 0
DEPRESSION UNABLE TO ASSESS: FUNCTIONAL CAPACITY MOTIVATION LIMITS ACCURACY
SUM OF ALL RESPONSES TO PHQ QUESTIONS 1-9: 0
SUM OF ALL RESPONSES TO PHQ QUESTIONS 1-9: 0

## 2024-10-22 NOTE — PROGRESS NOTES
[facility]      Apolinar Wheat MD, FACP, FACG, FAASLD      Romelia Russell, PA-C    Velma Fuentes, [facility]   Ansley Zavalavalentina, [facility]   Naifavio Chu, [facility]  Nick Page, [facility]   Vanessa Holliday, [facility]   Laya Chapito, [facility]   [address], [address]   [place]  [address] [phone]   FAX: [phone]  [facility]   [address], [address]   [place]  [address] [phone]   FAX: [phone]       Patient Care Team:  Uche Lee MD as PCP - General    Patient Active Problem List   Diagnosis    Chronic hepatitis B (HCC)    DM (diabetes mellitus) (HCC)    Hematuria    Hypertension    Hepatitis C virus infection cured after antiviral drug therapy    End stage renal disease (HCC)       Bradley Smith is being seen at [facility] for management of Chronic HBV and Chronic HCV. The active problem list, all pertinent past medical history, medications, radiologic findings and laboratory findings related to the liver disorder were reviewed and discussed with the patient.      Bradley Smith returns to the [facility] for management of chronic HCV and chronic HBV.    The active problem list, all pertinent past medical history, medications, liver histology, radiologic findings and laboratory findings related to the liver disorder were reviewed with the patient.      The patient is a 73 y.o. Black male who was found to have abnormalities in liver chemistries and subsequently tested positive for chronic HCV and HBV in 11/2019.      Patient was sent for transjugular biopsy at [facility] on 12/3/2021. HVPG demonstrated a 2 mm gradient. After the procedure he developed severe vomiting, nausea and inability to eat. Hematemesis developed which prompted

## 2024-10-22 NOTE — PROGRESS NOTES
Chief Complaint   Patient presents with    Follow-up     N/A     Vitals:    10/22/24 0908   BP: (!) 173/96   Site: Right Upper Arm   Position: Sitting   Pulse: 77   Temp: 99 °F (37.2 °C)   TempSrc: Temporal   SpO2: 98%   Weight: 72.4 kg (159 lb 9.6 oz)   Height: 1.778 m (5' 10\")     .  \"Have you been to the ER, urgent care clinic since your last visit?  Hospitalized since your last visit?\"    NO    “Have you seen or consulted any other health care providers outside of [facility] since your last visit?”    NO            Click Here for Release of Records Request

## 2024-11-05 ENCOUNTER — HOSPITAL ENCOUNTER (EMERGENCY)
Facility: HOSPITAL | Age: 74
Discharge: HOME | End: 2024-11-05
Attending: EMERGENCY MEDICINE
Payer: MEDICARE

## 2024-11-05 ENCOUNTER — APPOINTMENT (OUTPATIENT)
Facility: HOSPITAL | Age: 74
End: 2024-11-05
Attending: EMERGENCY MEDICINE
Payer: MEDICARE

## 2024-11-05 VITALS
TEMPERATURE: 97.5 F | DIASTOLIC BLOOD PRESSURE: 90 MMHG | SYSTOLIC BLOOD PRESSURE: 176 MMHG | OXYGEN SATURATION: 99 % | HEART RATE: 75 BPM | RESPIRATION RATE: 16 BRPM

## 2024-11-05 DIAGNOSIS — S20.212A RIB CONTUSION, LEFT, INITIAL ENCOUNTER: Primary | ICD-10-CM
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
## 2024-11-05 PROCEDURE — 6370000000 HC RX 637 (ALT 250 FOR IP): Performed by: EMERGENCY MEDICINE

## 2024-11-05 PROCEDURE — 71250 CT THORAX DX C-: CPT

## 2024-11-05 PROCEDURE — 99284 EMERGENCY DEPT VISIT MOD MDM: CPT

## 2024-11-05 RX ORDER — OXYCODONE AND ACETAMINOPHEN 5; 325 MG/1; MG/1
1 TABLET ORAL EVERY 6 HOURS PRN
Qty: 15 TABLET | Refills: 0 | Status: SHIPPED | OUTPATIENT
Start: 2024-11-05 | End: 2024-11-10

## 2024-11-05 RX ORDER — OXYCODONE AND ACETAMINOPHEN 5; 325 MG/1; MG/1
2 TABLET ORAL
Status: COMPLETED | OUTPATIENT
Start: 2024-11-05 | End: 2024-11-05

## 2024-11-05 RX ADMIN — OXYCODONE HYDROCHLORIDE AND ACETAMINOPHEN 2 TABLET: 5; 325 TABLET ORAL at 12:55

## 2024-11-05 ASSESSMENT — PAIN SCALES - GENERAL
PAINLEVEL_OUTOF10: 10
PAINLEVEL_OUTOF10: 10

## 2024-11-05 ASSESSMENT — PAIN DESCRIPTION - LOCATION
LOCATION: RIB CAGE
LOCATION: CHEST;RIB CAGE

## 2024-11-05 ASSESSMENT — PAIN DESCRIPTION - ORIENTATION
ORIENTATION: LEFT
ORIENTATION: LEFT

## 2024-11-05 ASSESSMENT — PAIN DESCRIPTION - DESCRIPTORS: DESCRIPTORS: ACHING

## 2024-11-05 ASSESSMENT — PAIN - FUNCTIONAL ASSESSMENT: PAIN_FUNCTIONAL_ASSESSMENT: 0-10

## 2024-11-05 ASSESSMENT — ENCOUNTER SYMPTOMS
ABDOMINAL PAIN: 0
SHORTNESS OF BREATH: 0

## 2024-11-05 NOTE — DISCHARGE INSTRUCTIONS
It was a pleasure taking care of you at [facility] Emergency Department today.  We know that when you come to [facility], you are entrusting us with your health, comfort, and safety.  Our physicians and nurses honor that trust, and we appreciate the opportunity to care for you and your loved ones.  We also value your feedback, and we would like to hear from you.    If you receive a  >>> survey <<< about your Emergency Department experience today, please fill it out. We review every single response from our patients. Thank you!

## 2024-11-05 NOTE — ED NOTES
Discharge instructions were given to the patient by MD Nancy.     The patient left the Emergency Department ambulatory, alert and oriented and in no acute distress with 1 prescriptions. The patient was encouraged to call or return to the ED for worsening issues or problems and was encouraged to schedule a follow up appointment for continuing care. Patient discharged home ambulatory with designated .    The patient verbalized understanding of discharge instructions and prescriptions, all questions were answered. The patient has no further concerns at this time.

## 2024-11-05 NOTE — ED NOTES
Dr. Cruz and Charge nurse had discussion about need for case management or forensic nurse examiner. Dr. Cruz does not find need for consults at this time.
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
## 2024-11-26 LAB
BASOPHILS # BLD AUTO: 0 X10E3/UL (ref 0–0.2)
BASOPHILS NFR BLD AUTO: 0 %
EOSINOPHIL # BLD AUTO: 0.2 X10E3/UL (ref 0–0.4)
EOSINOPHIL NFR BLD AUTO: 4 %
ERYTHROCYTE [DISTWIDTH] IN BLOOD BY AUTOMATED COUNT: 13.6 % (ref 11.6–15.4)
HCT VFR BLD AUTO: 35.7 % (ref 37.5–51)
HGB BLD-MCNC: 11.3 G/DL (ref 13–17.7)
IMM GRANULOCYTES # BLD AUTO: 0 X10E3/UL (ref 0–0.1)
IMM GRANULOCYTES NFR BLD AUTO: 0 %
LYMPHOCYTES # BLD AUTO: 1.1 X10E3/UL (ref 0.7–3.1)
LYMPHOCYTES NFR BLD AUTO: 24 %
MCH RBC QN AUTO: 29.9 PG (ref 26.6–33)
MCHC RBC AUTO-ENTMCNC: 31.7 G/DL (ref 31.5–35.7)
MCV RBC AUTO: 94 FL (ref 79–97)
MONOCYTES # BLD AUTO: 0.5 X10E3/UL (ref 0.1–0.9)
MONOCYTES NFR BLD AUTO: 10 %
NEUTROPHILS # BLD AUTO: 2.9 X10E3/UL (ref 1.4–7)
NEUTROPHILS NFR BLD AUTO: 62 %
PLATELET # BLD AUTO: 219 X10E3/UL (ref 150–450)
RBC # BLD AUTO: 3.78 X10E6/UL (ref 4.14–5.8)
WBC # BLD AUTO: 4.6 X10E3/UL (ref 3.4–10.8)

## 2024-11-27 LAB
ALBUMIN SERPL-MCNC: 4.5 G/DL (ref 3.8–4.8)
ALP SERPL-CCNC: 90 IU/L (ref 44–121)
ALT SERPL-CCNC: 14 IU/L (ref 0–44)
AST SERPL-CCNC: 14 IU/L (ref 0–40)
BILIRUB DIRECT SERPL-MCNC: 0.19 MG/DL (ref 0–0.4)
BILIRUB SERPL-MCNC: 0.5 MG/DL (ref 0–1.2)
BUN SERPL-MCNC: 24 MG/DL (ref 8–27)
BUN/CREAT SERPL: 5 (ref 10–24)
CALCIUM SERPL-MCNC: 9.6 MG/DL (ref 8.6–10.2)
CHLORIDE SERPL-SCNC: 100 MMOL/L (ref 96–106)
CO2 SERPL-SCNC: 25 MMOL/L (ref 20–29)
CREAT SERPL-MCNC: 5.11 MG/DL (ref 0.76–1.27)
EGFRCR SERPLBLD CKD-EPI 2021: 11 ML/MIN/1.73
GLUCOSE SERPL-MCNC: 74 MG/DL (ref 70–99)
HBV DNA SERPL NAA+PROBE-ACNC: NORMAL IU/ML
HBV DNA SERPL NAA+PROBE-LOG IU: NORMAL LOG10 IU/ML
INR PPP: 1.1 (ref 0.9–1.2)
POTASSIUM SERPL-SCNC: 3.8 MMOL/L (ref 3.5–5.2)
PROT SERPL-MCNC: 8.1 G/DL (ref 6–8.5)
PROTHROMBIN TIME: 12.4 SEC (ref 9.1–12)
REF LAB TEST REF RANGE: NORMAL
SODIUM SERPL-SCNC: 144 MMOL/L (ref 134–144)

## 2024-11-28 LAB
AFP L3 MFR SERPL: NORMAL % (ref 0–9.9)
AFP SERPL-MCNC: 1.7 NG/ML (ref 0–8.4)

## 2025-04-24 ENCOUNTER — OFFICE VISIT (OUTPATIENT)
Age: 75
End: 2025-04-24
Payer: MEDICARE

## 2025-04-24 VITALS
HEIGHT: 70 IN | WEIGHT: 157 LBS | SYSTOLIC BLOOD PRESSURE: 176 MMHG | OXYGEN SATURATION: 97 % | TEMPERATURE: 98.6 F | DIASTOLIC BLOOD PRESSURE: 97 MMHG | BODY MASS INDEX: 22.48 KG/M2 | HEART RATE: 80 BPM

## 2025-04-24 DIAGNOSIS — B18.1 CHRONIC VIRAL HEPATITIS B WITHOUT DELTA AGENT AND WITHOUT COMA (HCC): Primary | ICD-10-CM

## 2025-04-24 LAB
ALBUMIN SERPL-MCNC: 4.4 G/DL (ref 3.5–5)
ALBUMIN/GLOB SERPL: 1 (ref 1.1–2.2)
ALP SERPL-CCNC: 78 U/L (ref 45–117)
ALT SERPL-CCNC: 17 U/L (ref 12–78)
ANION GAP SERPL CALC-SCNC: 10 MMOL/L (ref 2–12)
AST SERPL-CCNC: 12 U/L (ref 15–37)
BASOPHILS # BLD: 0.04 K/UL (ref 0–0.1)
BASOPHILS NFR BLD: 1 % (ref 0–1)
BILIRUB SERPL-MCNC: 0.8 MG/DL (ref 0.2–1)
BUN SERPL-MCNC: 30 MG/DL (ref 6–20)
BUN/CREAT SERPL: 5 (ref 12–20)
CALCIUM SERPL-MCNC: 9.7 MG/DL (ref 8.5–10.1)
CHLORIDE SERPL-SCNC: 98 MMOL/L (ref 97–108)
CO2 SERPL-SCNC: 29 MMOL/L (ref 21–32)
CREAT SERPL-MCNC: 5.79 MG/DL (ref 0.7–1.3)
DIFFERENTIAL METHOD BLD: ABNORMAL
EOSINOPHIL # BLD: 0.15 K/UL (ref 0–0.4)
EOSINOPHIL NFR BLD: 3.7 % (ref 0–7)
ERYTHROCYTE [DISTWIDTH] IN BLOOD BY AUTOMATED COUNT: 13.2 % (ref 11.5–14.5)
GLOBULIN SER CALC-MCNC: 4.6 G/DL (ref 2–4)
GLUCOSE SERPL-MCNC: 72 MG/DL (ref 65–100)
HCT VFR BLD AUTO: 38.3 % (ref 36.6–50.3)
HGB BLD-MCNC: 12.1 G/DL (ref 12.1–17)
IMM GRANULOCYTES # BLD AUTO: 0.01 K/UL (ref 0–0.04)
IMM GRANULOCYTES NFR BLD AUTO: 0.2 % (ref 0–0.5)
INR PPP: 1.1 (ref 0.9–1.1)
LYMPHOCYTES # BLD: 1.3 K/UL (ref 0.8–3.5)
LYMPHOCYTES NFR BLD: 31.9 % (ref 12–49)
MCH RBC QN AUTO: 31 PG (ref 26–34)
MCHC RBC AUTO-ENTMCNC: 31.6 G/DL (ref 30–36.5)
MCV RBC AUTO: 98.2 FL (ref 80–99)
MONOCYTES # BLD: 0.44 K/UL (ref 0–1)
MONOCYTES NFR BLD: 10.8 % (ref 5–13)
NEUTS SEG # BLD: 2.13 K/UL (ref 1.8–8)
NEUTS SEG NFR BLD: 52.4 % (ref 32–75)
NRBC # BLD: 0 K/UL (ref 0–0.01)
NRBC BLD-RTO: 0 PER 100 WBC
PLATELET # BLD AUTO: 223 K/UL (ref 150–400)
PMV BLD AUTO: 11.7 FL (ref 8.9–12.9)
POTASSIUM SERPL-SCNC: 3.5 MMOL/L (ref 3.5–5.1)
PROT SERPL-MCNC: 9 G/DL (ref 6.4–8.2)
PROTHROMBIN TIME: 11.4 SEC (ref 9.2–11.2)
RBC # BLD AUTO: 3.9 M/UL (ref 4.1–5.7)
SODIUM SERPL-SCNC: 137 MMOL/L (ref 136–145)
WBC # BLD AUTO: 4.1 K/UL (ref 4.1–11.1)

## 2025-04-24 PROCEDURE — 1123F ACP DISCUSS/DSCN MKR DOCD: CPT | Performed by: NURSE PRACTITIONER

## 2025-04-24 PROCEDURE — 99214 OFFICE O/P EST MOD 30 MIN: CPT | Performed by: NURSE PRACTITIONER

## 2025-04-24 PROCEDURE — 3080F DIAST BP >= 90 MM HG: CPT | Performed by: NURSE PRACTITIONER

## 2025-04-24 PROCEDURE — 1036F TOBACCO NON-USER: CPT | Performed by: NURSE PRACTITIONER

## 2025-04-24 PROCEDURE — G8427 DOCREV CUR MEDS BY ELIG CLIN: HCPCS | Performed by: NURSE PRACTITIONER

## 2025-04-24 PROCEDURE — 3077F SYST BP >= 140 MM HG: CPT | Performed by: NURSE PRACTITIONER

## 2025-04-24 PROCEDURE — G8420 CALC BMI NORM PARAMETERS: HCPCS | Performed by: NURSE PRACTITIONER

## 2025-04-24 PROCEDURE — 1160F RVW MEDS BY RX/DR IN RCRD: CPT | Performed by: NURSE PRACTITIONER

## 2025-04-24 PROCEDURE — 1159F MED LIST DOCD IN RCRD: CPT | Performed by: NURSE PRACTITIONER

## 2025-04-24 ASSESSMENT — PATIENT HEALTH QUESTIONNAIRE - PHQ9
DEPRESSION UNABLE TO ASSESS: FUNCTIONAL CAPACITY MOTIVATION LIMITS ACCURACY
SUM OF ALL RESPONSES TO PHQ QUESTIONS 1-9: 0
1. LITTLE INTEREST OR PLEASURE IN DOING THINGS: NOT AT ALL
2. FEELING DOWN, DEPRESSED OR HOPELESS: NOT AT ALL
SUM OF ALL RESPONSES TO PHQ QUESTIONS 1-9: 0

## 2025-04-24 ASSESSMENT — ANXIETY QUESTIONNAIRES
7. FEELING AFRAID AS IF SOMETHING AWFUL MIGHT HAPPEN: NOT AT ALL
3. WORRYING TOO MUCH ABOUT DIFFERENT THINGS: NOT AT ALL
1. FEELING NERVOUS, ANXIOUS, OR ON EDGE: NOT AT ALL
IF YOU CHECKED OFF ANY PROBLEMS ON THIS QUESTIONNAIRE, HOW DIFFICULT HAVE THESE PROBLEMS MADE IT FOR YOU TO DO YOUR WORK, TAKE CARE OF THINGS AT HOME, OR GET ALONG WITH OTHER PEOPLE: NOT DIFFICULT AT ALL
GAD7 TOTAL SCORE: 0
4. TROUBLE RELAXING: NOT AT ALL
5. BEING SO RESTLESS THAT IT IS HARD TO SIT STILL: NOT AT ALL
6. BECOMING EASILY ANNOYED OR IRRITABLE: NOT AT ALL
2. NOT BEING ABLE TO STOP OR CONTROL WORRYING: NOT AT ALL

## 2025-04-24 NOTE — PROGRESS NOTES
Chief Complaint   Patient presents with    Follow-up     N/A     Vitals:    04/24/25 0855   BP: (!) 176/97   BP Site: Right Upper Arm   Patient Position: Sitting   Pulse: 80   Temp: 98.6 °F (37 °C)   TempSrc: Temporal   SpO2: 97%   Weight: 71.2 kg (157 lb)   Height: 1.778 m (5' 10\")     .  \"Have you been to the ER, urgent care clinic since your last visit?  Hospitalized since your last visit?\"    NO    “Have you seen or consulted any other health care providers outside of [facility] since your last visit?”    NO            Click Here for Release of Records Request    
    ESRD  The patient continues on dialysis M,W,F.     He has decided not to pursue having a kidney transplant.     Screening for hepatocellular carcinoma  HCC screening was last performed with ultrasound 5/2024 and did not suggest HCC. AFP normal 11/2024.  Ultrasound was ordered but not yet scheduled.  Will order this again along with AFP.      Treatment of other medical problems in patients with chronic liver disease  There are no contraindications for the patient to take most medications necessary for treatment of other medical issues.    Counseling for alcohol in patients with chronic liver disease  The patient was counseled regarding alcohol consumption and the effect of alcohol on chronic liver disease. The patient does not consume any significant amount of alcohol.    Vaccinations   Routine vaccinations against other bacterial and viral agents can be performed as indicated. Annual flu vaccination should be administered if indicated.    ALLERGIES  No Known Allergies    MEDICATIONS  Current Outpatient Medications   Medication Instructions    amLODIPine (NORVASC) 10 MG tablet DAILY    atorvastatin (LIPITOR) 20 MG tablet DAILY    hydrALAZINE (APRESOLINE) 25 MG tablet 3 TIMES DAILY    labetalol (NORMODYNE) 300 MG tablet 2 TIMES DAILY    sevelamer (RENVELA) 800 MG tablet 3 TIMES DAILY    Tenofovir Alafenamide Fumarate (VEMLIDY) 25 MG TABS Take 1 by mouth 3 times weekly after dialysis     FAMILY HISTORY:  The patient has no knowledge of the father's medical condition.    The patient has no knowledge of the mother's medical condition.      SOCIAL HISTORY:  The patient is .    The patient has 1 child and 3 grandchildren.    The patient has never used tobacco products.    The patient has never consumed significant amounts of alcohol.    The patient used to work for furniture sales.    The patient has not worked since 2014.      PHYSICAL EXAMINATION:  BP (!) 176/97 (BP Site: Right Upper Arm, Patient Position:

## 2025-04-26 LAB — AFP-TM SERPL-MCNC: 1.8 NG/ML (ref 0–8.4)

## 2025-04-27 LAB
HBV DNA SERPL NAA+PROBE-ACNC: <10 IU/ML
HBV DNA SERPL NAA+PROBE-LOG IU: NORMAL LOG10 IU/ML
TEST INFORMATION: NORMAL

## 2025-04-28 ENCOUNTER — RESULTS FOLLOW-UP (OUTPATIENT)
Age: 75
End: 2025-04-28

## 2025-05-19 ENCOUNTER — HOSPITAL ENCOUNTER (OUTPATIENT)
Facility: HOSPITAL | Age: 75
Discharge: HOME | End: 2025-05-22
Payer: MEDICARE

## 2025-05-19 DIAGNOSIS — B18.1 CHRONIC VIRAL HEPATITIS B WITHOUT DELTA AGENT AND WITHOUT COMA (HCC): ICD-10-CM

## 2025-05-19 PROCEDURE — 76700 US EXAM ABDOM COMPLETE: CPT

## (undated) DEVICE — CATH IV AUTOGRD BC PNK 20GA 25 -- INSYTE

## (undated) DEVICE — 1200 GUARD II KIT W/5MM TUBE W/O VAC TUBE: Brand: GUARDIAN

## (undated) DEVICE — NEEDLE HYPO 18GA L1.5IN PNK S STL HUB POLYPR SHLD REG BVL

## (undated) DEVICE — SET ADMIN 16ML TBNG L100IN 2 Y INJ SITE IV PIGGY BK DISP

## (undated) DEVICE — CONTAINER SPEC 20 ML LID NEUT BUFF FORMALIN 10 % POLYPR STS

## (undated) DEVICE — YANKAUER,TAPERED BULBOUS TIP,W/O VENT: Brand: MEDLINE

## (undated) DEVICE — BAG SPEC BIOHZRD 10 X 10 IN --

## (undated) DEVICE — SOLIDIFIER MEDC 1200ML -- CONVERT TO 356117

## (undated) DEVICE — SYR 10ML LUER LOK 1/5ML GRAD --

## (undated) DEVICE — TOWEL 4 PLY TISS 19X30 SUE WHT

## (undated) DEVICE — ELECTRODE,RADIOTRANSLUCENT,FOAM,5PK: Brand: MEDLINE

## (undated) DEVICE — BLOCK BITE ENDOSCP AD 21 MM W/ DIL BLU LF DISP

## (undated) DEVICE — Z DISCONTINUED PER MEDLINE LINE GAS SAMPLING O2/CO2 LNG AD 13 FT NSL W/ TBNG FILTERLINE

## (undated) DEVICE — Device

## (undated) DEVICE — FORCEPS BX L160CM DIA8MM GRSP DISECT CUP TIP NONLOCKING ROT

## (undated) DEVICE — BASIN EMSIS 16OZ GRAPHITE PLAS KID SHP MOLD GRAD FOR ORAL

## (undated) DEVICE — NEONATAL-ADULT SPO2 SENSOR: Brand: NELLCOR

## (undated) DEVICE — SYR 3ML LL TIP 1/10ML GRAD --